# Patient Record
Sex: FEMALE | Race: WHITE | Employment: OTHER | URBAN - NONMETROPOLITAN AREA
[De-identification: names, ages, dates, MRNs, and addresses within clinical notes are randomized per-mention and may not be internally consistent; named-entity substitution may affect disease eponyms.]

---

## 2017-01-31 PROBLEM — M67.442 GANGLION CYST OF FINGER OF LEFT HAND: Status: ACTIVE | Noted: 2017-01-31

## 2017-07-17 ENCOUNTER — HOSPITAL ENCOUNTER (OUTPATIENT)
Dept: PHYSICAL THERAPY | Age: 77
Setting detail: THERAPIES SERIES
Discharge: HOME | End: 2017-07-17
Payer: MEDICARE

## 2017-07-17 PROCEDURE — 97110 THERAPEUTIC EXERCISES: CPT

## 2017-07-17 PROCEDURE — 97035 APP MDLTY 1+ULTRASOUND EA 15: CPT

## 2017-07-17 ASSESSMENT — PAIN SCALES - GENERAL: PAINLEVEL_OUTOF10: 5

## 2017-07-17 ASSESSMENT — PAIN DESCRIPTION - ORIENTATION: ORIENTATION: LOWER

## 2017-07-17 ASSESSMENT — PAIN DESCRIPTION - LOCATION: LOCATION: BACK

## 2017-07-19 ENCOUNTER — HOSPITAL ENCOUNTER (OUTPATIENT)
Dept: PHYSICAL THERAPY | Age: 77
Setting detail: THERAPIES SERIES
Discharge: HOME | End: 2017-07-19
Payer: MEDICARE

## 2017-07-19 PROCEDURE — 97110 THERAPEUTIC EXERCISES: CPT

## 2017-07-19 PROCEDURE — 97035 APP MDLTY 1+ULTRASOUND EA 15: CPT

## 2017-07-19 ASSESSMENT — PAIN DESCRIPTION - LOCATION: LOCATION: BACK

## 2017-07-19 ASSESSMENT — PAIN SCALES - GENERAL: PAINLEVEL_OUTOF10: 4

## 2017-07-19 ASSESSMENT — PAIN DESCRIPTION - PAIN TYPE: TYPE: CHRONIC PAIN

## 2017-07-19 ASSESSMENT — PAIN DESCRIPTION - ORIENTATION: ORIENTATION: RIGHT;LOWER

## 2017-07-24 ENCOUNTER — APPOINTMENT (OUTPATIENT)
Dept: PHYSICAL THERAPY | Age: 77
End: 2017-07-24
Payer: MEDICARE

## 2017-07-26 ENCOUNTER — APPOINTMENT (OUTPATIENT)
Dept: PHYSICAL THERAPY | Age: 77
End: 2017-07-26
Payer: MEDICARE

## 2017-07-27 ENCOUNTER — APPOINTMENT (OUTPATIENT)
Dept: PHYSICAL THERAPY | Age: 77
End: 2017-07-27
Payer: MEDICARE

## 2017-09-05 LAB
CHOLESTEROL, TOTAL: 168 MG/DL
CHOLESTEROL/HDL RATIO: 2.8
HDLC SERPL-MCNC: 60 MG/DL (ref 35–70)
LDL CHOLESTEROL CALCULATED: 70 MG/DL (ref 0–160)
TRIGL SERPL-MCNC: 148 MG/DL
VLDLC SERPL CALC-MCNC: 29 MG/DL

## 2017-09-22 ENCOUNTER — HOSPITAL ENCOUNTER (OUTPATIENT)
Dept: WOMENS IMAGING | Age: 77
Discharge: HOME | End: 2017-09-22
Payer: MEDICARE

## 2017-09-22 DIAGNOSIS — Z12.31 VISIT FOR SCREENING MAMMOGRAM: ICD-10-CM

## 2017-09-22 PROCEDURE — G0202 SCR MAMMO BI INCL CAD: HCPCS

## 2017-10-19 ENCOUNTER — HOSPITAL ENCOUNTER (OUTPATIENT)
Dept: INTERVENTIONAL RADIOLOGY/VASCULAR | Age: 77
Discharge: HOME | End: 2017-10-19
Payer: MEDICARE

## 2017-10-19 VITALS
TEMPERATURE: 97.7 F | BODY MASS INDEX: 27.81 KG/M2 | DIASTOLIC BLOOD PRESSURE: 76 MMHG | RESPIRATION RATE: 16 BRPM | WEIGHT: 157 LBS | OXYGEN SATURATION: 98 % | SYSTOLIC BLOOD PRESSURE: 136 MMHG | HEART RATE: 55 BPM

## 2017-10-19 PROCEDURE — 6360000002 HC RX W HCPCS

## 2017-10-19 PROCEDURE — 62323 NJX INTERLAMINAR LMBR/SAC: CPT | Performed by: RADIOLOGY

## 2017-10-19 PROCEDURE — 2500000003 HC RX 250 WO HCPCS

## 2017-10-19 PROCEDURE — 6360000004 HC RX CONTRAST MEDICATION: Performed by: RADIOLOGY

## 2017-10-19 RX ORDER — BUPIVACAINE HYDROCHLORIDE 2.5 MG/ML
5 INJECTION, SOLUTION EPIDURAL; INFILTRATION; INTRACAUDAL ONCE
Status: COMPLETED | OUTPATIENT
Start: 2017-10-19 | End: 2017-10-19

## 2017-10-19 RX ORDER — METHYLPREDNISOLONE ACETATE 80 MG/ML
80 INJECTION, SUSPENSION INTRA-ARTICULAR; INTRALESIONAL; INTRAMUSCULAR; SOFT TISSUE ONCE
Status: COMPLETED | OUTPATIENT
Start: 2017-10-19 | End: 2017-10-19

## 2017-10-19 RX ADMIN — METHYLPREDNISOLONE ACETATE 80 MG: 80 INJECTION, SUSPENSION INTRA-ARTICULAR; INTRALESIONAL; INTRAMUSCULAR; SOFT TISSUE at 09:20

## 2017-10-19 RX ADMIN — BUPIVACAINE HYDROCHLORIDE 2 ML: 2.5 INJECTION, SOLUTION EPIDURAL; INFILTRATION; INTRACAUDAL at 09:20

## 2017-10-19 RX ADMIN — IOHEXOL 1 ML: 180 INJECTION INTRAVENOUS at 09:20

## 2017-10-19 ASSESSMENT — PAIN - FUNCTIONAL ASSESSMENT: PAIN_FUNCTIONAL_ASSESSMENT: 0-10

## 2017-10-19 ASSESSMENT — PAIN SCALES - GENERAL
PAINLEVEL_OUTOF10: 0
PAINLEVEL_OUTOF10: 5
PAINLEVEL_OUTOF10: 5
PAINLEVEL_OUTOF10: 0

## 2017-10-19 ASSESSMENT — PAIN DESCRIPTION - PAIN TYPE: TYPE: CHRONIC PAIN

## 2017-10-19 ASSESSMENT — PAIN DESCRIPTION - ORIENTATION: ORIENTATION: RIGHT;LOWER

## 2017-10-19 ASSESSMENT — PAIN DESCRIPTION - DESCRIPTORS
DESCRIPTORS: ACHING
DESCRIPTORS: ACHING

## 2017-10-19 ASSESSMENT — PAIN DESCRIPTION - LOCATION: LOCATION: BACK

## 2017-10-19 NOTE — PROGRESS NOTES
Formulation and discussion of sedation / procedure plans, risks, benefits, side effects and alternatives with patient and/or responsible adult completed.     Electronically signed by Anamika Vallejo MD on 10/19/2017 at 9:22 AM

## 2017-10-19 NOTE — PROGRESS NOTES
8:55 AM pt arrives to OPN for nerve block.  All questions and concerns addressed  8:55 AM PATIENT RIGHTS AND RESPONSIBILITIES SHEET OFFERED TO PT TO READ.  9:03 AM to specials

## 2017-10-19 NOTE — PROGRESS NOTES
Patient in recovery, vitals are stable. She denies any new numbness/tingling in her extremities. Patients injection site band-aid is dry and intact.

## 2018-03-06 LAB
CHOLESTEROL, TOTAL: 207 MG/DL
CHOLESTEROL/HDL RATIO: 2.7
HDLC SERPL-MCNC: 74 MG/DL (ref 35–70)
LDL CHOLESTEROL CALCULATED: 82 MG/DL (ref 0–160)
TRIGL SERPL-MCNC: 149 MG/DL
VLDLC SERPL CALC-MCNC: 29 MG/DL

## 2018-04-03 ENCOUNTER — TELEPHONE (OUTPATIENT)
Dept: FAMILY MEDICINE CLINIC | Age: 78
End: 2018-04-03

## 2018-04-18 ENCOUNTER — OFFICE VISIT (OUTPATIENT)
Dept: FAMILY MEDICINE CLINIC | Age: 78
End: 2018-04-18
Payer: MEDICARE

## 2018-04-18 VITALS
SYSTOLIC BLOOD PRESSURE: 130 MMHG | HEART RATE: 76 BPM | BODY MASS INDEX: 27.21 KG/M2 | HEIGHT: 63 IN | RESPIRATION RATE: 16 BRPM | WEIGHT: 153.6 LBS | TEMPERATURE: 98.5 F | DIASTOLIC BLOOD PRESSURE: 80 MMHG

## 2018-04-18 DIAGNOSIS — G89.29 CHRONIC MIDLINE LOW BACK PAIN WITHOUT SCIATICA: Chronic | ICD-10-CM

## 2018-04-18 DIAGNOSIS — Z23 NEED FOR PNEUMOCOCCAL VACCINATION: ICD-10-CM

## 2018-04-18 DIAGNOSIS — M51.36 DDD (DEGENERATIVE DISC DISEASE), LUMBAR: ICD-10-CM

## 2018-04-18 DIAGNOSIS — I10 ESSENTIAL HYPERTENSION: Primary | Chronic | ICD-10-CM

## 2018-04-18 DIAGNOSIS — M15.9 PRIMARY OSTEOARTHRITIS INVOLVING MULTIPLE JOINTS: Chronic | ICD-10-CM

## 2018-04-18 DIAGNOSIS — E87.5 HYPERKALEMIA: ICD-10-CM

## 2018-04-18 DIAGNOSIS — R76.8 POSITIVE ANA (ANTINUCLEAR ANTIBODY): ICD-10-CM

## 2018-04-18 DIAGNOSIS — E78.5 DYSLIPIDEMIA: ICD-10-CM

## 2018-04-18 DIAGNOSIS — N18.30 CKD (CHRONIC KIDNEY DISEASE) STAGE 3, GFR 30-59 ML/MIN (HCC): Chronic | ICD-10-CM

## 2018-04-18 DIAGNOSIS — M54.50 CHRONIC MIDLINE LOW BACK PAIN WITHOUT SCIATICA: Chronic | ICD-10-CM

## 2018-04-18 PROCEDURE — 3017F COLORECTAL CA SCREEN DOC REV: CPT | Performed by: FAMILY MEDICINE

## 2018-04-18 PROCEDURE — 4040F PNEUMOC VAC/ADMIN/RCVD: CPT | Performed by: FAMILY MEDICINE

## 2018-04-18 PROCEDURE — 1036F TOBACCO NON-USER: CPT | Performed by: FAMILY MEDICINE

## 2018-04-18 PROCEDURE — 1123F ACP DISCUSS/DSCN MKR DOCD: CPT | Performed by: FAMILY MEDICINE

## 2018-04-18 PROCEDURE — 1090F PRES/ABSN URINE INCON ASSESS: CPT | Performed by: FAMILY MEDICINE

## 2018-04-18 PROCEDURE — 99204 OFFICE O/P NEW MOD 45 MIN: CPT | Performed by: FAMILY MEDICINE

## 2018-04-18 PROCEDURE — 90732 PPSV23 VACC 2 YRS+ SUBQ/IM: CPT | Performed by: FAMILY MEDICINE

## 2018-04-18 PROCEDURE — G8427 DOCREV CUR MEDS BY ELIG CLIN: HCPCS | Performed by: FAMILY MEDICINE

## 2018-04-18 PROCEDURE — G8400 PT W/DXA NO RESULTS DOC: HCPCS | Performed by: FAMILY MEDICINE

## 2018-04-18 PROCEDURE — G0009 ADMIN PNEUMOCOCCAL VACCINE: HCPCS | Performed by: FAMILY MEDICINE

## 2018-04-18 PROCEDURE — G8419 CALC BMI OUT NRM PARAM NOF/U: HCPCS | Performed by: FAMILY MEDICINE

## 2018-04-18 RX ORDER — LISINOPRIL AND HYDROCHLOROTHIAZIDE 12.5; 1 MG/1; MG/1
TABLET ORAL
Refills: 1 | COMMUNITY
Start: 2018-03-02 | End: 2018-09-18 | Stop reason: SDUPTHER

## 2018-04-18 RX ORDER — MELOXICAM 7.5 MG/1
7.5 TABLET ORAL DAILY PRN
Qty: 90 TABLET | Refills: 3 | Status: SHIPPED | OUTPATIENT
Start: 2018-04-18 | End: 2019-05-19 | Stop reason: SDUPTHER

## 2018-04-18 RX ORDER — SIMVASTATIN 20 MG
TABLET ORAL
Refills: 1 | COMMUNITY
Start: 2018-02-27 | End: 2018-09-18 | Stop reason: SDUPTHER

## 2018-04-18 RX ORDER — THIAMINE HCL 100 MG
TABLET ORAL 2 TIMES DAILY
COMMUNITY
End: 2019-04-10

## 2018-04-18 ASSESSMENT — PATIENT HEALTH QUESTIONNAIRE - PHQ9
1. LITTLE INTEREST OR PLEASURE IN DOING THINGS: 0
SUM OF ALL RESPONSES TO PHQ9 QUESTIONS 1 & 2: 0
SUM OF ALL RESPONSES TO PHQ QUESTIONS 1-9: 0
2. FEELING DOWN, DEPRESSED OR HOPELESS: 0

## 2018-05-21 ENCOUNTER — OFFICE VISIT (OUTPATIENT)
Dept: RHEUMATOLOGY | Age: 78
End: 2018-05-21
Payer: MEDICARE

## 2018-05-21 VITALS
HEART RATE: 64 BPM | HEIGHT: 63 IN | OXYGEN SATURATION: 98 % | BODY MASS INDEX: 27.11 KG/M2 | SYSTOLIC BLOOD PRESSURE: 132 MMHG | DIASTOLIC BLOOD PRESSURE: 71 MMHG | WEIGHT: 153 LBS

## 2018-05-21 DIAGNOSIS — M19.071 OSTEOARTHRITIS OF BOTH FEET, UNSPECIFIED OSTEOARTHRITIS TYPE: ICD-10-CM

## 2018-05-21 DIAGNOSIS — M19.072 OSTEOARTHRITIS OF BOTH FEET, UNSPECIFIED OSTEOARTHRITIS TYPE: ICD-10-CM

## 2018-05-21 DIAGNOSIS — R76.8 ANA POSITIVE: ICD-10-CM

## 2018-05-21 DIAGNOSIS — M15.4 EROSIVE OSTEOARTHRITIS OF HANDS, BILATERAL: ICD-10-CM

## 2018-05-21 DIAGNOSIS — M25.50 POLYARTHRALGIA: Primary | ICD-10-CM

## 2018-05-21 PROCEDURE — G8427 DOCREV CUR MEDS BY ELIG CLIN: HCPCS | Performed by: INTERNAL MEDICINE

## 2018-05-21 PROCEDURE — 3017F COLORECTAL CA SCREEN DOC REV: CPT | Performed by: INTERNAL MEDICINE

## 2018-05-21 PROCEDURE — 1090F PRES/ABSN URINE INCON ASSESS: CPT | Performed by: INTERNAL MEDICINE

## 2018-05-21 PROCEDURE — G8419 CALC BMI OUT NRM PARAM NOF/U: HCPCS | Performed by: INTERNAL MEDICINE

## 2018-05-21 PROCEDURE — 99204 OFFICE O/P NEW MOD 45 MIN: CPT | Performed by: INTERNAL MEDICINE

## 2018-05-21 ASSESSMENT — ENCOUNTER SYMPTOMS
SHORTNESS OF BREATH: 0
WHEEZING: 0
ABDOMINAL PAIN: 0
DIARRHEA: 1
EYES NEGATIVE: 1
RESPIRATORY NEGATIVE: 1
CONSTIPATION: 0
BLOOD IN STOOL: 0
COUGH: 0
SPUTUM PRODUCTION: 0

## 2018-06-08 LAB
C-REACTIVE PROTEIN: 0.5 MG/DL
PHOSPHORUS: 4.3 MG/DL (ref 2.4–4.7)
RHEUMATOID FACTOR: NEGATIVE
SEDIMENTATION RATE, ERYTHROCYTE: 12 MM/HR
URIC ACID: 6.8 MG/DL (ref 2.6–8)

## 2018-06-09 LAB
DOUBLE STRANDED DNA AB, IGG: <12.3 IU/ML
ENA TO SSA (RO) ANTIBODY: <0.2 U
ENA TO SSB (LA) ANTIBODY: <0.2 U
RNP AB, IGG: <0.2 U
SMITH ANTIBODY, IGG: <0.2 U

## 2018-06-29 ENCOUNTER — TELEPHONE (OUTPATIENT)
Dept: FAMILY MEDICINE CLINIC | Age: 78
End: 2018-06-29

## 2018-06-29 DIAGNOSIS — M15.9 PRIMARY OSTEOARTHRITIS INVOLVING MULTIPLE JOINTS: Primary | Chronic | ICD-10-CM

## 2018-07-29 ENCOUNTER — APPOINTMENT (OUTPATIENT)
Dept: GENERAL RADIOLOGY | Age: 78
End: 2018-07-29
Payer: MEDICARE

## 2018-07-29 ENCOUNTER — HOSPITAL ENCOUNTER (EMERGENCY)
Age: 78
Discharge: HOME | End: 2018-07-29
Attending: EMERGENCY MEDICINE
Payer: MEDICARE

## 2018-07-29 VITALS
DIASTOLIC BLOOD PRESSURE: 67 MMHG | RESPIRATION RATE: 17 BRPM | BODY MASS INDEX: 27.1 KG/M2 | OXYGEN SATURATION: 99 % | WEIGHT: 153 LBS | HEART RATE: 68 BPM | TEMPERATURE: 97.7 F | SYSTOLIC BLOOD PRESSURE: 152 MMHG

## 2018-07-29 DIAGNOSIS — S01.21XA LACERATION OF NOSE WITHOUT COMPLICATION, INITIAL ENCOUNTER: Primary | ICD-10-CM

## 2018-07-29 DIAGNOSIS — S00.31XA ABRASION OF NOSE, INITIAL ENCOUNTER: ICD-10-CM

## 2018-07-29 PROCEDURE — 6360000002 HC RX W HCPCS: Performed by: EMERGENCY MEDICINE

## 2018-07-29 PROCEDURE — 70160 X-RAY EXAM OF NASAL BONES: CPT

## 2018-07-29 PROCEDURE — 90715 TDAP VACCINE 7 YRS/> IM: CPT | Performed by: EMERGENCY MEDICINE

## 2018-07-29 PROCEDURE — 99282 EMERGENCY DEPT VISIT SF MDM: CPT

## 2018-07-29 PROCEDURE — 2709999900 HC NON-CHARGEABLE SUPPLY

## 2018-07-29 PROCEDURE — 90471 IMMUNIZATION ADMIN: CPT | Performed by: EMERGENCY MEDICINE

## 2018-07-29 RX ADMIN — TETANUS TOXOID, REDUCED DIPHTHERIA TOXOID AND ACELLULAR PERTUSSIS VACCINE, ADSORBED 0.5 ML: 5; 2.5; 8; 8; 2.5 SUSPENSION INTRAMUSCULAR at 16:17

## 2018-07-29 ASSESSMENT — ENCOUNTER SYMPTOMS
DIARRHEA: 0
WHEEZING: 0
BACK PAIN: 0
EYE DISCHARGE: 0
COUGH: 0
SORE THROAT: 0
NAUSEA: 0
EYE PAIN: 0
VOMITING: 0
RHINORRHEA: 0
ABDOMINAL PAIN: 0
SHORTNESS OF BREATH: 0

## 2018-07-29 ASSESSMENT — PAIN DESCRIPTION - PAIN TYPE: TYPE: ACUTE PAIN

## 2018-07-29 ASSESSMENT — PAIN DESCRIPTION - LOCATION: LOCATION: NOSE

## 2018-07-29 ASSESSMENT — PAIN DESCRIPTION - DESCRIPTORS: DESCRIPTORS: ACHING

## 2018-07-29 ASSESSMENT — PAIN DESCRIPTION - ORIENTATION: ORIENTATION: RIGHT;LEFT

## 2018-07-29 ASSESSMENT — PAIN SCALES - GENERAL: PAINLEVEL_OUTOF10: 8

## 2018-07-29 NOTE — ED PROVIDER NOTES
[address] [address]   eMERGENCY dEPARTMENT eNCOUnter        [facility]    Chief Complaint   Patient presents with    Laceration     left nose       Nurses Notes reviewed and I agree except as noted in the HPI. HPI    Michelle Rico is a 78 y.o. female who presents for evaluation of a laceration to the nose. The patient states she was watering some plants when she tripped and fell, hitting her nose. No LOC. No pain. No further complaints at the time of the initial encounter. REVIEW OF SYSTEMS    Review of Systems   Constitutional: Negative for appetite change, chills, fatigue and fever. HENT: Negative for congestion, ear pain, rhinorrhea and sore throat. Eyes: Negative for pain, discharge and visual disturbance. Respiratory: Negative for cough, shortness of breath and wheezing. Cardiovascular: Negative for chest pain, palpitations and leg swelling. Gastrointestinal: Negative for abdominal pain, diarrhea, nausea and vomiting. Genitourinary: Negative for difficulty urinating, dysuria and vaginal discharge. Musculoskeletal: Negative for arthralgias, back pain, joint swelling and neck pain. Skin: Positive for wound. Negative for pallor and rash. Neurological: Negative for dizziness, syncope, weakness, light-headedness and headaches. Hematological: Negative for adenopathy. Psychiatric/Behavioral: Negative for confusion, dysphoric mood and suicidal ideas. The patient is not nervous/anxious. PAST MEDICAL HISTORY     has a past medical history of Chronic low back pain; CKD (chronic kidney disease) stage 3, GFR 30-59 ml/min; DDD (degenerative disc disease), lumbar; Dyslipidemia; History of skin cancer; Hypertension; and Osteoarthritis. SURGICAL HISTORY     has a past surgical history that includes joint replacement (Bilateral); Foot surgery (Bilateral); Hysterectomy;  section; Dilation and curettage of uterus;  Carpal tunnel

## 2018-08-01 ENCOUNTER — OFFICE VISIT (OUTPATIENT)
Dept: FAMILY MEDICINE CLINIC | Age: 78
End: 2018-08-01
Payer: MEDICARE

## 2018-08-01 VITALS
SYSTOLIC BLOOD PRESSURE: 122 MMHG | DIASTOLIC BLOOD PRESSURE: 82 MMHG | WEIGHT: 155 LBS | BODY MASS INDEX: 27.46 KG/M2 | RESPIRATION RATE: 12 BRPM | TEMPERATURE: 98.3 F | HEIGHT: 63 IN | HEART RATE: 76 BPM

## 2018-08-01 DIAGNOSIS — S00.33XA CONTUSION OF NOSE, INITIAL ENCOUNTER: Primary | ICD-10-CM

## 2018-08-01 DIAGNOSIS — M51.36 DDD (DEGENERATIVE DISC DISEASE), LUMBAR: Chronic | ICD-10-CM

## 2018-08-01 DIAGNOSIS — M15.9 PRIMARY OSTEOARTHRITIS INVOLVING MULTIPLE JOINTS: Chronic | ICD-10-CM
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
## 2018-08-01 DIAGNOSIS — M54.50 CHRONIC MIDLINE LOW BACK PAIN WITHOUT SCIATICA: Chronic | ICD-10-CM
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
## 2018-08-01 DIAGNOSIS — G89.29 CHRONIC MIDLINE LOW BACK PAIN WITHOUT SCIATICA: Chronic | ICD-10-CM

## 2018-08-01 PROCEDURE — 3017F COLORECTAL CA SCREEN DOC REV: CPT | Performed by: FAMILY MEDICINE

## 2018-08-01 PROCEDURE — G8427 DOCREV CUR MEDS BY ELIG CLIN: HCPCS | Performed by: FAMILY MEDICINE

## 2018-08-01 PROCEDURE — 1123F ACP DISCUSS/DSCN MKR DOCD: CPT | Performed by: FAMILY MEDICINE

## 2018-08-01 PROCEDURE — 99213 OFFICE O/P EST LOW 20 MIN: CPT | Performed by: FAMILY MEDICINE

## 2018-08-01 PROCEDURE — 4040F PNEUMOC VAC/ADMIN/RCVD: CPT | Performed by: FAMILY MEDICINE

## 2018-08-01 PROCEDURE — G8400 PT W/DXA NO RESULTS DOC: HCPCS | Performed by: FAMILY MEDICINE

## 2018-08-01 PROCEDURE — 1036F TOBACCO NON-USER: CPT | Performed by: FAMILY MEDICINE

## 2018-08-01 PROCEDURE — 1101F PT FALLS ASSESS-DOCD LE1/YR: CPT | Performed by: FAMILY MEDICINE

## 2018-08-01 PROCEDURE — 1090F PRES/ABSN URINE INCON ASSESS: CPT | Performed by: FAMILY MEDICINE

## 2018-08-01 PROCEDURE — G8419 CALC BMI OUT NRM PARAM NOF/U: HCPCS | Performed by: FAMILY MEDICINE

## 2018-08-01 RX ORDER — TRAMADOL HYDROCHLORIDE 50 MG/1
50 TABLET ORAL EVERY 8 HOURS PRN
Qty: 90 TABLET | Refills: 2 | Status: SHIPPED | OUTPATIENT
Start: 2018-08-01 | End: 2018-12-17 | Stop reason: SDUPTHER

## 2018-08-01 NOTE — PROGRESS NOTES
Chief Complaint   Patient presents with   Prasanna Raymundo ED Follow-up     ED f/u 7/29/18 fall, pt fell and hit her face on Sunday after tripping, evaluated at ER, no new concerns     Follow-up     Chronic p[ain       History obtained from the patient. SUBJECTIVE:  Jania Loco is a 78 y.o. female that presents today for       Fall: had a fall Sunday, tripped over a brick. Hit her nose and face when fell. Tripped over a brick that wasn't supposed to be there. Seen in ER, neg xray. Sent home. Face is sore, but pain controlled. No LOC. No syncope. No pain elsewhere, otherwise doing fine. No HA or double vision. Chronic pain LAST VISIT: has chronic OA pain in back and R foot and ankle. Was on mobic, but stopped by old PCP. Has hx of mild CKD, no GI issues. Also gets tramadol, but having to take every 4 hours with tynol and not doing nearly as well. Asking what she can do. Starting to limit her activities. No falls. She understands risks of mobic, but really wants to resume and dec her tramadol dose. UPDATE TODAY: mobic working well. Due for refill of tramadol. Takes 1 to 3 per day, depending on the day. mobic helping as well. Keeps joint and back pain under good control. Age/Gender Health Maintenance    Lipid - ; LDL 82; HDL 74;  [facility] 2018)  DM Screen - [facility] 2018)  Colon Cancer Screening - Hyperplastic polyp SEPT 2011; repeat 7 years (moe)  Lung Cancer Screening (Age 54 to [de-identified] with 30 pack year hx, current smoker or quit within past 15 years) - never smoker    Tetanus - UTD July 2018  Influenza Vaccine - Candidate FALL 2018  Pneumonia Vaccine - UTD PCV 13 OCT 2015.  UTD PPV 23 APR 2018  Zostavax - Zostavax competed APR 2013     Breast Cancer Screening - NEG SEPT 2017  Cervical Cancer Screening - Aged out  Osteoporosis Screening - NEG OCT 2012    Falls screening - neg APR 2018      Current Outpatient Prescriptions   Medication Sig Dispense Refill    traMADol (ULTRAM) 50 MG tablet Take 1 tablet by mouth every 8 hours as needed for Pain for up to 90 days. . 90 tablet 2    Handicap Placard MISC by Does not apply route Expires 29 June 2023 1 each 0    Magnesium 500 MG TABS Take by mouth 2 times daily      simvastatin (ZOCOR) 20 MG tablet TK 1 T PO QPM  1    lisinopril-hydrochlorothiazide (PRINZIDE;ZESTORETIC) 10-12.5 MG per tablet TK 1 T PO QD  1    patiromer sorbitex calcium (VELTASSA) 8.4 g PACK Take by mouth      Multiple Vitamins-Minerals (PRESERVISION AREDS PO) Take by mouth      Probiotic Product (PROBIOTIC DAILY PO) Take by mouth      meloxicam (MOBIC) 7.5 MG tablet Take 1 tablet by mouth daily as needed for Pain (Patient taking differently: Take 7.5 mg by mouth daily as needed for Pain Taking every other day) 90 tablet 3    Calcium Carbonate-Vit D-Min (CALCIUM 1200 PO) Take by mouth daily      Cholecalciferol (VITAMIN D-3 PO) Take by mouth daily      Multiple Vitamins-Calcium (VIACTIV MULTI-VITAMIN) CHEW Take  by mouth daily. No current facility-administered medications for this visit. Orders Placed This Encounter   Medications    traMADol (ULTRAM) 50 MG tablet     Sig: Take 1 tablet by mouth every 8 hours as needed for Pain for up to 90 days. .     Dispense:  90 tablet     Refill:  2         All medications reviewed and reconciled, including OTC and herbal medications. Updated list given to patient.        Patient Active Problem List    Diagnosis Date Noted    Ganglion cyst of finger of left hand 01/31/2017     Priority: High     Class: Chronic    Positive WICHO (antinuclear antibody) 04/18/2018    Dyslipidemia     History of skin cancer      BCC AND SCC      Hypertension     Osteoarthritis     DDD (degenerative disc disease), lumbar     Chronic low back pain     Hyperkalemia     CKD (chronic kidney disease) stage 3, GFR 30-59 ml/min          Past Medical History:   Diagnosis Date    Chronic low back pain     CKD (chronic kidney disease) stage 3, GFR 30-59 Orthopnea, Paroxysmal Nocturnal Dyspnea  Respiratory:  Cough, Wheezing, Shortness of breath, Chest tightness, Apnea  Gastrointestinal:  Nausea, Vomiting, Diarrhea, Constipation, Heartburn, Blood in stool  Genitourinary:  Difficulty or painful urination, Flank pain, Change in frequency, Urgency  Skin:  Color change, Rash, Itching, Wound  Musculoskeletal:  Joint pain, Back pain, Gait problems, Joint swelling, Myalgias  Neurological:  Dizziness, Headaches, Presyncope, Numbness, Seizures, Tremors  Endocrine:  Heat Intolerance, Cold Intolerance, Polydipsia, Polyphagia, Polyuria      PHYSICAL EXAM:  Vitals:    08/01/18 1636   BP: 122/82   Pulse: 76   Resp: 12   Temp: 98.3 °F (36.8 °C)   Weight: 155 lb (70.3 kg)   Height: 5' 3.25\" (1.607 m)     Body mass index is 27.24 kg/m². Pain Score:   4 (face/joints)    VS Reviewed  General Appearance: A&O x 3, No acute distress,well developed and well- nourished  Eyes: pupils equal, round, and reactive to light, extraocular eye movements intact, conjunctivae and eye lids without erythema  ENT: external ear and ear canal clear bilaterally, TMs intact and regular, nose without deformity, nasal mucosa and turbinates normal without polyps, oropharynx normal, dentition is normal for age. Scattered bruising on face, laceration on bridge of nose, healing w/o drainage. EOMI. Neck: supple and non-tender without mass, no thyromegaly or thyroid nodules, no cervical lymphadenopathy  Pulmonary/Chest: clear to auscultation bilaterally- no wheezes, rales or rhonchi, normal air movement, no respiratory distress or retractions  Cardiovascular: S1 and S2 auscultated w/ RRR. No murmurs, rubs, clicks, or gallops, distal pulses intact. Abdomen: soft, non-tender, non-distended, bowel sounds physiologic,  no rebound or guarding, no masses or hernias noted. Liver and spleen without enlargement. Extremities: no cyanosis, clubbing or edema of the lower extremities. +2 PT/DP bilaterally.

## 2018-08-01 NOTE — PATIENT INSTRUCTIONS
Patient Education        Arthritis: Care Instructions  Your Care Instructions  Arthritis, also called osteoarthritis, is a breakdown of the cartilage that cushions your joints. When the cartilage wears down, your bones rub against each other. This causes pain and stiffness. Many people have some arthritis as they age. Arthritis most often affects the joints of the spine, hands, hips, knees, or feet. You can take simple measures to protect your joints, ease your pain, and help you stay active. Follow-up care is a key part of your treatment and safety. Be sure to make and go to all appointments, and call your doctor if you are having problems. It's also a good idea to know your test results and keep a list of the medicines you take. How can you care for yourself at home? · Stay at a healthy weight. Being overweight puts extra strain on your joints. · Talk to your doctor or physical therapist about exercises that will help ease joint pain. ¨ Stretch. You may enjoy gentle forms of yoga to help keep your joints and muscles flexible. ¨ Walk instead of jog. Other types of exercise that are less stressful on the joints include riding a bicycle, swimming, fabián chi, or water exercise. ¨ Lift weights. Strong muscles help reduce stress on your joints. Stronger thigh muscles, for example, take some of the stress off of the knees and hips. Learn the right way to lift weights so you do not make joint pain worse. · Take your medicines exactly as prescribed. Call your doctor if you think you are having a problem with your medicine. · Take pain medicines exactly as directed. ¨ If the doctor gave you a prescription medicine for pain, take it as prescribed. ¨ If you are not taking a prescription pain medicine, ask your doctor if you can take an over-the-counter medicine. · Use a cane, crutch, walker, or another device if you need help to get around. These can help rest your joints.  You also can use other things to make

## 2018-08-22 ENCOUNTER — OFFICE VISIT (OUTPATIENT)
Dept: RHEUMATOLOGY | Age: 78
End: 2018-08-22
Payer: MEDICARE

## 2018-08-22 VITALS
WEIGHT: 154 LBS | SYSTOLIC BLOOD PRESSURE: 132 MMHG | OXYGEN SATURATION: 100 % | DIASTOLIC BLOOD PRESSURE: 76 MMHG | BODY MASS INDEX: 27.06 KG/M2 | HEART RATE: 74 BPM

## 2018-08-22 DIAGNOSIS — Z78.0 POST-MENOPAUSAL: ICD-10-CM

## 2018-08-22 DIAGNOSIS — M19.072 OSTEOARTHRITIS OF BOTH FEET, UNSPECIFIED OSTEOARTHRITIS TYPE: ICD-10-CM

## 2018-08-22 DIAGNOSIS — M19.071 OSTEOARTHRITIS OF BOTH FEET, UNSPECIFIED OSTEOARTHRITIS TYPE: ICD-10-CM

## 2018-08-22 DIAGNOSIS — R76.8 ANA POSITIVE: Primary | ICD-10-CM

## 2018-08-22 DIAGNOSIS — M51.36 DDD (DEGENERATIVE DISC DISEASE), LUMBAR: ICD-10-CM

## 2018-08-22 DIAGNOSIS — M15.4 EROSIVE OSTEOARTHRITIS OF HANDS, BILATERAL: ICD-10-CM

## 2018-08-22 PROCEDURE — G8400 PT W/DXA NO RESULTS DOC: HCPCS | Performed by: INTERNAL MEDICINE

## 2018-08-22 PROCEDURE — G8419 CALC BMI OUT NRM PARAM NOF/U: HCPCS | Performed by: INTERNAL MEDICINE

## 2018-08-22 PROCEDURE — G8427 DOCREV CUR MEDS BY ELIG CLIN: HCPCS | Performed by: INTERNAL MEDICINE

## 2018-08-22 PROCEDURE — 1090F PRES/ABSN URINE INCON ASSESS: CPT | Performed by: INTERNAL MEDICINE

## 2018-08-22 PROCEDURE — 1101F PT FALLS ASSESS-DOCD LE1/YR: CPT | Performed by: INTERNAL MEDICINE

## 2018-08-22 PROCEDURE — 1036F TOBACCO NON-USER: CPT | Performed by: INTERNAL MEDICINE

## 2018-08-22 PROCEDURE — 3017F COLORECTAL CA SCREEN DOC REV: CPT | Performed by: INTERNAL MEDICINE

## 2018-08-22 PROCEDURE — 1123F ACP DISCUSS/DSCN MKR DOCD: CPT | Performed by: INTERNAL MEDICINE

## 2018-08-22 PROCEDURE — 99213 OFFICE O/P EST LOW 20 MIN: CPT | Performed by: INTERNAL MEDICINE

## 2018-08-22 PROCEDURE — 4040F PNEUMOC VAC/ADMIN/RCVD: CPT | Performed by: INTERNAL MEDICINE

## 2018-08-22 RX ORDER — OMEPRAZOLE 20 MG/1
20 CAPSULE, DELAYED RELEASE ORAL DAILY
Qty: 90 CAPSULE | Refills: 3 | Status: SHIPPED | OUTPATIENT
Start: 2018-08-22 | End: 2018-12-19 | Stop reason: SDUPTHER

## 2018-08-22 RX ORDER — OMEPRAZOLE 20 MG/1
20 CAPSULE, DELAYED RELEASE ORAL DAILY
Qty: 30 CAPSULE | Refills: 3 | Status: SHIPPED | OUTPATIENT
Start: 2018-08-22 | End: 2018-08-22 | Stop reason: SDUPTHER

## 2018-08-22 ASSESSMENT — ENCOUNTER SYMPTOMS
EYES NEGATIVE: 1
BLOOD IN STOOL: 0
WHEEZING: 0
COUGH: 0
SPUTUM PRODUCTION: 0
CONSTIPATION: 0
RESPIRATORY NEGATIVE: 1
DIARRHEA: 1
SHORTNESS OF BREATH: 0
ABDOMINAL PAIN: 1

## 2018-08-22 NOTE — PROGRESS NOTES
and weight loss. HENT: Negative. Eyes: Negative. Respiratory: Negative. Negative for cough, sputum production, shortness of breath and wheezing. Cardiovascular: Negative for chest pain, palpitations, claudication and leg swelling. Gastrointestinal: Positive for abdominal pain and diarrhea (since gallbladder removed. ). Negative for blood in stool, constipation and melena. Genitourinary: Negative. Musculoskeletal: Positive for myalgias (rarely in legs). Skin: Negative. Neurological: Negative for dizziness, tingling and headaches. Endo/Heme/Allergies: Negative. Psychiatric/Behavioral: The patient has insomnia. PAST MEDICAL HISTORY  Past Medical History:   Diagnosis Date    Chronic low back pain     CKD (chronic kidney disease) stage 3, GFR 30-59 ml/min     DDD (degenerative disc disease), lumbar     Dyslipidemia     History of skin cancer     BCC AND SCC    Hypertension     Osteoarthritis        SOCIAL HISTORY  Social History     Social History    Marital status:       Spouse name: N/A    Number of children: N/A    Years of education: N/A     Social History Main Topics    Smoking status: Never Smoker    Smokeless tobacco: Never Used    Alcohol use Yes      Comment: OCCASIONALLY    Drug use: No    Sexual activity: Not Currently     Other Topics Concern    None     Social History Narrative    None       FAMILY HISTORY  Family History   Problem Relation Age of Onset    Heart Disease Mother         CHF    Alzheimer's Disease Mother     Dementia Mother     Cancer Father         LUNG    Emphysema Father     Colon Cancer Neg Hx     Breast Cancer Neg Hx        SURGICAL HISTORY  Past Surgical History:   Procedure Laterality Date    BACK SURGERY      lower lumbar fusion    CARPAL TUNNEL RELEASE Bilateral      SECTION      CHOLECYSTECTOMY, LAPAROSCOPIC  10/03/2017    COLONOSCOPY      CYST REMOVAL Left 2017    left  5th digit- extremities:  Muscle strength 5/5, FROM,   Fingers: fullness right 2-3 MCPs, gretta nodes bilateral PIPs, Left > right CMC squaring, left CMC grind testing.    - mild gretta nodes. Wrist - elbows: non-tender, no synovits. Hips: left outer hip tenderness, increase w/ resisted abduction and internal rotation. Ankles: tenderness bilateral. No swelling or warmth  Feet: mid-foot tenderness bilateral bilateral.    - boutonier of the right 2nd toe. Spine: tender & hypertonic  upper traps bilat    Spine:   - hypertonicity and tenderness of the bilateral traps. Psych: Oriented to person, place, time. No anxiety or agitation. Skin: Warm and dry. No nodule on exposed extremities. No rash on exposed extremities. Lymph: No cervical LAD.  No supraclavicular LAD.      8/22/2018 --- RAPID 3: 0.3 + 3 + 0 = 3.3     RAPID3 Total Score: 4      Remission: <3  Low Disease Activity: <6  Moderate Disease Activity: >=6 and <=12  High Disease Activity: >12    LABS:  CBC  Lab Results   Component Value Date    WBC 5.3 05/03/2018    RBC 4.35 05/03/2018    HGB 12.8 05/03/2018    HCT 38.9 05/03/2018    MCV 89.5 05/03/2018    MCH 29.4 05/03/2018    MCHC 32.9 05/03/2018    RDW 13.9 05/03/2018     05/03/2018       CMP  Lab Results   Component Value Date    CALCIUM 9.4 05/03/2018     05/03/2018    K 4.8 05/03/2018    CO2 31 05/03/2018     05/03/2018    BUN 21 05/03/2018    CREATININE 1.03 05/03/2018       HgBA1c: No components found for: HGBA1C    No results found for: TSHFT4, TSH  No results found for: VITD25      No results found for: ANASCRN  No results found for: SSA  No results found for: SSB  No results found for: ANTI-SMITH  No results found for: DSDNAAB   No results found for: ANTIRNP  No results found for: C3, C4  No results found for: CCPAB  Lab Results   Component Value Date    RF Negative 06/08/2018       No components found for: CANCASCRN, APANCASCRN  Lab Results   Component Value Date    SEDRATE 12 continue mobic 7.5mg every other day prn pain from PCP   - would benefit from a PPI given persistent -- Rx for prilosec 20mg daily provided. Low back pain:   - intermittent, localized back pain, denies red flag sx's of leg paresthesias, saddle anesthesias, pseudoclaudications, wt loss, fevers, weakness of the legs. - continue as needed pain relief. - currently not concerning for inflammatory back pain or spinal stenosis. Post menopausal :   - DEXA ordered for re-evaluation. - continue calcium and vitamin D suppelentation. Prolonged NSAID use. we have discussed the use of nonsteroidal anti-inflammatory medications which include but not limited to Gastrointestinal toxicity (such as ulceration and bleeding), renal toxicity, liver toxicity, and potential cardiovascular toxicity. - Prilosec 20mg daily started for GI protection given her NSAID use          No Follow-up on file. Electronically signed by Zachary Guzman DO on 8/22/2018 at 10:03 AM      Thank you for allowing me to participate in the care of this patient. Please call if there are any questions.

## 2018-08-27 ENCOUNTER — HOSPITAL ENCOUNTER (OUTPATIENT)
Dept: GENERAL RADIOLOGY | Age: 78
Discharge: HOME | End: 2018-08-27
Payer: MEDICARE

## 2018-08-27 ENCOUNTER — HOSPITAL ENCOUNTER (OUTPATIENT)
Age: 78
Discharge: HOME | End: 2018-08-27
Payer: MEDICARE

## 2018-08-27 ENCOUNTER — HOSPITAL ENCOUNTER (OUTPATIENT)
Dept: WOMENS IMAGING | Age: 78
Discharge: HOME | End: 2018-08-27
Payer: MEDICARE

## 2018-08-27 DIAGNOSIS — Z78.0 POST-MENOPAUSAL: ICD-10-CM

## 2018-08-27 DIAGNOSIS — M15.4 EROSIVE OSTEOARTHRITIS OF HANDS, BILATERAL: ICD-10-CM

## 2018-08-27 PROCEDURE — 77080 DXA BONE DENSITY AXIAL: CPT

## 2018-08-27 PROCEDURE — 73130 X-RAY EXAM OF HAND: CPT

## 2018-08-28 ENCOUNTER — TELEPHONE (OUTPATIENT)
Dept: RHEUMATOLOGY | Age: 78
End: 2018-08-28

## 2018-09-07 ENCOUNTER — ANESTHESIA EVENT (OUTPATIENT)
Dept: ENDOSCOPY | Age: 78
End: 2018-09-07
Payer: MEDICARE

## 2018-09-07 ENCOUNTER — ANESTHESIA (OUTPATIENT)
Dept: ENDOSCOPY | Age: 78
End: 2018-09-07
Payer: MEDICARE

## 2018-09-07 ENCOUNTER — HOSPITAL ENCOUNTER (OUTPATIENT)
Age: 78
Setting detail: OUTPATIENT SURGERY
Discharge: HOME | End: 2018-09-07
Attending: INTERNAL MEDICINE | Admitting: INTERNAL MEDICINE
Payer: MEDICARE

## 2018-09-07 VITALS
OXYGEN SATURATION: 100 % | SYSTOLIC BLOOD PRESSURE: 120 MMHG | DIASTOLIC BLOOD PRESSURE: 60 MMHG | HEART RATE: 66 BPM | WEIGHT: 150.4 LBS | RESPIRATION RATE: 16 BRPM | HEIGHT: 63 IN | BODY MASS INDEX: 26.65 KG/M2 | TEMPERATURE: 98.3 F

## 2018-09-07 VITALS
RESPIRATION RATE: 15 BRPM | OXYGEN SATURATION: 99 % | DIASTOLIC BLOOD PRESSURE: 59 MMHG | SYSTOLIC BLOOD PRESSURE: 127 MMHG

## 2018-09-07 PROCEDURE — 6360000002 HC RX W HCPCS: Performed by: REGISTERED NURSE

## 2018-09-07 PROCEDURE — 3609027000 HC COLONOSCOPY: Performed by: INTERNAL MEDICINE

## 2018-09-07 PROCEDURE — 2709999900 HC NON-CHARGEABLE SUPPLY: Performed by: INTERNAL MEDICINE

## 2018-09-07 PROCEDURE — 7100000001 HC PACU RECOVERY - ADDTL 15 MIN: Performed by: INTERNAL MEDICINE

## 2018-09-07 PROCEDURE — 3700000000 HC ANESTHESIA ATTENDED CARE: Performed by: INTERNAL MEDICINE

## 2018-09-07 PROCEDURE — 3700000001 HC ADD 15 MINUTES (ANESTHESIA): Performed by: INTERNAL MEDICINE

## 2018-09-07 PROCEDURE — 2580000003 HC RX 258: Performed by: INTERNAL MEDICINE

## 2018-09-07 PROCEDURE — 7100000000 HC PACU RECOVERY - FIRST 15 MIN: Performed by: INTERNAL MEDICINE

## 2018-09-07 RX ORDER — PROPOFOL 10 MG/ML
INJECTION, EMULSION INTRAVENOUS PRN
Status: DISCONTINUED | OUTPATIENT
Start: 2018-09-07 | End: 2018-09-07 | Stop reason: SDUPTHER

## 2018-09-07 RX ORDER — SODIUM CHLORIDE 450 MG/100ML
INJECTION, SOLUTION INTRAVENOUS CONTINUOUS
Status: DISCONTINUED | OUTPATIENT
Start: 2018-09-07 | End: 2018-09-07 | Stop reason: HOSPADM

## 2018-09-07 RX ADMIN — PROPOFOL 200 MG: 10 INJECTION, EMULSION INTRAVENOUS at 12:18

## 2018-09-07 RX ADMIN — SODIUM CHLORIDE: 4.5 INJECTION, SOLUTION INTRAVENOUS at 11:18

## 2018-09-07 ASSESSMENT — PAIN SCALES - GENERAL
PAINLEVEL_OUTOF10: 0
PAINLEVEL_OUTOF10: 0

## 2018-09-07 ASSESSMENT — PAIN - FUNCTIONAL ASSESSMENT: PAIN_FUNCTIONAL_ASSESSMENT: 0-10

## 2018-09-07 NOTE — ANESTHESIA POSTPROCEDURE EVALUATION
Department of Anesthesiology  Postprocedure Note    Patient: Saintclair Awkward  MRN: [id number]  YOB: 1940  Date of evaluation: 9/7/2018  Time:  12:37 PM     Procedure Summary     Date:  09/07/18 Room / Location:  [address] [facility] ENDO 15 / [address] [facility]    Anesthesia Start:  7500 Anesthesia Stop:  7741    Procedure:  COLONOSCOPY (N/A Anus) Diagnosis:  (PERSONAL HX OF POLYPS)    Surgeon:  Reina Castaneda MD Responsible Provider:  Madonna Hemphill MD    Anesthesia Type:  MAC ASA Status:  2          Anesthesia Type: No value filed. Melissa Phase I: Melissa Score: 10    Melissa Phase II:      Last vitals: Reviewed and per EMR flowsheets.        Anesthesia Post Evaluation    Patient location during evaluation: PACU  Patient participation: complete - patient participated  Level of consciousness: awake and alert  Pain score: 0  Airway patency: patent  Nausea & Vomiting: no vomiting and no nausea  Complications: no  Cardiovascular status: blood pressure returned to baseline  Respiratory status: acceptable and spontaneous ventilation  Hydration status: euvolemic

## 2018-09-07 NOTE — ANESTHESIA PRE PROCEDURE
Last 3 Encounters:   09/07/18 (!) 144/71   08/22/18 132/76   08/01/18 122/82       NPO Status: Time of last liquid consumption: 0615                        Time of last solid consumption: 1800                        Date of last liquid consumption: 09/07/18                        Date of last solid food consumption: 09/05/18    BMI:   Wt Readings from Last 3 Encounters:   09/07/18 150 lb 6.4 oz (68.2 kg)   08/22/18 154 lb (69.9 kg)   08/01/18 155 lb (70.3 kg)     Body mass index is 26.64 kg/m². CBC:   Lab Results   Component Value Date    WBC 5.3 05/03/2018    RBC 4.35 05/03/2018    HGB 12.8 05/03/2018    HCT 38.9 05/03/2018    MCV 89.5 05/03/2018    RDW 13.9 05/03/2018     05/03/2018       CMP:   Lab Results   Component Value Date     05/03/2018    K 4.8 05/03/2018     05/03/2018    CO2 31 05/03/2018    BUN 21 05/03/2018    CREATININE 1.03 05/03/2018    LABGLOM 44 03/05/2017    GLUCOSE 99 05/03/2018    CALCIUM 9.4 05/03/2018       POC Tests: No results for input(s): POCGLU, POCNA, POCK, POCCL, POCBUN, POCHEMO, POCHCT in the last 72 hours.     Coags:   Lab Results   Component Value Date    INR 1.00 03/05/2017    APTT 32.7 03/05/2017       HCG (If Applicable): No results found for: PREGTESTUR, PREGSERUM, HCG, HCGQUANT     ABGs: No results found for: PHART, PO2ART, FSO2MQA, OUK9IRV, BEART, S1NTGGIZ     Type & Screen (If Applicable):  No results found for: LABABO, [address]    Anesthesia Evaluation  Patient summary reviewed and Nursing notes reviewed no history of anesthetic complications:   Airway: Mallampati: I  TM distance: >3 FB   Neck ROM: full  Mouth opening: > = 3 FB Dental: normal exam         Pulmonary:Negative Pulmonary ROS and normal exam                               Cardiovascular:  Exercise tolerance: good (>4 METS),   (+) hypertension:,       ECG reviewed                        Neuro/Psych:   Negative Neuro/Psych ROS              GI/Hepatic/Renal:   (+) renal disease: CRI, bowel prep,

## 2018-09-07 NOTE — H&P
Nikki Hernandez M.D.    D: 09/07/2018 12:14:09       T: 09/07/2018 12:15:14     [email or web address]  Job#: [id number]     Doc#: [id number]    CC:

## 2018-09-07 NOTE — BRIEF OP NOTE
Brief Postoperative Note  ______________________________________________________________    Patient: Nj Lopez  YOB: 1940  MRN: [id number]  Date of Procedure: 9/7/2018    Pre-Op Diagnosis: PERSONAL HX OF POLYPS    Post-Op Diagnosis: Same       Procedure(s):  COLONOSCOPY    Anesthesia: Anesthesia type not filed in the log.     Surgeon(s):  Raegan Nunez MD    Staff:  [employer]: Rogers Peña     Estimated Blood Loss: * No values recorded between 9/7/2018 12:15 PM and 9/7/2018 73:73 PM * mL    Complications: None    Specimens:   * No specimens in log *    Implants:  * No implants in log *      Drains:      Findings: nl colon    Raegan Nunez MD  Date: 9/7/2018  Time: 12:35 PM

## 2018-09-07 NOTE — OP NOTE
[facility] Endoscopy    CONSCIOUS SEDATION      Patient: Michael Henderson  : 1940  Acct#: [de-identified]    PLANNED PROCEDURE   []EGD  [x]Colonoscopy []Flex Sigmoid  []Other:  Indication: Pain control for GI procedure    Consent: I have discussed with the patient and/or the patient representative the indication, alternatives, and the possible risks and/or complications of the planned procedure and the anesthesia methods. The patient and/or patient representative appear to understand and agree to proceed. Pre-Sedation Documentation and Exam: I have personally completed a history, physical exam & review of systems for this patient (see notes). Airway Assessment: mac    Prior History of Anesthesia Complications: mac    ASA Classification: mac    Sedation/ Anesthesia Plan: mac      SEDATION  Planned agent:[x]Midazolam []Meperidine [x]Sublimaze []Morphine    Monitoring and Safety: The patient will be placed on a cardiac monitor and vital signs, pulse oximetry and level of consciousness will be continuously evaluated throughout the procedure. The patient will be closely monitored until recovery from the medications is complete and the patient has returned to baseline status. Respiratory therapy will be on standby during the procedure. I have reviewed with the patient and/or family the risks, benefits, and alternatives to the procedure.     Miriam Coon MD, [address]  2018, 12:13 PM    Post-Sedation Vital Signs: Vital signs were reviewed and were stable after the procedure (see flow sheet for vitals)            Post-Sedation Exam: Lungs: clear           Complications: none     Miriam Coon MD, CNSP  2018,

## 2018-09-08 NOTE — OP NOTE
[address] [place] [address]                                 OPERATIVE REPORT    PATIENT NAME: Mark Munoz                    :        1940  MED REC NO:   [id number]                           ROOM:  ACCOUNT NO:   [de-identified]                           ADMIT DATE: 2018  PROVIDER:     Tommy Sunshine M.D.    DATE OF PROCEDURE:  2018    PROCEDURE  Colonoscopy. INDICATION:  A 78 years pleasant female, personal history of colon polyps. She is undergoing followup examination. Denied any abdominal pain, nausea  or vomiting. Denied any dysphagia or painful swallowing. Please see my  brief history for details and for physical examination. ASA CLASSIFICATION:  As per Anesthesia. Please see Anesthesia note for  details. INSTRUMENT:  PCF-H190AL pediatric colonoscope. PHOTOGRAPHS:  Yes. BIOPSIES:  No.    DESCRIPTION OF PROCEDURE:  Procedure indications and complications  including but not limited to perforation, bleeding, infection, adverse  reaction to medicine, very slight chance of missing significant lesions  discussed with the patient and the patient expressed her understanding and  a written consent was obtained. The patient was placed in the left lateral decubitus position in the Endo  room #13. The patient was placed on appropriate monitoring of vitals  including blood pressure, heart rate and pulse ox. After the rectal  examination, after the adequate total intravenous anesthesia was given by  Anesthesia, the PCF-H190AL pediatric colonoscope was inserted into the  rectum and advanced up to the cecum without any difficulty and the terminal  ileum was intubated. On careful inspection, the preparation was good. On  withdrawal of the scope, the terminal ileum looks normal as shown in  picture #2. Appendiceal orifice and cecum look normal as shown in picture  #A1.   Ascending colon looks normal as

## 2018-09-14 ENCOUNTER — TELEPHONE (OUTPATIENT)
Dept: RHEUMATOLOGY | Age: 78
End: 2018-09-14

## 2018-09-14 NOTE — TELEPHONE ENCOUNTER
Patient called office stating Osmin Shannon had mentioned hip injections at her last office visit (08/22/18). Patient denied at office visit but has decided she would like to pursue the injections. Please call patient and advise. Thank you.

## 2018-09-18 ENCOUNTER — OFFICE VISIT (OUTPATIENT)
Dept: RHEUMATOLOGY | Age: 78
End: 2018-09-18
Payer: MEDICARE

## 2018-09-18 VITALS
SYSTOLIC BLOOD PRESSURE: 139 MMHG | WEIGHT: 153 LBS | HEART RATE: 62 BPM | BODY MASS INDEX: 27.1 KG/M2 | DIASTOLIC BLOOD PRESSURE: 72 MMHG | OXYGEN SATURATION: 100 %

## 2018-09-18 DIAGNOSIS — M70.62 TROCHANTERIC BURSITIS OF LEFT HIP: Primary | ICD-10-CM

## 2018-09-18 DIAGNOSIS — I10 ESSENTIAL HYPERTENSION: Chronic | ICD-10-CM

## 2018-09-18 DIAGNOSIS — E78.5 DYSLIPIDEMIA: Primary | Chronic | ICD-10-CM

## 2018-09-18 LAB
CHARACTER,SYN.FL: ABNORMAL
COLOR FLUID: ABNORMAL
CRYSTALS, FLUID: ABNORMAL
MONONUCLEAR CELLS SYNOVIAL FLUID: 79.2 % (ref 0–74)
PATHOLOGIST REVIEW: ABNORMAL
POLYMORPHONUCLEAR CELLS SYNOVIAL FLUID: 20.8 % (ref 0–24)
TOTAL NUCLEATED CELLS SYNOVIAL: 274 /CUMM (ref 0–150)
TOTAL VOLUME RECEIVED SYNOVIAL: 5 ML

## 2018-09-18 PROCEDURE — 20610 DRAIN/INJ JOINT/BURSA W/O US: CPT | Performed by: INTERNAL MEDICINE

## 2018-09-18 RX ORDER — LISINOPRIL AND HYDROCHLOROTHIAZIDE 12.5; 1 MG/1; MG/1
TABLET ORAL
Qty: 90 TABLET | Refills: 3 | Status: SHIPPED | OUTPATIENT
Start: 2018-09-18 | End: 2019-09-13 | Stop reason: SDUPTHER

## 2018-09-18 RX ORDER — SIMVASTATIN 20 MG
TABLET ORAL
Qty: 90 TABLET | Refills: 3 | Status: SHIPPED | OUTPATIENT
Start: 2018-09-18 | End: 2018-10-10

## 2018-09-18 RX ORDER — METHYLPREDNISOLONE ACETATE 80 MG/ML
80 INJECTION, SUSPENSION INTRA-ARTICULAR; INTRALESIONAL; INTRAMUSCULAR; SOFT TISSUE ONCE
Status: COMPLETED | OUTPATIENT
Start: 2018-09-18 | End: 2018-09-18

## 2018-09-18 RX ADMIN — METHYLPREDNISOLONE ACETATE 80 MG: 80 INJECTION, SUSPENSION INTRA-ARTICULAR; INTRALESIONAL; INTRAMUSCULAR; SOFT TISSUE at 12:56

## 2018-09-18 NOTE — PROGRESS NOTES
Bursa Injection Procedure Note    Date: 9/18/2018   Indications: Left trochaneric Bursitis  Anesthesia: Ethyl chloride  Procedure Details     After a discussion of the risks including infection ,bleeding and damage to tissues and benefits, mainly the relief of pain, written consent was obtained for the procedure. The site was prepped with Betadine and  Chlorhexadine. ~ 6 mLs of clear serosanguinous fluid removed. 1ml of Solumedrol 80 mg/ml mixed with 1 ml of  lidocaine was injected into the bursa. The injection site was cleansed with topical alcohol and a dressing was applied. The patient was asked to continue to rest the for a few more days before resuming regular activities. It may be more painful for the first 1-2 days. Watch for fever, or increased swelling or persistent pain in the joint. Call or return to clinic prn if such symptoms occur or there is failure to improve as anticipated. Complications:  None; patient tolerated the procedure well. Trochanteric bursitis:   - trochanteric bursal injection today. - synovial fluid sent crystal and cell count studies.

## 2018-09-18 NOTE — LETTER
The physician has answered all of my questions regarding this procedure. I have read or had explained to me this informed consent/authorization and I fully understand the contemplated procedures and their risks and certify that there are no blanks spaces in this form at the time of my signing. Patient Signature: ______________________________ Date/Time: ____________       Legal Guardian Signature: _______________________ Date/Time: ____________    Relationship to patient: ________________________________________________    Witness: _____________________________________ Date/Time: _____________  I certify that I have explained the nature, purpose, benefits, risks, complications and alternatives to the proposed procedure to the patient or the patients legal representative. I have answered all questions fully, and I believe that the patient / legal representative fully understands what I have explained.     Physicians Signature: ______________________________ Date/Time: __________  Medication:_______________  Lot #:__________  Expiration Date:__________  Medication:_______________  Lot #:__________  Expiration  Date:__________

## 2018-09-18 NOTE — TELEPHONE ENCOUNTER
Lisa Trent called requesting a refill on the following medications:  Requested Prescriptions     Pending Prescriptions Disp Refills    simvastatin (ZOCOR) 20 MG tablet 30 tablet 1    lisinopril-hydrochlorothiazide (PRINZIDE;ZESTORETIC) 10-12.5 MG per tablet 30 tablet 1     Pharmacy verified: [facility]      Date of last visit: 8/1/18  Date of next visit (if applicable): 76/82/1842

## 2018-09-24 ENCOUNTER — HOSPITAL ENCOUNTER (OUTPATIENT)
Dept: WOMENS IMAGING | Age: 78
Discharge: HOME | End: 2018-09-24
Payer: MEDICARE

## 2018-09-24 ENCOUNTER — TELEPHONE (OUTPATIENT)
Dept: FAMILY MEDICINE CLINIC | Age: 78
End: 2018-09-24

## 2018-09-24 DIAGNOSIS — Z12.31 VISIT FOR SCREENING MAMMOGRAM: ICD-10-CM

## 2018-09-24 PROCEDURE — 77067 SCR MAMMO BI INCL CAD: CPT

## 2018-10-09 NOTE — PROGRESS NOTES
normal without polyps, oropharynx normal, dentition is normal for age=  Neck: supple and non-tender without mass, no thyromegaly or thyroid nodules, no cervical lymphadenopathy  Pulmonary/Chest: clear to auscultation bilaterally- no wheezes, rales or rhonchi, normal air movement, no respiratory distress or retractions  Cardiovascular: S1 and S2 auscultated w/ RRR. No murmurs, rubs, clicks, or gallops, distal pulses intact. Abdomen: soft, non-tender, non-distended, bowel sounds physiologic,  no rebound or guarding, no masses or hernias noted. Liver and spleen without enlargement. Extremities: no cyanosis, clubbing or edema of the lower extremities. +2 PT/DP bilaterally. Musculoskeletal: No joint swelling or gross deformity   Skin: warm and dry, no rash or erythema      Lab Results   Component Value Date     05/03/2018    K 4.8 05/03/2018     05/03/2018    CO2 31 05/03/2018    BUN 21 05/03/2018    CREATININE 1.03 05/03/2018    GLUCOSE 99 05/03/2018    CALCIUM 9.4 05/03/2018        ASSESSMENT & PLAN  1. Essential hypertension    At goal  Cont' meds  Labs UTD    2. CKD (chronic kidney disease) stage 3, GFR 30-59 ml/min ([facility])    As per # 1    - Potassium; Future    3. Hyperkalemia    con't off veltessa  Check K+  If still ok, can con't off veltessa and doesn't need to f/u with nephro if doesn't want to    - Potassium; Future    4. Dyslipidemia    con't off zocor per pt preference  Check labs in 6 months prior to next apt and see where lipids are. Pt is in call-back cue    5. Primary osteoarthritis involving multiple joints    Stable  con't low dose mobic, she understands risks/benefits of this  con't tramdol, at q8h prn pain, has refills,c an call for refills  F/u + months  Agree with PPI from rheum for GI protection. Appreciate it    6. DDD (degenerative disc disease), lumbar      7. Chronic midline low back pain without sciatica      8.  Positive WICHO (antinuclear antibody)    Neg w/u  F/u rheum prn      DISPOSITION    Return in about 6 months (around 4/10/2019) for follow-up on chronic medical conditions, sooner as needed. Denver Carbo released without restrictions. Future Appointments  Date Time Provider Radha Paulson   10/10/2018 10:15 AM SCHEDULE, [facility] RM 1 [facility] [place]   2/25/2019 10:15 AM Riley Kumar DO [facility] [place]     PATIENT COUNSELING    Denver Carbo received counseling on the following healthy behaviors: nutrition, exercise and medication adherence    Patient given educational materials on: See Attached    I have instructed Julianna to complete a self tracking handout on Blood Pressures  and instructed them to bring it with them to her next appointment. Barriers to learning and self management: none    Discussed use, benefit, and side effects of prescribed medications. Barriers to medication compliance addressed. All patient questions answered. Pt voiced understanding.        Electronically signed by Cande Angel DO on 10/10/2018 at 10:07 AM

## 2018-10-10 ENCOUNTER — NURSE ONLY (OUTPATIENT)
Dept: LAB | Age: 78
End: 2018-10-10

## 2018-10-10 ENCOUNTER — TELEPHONE (OUTPATIENT)
Dept: FAMILY MEDICINE CLINIC | Age: 78
End: 2018-10-10

## 2018-10-10 ENCOUNTER — OFFICE VISIT (OUTPATIENT)
Dept: FAMILY MEDICINE CLINIC | Age: 78
End: 2018-10-10
Payer: MEDICARE

## 2018-10-10 VITALS
HEART RATE: 63 BPM | BODY MASS INDEX: 27.49 KG/M2 | SYSTOLIC BLOOD PRESSURE: 122 MMHG | TEMPERATURE: 98.1 F | RESPIRATION RATE: 14 BRPM | DIASTOLIC BLOOD PRESSURE: 68 MMHG | WEIGHT: 155.2 LBS

## 2018-10-10 DIAGNOSIS — R76.8 POSITIVE ANA (ANTINUCLEAR ANTIBODY): ICD-10-CM

## 2018-10-10 DIAGNOSIS — G89.29 CHRONIC MIDLINE LOW BACK PAIN WITHOUT SCIATICA: ICD-10-CM

## 2018-10-10 DIAGNOSIS — M51.36 DDD (DEGENERATIVE DISC DISEASE), LUMBAR: ICD-10-CM

## 2018-10-10 DIAGNOSIS — M54.50 CHRONIC MIDLINE LOW BACK PAIN WITHOUT SCIATICA: ICD-10-CM

## 2018-10-10 DIAGNOSIS — E78.5 DYSLIPIDEMIA: ICD-10-CM

## 2018-10-10 DIAGNOSIS — E87.5 HYPERKALEMIA: ICD-10-CM

## 2018-10-10 DIAGNOSIS — N18.30 CKD (CHRONIC KIDNEY DISEASE) STAGE 3, GFR 30-59 ML/MIN (HCC): ICD-10-CM

## 2018-10-10 DIAGNOSIS — M15.9 PRIMARY OSTEOARTHRITIS INVOLVING MULTIPLE JOINTS: ICD-10-CM

## 2018-10-10 DIAGNOSIS — I10 ESSENTIAL HYPERTENSION: Primary | ICD-10-CM

## 2018-10-10 LAB — POTASSIUM SERPL-SCNC: 4.1 MEQ/L (ref 3.5–5.2)

## 2018-10-10 PROCEDURE — 1101F PT FALLS ASSESS-DOCD LE1/YR: CPT | Performed by: FAMILY MEDICINE

## 2018-10-10 PROCEDURE — 1123F ACP DISCUSS/DSCN MKR DOCD: CPT | Performed by: FAMILY MEDICINE

## 2018-10-10 PROCEDURE — G8399 PT W/DXA RESULTS DOCUMENT: HCPCS | Performed by: FAMILY MEDICINE

## 2018-10-10 PROCEDURE — 4040F PNEUMOC VAC/ADMIN/RCVD: CPT | Performed by: FAMILY MEDICINE

## 2018-10-10 PROCEDURE — 1036F TOBACCO NON-USER: CPT | Performed by: FAMILY MEDICINE

## 2018-10-10 PROCEDURE — 3017F COLORECTAL CA SCREEN DOC REV: CPT | Performed by: FAMILY MEDICINE

## 2018-10-10 PROCEDURE — 99214 OFFICE O/P EST MOD 30 MIN: CPT | Performed by: FAMILY MEDICINE

## 2018-10-10 PROCEDURE — G8419 CALC BMI OUT NRM PARAM NOF/U: HCPCS | Performed by: FAMILY MEDICINE

## 2018-10-10 PROCEDURE — 1090F PRES/ABSN URINE INCON ASSESS: CPT | Performed by: FAMILY MEDICINE

## 2018-10-10 PROCEDURE — G8427 DOCREV CUR MEDS BY ELIG CLIN: HCPCS | Performed by: FAMILY MEDICINE

## 2018-10-10 PROCEDURE — G8484 FLU IMMUNIZE NO ADMIN: HCPCS | Performed by: FAMILY MEDICINE

## 2018-10-15 ENCOUNTER — TELEPHONE (OUTPATIENT)
Dept: RHEUMATOLOGY | Age: 78
End: 2018-10-15

## 2018-12-17 DIAGNOSIS — M51.36 DDD (DEGENERATIVE DISC DISEASE), LUMBAR: Chronic | ICD-10-CM

## 2018-12-17 DIAGNOSIS — M15.9 PRIMARY OSTEOARTHRITIS INVOLVING MULTIPLE JOINTS: Chronic | ICD-10-CM

## 2018-12-17 DIAGNOSIS — G89.29 CHRONIC MIDLINE LOW BACK PAIN WITHOUT SCIATICA: Chronic | ICD-10-CM

## 2018-12-17 DIAGNOSIS — M54.50 CHRONIC MIDLINE LOW BACK PAIN WITHOUT SCIATICA: Chronic | ICD-10-CM

## 2018-12-17 RX ORDER — TRAMADOL HYDROCHLORIDE 50 MG/1
50 TABLET ORAL EVERY 8 HOURS PRN
Qty: 90 TABLET | Refills: 2 | Status: SHIPPED | OUTPATIENT
Start: 2018-12-17 | End: 2019-03-17

## 2018-12-19 RX ORDER — OMEPRAZOLE 20 MG/1
20 CAPSULE, DELAYED RELEASE ORAL DAILY
Qty: 90 CAPSULE | Refills: 3 | Status: SHIPPED | OUTPATIENT
Start: 2018-12-19 | End: 2019-10-28 | Stop reason: SDUPTHER

## 2019-02-25 ENCOUNTER — OFFICE VISIT (OUTPATIENT)
Dept: RHEUMATOLOGY | Age: 79
End: 2019-02-25
Payer: MEDICARE

## 2019-02-25 VITALS
DIASTOLIC BLOOD PRESSURE: 68 MMHG | WEIGHT: 156.6 LBS | BODY MASS INDEX: 27.75 KG/M2 | HEART RATE: 72 BPM | SYSTOLIC BLOOD PRESSURE: 138 MMHG | OXYGEN SATURATION: 97 % | HEIGHT: 63 IN

## 2019-02-25 DIAGNOSIS — M19.072 OSTEOARTHRITIS OF BOTH FEET, UNSPECIFIED OSTEOARTHRITIS TYPE: ICD-10-CM

## 2019-02-25 DIAGNOSIS — M15.4 EROSIVE OSTEOARTHRITIS OF HANDS, BILATERAL: Primary | ICD-10-CM

## 2019-02-25 DIAGNOSIS — M19.071 OSTEOARTHRITIS OF BOTH FEET, UNSPECIFIED OSTEOARTHRITIS TYPE: ICD-10-CM

## 2019-02-25 DIAGNOSIS — M70.62 TROCHANTERIC BURSITIS OF LEFT HIP: ICD-10-CM

## 2019-02-25 DIAGNOSIS — R76.8 ANA POSITIVE: ICD-10-CM

## 2019-02-25 PROCEDURE — G8484 FLU IMMUNIZE NO ADMIN: HCPCS | Performed by: INTERNAL MEDICINE

## 2019-02-25 PROCEDURE — 99214 OFFICE O/P EST MOD 30 MIN: CPT | Performed by: INTERNAL MEDICINE

## 2019-02-25 PROCEDURE — 4040F PNEUMOC VAC/ADMIN/RCVD: CPT | Performed by: INTERNAL MEDICINE

## 2019-02-25 PROCEDURE — 1036F TOBACCO NON-USER: CPT | Performed by: INTERNAL MEDICINE

## 2019-02-25 PROCEDURE — 1101F PT FALLS ASSESS-DOCD LE1/YR: CPT | Performed by: INTERNAL MEDICINE

## 2019-02-25 PROCEDURE — G8399 PT W/DXA RESULTS DOCUMENT: HCPCS | Performed by: INTERNAL MEDICINE

## 2019-02-25 PROCEDURE — G8427 DOCREV CUR MEDS BY ELIG CLIN: HCPCS | Performed by: INTERNAL MEDICINE

## 2019-02-25 PROCEDURE — 1090F PRES/ABSN URINE INCON ASSESS: CPT | Performed by: INTERNAL MEDICINE

## 2019-02-25 PROCEDURE — 1123F ACP DISCUSS/DSCN MKR DOCD: CPT | Performed by: INTERNAL MEDICINE

## 2019-02-25 PROCEDURE — G8419 CALC BMI OUT NRM PARAM NOF/U: HCPCS | Performed by: INTERNAL MEDICINE

## 2019-02-25 RX ORDER — HYDROXYCHLOROQUINE SULFATE 200 MG/1
TABLET, FILM COATED ORAL
Qty: 60 TABLET | Refills: 2 | Status: SHIPPED | OUTPATIENT
Start: 2019-02-25 | End: 2019-07-23 | Stop reason: SDUPTHER

## 2019-02-25 ASSESSMENT — ENCOUNTER SYMPTOMS
EYE REDNESS: 0
VOMITING: 0
DIARRHEA: 1
EYES NEGATIVE: 1
NAUSEA: 0
CONSTIPATION: 0
RESPIRATORY NEGATIVE: 1
BLOOD IN STOOL: 0
EYE PAIN: 0

## 2019-03-12 ENCOUNTER — TELEPHONE (OUTPATIENT)
Dept: FAMILY MEDICINE CLINIC | Age: 79
End: 2019-03-12

## 2019-03-12 DIAGNOSIS — I10 ESSENTIAL HYPERTENSION: ICD-10-CM

## 2019-03-12 DIAGNOSIS — E78.5 DYSLIPIDEMIA: ICD-10-CM

## 2019-03-12 DIAGNOSIS — N18.30 CKD (CHRONIC KIDNEY DISEASE) STAGE 3, GFR 30-59 ML/MIN (HCC): Primary | ICD-10-CM

## 2019-04-01 ENCOUNTER — NURSE ONLY (OUTPATIENT)
Dept: LAB | Age: 79
End: 2019-04-01

## 2019-04-01 DIAGNOSIS — I10 ESSENTIAL HYPERTENSION: ICD-10-CM

## 2019-04-01 DIAGNOSIS — N18.30 CKD (CHRONIC KIDNEY DISEASE) STAGE 3, GFR 30-59 ML/MIN (HCC): ICD-10-CM

## 2019-04-01 DIAGNOSIS — E78.5 DYSLIPIDEMIA: ICD-10-CM

## 2019-04-01 LAB
ALBUMIN SERPL-MCNC: 4.3 G/DL (ref 3.5–5.1)
ALP BLD-CCNC: 116 U/L (ref 38–126)
ALT SERPL-CCNC: 16 U/L (ref 11–66)
ANION GAP SERPL CALCULATED.3IONS-SCNC: 11 MEQ/L (ref 8–16)
AST SERPL-CCNC: 24 U/L (ref 5–40)
BASOPHILS # BLD: 0.7 %
BASOPHILS ABSOLUTE: 0 THOU/MM3 (ref 0–0.1)
BILIRUB SERPL-MCNC: 0.5 MG/DL (ref 0.3–1.2)
BUN BLDV-MCNC: 21 MG/DL (ref 7–22)
CALCIUM SERPL-MCNC: 9.9 MG/DL (ref 8.5–10.5)
CHLORIDE BLD-SCNC: 102 MEQ/L (ref 98–111)
CHOLESTEROL, TOTAL: 234 MG/DL (ref 100–199)
CO2: 28 MEQ/L (ref 23–33)
CREAT SERPL-MCNC: 1.1 MG/DL (ref 0.4–1.2)
CREATININE, URINE: 110.2 MG/DL
EOSINOPHIL # BLD: 6.5 %
EOSINOPHILS ABSOLUTE: 0.3 THOU/MM3 (ref 0–0.4)
ERYTHROCYTE [DISTWIDTH] IN BLOOD BY AUTOMATED COUNT: 13.1 % (ref 11.5–14.5)
ERYTHROCYTE [DISTWIDTH] IN BLOOD BY AUTOMATED COUNT: 42.6 FL (ref 35–45)
GFR SERPL CREATININE-BSD FRML MDRD: 48 ML/MIN/1.73M2
GLUCOSE BLD-MCNC: 89 MG/DL (ref 70–108)
HCT VFR BLD CALC: 40.4 % (ref 37–47)
HDLC SERPL-MCNC: 57 MG/DL
HEMOGLOBIN: 13.3 GM/DL (ref 12–16)
IMMATURE GRANS (ABS): 0.01 THOU/MM3 (ref 0–0.07)
IMMATURE GRANULOCYTES: 0.2 %
LDL CHOLESTEROL CALCULATED: 144 MG/DL
LYMPHOCYTES # BLD: 32.2 %
LYMPHOCYTES ABSOLUTE: 1.5 THOU/MM3 (ref 1–4.8)
MCH RBC QN AUTO: 29.4 PG (ref 26–33)
MCHC RBC AUTO-ENTMCNC: 32.9 GM/DL (ref 32.2–35.5)
MCV RBC AUTO: 89.4 FL (ref 81–99)
MICROALBUMIN UR-MCNC: 1.95 MG/DL
MICROALBUMIN/CREAT UR-RTO: 18 MG/G (ref 0–30)
MONOCYTES # BLD: 11.8 %
MONOCYTES ABSOLUTE: 0.5 THOU/MM3 (ref 0.4–1.3)
NUCLEATED RED BLOOD CELLS: 0 /100 WBC
PLATELET # BLD: 226 THOU/MM3 (ref 130–400)
PMV BLD AUTO: 11.7 FL (ref 9.4–12.4)
POTASSIUM SERPL-SCNC: 4.7 MEQ/L (ref 3.5–5.2)
RBC # BLD: 4.52 MILL/MM3 (ref 4.2–5.4)
SEG NEUTROPHILS: 48.6 %
SEGMENTED NEUTROPHILS ABSOLUTE COUNT: 2.2 THOU/MM3 (ref 1.8–7.7)
SODIUM BLD-SCNC: 141 MEQ/L (ref 135–145)
TOTAL PROTEIN: 7.2 G/DL (ref 6.1–8)
TRIGL SERPL-MCNC: 167 MG/DL (ref 0–199)
TSH SERPL DL<=0.05 MIU/L-ACNC: 2.26 UIU/ML (ref 0.4–4.2)
WBC # BLD: 4.6 THOU/MM3 (ref 4.8–10.8)

## 2019-04-02 ENCOUNTER — TELEPHONE (OUTPATIENT)
Dept: FAMILY MEDICINE CLINIC | Age: 79
End: 2019-04-02

## 2019-04-02 NOTE — TELEPHONE ENCOUNTER
----- Message from Sammy Liz, DO sent at 4/1/2019  6:23 PM EDT -----  Please let pt know that labs overall stable and appropriate  Lipids a bit higher off zocor, but not catastrophically so  Will discuss at her f/u visit next wk. Let me know if questions, thanks!

## 2019-04-09 NOTE — PROGRESS NOTES
Chief Complaint   Patient presents with    6 Month Follow-Up     chronic medical conditions    Diarrhea     taking daily for over a year since gallbladder was removed       History obtained from the patient. SUBJECTIVE:  Coni Schofield is a 70 y.o. female that presents today for       -01. HTN/CKD3:     HPI:     Taking meds as prescribed ?: yes  Tolerating well ?: yes  Side Effects ?: denies  BP at home ?: <140/90  Working on TLCS ?: yes  Chest Pain/SOB/Palpitations? denies    BP Readings from Last 3 Encounters:   04/10/19 132/70   02/25/19 138/68   10/10/18 122/68       -02. Hyperkalemia PRIOr VISIT: dx with mild hyperkalemia by old PCP And sent to nephro at [facility]. Started on veltassa for it. However, very expensive and doesn't want to take it. No records. She has f/u soon. Asking what she should do. No prior issues until recent for elevated K+    UPDATE TODAY: pt quite Veltessa, doesn't want to see nephro any more. Would like me to montior. Labs stable over time.        -03. HLD:     HPI LAST VISIT: pt quit taking zocor 4 wks ago, doesn't want to take any more, no side effects. Watching diet. No hx of CAD or stroke. Wants to see where labas are in 6 months     UPDATE TODAY: off zocor, labs a bit worse, but not bad. Doesn't want to resume statin. No CAD.        -04. Chronic pain PRIOR VISIT: has chronic OA pain in back and R foot and ankle. Was on mobic, but stopped by old PCP. Has hx of mild CKD, no GI issues. Also gets tramadol, but having to take every 4 hours with tynol and not doing nearly as well. Asking what she can do. Starting to limit her activities. No falls. She understands risks of mobic, but really wants to resume and dec her tramadol dose. UPDATE PRIOr VISIT: mobic working well. Due for refill of tramadol. Takes 1 to 3 per day, depending on the day. mobic helping as well. Keeps joint and back pain under good control. UPDATE TODAY: pain stable, taking tramadol and mobic. Working well. Has 1 RF of tramadol yet. Keeping things under control. Placed on PPI by rheum, for GI protection. Overall doing well      -05. + WICHO PRIOR VISIT: has f.u for RA with Dr. Nancy Alfred. Labs neg other than + WICHO. Has apt with rheum. UPDATE TODAY: seen by rheum, neg w/u. Felt to be just OA and false + WICHO. Is on Plaquenil, does seems to help. Does have f/u with rheum in June.       -06. Diarrhea: started after gallbladder out. It's been 1.5 years ago. Every morning will have a loose stool and will have trouble holding it. Will be liquidy. Through the day, her bowels will be normal. No melena or hematochezia. No abd pain or cramping. wts stable. Age/Gender Health Maintenance    Lipid -   Lab Results   Component Value Date    CHOL 234 (H) 04/01/2019    CHOL 207 03/06/2018    CHOL 168 09/05/2017     Lab Results   Component Value Date    TRIG 167 04/01/2019    TRIG 149 03/06/2018    TRIG 148 09/05/2017     Lab Results   Component Value Date    HDL 57 04/01/2019    HDL 74 (A) 03/06/2018    HDL 60 09/05/2017     Lab Results   Component Value Date    LDLCALC 144 04/01/2019    LDLCALC 82 03/06/2018    LDLCALC 70 09/05/2017       ; LDL 82; HDL 74;  (MARCH 2018)    DM Screen -   Lab Results   Component Value Date    GLUCOSE 89 04/01/2019    GLUCOSE 99 05/03/2018       101 (MARCH 2018)    Colon Cancer Screening -NEG SEPT 2018, no repeat ([facility])  Lung Cancer Screening (Age 54 to [de-identified] with 30 pack year hx, current smoker or quit within past 15 years) - never smoker    Tetanus - UTD July 2018  Influenza Vaccine - UTD FALL 2018  Pneumonia Vaccine - UTD PCV 13 OCT 2015.  UTD PPV 23 APR 2018  Zostavax - Zostavax competed APR 2013     Breast Cancer Screening - NEG SEPT 2018  Cervical Cancer Screening - Aged out  Osteoporosis Screening - osteopenia AUG 2018, low FRAX SCORE      Current Outpatient Medications   Medication Sig Dispense Refill    traMADol (ULTRAM) 50 MG tablet Take 50 mg by mouth every 8 hours as needed lumbar     Dyslipidemia     History of skin cancer     BCC AND SCC    Hyperlipidemia     Hypertension     Osteoarthritis        Past Surgical History:   Procedure Laterality Date    BACK SURGERY      lower lumbar fusion    CARPAL TUNNEL RELEASE Bilateral      SECTION      CHOLECYSTECTOMY, LAPAROSCOPIC  10/03/2017    COLONOSCOPY      CYST REMOVAL Left 2017    left  5th digit-Dr Bowman    DILATION AND CURETTAGE OF UTERUS      FINGER SURGERY      SEVERAL    FOOT SURGERY Bilateral     HYSTERECTOMY      JOINT REPLACEMENT Bilateral     KNEES    AZ COLONOSCOPY FLX DX W/COLLJ SPEC WHEN PFRMD N/A 2018    COLONOSCOPY [facility]    SKIN BIOPSY      BCC AND SCC       Allergies   Allergen Reactions    Sulfa Antibiotics Swelling    Other Nausea And Vomiting     oysters       Social History     Tobacco Use    Smoking status: Never Smoker    Smokeless tobacco: Never Used   Substance Use Topics    Alcohol use: Yes     Comment: OCCASIONALLY       Family History   Problem Relation Age of Onset    Heart Disease Mother         CHF    Alzheimer's Disease Mother     Dementia Mother     Cancer Father         LUNG    Emphysema Father     Colon Cancer Neg Hx     Breast Cancer Neg Hx          I have reviewed the patient's past medical history, past surgical history, allergies, medications, social and family history and I have made updates where appropriate.       Review of Systems  Positive responses are highlighted in bold    Constitutional:  Fever, Chills, Night Sweats, Fatigue, Unexpected changes in weight  HENT:  Ear pain, Tinnitus, Nosebleeds, Trouble swallowing, Hearing loss, Sore throat  Cardiovascular:  Chest Pain, Palpitations, Orthopnea, Paroxysmal Nocturnal Dyspnea  Respiratory:  Cough, Wheezing, Shortness of breath, Chest tightness, Apnea  Gastrointestinal:  Nausea, Vomiting, Diarrhea, Constipation, Heartburn, Blood in 04/01/2019 13.3  12.0 - 16.0 gm/dl Final    Hematocrit 04/01/2019 40.4  37.0 - 47.0 % Final    MCV 04/01/2019 89.4  81.0 - 99.0 fL Final    MCH 04/01/2019 29.4  26.0 - 33.0 pg Final    MCHC 04/01/2019 32.9  32.2 - 35.5 gm/dl Final    RDW-CV 04/01/2019 13.1  11.5 - 14.5 % Final    RDW-SD 04/01/2019 42.6  35.0 - 45.0 fL Final    Platelets 01/52/4590 226  130 - 400 thou/mm3 Final    MPV 04/01/2019 11.7  9.4 - 12.4 fL Final    Seg Neutrophils 04/01/2019 48.6  % Final    Lymphocytes 04/01/2019 32.2  % Final    Monocytes 04/01/2019 11.8  % Final    Eosinophils 04/01/2019 6.5  % Final    Basophils 04/01/2019 0.7  % Final    Immature Granulocytes 04/01/2019 0.2  % Final    Segs Absolute 04/01/2019 2.2  1.8 - 7.7 thou/mm3 Final    Lymphocytes # 04/01/2019 1.5  1.0 - 4.8 thou/mm3 Final    Monocytes # 04/01/2019 0.5  0.4 - 1.3 thou/mm3 Final    Eosinophils # 04/01/2019 0.3  0.0 - 0.4 thou/mm3 Final    Basophils # 04/01/2019 0.0  0.0 - 0.1 thou/mm3 Final    Immature Grans (Abs) 04/01/2019 0.01  0.00 - 0.07 thou/mm3 Final    nRBC 04/01/2019 0  /100 wbc Final    TSH 04/01/2019 2.260  0.400 - 4.20 uIU/mL Final    Microalbumin, Random Urine 04/01/2019 1.95  mg/dL Final    Creatinine, Urine 04/01/2019 110.2  mg/dL Final    Microalb/Creat Ratio 04/01/2019 18  0 - 30 mg/g Final    Cholesterol, Total 04/01/2019 234* 100 - 199 mg/dL Final    Triglycerides 04/01/2019 167  0 - 199 mg/dL Final    HDL 04/01/2019 57  mg/dL Final    LDL Calculated 04/01/2019 144  mg/dL Final    Glucose 04/01/2019 89  70 - 108 mg/dL Final    CREATININE 04/01/2019 1.1  0.4 - 1.2 mg/dL Final    BUN 04/01/2019 21  7 - 22 mg/dL Final    Sodium 04/01/2019 141  135 - 145 meq/L Final    Potassium 04/01/2019 4.7  3.5 - 5.2 meq/L Final    Chloride 04/01/2019 102  98 - 111 meq/L Final    CO2 04/01/2019 28  23 - 33 meq/L Final    Calcium 04/01/2019 9.9  8.5 - 10.5 mg/dL Final    AST 04/01/2019 24  5 - 40 U/L Final    Alkaline Phosphatase 04/01/2019 116  38 - 126 U/L Final    Total Protein 04/01/2019 7.2  6.1 - 8.0 g/dL Final    Alb 04/01/2019 4.3  3.5 - 5.1 g/dL Final    Total Bilirubin 04/01/2019 0.5  0.3 - 1.2 mg/dL Final    ALT 04/01/2019 16  11 - 66 U/L Final    Anion Gap 04/01/2019 11.0  8.0 - 16.0 meq/L Final    Est, Glom Filt Rate 04/01/2019 48* ml/min/1.73m2 Final       ASSESSMENT & PLAN  1. Essential hypertension    At goal  Cont' meds  Labs UTD    2. CKD (chronic kidney disease) stage 3, GFR 30-59 ml/min ([facility])    Stable  con't monitor labs  con't lisinopril    3. Hyperkalemia    Labs stable off veltessa  Repeat BMP 6 months prior to next visit  In call back cue    4. Dyslipidemia    Labs ok for [facility] to con't off statin  Monitor yearly    5. Primary osteoarthritis involving multiple joints    Stable  con't low dose mobic, she understands risks/benefits of this  con't tramdol, at q8h prn pain, has refills, can call for refills  F/u rheum  Agree with PPI from rheum for GI protection. Appreciate it    6. DDD (degenerative disc disease), lumbar    As per # 5    7. Chronic midline low back pain without sciatica    As per # 5    8. Positive WICHO (antinuclear antibody)    As per #5  con't Plaqunil as well  F/u rheum    9. Erosive osteoarthritis of both hands    As above    10. Chronic diarrhea    Unclear etiology  Could still be d/t gallbladder    Plan:  Hold mg supplement  Start fiber  Check labs below  Further w/u based on response to above rec's and labs    - C Diff Toxin by RT PCR; Future  - Fecal leukocytes; Future  - IgA; Future  - O&P PANEL ([employer]) #1; Future  - Stool Culture; Future  - Tissue Transglutaminase, IgA; Future      DISPOSITION    Return in about 6 months (around 10/10/2019) for follow-up on chronic medical conditions, sooner as needed. Steven Garber released without restrictions.     Future Appointments   Date Time Provider Radha Paulson   6/24/2019 10:15 AM Mary York DO [facility] [place]     PATIENT COUNSELING    Julianna received counseling on the following healthy behaviors: nutrition, exercise and medication adherence    Patient given educational materials on: See Attached    I have instructed Julianna to complete a self tracking handout on Blood Pressures  and instructed them to bring it with them to her next appointment. Barriers to learning and self management: none    Discussed use, benefit, and side effects of prescribed medications. Barriers to medication compliance addressed. All patient questions answered. Pt voiced understanding.        Electronically signed by Leonela Nichole DO on 4/10/2019 at 10:07 AM

## 2019-04-10 ENCOUNTER — OFFICE VISIT (OUTPATIENT)
Dept: FAMILY MEDICINE CLINIC | Age: 79
End: 2019-04-10
Payer: MEDICARE

## 2019-04-10 VITALS
HEART RATE: 66 BPM | SYSTOLIC BLOOD PRESSURE: 132 MMHG | BODY MASS INDEX: 28.93 KG/M2 | HEIGHT: 62 IN | WEIGHT: 157.2 LBS | DIASTOLIC BLOOD PRESSURE: 70 MMHG | TEMPERATURE: 97.6 F | RESPIRATION RATE: 12 BRPM

## 2019-04-10 DIAGNOSIS — N18.30 CKD (CHRONIC KIDNEY DISEASE) STAGE 3, GFR 30-59 ML/MIN (HCC): ICD-10-CM

## 2019-04-10 DIAGNOSIS — M15.4 EROSIVE OSTEOARTHRITIS OF BOTH HANDS: ICD-10-CM

## 2019-04-10 DIAGNOSIS — I10 ESSENTIAL HYPERTENSION: Primary | ICD-10-CM

## 2019-04-10 DIAGNOSIS — E78.5 DYSLIPIDEMIA: ICD-10-CM

## 2019-04-10 DIAGNOSIS — M15.9 PRIMARY OSTEOARTHRITIS INVOLVING MULTIPLE JOINTS: ICD-10-CM

## 2019-04-10 DIAGNOSIS — G89.29 CHRONIC MIDLINE LOW BACK PAIN WITHOUT SCIATICA: ICD-10-CM

## 2019-04-10 DIAGNOSIS — E87.5 HYPERKALEMIA: ICD-10-CM

## 2019-04-10 DIAGNOSIS — K52.9 CHRONIC DIARRHEA: ICD-10-CM

## 2019-04-10 DIAGNOSIS — M51.36 DDD (DEGENERATIVE DISC DISEASE), LUMBAR: ICD-10-CM

## 2019-04-10 DIAGNOSIS — M54.50 CHRONIC MIDLINE LOW BACK PAIN WITHOUT SCIATICA: ICD-10-CM

## 2019-04-10 DIAGNOSIS — R76.8 POSITIVE ANA (ANTINUCLEAR ANTIBODY): ICD-10-CM

## 2019-04-10 PROCEDURE — 99214 OFFICE O/P EST MOD 30 MIN: CPT | Performed by: FAMILY MEDICINE

## 2019-04-10 PROCEDURE — G8427 DOCREV CUR MEDS BY ELIG CLIN: HCPCS | Performed by: FAMILY MEDICINE

## 2019-04-10 PROCEDURE — 1090F PRES/ABSN URINE INCON ASSESS: CPT | Performed by: FAMILY MEDICINE

## 2019-04-10 PROCEDURE — G8419 CALC BMI OUT NRM PARAM NOF/U: HCPCS | Performed by: FAMILY MEDICINE

## 2019-04-10 PROCEDURE — 4040F PNEUMOC VAC/ADMIN/RCVD: CPT | Performed by: FAMILY MEDICINE

## 2019-04-10 PROCEDURE — 1123F ACP DISCUSS/DSCN MKR DOCD: CPT | Performed by: FAMILY MEDICINE

## 2019-04-10 PROCEDURE — G8399 PT W/DXA RESULTS DOCUMENT: HCPCS | Performed by: FAMILY MEDICINE

## 2019-04-10 PROCEDURE — 1036F TOBACCO NON-USER: CPT | Performed by: FAMILY MEDICINE

## 2019-04-10 RX ORDER — TRAMADOL HYDROCHLORIDE 50 MG/1
50 TABLET ORAL EVERY 8 HOURS PRN
COMMUNITY
End: 2019-07-12

## 2019-04-10 ASSESSMENT — PATIENT HEALTH QUESTIONNAIRE - PHQ9
SUM OF ALL RESPONSES TO PHQ QUESTIONS 1-9: 0
2. FEELING DOWN, DEPRESSED OR HOPELESS: 0
1. LITTLE INTEREST OR PLEASURE IN DOING THINGS: 0
SUM OF ALL RESPONSES TO PHQ QUESTIONS 1-9: 0
SUM OF ALL RESPONSES TO PHQ9 QUESTIONS 1 & 2: 0

## 2019-04-10 NOTE — PROGRESS NOTES
Visit Information    Have you changed or started any medications since your last visit including any over-the-counter medicines, vitamins, or herbal medicines? no   Are you having any side effects from any of your medications? -  no  Have you stopped taking any of your medications? Is so, why? -  no    Have you seen any other physician or provider since your last visit? Yes - Records Obtained Dr Rissa Pearce  Have you had any other diagnostic tests since your last visit? No  Have you been seen in the emergency room and/or had an admission to a hospital since we last saw you? No  Have you had your routine dental cleaning in the past 6 months? yes     Have you activated your [employer] account? If not, what are your barriers?  Yes     Patient Care Team:  Cami John DO as PCP - General (Family Medicine)    Medical History Review  Deferred to PCP    Health Maintenance   Topic Date Due    Shingles Vaccine (2 of 3) 06/11/2013    Breast cancer screen  09/24/2019    Potassium monitoring  04/01/2020    Creatinine monitoring  04/01/2020    DEXA (modify frequency per FRAX score)  08/27/2020    DTaP/Tdap/Td vaccine (2 - Td) 07/29/2028    Flu vaccine  Completed    Pneumococcal 65+ years Vaccine  Completed

## 2019-04-10 NOTE — PATIENT INSTRUCTIONS
LAB INSTRUCTIONS:    Please complete labs within 4 week(s). Please fast for 8 hours prior to lab collection. The clinic will call you within 1 week of collection. If you have not heard from us within that amount of time, please call us at [phone]. Patient Education        High Blood Pressure: Care Instructions  Overview    It's normal for blood pressure to go up and down throughout the day. But if it stays up, you have high blood pressure. Another name for high blood pressure is hypertension. Despite what a lot of people think, high blood pressure usually doesn't cause headaches or make you feel dizzy or lightheaded. It usually has no symptoms. But it does increase your risk of stroke, heart attack, and other problems. You and your doctor will talk about your risks of these problems based on your blood pressure. Your doctor will give you a goal for your blood pressure. Your goal will be based on your health and your age. Lifestyle changes, such as eating healthy and being active, are always important to help lower blood pressure. You might also take medicine to reach your blood pressure goal.  Follow-up care is a key part of your treatment and safety. Be sure to make and go to all appointments, and call your doctor if you are having problems. It's also a good idea to know your test results and keep a list of the medicines you take. How can you care for yourself at home? Medical treatment  · If you stop taking your medicine, your blood pressure will go back up. You may take one or more types of medicine to lower your blood pressure. Be safe with medicines. Take your medicine exactly as prescribed. Call your doctor if you think you are having a problem with your medicine. · Talk to your doctor before you start taking aspirin every day. Aspirin can help certain people lower their risk of a heart attack or stroke.  But taking aspirin isn't right for everyone, because it can cause serious or a strange feeling in the chest.  ? Sweating. ? Shortness of breath. ? Nausea or vomiting. ? Pain, pressure, or a strange feeling in the back, neck, jaw, or upper belly or in one or both shoulders or arms. ? Lightheadedness or sudden weakness. ? A fast or irregular heartbeat.     · You have symptoms of a stroke. These may include:  ? Sudden numbness, tingling, weakness, or loss of movement in your face, arm, or leg, especially on only one side of your body. ? Sudden vision changes. ? Sudden trouble speaking. ? Sudden confusion or trouble understanding simple statements. ? Sudden problems with walking or balance. ? A sudden, severe headache that is different from past headaches.     · You have severe back or belly pain.    Do not wait until your blood pressure comes down on its own. Get help right away.   Call your doctor now or seek immediate care if:    · Your blood pressure is much higher than normal (such as 180/120 or higher), but you don't have symptoms.     · You think high blood pressure is causing symptoms, such as:  ? Severe headache.  ? Blurry vision.    Watch closely for changes in your health, and be sure to contact your doctor if:    · Your blood pressure measures higher than your doctor recommends at least 2 times. That means the top number is higher or the bottom number is higher, or both.     · You think you may be having side effects from your blood pressure medicine. Where can you learn more? Go to https://[employer].Direct[employer]. org and sign in to your [employer] account. Enter C449 in the [employer] box to learn more about \"High Blood Pressure: Care Instructions. \"     If you do not have an account, please click on the \"Sign Up Now\" link. Current as of: July 22, 2018  Content Version: 11.9  © 0174-5449 [employer]. Care instructions adapted under license by [facility] ([facility]).  If you have questions about a medical condition or this instruction, always ask your healthcare professional. [place] [address] any warranty or liability for your use of this information.

## 2019-04-30 ENCOUNTER — HOSPITAL ENCOUNTER (OUTPATIENT)
Age: 79
Discharge: HOME | End: 2019-04-30
Payer: MEDICARE

## 2019-04-30 DIAGNOSIS — K52.9 CHRONIC DIARRHEA: ICD-10-CM

## 2019-04-30 PROCEDURE — 36415 COLL VENOUS BLD VENIPUNCTURE: CPT

## 2019-04-30 PROCEDURE — 83516 IMMUNOASSAY NONANTIBODY: CPT

## 2019-04-30 PROCEDURE — 82784 ASSAY IGA/IGD/IGG/IGM EACH: CPT

## 2019-05-01 ENCOUNTER — HOSPITAL ENCOUNTER (OUTPATIENT)
Age: 79
Setting detail: SPECIMEN
Discharge: HOME | End: 2019-05-01
Payer: MEDICARE

## 2019-05-01 DIAGNOSIS — K52.9 CHRONIC DIARRHEA: ICD-10-CM

## 2019-05-01 LAB
CLOSTRIDIUM DIFFICILE, PCR: NORMAL
FECAL LEUKOCYTES: NORMAL
IGA: 146 MG/DL (ref 70–400)
REASON FOR REJECTION: NORMAL
REJECTED TEST: NORMAL

## 2019-05-01 PROCEDURE — 87427 SHIGA-LIKE TOXIN AG IA: CPT

## 2019-05-01 PROCEDURE — 89055 LEUKOCYTE ASSESSMENT FECAL: CPT

## 2019-05-01 PROCEDURE — 87045 FECES CULTURE AEROBIC BACT: CPT

## 2019-05-01 PROCEDURE — 87177 OVA AND PARASITES SMEARS: CPT

## 2019-05-02 LAB — OVA AND PARASITES: NORMAL

## 2019-05-03 ENCOUNTER — TELEPHONE (OUTPATIENT)
Dept: FAMILY MEDICINE CLINIC | Age: 79
End: 2019-05-03

## 2019-05-03 LAB
CULTURE, STOOL: NORMAL
TISSUE TRANSGLUTAMINASE IGA: 1 U/ML (ref 0–3)

## 2019-05-03 NOTE — TELEPHONE ENCOUNTER
Spoke to pt concerning normal labs. Pt states she stopped the mag and now is only going every other day now and now feeling a bit constipated. Pt is taking metamucil as suggested if she remembers.

## 2019-05-03 NOTE — TELEPHONE ENCOUNTER
----- Message from Bar Romero DO sent at 5/3/2019 12:24 PM EDT -----  Please let pt know that all blood work and stool studies are normal. All finally came back today. How is her diarrhea? Let me know, thanks!

## 2019-05-19 DIAGNOSIS — M15.9 PRIMARY OSTEOARTHRITIS INVOLVING MULTIPLE JOINTS: Chronic | ICD-10-CM

## 2019-05-20 RX ORDER — MELOXICAM 7.5 MG/1
7.5 TABLET ORAL DAILY PRN
Qty: 90 TABLET | Refills: 3 | Status: SHIPPED | OUTPATIENT
Start: 2019-05-20 | End: 2020-03-11

## 2019-06-24 ENCOUNTER — OFFICE VISIT (OUTPATIENT)
Dept: RHEUMATOLOGY | Age: 79
End: 2019-06-24
Payer: MEDICARE

## 2019-06-24 VITALS
DIASTOLIC BLOOD PRESSURE: 90 MMHG | HEART RATE: 62 BPM | SYSTOLIC BLOOD PRESSURE: 150 MMHG | WEIGHT: 157 LBS | BODY MASS INDEX: 28.89 KG/M2 | HEIGHT: 62 IN | OXYGEN SATURATION: 97 %

## 2019-06-24 DIAGNOSIS — R76.8 ANA POSITIVE: ICD-10-CM

## 2019-06-24 DIAGNOSIS — M15.4 EROSIVE OSTEOARTHRITIS OF HANDS, BILATERAL: Primary | ICD-10-CM

## 2019-06-24 DIAGNOSIS — M51.36 DDD (DEGENERATIVE DISC DISEASE), LUMBAR: ICD-10-CM

## 2019-06-24 DIAGNOSIS — M70.62 TROCHANTERIC BURSITIS OF LEFT HIP: ICD-10-CM

## 2019-06-24 DIAGNOSIS — M19.072 OSTEOARTHRITIS OF BOTH FEET, UNSPECIFIED OSTEOARTHRITIS TYPE: ICD-10-CM

## 2019-06-24 DIAGNOSIS — M19.071 OSTEOARTHRITIS OF BOTH FEET, UNSPECIFIED OSTEOARTHRITIS TYPE: ICD-10-CM

## 2019-06-24 PROCEDURE — G8427 DOCREV CUR MEDS BY ELIG CLIN: HCPCS | Performed by: INTERNAL MEDICINE

## 2019-06-24 PROCEDURE — 99214 OFFICE O/P EST MOD 30 MIN: CPT | Performed by: INTERNAL MEDICINE

## 2019-06-24 PROCEDURE — G8419 CALC BMI OUT NRM PARAM NOF/U: HCPCS | Performed by: INTERNAL MEDICINE

## 2019-06-24 PROCEDURE — 4040F PNEUMOC VAC/ADMIN/RCVD: CPT | Performed by: INTERNAL MEDICINE

## 2019-06-24 PROCEDURE — 1036F TOBACCO NON-USER: CPT | Performed by: INTERNAL MEDICINE

## 2019-06-24 PROCEDURE — G8399 PT W/DXA RESULTS DOCUMENT: HCPCS | Performed by: INTERNAL MEDICINE

## 2019-06-24 PROCEDURE — 1123F ACP DISCUSS/DSCN MKR DOCD: CPT | Performed by: INTERNAL MEDICINE

## 2019-06-24 PROCEDURE — 1090F PRES/ABSN URINE INCON ASSESS: CPT | Performed by: INTERNAL MEDICINE

## 2019-06-24 ASSESSMENT — ENCOUNTER SYMPTOMS
DIARRHEA: 1
EYE PAIN: 0
RESPIRATORY NEGATIVE: 1
EYES NEGATIVE: 1
BLOOD IN STOOL: 0
EYE REDNESS: 0
VOMITING: 0
NAUSEA: 0
CONSTIPATION: 0

## 2019-06-24 NOTE — PROGRESS NOTES
[facility] RHEUMATOLOGY FOLLOW UP NOTE       Date Of Service: 6/24/2019  Provider: Rosemary Randle DO    Name: Angela Fernández   MRN: [id number]    Chief Complaint(s)     Chief Complaint   Patient presents with    Follow-up     4 months        History of Present Illness (HPI)     Angela Fernández  is a(n)78 y.o. female with a hx of Chronic low back pain, and DDD lumbar spine, hypertension, osteoarthritis, Hyperlipidemia CKD stage 3  Here for the f/u evaluation of her osteoarthritis  both hand, both feet, low back pain.      Symptoms started:pt with several year of joint pain that started initially in the lower back withOUT trauma/injury. Development of radicular sx's and s/p lumbar spine surgery at [facility]. She has since has Ganglion cyst removal of the b/l 5th fingers and 3rd left PIP. Bilateral foot surgery for bunions. Hammer toe surgery on right. Bilateral knee TKR at [facility]. Nodules on fingers present for as long as she can remember. Right foot pain and swelling started around 2014. The pain has since been intermittent. Evaluated by Podiatry Kari Coronado) and diagnosed with osteoarthritis of the foot. Evaluated by Dr. Lizeth Ruiz 2017 who found her to have a (+) WICHO and elevated ESR. NEw PCP started mobic 7.5mg every other day has helped with the joint pains. Interval hx:   Osteoarthritis stable -- Pain in the hands -- intermittent, low back pain -- constant, localized. W/o radicular sx's or parethesia. Pain 5/10. Timing: n/a. Aggravating factors:  Back: mowing yard, walking dogs, prolonged standing.   hands -- prolonged use. Alleviating factors: mobic, heating pad (back) , back bracing. Denies AM stiffness, joint swelling, incontinence, saddle anesthesia, pseudoclaudciations           REVIEW OF SYSTEMS: (ROS)    Review of Systems   Constitutional: Negative for fever. HENT: Negative for congestion, hearing loss and nosebleeds. Eyes: Negative. Negative for pain and redness. Respiratory: Negative. Cardiovascular: Negative. Gastrointestinal: Positive for diarrhea (since gallbalder removal . ). Negative for blood in stool, constipation, nausea and vomiting. Genitourinary: Negative for hematuria. Musculoskeletal: Negative for myalgias. Skin: Negative for rash. Neurological: Negative for dizziness, weakness and headaches. Psychiatric/Behavioral: The patient is not nervous/anxious.           PAST MEDICAL HISTORY      Past Medical History:   Diagnosis Date    Chronic low back pain     CKD (chronic kidney disease) stage 3, GFR 30-59 ml/min ([facility])     DDD (degenerative disc disease), lumbar     Dyslipidemia     History of skin cancer     BCC AND SCC    Hyperlipidemia     Hypertension     Osteoarthritis        PAST SURGICAL HISTORY      Past Surgical History:   Procedure Laterality Date    BACK SURGERY      lower lumbar fusion    CARPAL TUNNEL RELEASE Bilateral      SECTION      CHOLECYSTECTOMY, LAPAROSCOPIC  10/03/2017    COLONOSCOPY      CYST REMOVAL Left 2017    left  5th digit-Dr Bowman    DILATION AND CURETTAGE OF UTERUS      FINGER SURGERY      SEVERAL    FOOT SURGERY Bilateral     HYSTERECTOMY      JOINT REPLACEMENT Bilateral     KNEES    PA COLONOSCOPY FLX DX W/COLLJ SPEC WHEN PFRMD N/A 2018    COLONOSCOPY performed [facility] Grace [facility]    SKIN BIOPSY      BCC AND SCC       FAMILY HISTORY      Family History   Problem Relation Age of Onset    Heart Disease Mother         CHF    Alzheimer's Disease Mother     Dementia Mother     Cancer Father         LUNG    Emphysema Father     Colon Cancer Neg Hx     Breast Cancer Neg Hx        SOCIAL HISTORY      Social History     Tobacco History     Smoking Status  Never Smoker    Smokeless Tobacco Use  Never Used          Alcohol History     Alcohol Use Status  Yes Comment  OCCASIONALLY          Drug Use     Drug Use Status  No          Sexual Activity     Sexually Active  Not normal. No respiratory distress. She has no wheezes. She has no rales. Lymphadenopathy:     She has no cervical adenopathy. Neurological: She is alert and oriented to person, place, and time. Skin: Skin is warm and dry. She is not diaphoretic. Psychiatric: She has a normal mood and affect. Her behavior is normal.      Fingers:   - fullness right 2-3 MCPs, Rt 3rd PIP , left 4th PIP. + gretta nodes , Left > right CMC squaring, left CMC grind testing. Wrist - elbows: non-tender, no synovits. Hips: left outer hip tenderness, increase w/ resisted abduction and internal rotation. Ankles:non-tender   Feet: mid-foot tenderness bilateral bilateral.               - boutonier of the right 2nd toe. Spine: tender & hypertonic  upper traps bilat   - right SC joint swelling.          RAPID3 Composite Score  6/24/2019 --- RAPID 3: 0 + 5 + 1 = 6     RAPID3 Total Score:       Remission: <3  Low Disease Activity: <6  Moderate Disease Activity: >=6 and <=12  High Disease Activity: >12     LABS      CBC  Lab Results   Component Value Date    WBC 4.6 04/01/2019    RBC 4.52 04/01/2019    HGB 13.3 04/01/2019    HCT 40.4 04/01/2019    MCV 89.4 04/01/2019    MCH 29.4 04/01/2019    MCHC 32.9 04/01/2019    RDW 13.9 05/03/2018     04/01/2019       CMP  Lab Results   Component Value Date    CALCIUM 9.9 04/01/2019    LABALBU 4.3 04/01/2019    PROT 7.2 04/01/2019     04/01/2019    K 4.7 04/01/2019    CO2 28 04/01/2019     04/01/2019    BUN 21 04/01/2019    CREATININE 1.1 04/01/2019    ALKPHOS 116 04/01/2019    ALT 16 04/01/2019    AST 24 04/01/2019       HgBA1c: No components found for: HGBA1C    Lab Results   Component Value Date    TSH 2.260 04/01/2019     No results found for: VITD25      No results found for: ANASCRN  No results found for: SSA  No results found for: SSB  No results found for: ANTI-SMITH  No results found for: DSDNAAB   No results found for: ANTIRNP  No results found for: C3, C4  No results found for: CCPAB  Lab Results   Component Value Date    RF Negative 06/08/2018       No components found for: CANCASCRN, APANCASCRN  Lab Results   Component Value Date    SEDRATE 12 06/08/2018     Lab Results   Component Value Date    CRP 0.5 06/08/2018       RADIOLOGY:         ASSESSMENT/PLAN    Assessment   Plan   - Symptoms started:pt with several year of joint pain that started initially in the lower back withOUT trauma/injury. Development of radicular sx's and s/p lumbar spine surgery at [address] [place] [id number]. She has since has Ganglion cyst removal of the b/l 5th fingers and 3rd left PIP. Bilateral foot surgery for bunions. Hammer toe surgery on right. Bilateral knee TKR at [address] [place] [address]. Nodules on fingers present for as long as she can remember. Right foot pain and swelling started around 2014. The pain has since been intermittent. Evaluated by Podiatry Tanner Alonso) and diagnosed with osteoarthritis of the foot. Evaluated by Dr. Barry Griffin 2017 who found her to have a (+) WICHO and elevated ESR. NEw PCP started mobic 7.5mg every other day has helped with the joint pains.     (+) WICHO : 1:160 homogenous   Polyarthralgia --- hands- intermittent, mild fullness/swelling of MCPS and Rt aC joint. - negative:  SSA, SSB, Espitia, RNP, DsDNA, RF, CCP              - discussed the a (+) WICHO can occur in the general population. 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 - plaquenil 300 mg/d-- Feb 2019.   - holding on additional medications at this time                 Bilateral hand osteoarthritis. - mother with similar hands. XR hands with some erosive changes. - prior tx: diclofenac gel (helped but didn't last)              - continue mobic 7.5mg every other day prn pain from PCP     Osteoarthritis bilateral feet:   - bilateral otes and midfoot DJD on x-ray evaluation. Prior Bilateral foot surgery for bunions. Hammer toe surgery on right.                - continue mobic 7.5mg every other day prn pain from PCP              - prilosec 20mg daily provided.      Low back pain:   - intermittent, localized back pain, denies red flag sx's of leg paresthesias, saddle anesthesias, pseudoclaudications, wt loss, fevers, weakness of the legs. - CONTINUE current tx.      Post menopausal :   - continue calcium and vitamin D suppelentation.      Prolonged NSAID use. - Prilosec 20mg daily started for GI protection given her NSAID use          No follow-ups on file. Electronically signed by Maurizio Mac DO on 6/24/2019 at 10:27 AM    New Prescriptions    No medications on file       Thank you for allowing me to participate in the care of this patient. Please call if there are any questions.

## 2019-07-12 ENCOUNTER — OFFICE VISIT (OUTPATIENT)
Dept: FAMILY MEDICINE CLINIC | Age: 79
End: 2019-07-12
Payer: MEDICARE

## 2019-07-12 VITALS
WEIGHT: 155 LBS | HEIGHT: 62 IN | DIASTOLIC BLOOD PRESSURE: 70 MMHG | BODY MASS INDEX: 28.52 KG/M2 | HEART RATE: 68 BPM | TEMPERATURE: 97.9 F | RESPIRATION RATE: 14 BRPM | SYSTOLIC BLOOD PRESSURE: 132 MMHG

## 2019-07-12 DIAGNOSIS — M15.9 PRIMARY OSTEOARTHRITIS INVOLVING MULTIPLE JOINTS: Chronic | ICD-10-CM

## 2019-07-12 DIAGNOSIS — M54.50 ACUTE LEFT-SIDED LOW BACK PAIN WITHOUT SCIATICA: Primary | ICD-10-CM

## 2019-07-12 DIAGNOSIS — N18.30 CKD (CHRONIC KIDNEY DISEASE) STAGE 3, GFR 30-59 ML/MIN (HCC): Chronic | ICD-10-CM

## 2019-07-12 DIAGNOSIS — M51.36 DDD (DEGENERATIVE DISC DISEASE), LUMBAR: Chronic | ICD-10-CM

## 2019-07-12 PROBLEM — K52.9 CHRONIC DIARRHEA: Status: RESOLVED | Noted: 2019-04-10 | Resolved: 2019-07-12

## 2019-07-12 PROCEDURE — G8399 PT W/DXA RESULTS DOCUMENT: HCPCS | Performed by: FAMILY MEDICINE

## 2019-07-12 PROCEDURE — 99214 OFFICE O/P EST MOD 30 MIN: CPT | Performed by: FAMILY MEDICINE

## 2019-07-12 PROCEDURE — G8427 DOCREV CUR MEDS BY ELIG CLIN: HCPCS | Performed by: FAMILY MEDICINE

## 2019-07-12 PROCEDURE — G8419 CALC BMI OUT NRM PARAM NOF/U: HCPCS | Performed by: FAMILY MEDICINE

## 2019-07-12 PROCEDURE — 4040F PNEUMOC VAC/ADMIN/RCVD: CPT | Performed by: FAMILY MEDICINE

## 2019-07-12 PROCEDURE — 1090F PRES/ABSN URINE INCON ASSESS: CPT | Performed by: FAMILY MEDICINE

## 2019-07-12 PROCEDURE — 1036F TOBACCO NON-USER: CPT | Performed by: FAMILY MEDICINE

## 2019-07-12 PROCEDURE — 1123F ACP DISCUSS/DSCN MKR DOCD: CPT | Performed by: FAMILY MEDICINE

## 2019-07-12 RX ORDER — PREDNISONE 20 MG/1
40 TABLET ORAL DAILY
Qty: 10 TABLET | Refills: 0 | Status: SHIPPED | OUTPATIENT
Start: 2019-07-12 | End: 2019-09-18 | Stop reason: SDUPTHER

## 2019-07-12 RX ORDER — TIZANIDINE 2 MG/1
2 TABLET ORAL EVERY 8 HOURS PRN
Qty: 30 TABLET | Refills: 0 | Status: SHIPPED | OUTPATIENT
Start: 2019-07-12 | End: 2019-08-08 | Stop reason: SDUPTHER

## 2019-07-23 RX ORDER — HYDROXYCHLOROQUINE SULFATE 200 MG/1
TABLET, FILM COATED ORAL
Qty: 60 TABLET | Refills: 0 | Status: SHIPPED | OUTPATIENT
Start: 2019-07-23 | End: 2019-08-12 | Stop reason: SDUPTHER

## 2019-08-08 ENCOUNTER — TELEPHONE (OUTPATIENT)
Dept: FAMILY MEDICINE CLINIC | Age: 79
End: 2019-08-08

## 2019-08-08 DIAGNOSIS — M54.50 ACUTE LEFT-SIDED LOW BACK PAIN WITHOUT SCIATICA: ICD-10-CM

## 2019-08-08 RX ORDER — TIZANIDINE 2 MG/1
2 TABLET ORAL EVERY 8 HOURS PRN
Qty: 30 TABLET | Refills: 0 | Status: SHIPPED | OUTPATIENT
Start: 2019-08-08 | End: 2019-09-18

## 2019-08-12 RX ORDER — HYDROXYCHLOROQUINE SULFATE 200 MG/1
TABLET, FILM COATED ORAL
Qty: 60 TABLET | Refills: 0 | Status: SHIPPED | OUTPATIENT
Start: 2019-08-12 | End: 2019-10-28 | Stop reason: SDUPTHER

## 2019-08-20 NOTE — PROGRESS NOTES
Chief Complaint   Patient presents with    Pain     located on the left side of bottom, pt feels she has pulled a muscle    Fall     pt had fallen about a month ago, pt's knukle on ther left hand, araceli finger is swollen and tender to touch. History obtained from the patient. SUBJECTIVE:  Sparkle Danielle is a 70 y.o. female that presents today for     -L buttocks pain:  Hurt over the weekend  Was trimming bushes and strained that area  No back pain  No radicular sxs  Does not radiate  No pain at rest  Pain with walking and going up stairs  Feeling a bit better  mobic helps, as does heat      -L hand pain:  Fall 4 wks ago  Landed on L hand  Still with L MCP swelling x 4 wks  Mild pain  Good ROM  No bruising. Age/Gender Health Maintenance    Lipid -   Lab Results   Component Value Date    CHOL 234 (H) 04/01/2019    CHOL 207 03/06/2018    CHOL 168 09/05/2017     Lab Results   Component Value Date    TRIG 167 04/01/2019    TRIG 149 03/06/2018    TRIG 148 09/05/2017     Lab Results   Component Value Date    HDL 57 04/01/2019    HDL 74 (A) 03/06/2018    HDL 60 09/05/2017     Lab Results   Component Value Date    LDLCALC 144 04/01/2019    LDLCALC 82 03/06/2018    LDLCALC 70 09/05/2017       ; LDL 82; HDL 74;  [facility] 2018)    DM Screen -   Lab Results   Component Value Date    GLUCOSE 89 04/01/2019    GLUCOSE 99 05/03/2018       Colon Cancer Screening -NEG SEPT 2018, no repeat (Hari Gill)  Lung Cancer Screening (Age 54 to [de-identified] with 30 pack year hx, current smoker or quit within past 15 years) - never smoker    Tetanus - UTD July 2018  Influenza Vaccine - UTD FALL 2018  Pneumonia Vaccine - UTD PCV 13 OCT 2015.  UTD PPV 23 APR 2018  Zostavax - Zostavax competed APR 2013   Shingrix - to get at pharmacy per medicare rules    Breast Cancer Screening - NEG SEPT 2018  Cervical Cancer Screening - Aged out  Osteoporosis Screening - osteopenia AUG 2018, low FRAX SCORE      Current Outpatient Medications BCC AND SCC    Hyperlipidemia     Hypertension     Osteoarthritis        Past Surgical History:   Procedure Laterality Date    BACK SURGERY      lower lumbar fusion    CARPAL TUNNEL RELEASE Bilateral      SECTION      CHOLECYSTECTOMY, LAPAROSCOPIC  10/03/2017    COLONOSCOPY      CYST REMOVAL Left 2017    left  5th digit-Dr Bowman    DILATION AND CURETTAGE OF UTERUS      FINGER SURGERY      SEVERAL    FOOT SURGERY Bilateral     HYSTERECTOMY      JOINT REPLACEMENT Bilateral     KNEES    DC COLONOSCOPY FLX DX W/COLLJ SPEC WHEN PFRMD N/A 2018    COL[facility] Pilar [facility]an [facility]    SKIN BIOPSY      BCC AND SCC       Allergies   Allergen Reactions    Sulfa Antibiotics Swelling    Other Nausea And Vomiting     oysters       Social History     Tobacco Use    Smoking status: Never Smoker    Smokeless tobacco: Never Used   Substance Use Topics    Alcohol use: Yes     Comment: OCCASIONALLY       Family History   Problem Relation Age of Onset    Heart Disease Mother         CHF    Alzheimer's Disease Mother     Dementia Mother     Cancer Father         LUNG    Emphysema Father     Colon Cancer Neg Hx     Breast Cancer Neg Hx          I have reviewed the patient's past medical history, past surgical history, allergies, medications, social and family history and I have made updates where appropriate.       Review of Systems  Positive responses are highlighted in bold    Constitutional:  Fever, Chills, Night Sweats, Fatigue, Unexpected changes in weight  HENT:  Ear pain, Tinnitus, Nosebleeds, Trouble swallowing, Hearing loss, Sore throat  Cardiovascular:  Chest Pain, Palpitations, Orthopnea, Paroxysmal Nocturnal Dyspnea  Respiratory:  Cough, Wheezing, Shortness of breath, Chest tightness, Apnea  Gastrointestinal:  Nausea, Vomiting, Diarrhea, Constipation, Heartburn, Blood in stool  Genitourinary:  Difficulty or painful urination, Flank pain, COUNSELING    Patient given educational materials on: See Attached    Barriers to learning and self management: none    Discussed use, benefit, and side effects of prescribed medications. Barriers to medication compliance addressed. All patient questions answered. Pt voiced understanding.        Electronically signed by Keiko Jones DO on 8/21/2019 at 4:53 PM

## 2019-08-21 ENCOUNTER — HOSPITAL ENCOUNTER (OUTPATIENT)
Dept: GENERAL RADIOLOGY | Age: 79
Discharge: HOME | End: 2019-08-21
Payer: MEDICARE

## 2019-08-21 ENCOUNTER — HOSPITAL ENCOUNTER (OUTPATIENT)
Age: 79
Discharge: HOME | End: 2019-08-21
Payer: MEDICARE

## 2019-08-21 ENCOUNTER — OFFICE VISIT (OUTPATIENT)
Dept: FAMILY MEDICINE CLINIC | Age: 79
End: 2019-08-21
Payer: MEDICARE

## 2019-08-21 VITALS
BODY MASS INDEX: 29.19 KG/M2 | RESPIRATION RATE: 16 BRPM | HEART RATE: 67 BPM | WEIGHT: 159.6 LBS | SYSTOLIC BLOOD PRESSURE: 115 MMHG | TEMPERATURE: 97.6 F | DIASTOLIC BLOOD PRESSURE: 55 MMHG

## 2019-08-21 DIAGNOSIS — M25.542 METACARPOPHALANGEAL JOINT PAIN OF LEFT HAND: ICD-10-CM

## 2019-08-21 DIAGNOSIS — S76.012A MUSCLE STRAIN OF LEFT GLUTEAL REGION, INITIAL ENCOUNTER: Primary | ICD-10-CM

## 2019-08-21 PROCEDURE — G8419 CALC BMI OUT NRM PARAM NOF/U: HCPCS | Performed by: FAMILY MEDICINE

## 2019-08-21 PROCEDURE — 99213 OFFICE O/P EST LOW 20 MIN: CPT | Performed by: FAMILY MEDICINE

## 2019-08-21 PROCEDURE — 4040F PNEUMOC VAC/ADMIN/RCVD: CPT | Performed by: FAMILY MEDICINE

## 2019-08-21 PROCEDURE — 1090F PRES/ABSN URINE INCON ASSESS: CPT | Performed by: FAMILY MEDICINE

## 2019-08-21 PROCEDURE — 1123F ACP DISCUSS/DSCN MKR DOCD: CPT | Performed by: FAMILY MEDICINE

## 2019-08-21 PROCEDURE — G8427 DOCREV CUR MEDS BY ELIG CLIN: HCPCS | Performed by: FAMILY MEDICINE

## 2019-08-21 PROCEDURE — 1036F TOBACCO NON-USER: CPT | Performed by: FAMILY MEDICINE

## 2019-08-21 PROCEDURE — 73130 X-RAY EXAM OF HAND: CPT

## 2019-08-21 PROCEDURE — G8399 PT W/DXA RESULTS DOCUMENT: HCPCS | Performed by: FAMILY MEDICINE

## 2019-08-21 NOTE — PROGRESS NOTES
Visit Information    Have you changed or started any medications since your last visit including any over-the-counter medicines, vitamins, or herbal medicines? no   Are you having any side effects from any of your medications? -  no  Have you stopped taking any of your medications? Is so, why? -  no    Have you seen any other physician or provider since your last visit? No  Have you had any other diagnostic tests since your last visit? No  Have you been seen in the emergency room and/or had an admission to a hospital since we last saw you? No  Have you had your routine dental cleaning in the past 6 months? no    Have you activated your [employer] account? If not, what are your barriers?  Yes     Patient Care Team:  [facility], DO as PCP - General (Family Medicine)  [facility], DO as PCP - [facility]        Health Maintenance   Topic Date Due    Annual Wellness Visit (AWV)  11/05/2005    Shingles Vaccine (2 of 3) 06/11/2013    Flu vaccine (1) 09/01/2019    Breast cancer screen  09/24/2019    Potassium monitoring  04/01/2020    Creatinine monitoring  04/01/2020    DEXA (modify frequency per FRAX score)  08/27/2020    DTaP/Tdap/Td vaccine (2 - Td) 07/29/2028    Pneumococcal 65+ years Vaccine  Completed

## 2019-08-22 ENCOUNTER — TELEPHONE (OUTPATIENT)
Dept: FAMILY MEDICINE CLINIC | Age: 79
End: 2019-08-22

## 2019-09-13 ENCOUNTER — TELEPHONE (OUTPATIENT)
Dept: FAMILY MEDICINE CLINIC | Age: 79
End: 2019-09-13

## 2019-09-13 DIAGNOSIS — E87.5 HYPERKALEMIA: Primary | ICD-10-CM

## 2019-09-13 DIAGNOSIS — I10 ESSENTIAL HYPERTENSION: Chronic | ICD-10-CM

## 2019-09-13 RX ORDER — LISINOPRIL AND HYDROCHLOROTHIAZIDE 12.5; 1 MG/1; MG/1
TABLET ORAL
Qty: 90 TABLET | Refills: 0 | Status: SHIPPED | OUTPATIENT
Start: 2019-09-13 | End: 2019-12-06 | Stop reason: SDUPTHER

## 2019-09-16 ENCOUNTER — PATIENT MESSAGE (OUTPATIENT)
Dept: FAMILY MEDICINE CLINIC | Age: 79
End: 2019-09-16

## 2019-09-16 NOTE — TELEPHONE ENCOUNTER
From: Van Mc  To: Eric Shi DO  Sent: 9/16/2019 9:47 AM EDT  Subject: Visit Follow-Up Question    I am still experiencing pain in my left side . not so much the spinal area as to the left side and radiating towards the front! I am wondering if I need to see the Dr. Yoselin Escoto or maybe a specialist? this mostly persists when I am active or doing physical activity ! thanks.  Julianna PORTER

## 2019-09-18 ENCOUNTER — OFFICE VISIT (OUTPATIENT)
Dept: FAMILY MEDICINE CLINIC | Age: 79
End: 2019-09-18
Payer: MEDICARE

## 2019-09-18 VITALS
HEART RATE: 66 BPM | HEIGHT: 62 IN | WEIGHT: 159.4 LBS | TEMPERATURE: 97.8 F | DIASTOLIC BLOOD PRESSURE: 80 MMHG | BODY MASS INDEX: 29.33 KG/M2 | RESPIRATION RATE: 12 BRPM | SYSTOLIC BLOOD PRESSURE: 138 MMHG

## 2019-09-18 DIAGNOSIS — M54.50 CHRONIC LEFT-SIDED LOW BACK PAIN WITHOUT SCIATICA: Primary | ICD-10-CM

## 2019-09-18 DIAGNOSIS — M53.3 CHRONIC LEFT SI JOINT PAIN: ICD-10-CM

## 2019-09-18 DIAGNOSIS — Z23 NEED FOR INFLUENZA VACCINATION: ICD-10-CM

## 2019-09-18 DIAGNOSIS — G89.29 CHRONIC LEFT-SIDED LOW BACK PAIN WITHOUT SCIATICA: Primary | ICD-10-CM

## 2019-09-18 DIAGNOSIS — M25.552 LEFT HIP PAIN: ICD-10-CM

## 2019-09-18 DIAGNOSIS — G89.29 CHRONIC LEFT SI JOINT PAIN: ICD-10-CM

## 2019-09-18 PROCEDURE — G8427 DOCREV CUR MEDS BY ELIG CLIN: HCPCS | Performed by: FAMILY MEDICINE

## 2019-09-18 PROCEDURE — 99214 OFFICE O/P EST MOD 30 MIN: CPT | Performed by: FAMILY MEDICINE

## 2019-09-18 PROCEDURE — 1036F TOBACCO NON-USER: CPT | Performed by: FAMILY MEDICINE

## 2019-09-18 PROCEDURE — 1090F PRES/ABSN URINE INCON ASSESS: CPT | Performed by: FAMILY MEDICINE

## 2019-09-18 PROCEDURE — 90686 IIV4 VACC NO PRSV 0.5 ML IM: CPT | Performed by: FAMILY MEDICINE

## 2019-09-18 PROCEDURE — G8419 CALC BMI OUT NRM PARAM NOF/U: HCPCS | Performed by: FAMILY MEDICINE

## 2019-09-18 PROCEDURE — 4040F PNEUMOC VAC/ADMIN/RCVD: CPT | Performed by: FAMILY MEDICINE

## 2019-09-18 PROCEDURE — G8399 PT W/DXA RESULTS DOCUMENT: HCPCS | Performed by: FAMILY MEDICINE

## 2019-09-18 PROCEDURE — G0008 ADMIN INFLUENZA VIRUS VAC: HCPCS | Performed by: FAMILY MEDICINE

## 2019-09-18 PROCEDURE — 1123F ACP DISCUSS/DSCN MKR DOCD: CPT | Performed by: FAMILY MEDICINE

## 2019-09-18 RX ORDER — PREDNISONE 20 MG/1
40 TABLET ORAL DAILY
Qty: 10 TABLET | Refills: 0 | Status: SHIPPED | OUTPATIENT
Start: 2019-09-18 | End: 2019-09-23

## 2019-09-18 RX ORDER — TRAMADOL HYDROCHLORIDE 50 MG/1
50 TABLET ORAL EVERY 8 HOURS PRN
Qty: 21 TABLET | Refills: 0 | Status: SHIPPED | OUTPATIENT
Start: 2019-09-18 | End: 2019-10-18 | Stop reason: SDUPTHER

## 2019-09-18 RX ORDER — TIZANIDINE 2 MG/1
2 TABLET ORAL EVERY 8 HOURS PRN
Qty: 30 TABLET | Refills: 0 | Status: ON HOLD | OUTPATIENT
Start: 2019-09-18 | End: 2019-12-04

## 2019-09-18 NOTE — PATIENT INSTRUCTIONS
or medium setting for 15 to 20 minutes every 2 or 3 hours. Try a warm shower in place of one session with the heating pad. · You can also try an ice pack for 10 to 15 minutes every 2 to 3 hours. Put a thin cloth between the ice pack and your skin. · Take pain medicines exactly as directed. ? If the doctor gave you a prescription medicine for pain, take it as prescribed. ? If you are not taking a prescription pain medicine, ask your doctor if you can take an over-the-counter medicine. · Take short walks several times a day. You can start with 5 to 10 minutes, 3 or 4 times a day, and work up to longer walks. Walk on level surfaces and avoid hills and stairs until your back is better. · Return to work and other activities as soon as you can. Continued rest without activity is usually not good for your back. · To prevent future back pain, do exercises to stretch and strengthen your back and stomach. Learn how to use good posture, safe lifting techniques, and proper body mechanics. When should you call for help? Call your doctor now or seek immediate medical care if:    · You have new or worsening numbness in your legs.     · You have new or worsening weakness in your legs. (This could make it hard to stand up.)     · You lose control of your bladder or bowels.    Watch closely for changes in your health, and be sure to contact your doctor if:    · You have a fever, lose weight, or don't feel well.     · You do not get better as expected. Where can you learn more? Go to [email or web address]://[email or web address].yoone. org and sign in to your [employer] account. Enter W593 in the Innohat box to learn more about \"Back Pain: Care Instructions. \"     If you do not have an account, please click on the \"Sign Up Now\" link. Current as of: September 20, 2018  Content Version: 12.1  © 1510-5060 [employer]. Care instructions adapted under license by [facility] ([facility]).  If you have questions about a medical

## 2019-09-18 NOTE — PROGRESS NOTES
Chief Complaint   Patient presents with    Back Pain       History obtained from the patient. SUBJECTIVE:  Vilma Ching is a 70 y.o. female that presents today for       -Low Back Pain LAST VISIT:     HPI:   Started 2 to 3 wks ago  Started after doing yard work  Persistent on L lower side, will radiate to the R side  Heat and ice help some  On tramadol chronically for back and joint pain, not helping.   mobic helps some  No radicular sxs  No bowel/bladder issues since back pain  Pain at it's worse a 6/10  Currently 3  Denies LUTS/dysuria     She describes the pain as dull, aching  in the lumbar region.     Inciting injury or history of trauma? No  Pain is relieved by - as above  Pain is aggravated by - as above  Radiation of the pain? No  Paresthesias of the extremities? No  Saddle anesthesia? No  Bowel or bladder incontinence? No  Treatments tried - as above. UPDATE TODAY:   Saw in July for this  Treated with tramadol, zanaflex and pred   Got mostly better, but once done with meds, pain came back  Has been dealing with this since then  Pain in L sided low back, will radiate down to L SI joint and front of L hip  Better with rest, worse when walking a distance, stairs and laying flat  Seems to be doing worse  Has been doing HEP, not helping  No fevers, chills, night sweats or wt loss  No groin pain/numbness or bowel/bladder dysfunction. No dysuria/luts  Asking what else to do. Age/Gender Health Maintenance    Lipid -   Lab Results   Component Value Date    CHOL 234 (H) 04/01/2019    CHOL 207 03/06/2018    CHOL 168 09/05/2017     Lab Results   Component Value Date    TRIG 167 04/01/2019    TRIG 149 03/06/2018    TRIG 148 09/05/2017     Lab Results   Component Value Date    HDL 57 04/01/2019    HDL 74 (A) 03/06/2018    HDL 60 09/05/2017     Lab Results   Component Value Date    LDLCALC 144 04/01/2019    LDLCALC 82 03/06/2018    LDLCALC 70 09/05/2017       ;  LDL 82; HDL 74;  (MARCH 2018)    DM Screen -   Lab Results   Component Value Date    GLUCOSE 89 04/01/2019    GLUCOSE 99 05/03/2018       Colon Cancer Screening -NEG SEPT 2018, no repeat (Mely Butter)  Lung Cancer Screening (Age 54 to [de-identified] with 30 pack year hx, current smoker or quit within past 15 years) - never smoker    Tetanus - UTD July 2018  Influenza Vaccine - UTD FALL 2019  Pneumonia Vaccine - UTD PCV 13 OCT 2015. UTD PPV 23 APR 2018  Zostavax - Zostavax competed APR 2013   Shingrix - to get at pharmacy per medicare rules    Breast Cancer Screening - NEG SEPT 2018  Cervical Cancer Screening - Aged out  Osteoporosis Screening - osteopenia AUG 2018, low FRAX SCORE      Current Outpatient Medications   Medication Sig Dispense Refill    predniSONE (DELTASONE) 20 MG tablet Take 2 tablets by mouth daily for 5 days 10 tablet 0    tiZANidine (ZANAFLEX) 2 MG tablet Take 1 tablet by mouth every 8 hours as needed (back pain/spasms) 30 tablet 0    traMADol (ULTRAM) 50 MG tablet Take 1 tablet by mouth every 8 hours as needed for Pain for up to 7 days. 21 tablet 0    lisinopril-hydrochlorothiazide (PRINZIDE;ZESTORETIC) 10-12.5 MG per tablet TAKE 1 TABLET BY MOUTH DAILY 90 tablet 0    hydroxychloroquine (PLAQUENIL) 200 MG tablet Take 1 tablet in the morning 60 tablet 0    meloxicam (MOBIC) 7.5 MG tablet TAKE 1 TABLET BY MOUTH DAILY AS NEEDED FOR PAIN 90 tablet 3    omeprazole (PRILOSEC) 20 MG delayed release capsule Take 1 capsule by mouth daily 90 capsule 3    Multiple Vitamins-Minerals (MULTIVITAMIN PO) Take by mouth daily      Handicap Placard MISC by Does not apply route Expires 29 June 2023 1 each 0    Multiple Vitamins-Minerals (PRESERVISION AREDS PO) Take by mouth      Probiotic Product (PROBIOTIC DAILY PO) Take by mouth daily       Calcium Carbonate-Vit D-Min (CALCIUM 1200 PO) Take by mouth daily      Cholecalciferol (VITAMIN D-3 PO) Take by mouth daily       No current facility-administered medications for this visit. Reviewed  General Appearance: A&O x 3, No acute distress,well developed and well- nourished  Eyes: pupils equal, round, and reactive to light, extraocular eye movements intact, conjunctivae and eye lids without erythema  ENT: external ear and ear canal clear bilaterally, TMs intact and regular, nose without deformity, nasal mucosa and turbinates normal without polyps, oropharynx normal, dentition is normal for age=  Neck: supple and non-tender without mass, no thyromegaly or thyroid nodules, no cervical lymphadenopathy  Pulmonary/Chest: clear to auscultation bilaterally- no wheezes, rales or rhonchi, normal air movement, no respiratory distress or retractions  Cardiovascular: S1 and S2 auscultated w/ RRR. No murmurs, rubs, clicks, or gallops, distal pulses intact. Abdomen: soft, non-tender, non-distended, bowel sounds physiologic,  no rebound or guarding, no masses or hernias noted. Liver and spleen without enlargement. Extremities: no cyanosis, clubbing or edema of the lower extremities. +2 PT/DP bilaterally. Musculoskeletal: No joint swelling or gross deformity   Skin: warm and dry, no rash or erythema  LUMBAR SPINE EXAM:  Examination of the Lumbar spine shows:  Deformity Absent . Soft Tissue Swelling Absent . Soft Tissue Tenderness abset. Midline Bone Tenderness Absent . Paraspinal Muscular Spasm present L lateral back. Previous Incisions Absent . Erythema Absent . Lumbar Flexion does not produce pain. Lumbar Extension does not produce pain. NEUROLOGICAL EXAM:  SLR     Left: Negative. Right Negative. DTR 2+ bilaterally  Ravi's Sign Absent   Gait normal Heel/ Toe.   Sensation to Touch normal    LE strength    Right Left   Hip Flexors 5/5 5/5   Hip Extensors 5/5 5/5   Knee Flexors 5/5 5/5   Knee Extensors 5/5 5/5   Ankle Flexors 5/5 5/5   Ankle Extensors 5/5 5/5   Extensor Hallicus Longus 5/5 5/5   Dorsiflexors 5/5 5/5     Sensation:  T12 100% 100%   L1 100% 100%   L2 100% 100%   L3 100% 100% L4 100% 100%   L5 100% 100%   S1 100% 100%   S2 100% 100%   S3-S5 100% 100%       ASSESSMENT & PLAN  1. Chronic left-sided low back pain without sciatica    Failed conservative measures  No alarm features    Plan:  Refer to PT  Short course of tramadol, prednisone and zanaflex  Xrays of back, hip and SI joint d/t persistent pain  F/u 7 wks  Further w/u based on response to therapy    OAARS reviewed and appropriate. Controlled Substances Monitoring:     Periodic Controlled Substance Monitoring: Possible medication side effects, risk of tolerance/dependence & alternative treatments discussed., No signs of potential drug abuse or diversion identified. Jeet Galvan DO)    - predniSONE (DELTASONE) 20 MG tablet; Take 2 tablets by mouth daily for 5 days  Dispense: 10 tablet; Refill: 0  - tiZANidine (ZANAFLEX) 2 MG tablet; Take 1 tablet by mouth every 8 hours as needed (back pain/spasms)  Dispense: 30 tablet; Refill: 0  - traMADol (ULTRAM) 50 MG tablet; Take 1 tablet by mouth every 8 hours as needed for Pain for up to 7 days. Dispense: 21 tablet; Refill: 0  - XR LUMBAR SPINE (2-3 VIEWS); Future  - XR HIP LEFT (2-3 VIEWS); Future  - XR SACROILIAC JOINTS (MIN 3 VIEWS); Future  - Amb External Referral To Physical Therapy    2. Left hip pain    - predniSONE (DELTASONE) 20 MG tablet; Take 2 tablets by mouth daily for 5 days  Dispense: 10 tablet; Refill: 0  - tiZANidine (ZANAFLEX) 2 MG tablet; Take 1 tablet by mouth every 8 hours as needed (back pain/spasms)  Dispense: 30 tablet; Refill: 0  - traMADol (ULTRAM) 50 MG tablet; Take 1 tablet by mouth every 8 hours as needed for Pain for up to 7 days. Dispense: 21 tablet; Refill: 0  - XR LUMBAR SPINE (2-3 VIEWS); Future  - XR HIP LEFT (2-3 VIEWS); Future  - XR SACROILIAC JOINTS (MIN 3 VIEWS); Future  - Amb External Referral To Physical Therapy    3. Chronic left SI joint pain    - predniSONE (DELTASONE) 20 MG tablet;  Take 2 tablets by mouth daily for 5 days  Dispense: 10 tablet; Refill: 0  - tiZANidine (ZANAFLEX) 2 MG tablet; Take 1 tablet by mouth every 8 hours as needed (back pain/spasms)  Dispense: 30 tablet; Refill: 0  - traMADol (ULTRAM) 50 MG tablet; Take 1 tablet by mouth every 8 hours as needed for Pain for up to 7 days. Dispense: 21 tablet; Refill: 0  - XR LUMBAR SPINE (2-3 VIEWS); Future  - XR HIP LEFT (2-3 VIEWS); Future  - XR SACROILIAC JOINTS (MIN 3 VIEWS); Future  - Amb External Referral To Physical Therapy    4. Need for influenza vaccination    - INFLUENZA, QUADV, 3 YRS AND OLDER, IM PF, PREFILL SYR OR SDV, 0.5ML (AFLURIA QUADV, PF)      DISPOSITION    Return in about 7 weeks (around 11/6/2019) for f/u back pain/chronic medical conditons, sooner as needed. Rachel Manriquez released without restrictions. Future Appointments   Date Time Provider Radha Paulson   9/25/2019  9:20 AM STR MAMMOGRAPHY RM2  [facility] Radiolog   11/6/2019  1:20 PM [facility] [place]   12/23/2019 10:30 AM Macrina Copeland DO [facility]    Patient given educational materials on: See Attached    Barriers to learning and self management: none    Discussed use, benefit, and side effects of prescribed medications. Barriers to medication compliance addressed. All patient questions answered. Pt voiced understanding.        Electronically signed by Frank Peng DO on 9/18/2019 at 5:24 PM

## 2019-09-19 ENCOUNTER — HOSPITAL ENCOUNTER (OUTPATIENT)
Dept: GENERAL RADIOLOGY | Age: 79
Discharge: HOME | End: 2019-09-19
Payer: MEDICARE

## 2019-09-19 ENCOUNTER — HOSPITAL ENCOUNTER (OUTPATIENT)
Age: 79
Discharge: HOME | End: 2019-09-19
Payer: MEDICARE

## 2019-09-19 DIAGNOSIS — M54.50 CHRONIC LEFT-SIDED LOW BACK PAIN WITHOUT SCIATICA: ICD-10-CM

## 2019-09-19 DIAGNOSIS — G89.29 CHRONIC LEFT-SIDED LOW BACK PAIN WITHOUT SCIATICA: ICD-10-CM

## 2019-09-19 DIAGNOSIS — M53.3 CHRONIC LEFT SI JOINT PAIN: ICD-10-CM

## 2019-09-19 DIAGNOSIS — M25.552 LEFT HIP PAIN: ICD-10-CM

## 2019-09-19 DIAGNOSIS — G89.29 CHRONIC LEFT SI JOINT PAIN: ICD-10-CM

## 2019-09-19 PROCEDURE — 73502 X-RAY EXAM HIP UNI 2-3 VIEWS: CPT

## 2019-09-19 PROCEDURE — 72100 X-RAY EXAM L-S SPINE 2/3 VWS: CPT

## 2019-09-19 PROCEDURE — 72202 X-RAY EXAM SI JOINTS 3/> VWS: CPT

## 2019-09-20 ENCOUNTER — TELEPHONE (OUTPATIENT)
Dept: FAMILY MEDICINE CLINIC | Age: 79
End: 2019-09-20

## 2019-09-25 ENCOUNTER — HOSPITAL ENCOUNTER (OUTPATIENT)
Dept: WOMENS IMAGING | Age: 79
Discharge: HOME | End: 2019-09-25
Payer: MEDICARE

## 2019-09-25 DIAGNOSIS — Z12.39 BREAST SCREENING: ICD-10-CM

## 2019-09-25 PROCEDURE — 77063 BREAST TOMOSYNTHESIS BI: CPT

## 2019-09-26 ENCOUNTER — TELEPHONE (OUTPATIENT)
Dept: FAMILY MEDICINE CLINIC | Age: 79
End: 2019-09-26

## 2019-10-15 ENCOUNTER — TELEPHONE (OUTPATIENT)
Dept: FAMILY MEDICINE CLINIC | Age: 79
End: 2019-10-15

## 2019-10-18 ENCOUNTER — PATIENT MESSAGE (OUTPATIENT)
Dept: FAMILY MEDICINE CLINIC | Age: 79
End: 2019-10-18

## 2019-10-18 DIAGNOSIS — M53.3 CHRONIC LEFT SI JOINT PAIN: ICD-10-CM

## 2019-10-18 DIAGNOSIS — G89.29 CHRONIC LEFT SI JOINT PAIN: ICD-10-CM

## 2019-10-18 DIAGNOSIS — G89.29 CHRONIC LEFT-SIDED LOW BACK PAIN WITHOUT SCIATICA: ICD-10-CM

## 2019-10-18 DIAGNOSIS — M54.50 CHRONIC LEFT-SIDED LOW BACK PAIN WITHOUT SCIATICA: ICD-10-CM

## 2019-10-18 DIAGNOSIS — M25.552 LEFT HIP PAIN: ICD-10-CM

## 2019-10-18 RX ORDER — PREDNISONE 10 MG/1
TABLET ORAL
Qty: 30 TABLET | Refills: 0 | Status: SHIPPED | OUTPATIENT
Start: 2019-10-18 | End: 2019-11-03

## 2019-10-18 RX ORDER — TRAMADOL HYDROCHLORIDE 50 MG/1
50 TABLET ORAL EVERY 8 HOURS PRN
Qty: 21 TABLET | Refills: 0 | Status: SHIPPED | OUTPATIENT
Start: 2019-10-18 | End: 2019-10-25

## 2019-10-21 ENCOUNTER — PATIENT MESSAGE (OUTPATIENT)
Dept: FAMILY MEDICINE CLINIC | Age: 79
End: 2019-10-21

## 2019-10-28 ENCOUNTER — OFFICE VISIT (OUTPATIENT)
Dept: RHEUMATOLOGY | Age: 79
End: 2019-10-28
Payer: MEDICARE

## 2019-10-28 VITALS
DIASTOLIC BLOOD PRESSURE: 86 MMHG | BODY MASS INDEX: 29.08 KG/M2 | OXYGEN SATURATION: 98 % | SYSTOLIC BLOOD PRESSURE: 138 MMHG | WEIGHT: 158 LBS | HEIGHT: 62 IN | HEART RATE: 73 BPM

## 2019-10-28 DIAGNOSIS — M51.36 DDD (DEGENERATIVE DISC DISEASE), LUMBAR: ICD-10-CM

## 2019-10-28 DIAGNOSIS — M70.62 TROCHANTERIC BURSITIS OF LEFT HIP: ICD-10-CM

## 2019-10-28 DIAGNOSIS — M19.071 OSTEOARTHRITIS OF BOTH FEET, UNSPECIFIED OSTEOARTHRITIS TYPE: Primary | ICD-10-CM

## 2019-10-28 DIAGNOSIS — Z51.81 MEDICATION MONITORING ENCOUNTER: ICD-10-CM

## 2019-10-28 DIAGNOSIS — R76.8 ANA POSITIVE: ICD-10-CM

## 2019-10-28 DIAGNOSIS — M19.072 OSTEOARTHRITIS OF BOTH FEET, UNSPECIFIED OSTEOARTHRITIS TYPE: Primary | ICD-10-CM

## 2019-10-28 PROCEDURE — G8427 DOCREV CUR MEDS BY ELIG CLIN: HCPCS | Performed by: INTERNAL MEDICINE

## 2019-10-28 PROCEDURE — 99214 OFFICE O/P EST MOD 30 MIN: CPT | Performed by: INTERNAL MEDICINE

## 2019-10-28 PROCEDURE — 4040F PNEUMOC VAC/ADMIN/RCVD: CPT | Performed by: INTERNAL MEDICINE

## 2019-10-28 PROCEDURE — G8482 FLU IMMUNIZE ORDER/ADMIN: HCPCS | Performed by: INTERNAL MEDICINE

## 2019-10-28 PROCEDURE — 20610 DRAIN/INJ JOINT/BURSA W/O US: CPT | Performed by: INTERNAL MEDICINE

## 2019-10-28 PROCEDURE — 1090F PRES/ABSN URINE INCON ASSESS: CPT | Performed by: INTERNAL MEDICINE

## 2019-10-28 PROCEDURE — G8417 CALC BMI ABV UP PARAM F/U: HCPCS | Performed by: INTERNAL MEDICINE

## 2019-10-28 PROCEDURE — G8399 PT W/DXA RESULTS DOCUMENT: HCPCS | Performed by: INTERNAL MEDICINE

## 2019-10-28 PROCEDURE — 1123F ACP DISCUSS/DSCN MKR DOCD: CPT | Performed by: INTERNAL MEDICINE

## 2019-10-28 PROCEDURE — 1036F TOBACCO NON-USER: CPT | Performed by: INTERNAL MEDICINE

## 2019-10-28 RX ORDER — OMEPRAZOLE 20 MG/1
20 CAPSULE, DELAYED RELEASE ORAL DAILY
Qty: 90 CAPSULE | Refills: 3 | Status: SHIPPED | OUTPATIENT
Start: 2019-10-28 | End: 2019-12-26

## 2019-10-28 RX ORDER — METHYLPREDNISOLONE ACETATE 80 MG/ML
80 INJECTION, SUSPENSION INTRA-ARTICULAR; INTRALESIONAL; INTRAMUSCULAR; SOFT TISSUE ONCE
Status: COMPLETED | OUTPATIENT
Start: 2019-10-28 | End: 2019-10-28

## 2019-10-28 RX ORDER — HYDROXYCHLOROQUINE SULFATE 200 MG/1
TABLET, FILM COATED ORAL
Qty: 60 TABLET | Refills: 0 | Status: SHIPPED | OUTPATIENT
Start: 2019-10-28 | End: 2020-02-06

## 2019-10-28 RX ADMIN — METHYLPREDNISOLONE ACETATE 80 MG: 80 INJECTION, SUSPENSION INTRA-ARTICULAR; INTRALESIONAL; INTRAMUSCULAR; SOFT TISSUE at 08:40

## 2019-10-28 ASSESSMENT — ENCOUNTER SYMPTOMS
CONSTIPATION: 0
EYES NEGATIVE: 1
BLOOD IN STOOL: 0
EYE PAIN: 0
DIARRHEA: 0
NAUSEA: 0
EYE REDNESS: 0
RESPIRATORY NEGATIVE: 1
VOMITING: 0

## 2019-10-30 ENCOUNTER — NURSE ONLY (OUTPATIENT)
Dept: LAB | Age: 79
End: 2019-10-30

## 2019-10-30 DIAGNOSIS — E87.5 HYPERKALEMIA: ICD-10-CM

## 2019-10-30 LAB
ANION GAP SERPL CALCULATED.3IONS-SCNC: 13 MEQ/L (ref 8–16)
BUN BLDV-MCNC: 21 MG/DL (ref 7–22)
CALCIUM SERPL-MCNC: 10 MG/DL (ref 8.5–10.5)
CHLORIDE BLD-SCNC: 97 MEQ/L (ref 98–111)
CO2: 26 MEQ/L (ref 23–33)
CREAT SERPL-MCNC: 1 MG/DL (ref 0.4–1.2)
GFR SERPL CREATININE-BSD FRML MDRD: 54 ML/MIN/1.73M2
GLUCOSE BLD-MCNC: 91 MG/DL (ref 70–108)
POTASSIUM SERPL-SCNC: 4.6 MEQ/L (ref 3.5–5.2)
SODIUM BLD-SCNC: 136 MEQ/L (ref 135–145)

## 2019-10-31 ENCOUNTER — TELEPHONE (OUTPATIENT)
Dept: FAMILY MEDICINE CLINIC | Age: 79
End: 2019-10-31

## 2019-11-06 ENCOUNTER — OFFICE VISIT (OUTPATIENT)
Dept: FAMILY MEDICINE CLINIC | Age: 79
End: 2019-11-06
Payer: MEDICARE

## 2019-11-06 VITALS
SYSTOLIC BLOOD PRESSURE: 128 MMHG | TEMPERATURE: 97.5 F | RESPIRATION RATE: 14 BRPM | HEART RATE: 76 BPM | DIASTOLIC BLOOD PRESSURE: 82 MMHG

## 2019-11-06 DIAGNOSIS — M54.50 CHRONIC LEFT-SIDED LOW BACK PAIN WITHOUT SCIATICA: Primary | ICD-10-CM

## 2019-11-06 DIAGNOSIS — M25.552 LEFT HIP PAIN: ICD-10-CM

## 2019-11-06 DIAGNOSIS — E87.5 HYPERKALEMIA: ICD-10-CM

## 2019-11-06 DIAGNOSIS — M53.3 CHRONIC LEFT SI JOINT PAIN: ICD-10-CM

## 2019-11-06 DIAGNOSIS — I10 ESSENTIAL HYPERTENSION: ICD-10-CM

## 2019-11-06 DIAGNOSIS — G89.29 CHRONIC LEFT SI JOINT PAIN: ICD-10-CM

## 2019-11-06 DIAGNOSIS — G89.29 CHRONIC LEFT-SIDED LOW BACK PAIN WITHOUT SCIATICA: Primary | ICD-10-CM

## 2019-11-06 DIAGNOSIS — N18.30 CKD (CHRONIC KIDNEY DISEASE) STAGE 3, GFR 30-59 ML/MIN (HCC): ICD-10-CM

## 2019-11-06 DIAGNOSIS — R76.8 POSITIVE ANA (ANTINUCLEAR ANTIBODY): ICD-10-CM

## 2019-11-06 DIAGNOSIS — M15.4 EROSIVE OSTEOARTHRITIS OF BOTH HANDS: ICD-10-CM

## 2019-11-06 DIAGNOSIS — M15.9 PRIMARY OSTEOARTHRITIS INVOLVING MULTIPLE JOINTS: ICD-10-CM

## 2019-11-06 DIAGNOSIS — E78.5 DYSLIPIDEMIA: ICD-10-CM

## 2019-11-06 PROCEDURE — G8482 FLU IMMUNIZE ORDER/ADMIN: HCPCS | Performed by: FAMILY MEDICINE

## 2019-11-06 PROCEDURE — 1036F TOBACCO NON-USER: CPT | Performed by: FAMILY MEDICINE

## 2019-11-06 PROCEDURE — G8417 CALC BMI ABV UP PARAM F/U: HCPCS | Performed by: FAMILY MEDICINE

## 2019-11-06 PROCEDURE — G8427 DOCREV CUR MEDS BY ELIG CLIN: HCPCS | Performed by: FAMILY MEDICINE

## 2019-11-06 PROCEDURE — 1090F PRES/ABSN URINE INCON ASSESS: CPT | Performed by: FAMILY MEDICINE

## 2019-11-06 PROCEDURE — 4040F PNEUMOC VAC/ADMIN/RCVD: CPT | Performed by: FAMILY MEDICINE

## 2019-11-06 PROCEDURE — 99214 OFFICE O/P EST MOD 30 MIN: CPT | Performed by: FAMILY MEDICINE

## 2019-11-06 PROCEDURE — G8399 PT W/DXA RESULTS DOCUMENT: HCPCS | Performed by: FAMILY MEDICINE

## 2019-11-06 PROCEDURE — 1123F ACP DISCUSS/DSCN MKR DOCD: CPT | Performed by: FAMILY MEDICINE

## 2019-11-06 RX ORDER — HYDROCODONE BITARTRATE AND ACETAMINOPHEN 5; 325 MG/1; MG/1
1 TABLET ORAL EVERY 6 HOURS PRN
Qty: 28 TABLET | Refills: 0 | Status: SHIPPED | OUTPATIENT
Start: 2019-11-06 | End: 2019-11-13

## 2019-11-13 ENCOUNTER — OFFICE VISIT (OUTPATIENT)
Dept: PHYSICAL MEDICINE AND REHAB | Age: 79
End: 2019-11-13
Payer: MEDICARE

## 2019-11-13 VITALS
WEIGHT: 158.07 LBS | HEART RATE: 80 BPM | DIASTOLIC BLOOD PRESSURE: 68 MMHG | BODY MASS INDEX: 29.09 KG/M2 | HEIGHT: 62 IN | SYSTOLIC BLOOD PRESSURE: 128 MMHG

## 2019-11-13 DIAGNOSIS — M47.816 LUMBAR SPONDYLOSIS: Primary | ICD-10-CM

## 2019-11-13 PROCEDURE — 1123F ACP DISCUSS/DSCN MKR DOCD: CPT | Performed by: PHYSICAL MEDICINE & REHABILITATION

## 2019-11-13 PROCEDURE — G8399 PT W/DXA RESULTS DOCUMENT: HCPCS | Performed by: PHYSICAL MEDICINE & REHABILITATION

## 2019-11-13 PROCEDURE — 4040F PNEUMOC VAC/ADMIN/RCVD: CPT | Performed by: PHYSICAL MEDICINE & REHABILITATION

## 2019-11-13 PROCEDURE — G8417 CALC BMI ABV UP PARAM F/U: HCPCS | Performed by: PHYSICAL MEDICINE & REHABILITATION

## 2019-11-13 PROCEDURE — G8427 DOCREV CUR MEDS BY ELIG CLIN: HCPCS | Performed by: PHYSICAL MEDICINE & REHABILITATION

## 2019-11-13 PROCEDURE — 1036F TOBACCO NON-USER: CPT | Performed by: PHYSICAL MEDICINE & REHABILITATION

## 2019-11-13 PROCEDURE — 1090F PRES/ABSN URINE INCON ASSESS: CPT | Performed by: PHYSICAL MEDICINE & REHABILITATION

## 2019-11-13 PROCEDURE — 99204 OFFICE O/P NEW MOD 45 MIN: CPT | Performed by: PHYSICAL MEDICINE & REHABILITATION

## 2019-11-13 PROCEDURE — G8482 FLU IMMUNIZE ORDER/ADMIN: HCPCS | Performed by: PHYSICAL MEDICINE & REHABILITATION

## 2019-12-04 ENCOUNTER — APPOINTMENT (OUTPATIENT)
Dept: GENERAL RADIOLOGY | Age: 79
End: 2019-12-04
Attending: PHYSICAL MEDICINE & REHABILITATION
Payer: MEDICARE

## 2019-12-04 ENCOUNTER — HOSPITAL ENCOUNTER (OUTPATIENT)
Age: 79
Setting detail: OUTPATIENT SURGERY
Discharge: HOME | End: 2019-12-04
Attending: PHYSICAL MEDICINE & REHABILITATION | Admitting: PHYSICAL MEDICINE & REHABILITATION
Payer: MEDICARE

## 2019-12-04 VITALS
RESPIRATION RATE: 20 BRPM | TEMPERATURE: 97 F | WEIGHT: 154 LBS | BODY MASS INDEX: 27.29 KG/M2 | SYSTOLIC BLOOD PRESSURE: 126 MMHG | DIASTOLIC BLOOD PRESSURE: 57 MMHG | HEIGHT: 63 IN | OXYGEN SATURATION: 97 % | HEART RATE: 71 BPM

## 2019-12-04 PROCEDURE — 64494 INJ PARAVERT F JNT L/S 2 LEV: CPT | Performed by: PHYSICAL MEDICINE & REHABILITATION

## 2019-12-04 PROCEDURE — 7100000011 HC PHASE II RECOVERY - ADDTL 15 MIN: Performed by: PHYSICAL MEDICINE & REHABILITATION

## 2019-12-04 PROCEDURE — 2709999900 HC NON-CHARGEABLE SUPPLY: Performed by: PHYSICAL MEDICINE & REHABILITATION

## 2019-12-04 PROCEDURE — 2500000003 HC RX 250 WO HCPCS: Performed by: PHYSICAL MEDICINE & REHABILITATION

## 2019-12-04 PROCEDURE — 6360000002 HC RX W HCPCS: Performed by: PHYSICAL MEDICINE & REHABILITATION

## 2019-12-04 PROCEDURE — 3209999900 FLUORO FOR SURGICAL PROCEDURES

## 2019-12-04 PROCEDURE — 3600000054 HC PAIN LEVEL 3 BASE: Performed by: PHYSICAL MEDICINE & REHABILITATION

## 2019-12-04 PROCEDURE — 64493 INJ PARAVERT F JNT L/S 1 LEV: CPT | Performed by: PHYSICAL MEDICINE & REHABILITATION

## 2019-12-04 PROCEDURE — 99152 MOD SED SAME PHYS/QHP 5/>YRS: CPT | Performed by: PHYSICAL MEDICINE & REHABILITATION

## 2019-12-04 PROCEDURE — 7100000010 HC PHASE II RECOVERY - FIRST 15 MIN: Performed by: PHYSICAL MEDICINE & REHABILITATION

## 2019-12-04 RX ORDER — TRIAMCINOLONE ACETONIDE 40 MG/ML
INJECTION, SUSPENSION INTRA-ARTICULAR; INTRAMUSCULAR PRN
Status: DISCONTINUED | OUTPATIENT
Start: 2019-12-04 | End: 2019-12-04 | Stop reason: ALTCHOICE

## 2019-12-04 RX ORDER — LIDOCAINE HYDROCHLORIDE 10 MG/ML
INJECTION, SOLUTION EPIDURAL; INFILTRATION; INTRACAUDAL; PERINEURAL PRN
Status: DISCONTINUED | OUTPATIENT
Start: 2019-12-04 | End: 2019-12-04 | Stop reason: ALTCHOICE

## 2019-12-04 RX ORDER — FENTANYL CITRATE 50 UG/ML
INJECTION, SOLUTION INTRAMUSCULAR; INTRAVENOUS PRN
Status: DISCONTINUED | OUTPATIENT
Start: 2019-12-04 | End: 2019-12-04 | Stop reason: ALTCHOICE

## 2019-12-04 RX ORDER — MIDAZOLAM HYDROCHLORIDE 1 MG/ML
INJECTION INTRAMUSCULAR; INTRAVENOUS PRN
Status: DISCONTINUED | OUTPATIENT
Start: 2019-12-04 | End: 2019-12-04 | Stop reason: ALTCHOICE

## 2019-12-04 ASSESSMENT — PAIN - FUNCTIONAL ASSESSMENT: PAIN_FUNCTIONAL_ASSESSMENT: 0-10

## 2019-12-04 ASSESSMENT — PAIN SCALES - GENERAL: PAINLEVEL_OUTOF10: 0

## 2019-12-04 ASSESSMENT — PAIN DESCRIPTION - DESCRIPTORS: DESCRIPTORS: ACHING

## 2019-12-06 DIAGNOSIS — I10 ESSENTIAL HYPERTENSION: Chronic | ICD-10-CM

## 2019-12-06 RX ORDER — LISINOPRIL AND HYDROCHLOROTHIAZIDE 12.5; 1 MG/1; MG/1
TABLET ORAL
Qty: 90 TABLET | Refills: 3 | Status: SHIPPED | OUTPATIENT
Start: 2019-12-06 | End: 2020-12-13 | Stop reason: SDUPTHER

## 2019-12-17 ENCOUNTER — OFFICE VISIT (OUTPATIENT)
Dept: PHYSICAL MEDICINE AND REHAB | Age: 79
End: 2019-12-17
Payer: MEDICARE

## 2019-12-17 VITALS
BODY MASS INDEX: 27.3 KG/M2 | DIASTOLIC BLOOD PRESSURE: 88 MMHG | SYSTOLIC BLOOD PRESSURE: 136 MMHG | WEIGHT: 154.1 LBS | HEART RATE: 72 BPM | HEIGHT: 63 IN

## 2019-12-17 DIAGNOSIS — M47.816 LUMBAR SPONDYLOSIS: Primary | ICD-10-CM

## 2019-12-17 PROCEDURE — G8482 FLU IMMUNIZE ORDER/ADMIN: HCPCS | Performed by: PHYSICAL MEDICINE & REHABILITATION

## 2019-12-17 PROCEDURE — 99214 OFFICE O/P EST MOD 30 MIN: CPT | Performed by: PHYSICAL MEDICINE & REHABILITATION

## 2019-12-17 PROCEDURE — G8417 CALC BMI ABV UP PARAM F/U: HCPCS | Performed by: PHYSICAL MEDICINE & REHABILITATION

## 2019-12-17 PROCEDURE — 1123F ACP DISCUSS/DSCN MKR DOCD: CPT | Performed by: PHYSICAL MEDICINE & REHABILITATION

## 2019-12-17 PROCEDURE — 1090F PRES/ABSN URINE INCON ASSESS: CPT | Performed by: PHYSICAL MEDICINE & REHABILITATION

## 2019-12-17 PROCEDURE — 4040F PNEUMOC VAC/ADMIN/RCVD: CPT | Performed by: PHYSICAL MEDICINE & REHABILITATION

## 2019-12-17 PROCEDURE — G8399 PT W/DXA RESULTS DOCUMENT: HCPCS | Performed by: PHYSICAL MEDICINE & REHABILITATION

## 2019-12-17 PROCEDURE — 1036F TOBACCO NON-USER: CPT | Performed by: PHYSICAL MEDICINE & REHABILITATION

## 2019-12-17 PROCEDURE — G8427 DOCREV CUR MEDS BY ELIG CLIN: HCPCS | Performed by: PHYSICAL MEDICINE & REHABILITATION

## 2019-12-17 RX ORDER — TRAMADOL HYDROCHLORIDE 50 MG/1
50 TABLET ORAL EVERY 8 HOURS PRN
Qty: 21 TABLET | Refills: 0 | Status: SHIPPED | OUTPATIENT
Start: 2019-12-17 | End: 2019-12-24

## 2019-12-24 DIAGNOSIS — M19.072 OSTEOARTHRITIS OF BOTH FEET, UNSPECIFIED OSTEOARTHRITIS TYPE: ICD-10-CM

## 2019-12-24 DIAGNOSIS — M19.071 OSTEOARTHRITIS OF BOTH FEET, UNSPECIFIED OSTEOARTHRITIS TYPE: ICD-10-CM

## 2019-12-26 RX ORDER — OMEPRAZOLE 20 MG/1
20 CAPSULE, DELAYED RELEASE ORAL DAILY
Qty: 90 CAPSULE | Refills: 3 | Status: SHIPPED | OUTPATIENT
Start: 2019-12-26 | End: 2020-11-18 | Stop reason: SDUPTHER

## 2020-01-03 ENCOUNTER — HOSPITAL ENCOUNTER (OUTPATIENT)
Dept: MRI IMAGING | Age: 80
Discharge: HOME | End: 2020-01-03
Payer: MEDICARE

## 2020-01-03 PROCEDURE — 72148 MRI LUMBAR SPINE W/O DYE: CPT

## 2020-01-14 ENCOUNTER — OFFICE VISIT (OUTPATIENT)
Dept: PHYSICAL MEDICINE AND REHAB | Age: 80
End: 2020-01-14
Payer: MEDICARE

## 2020-01-14 VITALS
HEART RATE: 72 BPM | BODY MASS INDEX: 26.95 KG/M2 | SYSTOLIC BLOOD PRESSURE: 136 MMHG | WEIGHT: 152.12 LBS | HEIGHT: 63 IN | DIASTOLIC BLOOD PRESSURE: 76 MMHG

## 2020-01-14 PROCEDURE — 99213 OFFICE O/P EST LOW 20 MIN: CPT | Performed by: PHYSICAL MEDICINE & REHABILITATION

## 2020-01-14 PROCEDURE — G8427 DOCREV CUR MEDS BY ELIG CLIN: HCPCS | Performed by: PHYSICAL MEDICINE & REHABILITATION

## 2020-01-14 PROCEDURE — G8399 PT W/DXA RESULTS DOCUMENT: HCPCS | Performed by: PHYSICAL MEDICINE & REHABILITATION

## 2020-01-14 PROCEDURE — G8417 CALC BMI ABV UP PARAM F/U: HCPCS | Performed by: PHYSICAL MEDICINE & REHABILITATION

## 2020-01-14 PROCEDURE — 1090F PRES/ABSN URINE INCON ASSESS: CPT | Performed by: PHYSICAL MEDICINE & REHABILITATION

## 2020-01-14 PROCEDURE — G8482 FLU IMMUNIZE ORDER/ADMIN: HCPCS | Performed by: PHYSICAL MEDICINE & REHABILITATION

## 2020-01-14 PROCEDURE — 4040F PNEUMOC VAC/ADMIN/RCVD: CPT | Performed by: PHYSICAL MEDICINE & REHABILITATION

## 2020-01-14 PROCEDURE — 1123F ACP DISCUSS/DSCN MKR DOCD: CPT | Performed by: PHYSICAL MEDICINE & REHABILITATION

## 2020-01-14 PROCEDURE — 1036F TOBACCO NON-USER: CPT | Performed by: PHYSICAL MEDICINE & REHABILITATION

## 2020-01-14 NOTE — PROGRESS NOTES
FOLLOW UP APPOINTMENT:         Basilia Rodriguez MD    1/14/2020     Sherice Workman is a 70 y.o. female, who came back due to   post injection follow up    Cause of the symptom(s): degenerative (arthritis)    Last visit was on 12/17/2019     Relief was noted from prior lumbar medial branch block 12/4/2019. Quality of Symptoms: aching and throbbing    Aggravating factors: activity    Relieving factors: rest    Treatment done: physical therapy, injection, anti-inflammatory medication and muscle relaxant      Allergies   Allergen Reactions    Sulfa Antibiotics Swelling    Other Nausea And Vomiting     oysters       Social History     Socioeconomic History    Marital status:       Spouse name: Not on file    Number of children: Not on file    Years of education: Not on file    Highest education level: Not on file   Occupational History    Not on file   Social Needs    Financial resource strain: Not on file    Food insecurity:     Worry: Not on file     Inability: Not on file    Transportation needs:     Medical: Not on file     Non-medical: Not on file   Tobacco Use    Smoking status: Never Smoker    Smokeless tobacco: Never Used   Substance and Sexual Activity    Alcohol use: Yes     Comment: OCCASIONALLY    Drug use: No    Sexual activity: Not Currently   Lifestyle    Physical activity:     Days per week: Not on file     Minutes per session: Not on file    Stress: Not on file   Relationships    Social connections:     Talks on phone: Not on file     Gets together: Not on file     Attends [employer] service: Not on file     Active member of club or organization: Not on file     Attends meetings of clubs or organizations: Not on file     Relationship status: Not on file    Intimate partner violence:     Fear of current or ex partner: Not on file     Emotionally abused: Not on file     Physically abused: Not on file     Forced sexual activity: Not on file   Other Topics Concern    Not on file   Social History Narrative    Not on file       Past Medical History:   Diagnosis Date    Chronic low back pain     CKD (chronic kidney disease) stage 3, GFR 30-59 ml/min (HCC)     DDD (degenerative disc disease), lumbar     Dyslipidemia     History of skin cancer     BCC AND SCC    Hyperlipidemia     Hypertension     Osteoarthritis     PONV (postoperative nausea and vomiting)        Past Surgical History:   Procedure Laterality Date    BACK SURGERY      lower lumbar fusion    CARPAL TUNNEL RELEASE Bilateral      SECTION      CHOLECYSTECTOMY, LAPAROSCOPIC  10/03/2017    COLONOSCOPY      CYST REMOVAL Left 2017    left  5th digit-Dr Bowman    DILATION AND CURETTAGE OF UTERUS      FINGER SURGERY      SEVERAL    FOOT SURGERY Bilateral     HYSTERECTOMY      JOINT REPLACEMENT Bilateral     KNEES    NERVE SURGERY N/A 2019    left L3, L4 medial branch and L5 dorsal rami injection performed by Yoshi Taylor MD at [address] [facility] [place] DX W/[address] [facility] [address] WHEN PFRMD N/A 2018    COLONOSCOPY performed by Mateusz Coyle MD at [facility]    SKIN BIOPSY      BCC AND SCC       Family History   Problem Relation Age of Onset    Heart Disease Mother         CHF    Alzheimer's Disease Mother     Dementia Mother     Cancer Father         LUNG    Emphysema Father     Colon Cancer Neg Hx     Breast Cancer Neg Hx         Prior to Visit Medications    Medication Sig Taking? Authorizing Provider   omeprazole (PRILOSEC) 20 MG delayed release capsule TAKE 1 CAPSULE BY MOUTH DAILY.  Yes JOSE Duarte - CNP   lisinopril-hydrochlorothiazide (PRINZIDE;ZESTORETIC) 10-12.5 MG per tablet TAKE 1 TABLET BY MOUTH DAILY Yes Lisa Galvan DO   hydroxychloroquine (PLAQUENIL) 200 MG tablet Take 1 tablet in the morning Yes Jyoti Cortez DO   meloxicam (MOBIC) 7.5 MG tablet TAKE 1 TABLET BY MOUTH DAILY AS NEEDED FOR PAIN Yes Kenn ENGLISH Onesim[place]ll Medico, DO   Multiple Vitamins-Minerals (MULTIVITAMIN PO) Take by mouth daily Yes Historical Provider, MD   Handicap Placard MISC by Does not apply route Expires 29 June 2023 Yes Gloria [place], DO   Multiple Vitamins-Minerals (PRESERVISION AREDS PO) Take by mouth Yes Historical Provider, MD   Probiotic Product (PROBIOTIC DAILY PO) Take by mouth daily  Yes Historical Provider, MD   Calcium Carbonate-Vit D-Min (CALCIUM 1200 PO) Take by mouth daily Yes Historical Provider, MD   Cholecalciferol (VITAMIN D-3 PO) Take by mouth daily Yes Historical Provider, MD         Review of Systems L   All systems reviewed, all unremarkable other than HPI. No change since last visit. Denies complications from the injection, including fever, chills, infection or non healing wound. /76 (Site: Right Upper Arm, Position: Sitting, Cuff Size: Large Adult)   Pulse 72   Ht 5' 3\" (1.6 m)   Wt 152 lb 1.9 oz (69 kg)   BMI 26.95 kg/m²       Physical Exam  Constitutional:       Appearance: Normal appearance. HENT:      Head: Normocephalic. Eyes:      General: No scleral icterus. Conjunctiva/sclera: Conjunctivae normal.   Cardiovascular:      Rate and Rhythm: Normal rate. Pulses: Normal pulses. Pulmonary:      Effort: Pulmonary effort is normal. No respiratory distress. Musculoskeletal:         General: Tenderness present. Comments: Positive lumbar facet loading   Skin:     General: Skin is dry. Neurological:      Mental Status: She is alert and oriented to person, place, and time. Motor: No weakness. Assessment and Plan:      Diagnosis Orders   1. Lumbar spondylosis  DE INJ DX/THER AGNT PARAVERT FACET JOINT, LUMBAR/SAC, 1ST LEVEL    DE INJ DX/THER AGNT PARAVERT FACET JOINT, LUMBAR/SAC, 2ND LEVEL       Second lumbar medial branch block at L3-4-5 and schedule for RFA if pain is better. Responded well from prior injection, although short duration of pain relief.      All questions were answered and imaging studies reviewed with the patient. I have reviewed the chief complaint and HPI including the [facility] and Vital documentation by my staff and I agree with their documentation and have added where applicable. Time spent with patient was 25 minutes. More than 50% was spent counseling/coordinating the patient's care. Nicholas Ritter MD   Spine Medicine/PM&R         Functionality Assessment/Goals Worksheet     On a scale of 0 (Does not Interfere) to 10 (Completely Interferes)     1. Which number describes how during the past week pain has interfered with       the following:  A. General Activity:  5  B. Mood: 4  C. Walking Ability:  5  D. Normal Work (Includes both work outside the home and housework):  2  E. Relations with Other People:   0  F. Sleep:   8  G. Enjoyment of Life:   3    2. Patient Prefers to Take their Pain Medications:     []  On a regular basis   [x]  Only when necessary    []  Does not take pain medications    3. What are the Patient's Goals/Expectations for Visiting Pain Management?      [x]  Learn about my pain    []  Receive Medication   []  Physical Therapy     []  Treat Depression   []  Receive Injections    []  Treat Sleep   []  Deal with Anxiety and Stress   []  Treat Opoid Dependence/Addiction   []  Other:

## 2020-01-22 ENCOUNTER — TELEPHONE (OUTPATIENT)
Dept: PHYSICAL MEDICINE AND REHAB | Age: 80
End: 2020-01-22

## 2020-01-29 ENCOUNTER — APPOINTMENT (OUTPATIENT)
Dept: GENERAL RADIOLOGY | Age: 80
End: 2020-01-29
Attending: PHYSICAL MEDICINE & REHABILITATION
Payer: MEDICARE

## 2020-01-29 ENCOUNTER — HOSPITAL ENCOUNTER (OUTPATIENT)
Age: 80
Setting detail: OUTPATIENT SURGERY
Discharge: HOME | End: 2020-01-29
Attending: PHYSICAL MEDICINE & REHABILITATION | Admitting: PHYSICAL MEDICINE & REHABILITATION
Payer: MEDICARE

## 2020-01-29 VITALS
RESPIRATION RATE: 15 BRPM | WEIGHT: 155 LBS | TEMPERATURE: 97.2 F | HEIGHT: 63 IN | BODY MASS INDEX: 27.46 KG/M2 | HEART RATE: 73 BPM | DIASTOLIC BLOOD PRESSURE: 70 MMHG | SYSTOLIC BLOOD PRESSURE: 157 MMHG | OXYGEN SATURATION: 93 %

## 2020-01-29 PROCEDURE — 3600000054 HC PAIN LEVEL 3 BASE: Performed by: PHYSICAL MEDICINE & REHABILITATION

## 2020-01-29 PROCEDURE — 2500000003 HC RX 250 WO HCPCS: Performed by: PHYSICAL MEDICINE & REHABILITATION

## 2020-01-29 PROCEDURE — 64493 INJ PARAVERT F JNT L/S 1 LEV: CPT | Performed by: PHYSICAL MEDICINE & REHABILITATION

## 2020-01-29 PROCEDURE — 6360000002 HC RX W HCPCS: Performed by: PHYSICAL MEDICINE & REHABILITATION

## 2020-01-29 PROCEDURE — 7100000010 HC PHASE II RECOVERY - FIRST 15 MIN: Performed by: PHYSICAL MEDICINE & REHABILITATION

## 2020-01-29 PROCEDURE — 3209999900 FLUORO FOR SURGICAL PROCEDURES

## 2020-01-29 PROCEDURE — 99152 MOD SED SAME PHYS/QHP 5/>YRS: CPT | Performed by: PHYSICAL MEDICINE & REHABILITATION

## 2020-01-29 PROCEDURE — 64494 INJ PARAVERT F JNT L/S 2 LEV: CPT | Performed by: PHYSICAL MEDICINE & REHABILITATION

## 2020-01-29 PROCEDURE — 2709999900 HC NON-CHARGEABLE SUPPLY: Performed by: PHYSICAL MEDICINE & REHABILITATION

## 2020-01-29 PROCEDURE — 7100000011 HC PHASE II RECOVERY - ADDTL 15 MIN: Performed by: PHYSICAL MEDICINE & REHABILITATION

## 2020-01-29 RX ORDER — TRIAMCINOLONE ACETONIDE 40 MG/ML
INJECTION, SUSPENSION INTRA-ARTICULAR; INTRAMUSCULAR PRN
Status: DISCONTINUED | OUTPATIENT
Start: 2020-01-29 | End: 2020-01-29 | Stop reason: ALTCHOICE

## 2020-01-29 RX ORDER — FENTANYL CITRATE 50 UG/ML
INJECTION, SOLUTION INTRAMUSCULAR; INTRAVENOUS PRN
Status: DISCONTINUED | OUTPATIENT
Start: 2020-01-29 | End: 2020-01-29 | Stop reason: ALTCHOICE

## 2020-01-29 RX ORDER — LIDOCAINE HYDROCHLORIDE 10 MG/ML
INJECTION, SOLUTION EPIDURAL; INFILTRATION; INTRACAUDAL; PERINEURAL PRN
Status: DISCONTINUED | OUTPATIENT
Start: 2020-01-29 | End: 2020-01-29 | Stop reason: ALTCHOICE

## 2020-01-29 RX ORDER — MIDAZOLAM HYDROCHLORIDE 1 MG/ML
INJECTION INTRAMUSCULAR; INTRAVENOUS PRN
Status: DISCONTINUED | OUTPATIENT
Start: 2020-01-29 | End: 2020-01-29 | Stop reason: ALTCHOICE

## 2020-01-29 ASSESSMENT — PAIN - FUNCTIONAL ASSESSMENT: PAIN_FUNCTIONAL_ASSESSMENT: 0-10

## 2020-01-29 ASSESSMENT — PAIN SCALES - GENERAL: PAINLEVEL_OUTOF10: 0

## 2020-01-29 ASSESSMENT — PAIN DESCRIPTION - DESCRIPTORS: DESCRIPTORS: ACHING;CONSTANT

## 2020-02-06 RX ORDER — HYDROXYCHLOROQUINE SULFATE 200 MG/1
TABLET, FILM COATED ORAL
Qty: 60 TABLET | Refills: 2 | Status: SHIPPED | OUTPATIENT
Start: 2020-02-06 | End: 2020-02-07

## 2020-02-07 RX ORDER — HYDROXYCHLOROQUINE SULFATE 200 MG/1
TABLET, FILM COATED ORAL
Qty: 90 TABLET | Refills: 2 | Status: SHIPPED | OUTPATIENT
Start: 2020-02-07 | End: 2020-11-18 | Stop reason: SDUPTHER

## 2020-02-20 ENCOUNTER — OFFICE VISIT (OUTPATIENT)
Dept: PHYSICAL MEDICINE AND REHAB | Age: 80
End: 2020-02-20
Payer: MEDICARE

## 2020-02-20 VITALS
WEIGHT: 154.98 LBS | DIASTOLIC BLOOD PRESSURE: 70 MMHG | SYSTOLIC BLOOD PRESSURE: 128 MMHG | BODY MASS INDEX: 27.46 KG/M2 | HEIGHT: 63 IN

## 2020-02-20 PROCEDURE — G8482 FLU IMMUNIZE ORDER/ADMIN: HCPCS | Performed by: PHYSICAL MEDICINE & REHABILITATION

## 2020-02-20 PROCEDURE — G8399 PT W/DXA RESULTS DOCUMENT: HCPCS | Performed by: PHYSICAL MEDICINE & REHABILITATION

## 2020-02-20 PROCEDURE — 99213 OFFICE O/P EST LOW 20 MIN: CPT | Performed by: PHYSICAL MEDICINE & REHABILITATION

## 2020-02-20 PROCEDURE — 4040F PNEUMOC VAC/ADMIN/RCVD: CPT | Performed by: PHYSICAL MEDICINE & REHABILITATION

## 2020-02-20 PROCEDURE — G8417 CALC BMI ABV UP PARAM F/U: HCPCS | Performed by: PHYSICAL MEDICINE & REHABILITATION

## 2020-02-20 PROCEDURE — 1036F TOBACCO NON-USER: CPT | Performed by: PHYSICAL MEDICINE & REHABILITATION

## 2020-02-20 PROCEDURE — G8427 DOCREV CUR MEDS BY ELIG CLIN: HCPCS | Performed by: PHYSICAL MEDICINE & REHABILITATION

## 2020-02-20 PROCEDURE — 1090F PRES/ABSN URINE INCON ASSESS: CPT | Performed by: PHYSICAL MEDICINE & REHABILITATION

## 2020-02-20 PROCEDURE — 1123F ACP DISCUSS/DSCN MKR DOCD: CPT | Performed by: PHYSICAL MEDICINE & REHABILITATION

## 2020-02-20 NOTE — PROGRESS NOTES
FOLLOW UP APPOINTMENT:         Lizet Madsen MD    2/20/2020       Kenneth Nascimento is a 70 y.o. female, who came back due to   post injection follow up      Post Left L3-4 medial branch and L5 dorsal rami injection - increase activity for today. Overall, better by at least 70%. Pain noted post injection but has dissipated. Allergies   Allergen Reactions    Sulfa Antibiotics Swelling    Other Nausea And Vomiting     oysters       Social History     Socioeconomic History    Marital status:       Spouse name: Not on file    Number of children: Not on file    Years of education: Not on file    Highest education level: Not on file   Occupational History    Not on file   Social Needs    Financial resource strain: Not on file    Food insecurity:     Worry: Not on file     Inability: Not on file    Transportation needs:     Medical: Not on file     Non-medical: Not on file   Tobacco Use    Smoking status: Never Smoker    Smokeless tobacco: Never Used   Substance and Sexual Activity    Alcohol use: Yes     Comment: OCCASIONALLY    Drug use: No    Sexual activity: Not Currently   Lifestyle    Physical activity:     Days per week: Not on file     Minutes per session: Not on file    Stress: Not on file   Relationships    Social connections:     Talks on phone: Not on file     Gets together: Not on file     Attends Shinto service: Not on file     Active member of club or organization: Not on file     Attends meetings of clubs or organizations: Not on file     Relationship status: Not on file    Intimate partner violence:     Fear of current or ex partner: Not on file     Emotionally abused: Not on file     Physically abused: Not on file     Forced sexual activity: Not on file   Other Topics Concern    Not on file   Social History Narrative    Not on file       Past Medical History:   Diagnosis Date    Chronic low back pain     CKD (chronic kidney disease) stage 3, GFR

## 2020-03-11 RX ORDER — MELOXICAM 7.5 MG/1
7.5 TABLET ORAL DAILY PRN
Qty: 90 TABLET | Refills: 3 | Status: SHIPPED | OUTPATIENT
Start: 2020-03-11 | End: 2020-04-24 | Stop reason: SDUPTHER

## 2020-04-24 ENCOUNTER — VIRTUAL VISIT (OUTPATIENT)
Dept: FAMILY MEDICINE CLINIC | Age: 80
End: 2020-04-24
Payer: MEDICARE

## 2020-04-24 VITALS
BODY MASS INDEX: 26.58 KG/M2 | RESPIRATION RATE: 14 BRPM | HEIGHT: 63 IN | DIASTOLIC BLOOD PRESSURE: 72 MMHG | WEIGHT: 150 LBS | SYSTOLIC BLOOD PRESSURE: 127 MMHG

## 2020-04-24 PROCEDURE — 1090F PRES/ABSN URINE INCON ASSESS: CPT | Performed by: FAMILY MEDICINE

## 2020-04-24 PROCEDURE — G8399 PT W/DXA RESULTS DOCUMENT: HCPCS | Performed by: FAMILY MEDICINE

## 2020-04-24 PROCEDURE — 99214 OFFICE O/P EST MOD 30 MIN: CPT | Performed by: FAMILY MEDICINE

## 2020-04-24 PROCEDURE — G8427 DOCREV CUR MEDS BY ELIG CLIN: HCPCS | Performed by: FAMILY MEDICINE

## 2020-04-24 PROCEDURE — 1123F ACP DISCUSS/DSCN MKR DOCD: CPT | Performed by: FAMILY MEDICINE

## 2020-04-24 PROCEDURE — 4040F PNEUMOC VAC/ADMIN/RCVD: CPT | Performed by: FAMILY MEDICINE

## 2020-04-24 RX ORDER — MELOXICAM 7.5 MG/1
7.5 TABLET ORAL DAILY PRN
Qty: 90 TABLET | Refills: 3 | Status: SHIPPED | OUTPATIENT
Start: 2020-04-24 | End: 2021-05-17 | Stop reason: SDUPTHER

## 2020-04-24 NOTE — PROGRESS NOTES
Chief Complaint   Patient presents with    Follow-up     Chronic issues as noted below       History obtained from the patient. SUBJECTIVE:  Edison Enriquez is a 70 y.o. female that presents today for       -Low Back Pain PRIOR VISIT:     HPI:   Started 2 to 3 wks ago  Started after doing yard work  Persistent on L lower side, will radiate to the R side  Heat and ice help some  On tramadol chronically for back and joint pain, not helping.   mobic helps some  No radicular sxs  No bowel/bladder issues since back pain  Pain at it's worse a 6/10  Currently 3  Denies LUTS/dysuria     She describes the pain as dull, aching  in the lumbar region.     Inciting injury or history of trauma? No  Pain is relieved by - as above  Pain is aggravated by - as above  Radiation of the pain? No  Paresthesias of the extremities? No  Saddle anesthesia? No  Bowel or bladder incontinence? No  Treatments tried - as above. UPDATE PRIOR VISIT:   Saw in July for this  Treated with tramadol, zanaflex and pred   Got mostly better, but once done with meds, pain came back  Has been dealing with this since then  Pain in L sided low back, will radiate down to L SI joint and front of L hip  Better with rest, worse when walking a distance, stairs and laying flat  Seems to be doing worse  Has been doing HEP, not helping  No fevers, chills, night sweats or wt loss  No groin pain/numbness or bowel/bladder dysfunction. No dysuria/luts  Asking what else to do. UPDATE LAST VISIT:   Completed 3 wks of PT, but wasn't getting better, thought it was getting a bit worse  So PT stopped  Referred to [facility] d/t worsening sxs  They recommended surgery, she wants to hold off  Referred to pain management at [facility]  As for hip pain, that is better, got hip injection at [facility], and those sxs are better.    Back pain limiting her on certain days  Tramadol used in the past, not helping  Asking what she can do while waiting for pain management apt  Has used Chronic low back pain     CKD (chronic kidney disease) stage 3, GFR 30-59 ml/min (HCC)     DDD (degenerative disc disease), lumbar     Dyslipidemia     History of skin cancer     BCC AND SCC    Hyperlipidemia     Hypertension     Osteoarthritis     PONV (postoperative nausea and vomiting)        Past Surgical History:   Procedure Laterality Date    BACK SURGERY      lower lumbar fusion    CARPAL TUNNEL RELEASE Bilateral      SECTION      CHOLECYSTECTOMY, LAPAROSCOPIC  10/03/2017    COLONOSCOPY      CYST REMOVAL Left 2017    left  5th digit-Dr Bowman    DILATION AND CURETTAGE OF UTERUS      FINGER SURGERY      SEVERAL    FOOT SURGERY Bilateral     HYSTERECTOMY      JOINT REPLACEMENT Bilateral     KNEES    NERVE SURGERY N/A 2019    left L3, L4 medial branch and L5 dorsal rami injection performed by Gale Adkins MD at [facility] [address] Left 2020    left L3, L4 medial branch and L5 dorsal rami injection performed by Gale Adkins MD at [address] [place] [address] [place] DX W/MARTHA Garcia  PFRMD N/A 2018    COLONOSCOPY performed by Brynn Mcmillan MD at [facility]    SKIN BIOPSY      BCC AND SCC       Allergies   Allergen Reactions    Sulfa Antibiotics Swelling    Other Nausea And Vomiting     oysters       Social History     Tobacco Use    Smoking status: Never Smoker    Smokeless tobacco: Never Used   Substance Use Topics    Alcohol use: Yes     Comment: OCCASIONALLY       Family History   Problem Relation Age of Onset    Heart Disease Mother         CHF    Alzheimer's Disease Mother     Dementia Mother     Cancer Father         LUNG    Emphysema Father     Colon Cancer Neg Hx     Breast Cancer Neg Hx          I have reviewed the patient's past medical history, past surgical history, allergies, medications, social and family history and I have made updates where [facility] [place]   5/21/2020  1:15 PM Roberto Valencia MD SRPX Pain [facility] [place]   4/21/2021 10:00 AM Fani Huynh DO [place] [id number] [facility]     PATIENT COUNSELING    Barriers to learning and self management: none    Discussed use, benefit, and side effects of prescribed medications. Barriers to medication compliance addressed. All patient questions answered. Pt voiced understanding.        Electronically signed by Fani Huynh DO on 4/24/2020 at 11:24 AM

## 2020-04-24 NOTE — PATIENT INSTRUCTIONS
pressure, or a strange feeling in the chest.  ? Sweating. ? Shortness of breath. ? Nausea or vomiting. ? Pain, pressure, or a strange feeling in the back, neck, jaw, or upper belly or in one or both shoulders or arms. ? Lightheadedness or sudden weakness. ? A fast or irregular heartbeat.     · You have symptoms of a stroke. These may include:  ? Sudden numbness, tingling, weakness, or loss of movement in your face, arm, or leg, especially on only one side of your body. ? Sudden vision changes. ? Sudden trouble speaking. ? Sudden confusion or trouble understanding simple statements. ? Sudden problems with walking or balance. ? A sudden, severe headache that is different from past headaches.     · You have severe back or belly pain.    Do not wait until your blood pressure comes down on its own. Get help right away.   Call your doctor now or seek immediate care if:    · Your blood pressure is much higher than normal (such as 180/120 or higher), but you don't have symptoms.     · You think high blood pressure is causing symptoms, such as:  ? Severe headache.  ? Blurry vision.    Watch closely for changes in your health, and be sure to contact your doctor if:    · Your blood pressure measures higher than your doctor recommends at least 2 times. That means the top number is higher or the bottom number is higher, or both.     · You think you may be having side effects from your blood pressure medicine. Where can you learn more? Go to [email or web address] and sign in to your [employer] account. Enter S774 in the [facility] box to learn more about \"High Blood Pressure: Care Instructions. \"     If you do not have an account, please click on the \"Sign Up Now\" link. Current as of: December 15, 2019Content Version: 12.4  © 2228-1778 Healthwise, Incorporated. Care instructions adapted under license by [facility] ([facility]).  If you have questions about a medical condition or this instruction,

## 2020-04-29 ENCOUNTER — TELEPHONE (OUTPATIENT)
Dept: RHEUMATOLOGY | Age: 80
End: 2020-04-29

## 2020-04-29 ENCOUNTER — VIRTUAL VISIT (OUTPATIENT)
Dept: RHEUMATOLOGY | Age: 80
End: 2020-04-29
Payer: MEDICARE

## 2020-04-29 PROCEDURE — 1090F PRES/ABSN URINE INCON ASSESS: CPT | Performed by: INTERNAL MEDICINE

## 2020-04-29 PROCEDURE — 4040F PNEUMOC VAC/ADMIN/RCVD: CPT | Performed by: INTERNAL MEDICINE

## 2020-04-29 PROCEDURE — 99213 OFFICE O/P EST LOW 20 MIN: CPT | Performed by: INTERNAL MEDICINE

## 2020-04-29 PROCEDURE — 1123F ACP DISCUSS/DSCN MKR DOCD: CPT | Performed by: INTERNAL MEDICINE

## 2020-04-29 PROCEDURE — G8399 PT W/DXA RESULTS DOCUMENT: HCPCS | Performed by: INTERNAL MEDICINE

## 2020-04-29 PROCEDURE — G8428 CUR MEDS NOT DOCUMENT: HCPCS | Performed by: INTERNAL MEDICINE

## 2020-04-29 ASSESSMENT — ENCOUNTER SYMPTOMS
EYE PAIN: 0
BLOOD IN STOOL: 0
RESPIRATORY NEGATIVE: 1
NAUSEA: 0
EYE REDNESS: 0
EYES NEGATIVE: 1
DIARRHEA: 0
CONSTIPATION: 0
VOMITING: 0

## 2020-04-29 NOTE — PROGRESS NOTES
intermittent. Evaluated by Podiatry Devon Fajardo) and diagnosed with osteoarthritis of the foot. Evaluated by Dr. Sis Lau 2017 who found her to have a (+) WICHO and elevated ESR. NEw PCP started mobic 7.5mg every other day has helped with the joint pains. Interval hx:    - [facility] evaluation - recommended back surgery - declined surgery at this time. - back injection Feb 20, 2020 - from Dr. Cece Benítez with improvment of pain     Joint pains currently in the left hips, lower back. Pain up to 8-9/10, intermittent in the lower back  Alleviating factors: mobic, heating pad (back) , back bracing, improved posture, slower walking. Radicular pain down left legs  , + myalgin in the left outer hip and lower back. Left outer worsened since the last evaluation- worse with laying on the hip. descirbed as a fullness sensation    Inflammatory osteoarthritis -- no sitingicant pain,plaquenil complaince reporting. Denies swelling, redness,warmth       REVIEW OF SYSTEMS: (ROS)    Review of Systems   Constitutional: Negative for fever. HENT: Negative for congestion, hearing loss and nosebleeds. Eyes: Negative. Negative for pain and redness. Respiratory: Negative. Cardiovascular: Negative. Gastrointestinal: Negative for blood in stool, constipation, diarrhea (since gallbalder removal 2018), nausea and vomiting. Genitourinary: Negative for hematuria. Musculoskeletal: Positive for myalgias. Skin: Negative for rash. Neurological: Positive for weakness. Negative for dizziness and headaches. Psychiatric/Behavioral: The patient is not nervous/anxious.           PAST MEDICAL HISTORY      Past Medical History:   Diagnosis Date    Chronic low back pain     CKD (chronic kidney disease) stage 3, GFR 30-59 ml/min ([facility])     DDD (degenerative disc disease), lumbar     Dyslipidemia     History of skin cancer     BCC AND SCC    Hyperlipidemia     Hypertension     Osteoarthritis     PONV (postoperative nausea and THE MORNING 90 tablet 2    omeprazole (PRILOSEC) 20 MG delayed release capsule TAKE 1 CAPSULE BY MOUTH DAILY. 90 capsule 3    lisinopril-hydrochlorothiazide (PRINZIDE;ZESTORETIC) 10-12.5 MG per tablet TAKE 1 TABLET BY MOUTH DAILY 90 tablet 3    Multiple Vitamins-Minerals (MULTIVITAMIN PO) Take by mouth daily      Handicap Placard MISC by Does not apply route Expires 29 June 2023 1 each 0    Multiple Vitamins-Minerals (PRESERVISION AREDS PO) Take by mouth      Probiotic Product (PROBIOTIC DAILY PO) Take by mouth daily       Calcium Carbonate-Vit D-Min (CALCIUM 1200 PO) Take by mouth daily      Cholecalciferol (VITAMIN D-3 PO) Take by mouth daily       No current facility-administered medications for this visit. PHYSICAL EXAMINATION / OBJECTIVE     Objective: There were no vitals taken for this visit. Physical Exam  Constitutional:       General: She is not in acute distress. Appearance: She is well-developed. She is not diaphoretic. HENT:      Head: Normocephalic and atraumatic. Eyes:      Conjunctiva/sclera: Conjunctivae normal.   Neck:      Musculoskeletal: Normal range of motion and neck supple. Lymphadenopathy:      Cervical: No cervical adenopathy. Skin:     General: Skin is warm and dry. Neurological:      Mental Status: She is alert and oriented to person, place, and time. Psychiatric:         Behavior: Behavior normal.        Fingers: . + gretta nodes , Left > right CMC squaring, left CMC grind testing. Wrist - elbows: non-tender, no synovits.                 LABS      CBC  Lab Results   Component Value Date    WBC 4.6 04/01/2019    RBC 4.52 04/01/2019    HGB 13.3 04/01/2019    HCT 40.4 04/01/2019    MCV 89.4 04/01/2019    MCH 29.4 04/01/2019    MCHC 32.9 04/01/2019    RDW 13.9 05/03/2018     04/01/2019       CMP  Lab Results   Component Value Date    CALCIUM 10.0 10/30/2019    LABALBU 4.3 04/01/2019    PROT 7.2 04/01/2019     10/30/2019    K 4.6 10/30/2019 April 2019-- rescheduled for July 2020 given the coronavirus epidemic      No follow-ups on file. Electronically signed by Cintia Saini DO on 4/29/2020 at 8:09 AM    New Prescriptions    No medications on file      Bursa Injection Procedure Note    Date: 4/29/2020   Indications: left Bursitis  Anesthesia: Ethyl chloride  Procedure Details     After a discussion of the risks including infection ,bleeding and damage to tissues and benefits, mainly the relief of pain, written consent was obtained for the procedure. The site was prepped with Betadine and  Chlorhexadine. 1ml of Solumedrol 80 mg/ml mixed with 1 ml of bupivacaine was injected into the bursa. The injection site was cleansed with topical alcohol and a dressing was applied. The patient was asked to continue to rest the for a few more days before resuming regular activities. It may be more painful for the first 1-2 days. Watch for fever, or increased swelling or persistent pain in the joint. Call or return to clinic prn if such symptoms occur or there is failure to improve as anticipated. Complications:  None; patient tolerated the procedure well.

## 2020-04-29 NOTE — TELEPHONE ENCOUNTER
Patient stated that she received a call to confirm that an appointment has been set up for 10/28/20 and that's fine. She also stated that she was suppose to call in regarding a dexa scan and whether it has been two years since her last one and she thinks it has.

## 2020-05-01 ENCOUNTER — OFFICE VISIT (OUTPATIENT)
Dept: RHEUMATOLOGY | Age: 80
End: 2020-05-01
Payer: MEDICARE

## 2020-05-01 VITALS
WEIGHT: 149.91 LBS | SYSTOLIC BLOOD PRESSURE: 150 MMHG | HEART RATE: 65 BPM | OXYGEN SATURATION: 97 % | DIASTOLIC BLOOD PRESSURE: 82 MMHG | TEMPERATURE: 98.1 F | HEIGHT: 63 IN | BODY MASS INDEX: 26.56 KG/M2

## 2020-05-01 PROCEDURE — 99999 PR OFFICE/OUTPT VISIT,PROCEDURE ONLY: CPT | Performed by: INTERNAL MEDICINE

## 2020-05-01 PROCEDURE — 20610 DRAIN/INJ JOINT/BURSA W/O US: CPT | Performed by: INTERNAL MEDICINE

## 2020-05-01 RX ORDER — METHYLPREDNISOLONE ACETATE 80 MG/ML
80 INJECTION, SUSPENSION INTRA-ARTICULAR; INTRALESIONAL; INTRAMUSCULAR; SOFT TISSUE ONCE
Status: COMPLETED | OUTPATIENT
Start: 2020-05-01 | End: 2020-05-01

## 2020-05-01 RX ADMIN — METHYLPREDNISOLONE ACETATE 80 MG: 80 INJECTION, SUSPENSION INTRA-ARTICULAR; INTRALESIONAL; INTRAMUSCULAR; SOFT TISSUE at 12:41

## 2020-05-20 ENCOUNTER — VIRTUAL VISIT (OUTPATIENT)
Dept: PHYSICAL MEDICINE AND REHAB | Age: 80
End: 2020-05-20
Payer: MEDICARE

## 2020-05-20 PROCEDURE — G8428 CUR MEDS NOT DOCUMENT: HCPCS | Performed by: PHYSICAL MEDICINE & REHABILITATION

## 2020-05-20 PROCEDURE — 99213 OFFICE O/P EST LOW 20 MIN: CPT | Performed by: PHYSICAL MEDICINE & REHABILITATION

## 2020-05-20 PROCEDURE — 1123F ACP DISCUSS/DSCN MKR DOCD: CPT | Performed by: PHYSICAL MEDICINE & REHABILITATION

## 2020-05-20 PROCEDURE — 4040F PNEUMOC VAC/ADMIN/RCVD: CPT | Performed by: PHYSICAL MEDICINE & REHABILITATION

## 2020-05-20 PROCEDURE — G8399 PT W/DXA RESULTS DOCUMENT: HCPCS | Performed by: PHYSICAL MEDICINE & REHABILITATION

## 2020-05-20 PROCEDURE — 1090F PRES/ABSN URINE INCON ASSESS: CPT | Performed by: PHYSICAL MEDICINE & REHABILITATION

## 2020-05-20 RX ORDER — TRAMADOL HYDROCHLORIDE 50 MG/1
50 TABLET ORAL EVERY 8 HOURS PRN
Qty: 21 TABLET | Refills: 0 | Status: SHIPPED | OUTPATIENT
Start: 2020-05-20 | End: 2020-05-27

## 2020-05-23 ENCOUNTER — HOSPITAL ENCOUNTER (OUTPATIENT)
Age: 80
Discharge: HOME | End: 2020-05-23
Payer: MEDICARE

## 2020-05-23 PROCEDURE — U0002 COVID-19 LAB TEST NON-CDC: HCPCS

## 2020-05-24 LAB
PERFORMING LAB: NORMAL
REPORT: NORMAL
SARS-COV-2: NOT DETECTED

## 2020-05-26 ENCOUNTER — TELEPHONE (OUTPATIENT)
Dept: PHYSICAL MEDICINE AND REHAB | Age: 80
End: 2020-05-26

## 2020-05-27 ENCOUNTER — HOSPITAL ENCOUNTER (OUTPATIENT)
Age: 80
Setting detail: OUTPATIENT SURGERY
Discharge: HOME | End: 2020-05-27
Attending: PHYSICAL MEDICINE & REHABILITATION | Admitting: PHYSICAL MEDICINE & REHABILITATION
Payer: MEDICARE

## 2020-05-27 ENCOUNTER — APPOINTMENT (OUTPATIENT)
Dept: GENERAL RADIOLOGY | Age: 80
End: 2020-05-27
Attending: PHYSICAL MEDICINE & REHABILITATION
Payer: MEDICARE

## 2020-05-27 VITALS
SYSTOLIC BLOOD PRESSURE: 113 MMHG | OXYGEN SATURATION: 96 % | HEART RATE: 89 BPM | BODY MASS INDEX: 29.44 KG/M2 | TEMPERATURE: 98.1 F | RESPIRATION RATE: 15 BRPM | WEIGHT: 160 LBS | HEIGHT: 62 IN | DIASTOLIC BLOOD PRESSURE: 57 MMHG

## 2020-05-27 PROCEDURE — 7100000010 HC PHASE II RECOVERY - FIRST 15 MIN: Performed by: PHYSICAL MEDICINE & REHABILITATION

## 2020-05-27 PROCEDURE — 64493 INJ PARAVERT F JNT L/S 1 LEV: CPT | Performed by: PHYSICAL MEDICINE & REHABILITATION

## 2020-05-27 PROCEDURE — 2500000003 HC RX 250 WO HCPCS: Performed by: PHYSICAL MEDICINE & REHABILITATION

## 2020-05-27 PROCEDURE — 2709999900 HC NON-CHARGEABLE SUPPLY: Performed by: PHYSICAL MEDICINE & REHABILITATION

## 2020-05-27 PROCEDURE — 7100000011 HC PHASE II RECOVERY - ADDTL 15 MIN: Performed by: PHYSICAL MEDICINE & REHABILITATION

## 2020-05-27 PROCEDURE — 99152 MOD SED SAME PHYS/QHP 5/>YRS: CPT | Performed by: PHYSICAL MEDICINE & REHABILITATION

## 2020-05-27 PROCEDURE — 3209999900 FLUORO FOR SURGICAL PROCEDURES

## 2020-05-27 PROCEDURE — 64494 INJ PARAVERT F JNT L/S 2 LEV: CPT | Performed by: PHYSICAL MEDICINE & REHABILITATION

## 2020-05-27 PROCEDURE — 3600000056 HC PAIN LEVEL 4 BASE: Performed by: PHYSICAL MEDICINE & REHABILITATION

## 2020-05-27 PROCEDURE — 6360000002 HC RX W HCPCS: Performed by: PHYSICAL MEDICINE & REHABILITATION

## 2020-05-27 RX ORDER — TRIAMCINOLONE ACETONIDE 40 MG/ML
INJECTION, SUSPENSION INTRA-ARTICULAR; INTRAMUSCULAR PRN
Status: DISCONTINUED | OUTPATIENT
Start: 2020-05-27 | End: 2020-05-27 | Stop reason: ALTCHOICE

## 2020-05-27 RX ORDER — FENTANYL CITRATE 50 UG/ML
INJECTION, SOLUTION INTRAMUSCULAR; INTRAVENOUS PRN
Status: DISCONTINUED | OUTPATIENT
Start: 2020-05-27 | End: 2020-05-27 | Stop reason: ALTCHOICE

## 2020-05-27 RX ORDER — LIDOCAINE HYDROCHLORIDE 10 MG/ML
INJECTION, SOLUTION INFILTRATION; PERINEURAL PRN
Status: DISCONTINUED | OUTPATIENT
Start: 2020-05-27 | End: 2020-05-27 | Stop reason: ALTCHOICE

## 2020-05-27 RX ORDER — MIDAZOLAM HYDROCHLORIDE 1 MG/ML
INJECTION INTRAMUSCULAR; INTRAVENOUS PRN
Status: DISCONTINUED | OUTPATIENT
Start: 2020-05-27 | End: 2020-05-27 | Stop reason: ALTCHOICE

## 2020-05-27 ASSESSMENT — PAIN DESCRIPTION - DESCRIPTORS: DESCRIPTORS: ACHING;BURNING;CONSTANT

## 2020-05-27 ASSESSMENT — PAIN - FUNCTIONAL ASSESSMENT: PAIN_FUNCTIONAL_ASSESSMENT: 0-10

## 2020-05-27 ASSESSMENT — PAIN SCALES - GENERAL: PAINLEVEL_OUTOF10: 5

## 2020-05-27 NOTE — H&P
2023  Prateek Granados, DO   Multiple Vitamins-Minerals (PRESERVISION AREDS PO) Take by mouth  Historical Provider, MD   Probiotic Product (PROBIOTIC DAILY PO) Take by mouth daily   Historical Provider, MD   Calcium Carbonate-Vit D-Min (CALCIUM 1200 PO) Take by mouth daily  Historical Provider, MD   Cholecalciferol (VITAMIN D-3 PO) Take by mouth daily  Historical Provider, MD         Review of Systems : All systems reviewed, all unremarkable other than HPI. No change since last visit. Physical Exam  Constitutional:       Appearance: Normal appearance. HENT:      Head: Atraumatic. Cardiovascular:      Rate and Rhythm: Normal rate. Pulmonary:      Effort: Pulmonary effort is normal. No respiratory distress. Neurological:      Mental Status: She is alert and oriented to person, place, and time. Psychiatric:         Mood and Affect: Mood normal.       Cervical Spine Exam: Full ROM, without limitation     Access: Adequate mouth opening         Assessment:   Lumbar spondylosis    PLAN:     As advertised. Planned duration of treatment: 4 weeks. Further assessment and followup in  4 weeks for follow up post injection.        Electronically signed by Peter Moyer MD on 5/27/2020 at 10:50 AM

## 2020-06-18 ENCOUNTER — OFFICE VISIT (OUTPATIENT)
Dept: PHYSICAL MEDICINE AND REHAB | Age: 80
End: 2020-06-18
Payer: MEDICARE

## 2020-06-18 VITALS
TEMPERATURE: 97.8 F | HEIGHT: 62 IN | SYSTOLIC BLOOD PRESSURE: 126 MMHG | WEIGHT: 160 LBS | BODY MASS INDEX: 29.44 KG/M2 | DIASTOLIC BLOOD PRESSURE: 64 MMHG

## 2020-06-18 PROCEDURE — 4040F PNEUMOC VAC/ADMIN/RCVD: CPT | Performed by: PHYSICAL MEDICINE & REHABILITATION

## 2020-06-18 PROCEDURE — G8427 DOCREV CUR MEDS BY ELIG CLIN: HCPCS | Performed by: PHYSICAL MEDICINE & REHABILITATION

## 2020-06-18 PROCEDURE — 1090F PRES/ABSN URINE INCON ASSESS: CPT | Performed by: PHYSICAL MEDICINE & REHABILITATION

## 2020-06-18 PROCEDURE — 1036F TOBACCO NON-USER: CPT | Performed by: PHYSICAL MEDICINE & REHABILITATION

## 2020-06-18 PROCEDURE — 1123F ACP DISCUSS/DSCN MKR DOCD: CPT | Performed by: PHYSICAL MEDICINE & REHABILITATION

## 2020-06-18 PROCEDURE — G8417 CALC BMI ABV UP PARAM F/U: HCPCS | Performed by: PHYSICAL MEDICINE & REHABILITATION

## 2020-06-18 PROCEDURE — 99214 OFFICE O/P EST MOD 30 MIN: CPT | Performed by: PHYSICAL MEDICINE & REHABILITATION

## 2020-06-18 PROCEDURE — G8399 PT W/DXA RESULTS DOCUMENT: HCPCS | Performed by: PHYSICAL MEDICINE & REHABILITATION

## 2020-06-18 RX ORDER — TRAMADOL HYDROCHLORIDE 50 MG/1
50 TABLET ORAL EVERY 6 HOURS PRN
Qty: 21 TABLET | Refills: 0 | Status: SHIPPED | OUTPATIENT
Start: 2020-06-18 | End: 2020-06-25

## 2020-06-18 NOTE — PROGRESS NOTES
FOLLOW UP APPOINTMENT:         Dayanna Jean MD    6/18/2020       Edu Gutierrez is a 70 y.o. female, who came for a   post injection follow up      Cause of the symptom(s): degenerative (arthritis)      Last visit was on 2/20/2020  due to : back pain       May 27- lumbar medial branch block L3-4 - 5       Pain relief was noted at 80% for at least 2 weeks. Quality of Symptoms: aching and throbbing    Aggravating factors: activity    Relieving factors: rest, lying down and use of medication      Treatment done: physical therapy, injection, anti-inflammatory medication, muscle relaxant, steroid and opioid      Allergies   Allergen Reactions    Sulfa Antibiotics Swelling    Other Nausea And Vomiting     oysters       Social History     Socioeconomic History    Marital status:       Spouse name: Not on file    Number of children: Not on file    Years of education: Not on file    Highest education level: Not on file   Occupational History    Not on file   Social Needs    Financial resource strain: Not on file    Food insecurity     Worry: Not on file     Inability: Not on file    Transportation needs     Medical: Not on file     Non-medical: Not on file   Tobacco Use    Smoking status: Never Smoker    Smokeless tobacco: Never Used   Substance and Sexual Activity    Alcohol use: Yes     Comment: OCCASIONALLY    Drug use: No    Sexual activity: Not Currently   Lifestyle    Physical activity     Days per week: Not on file     Minutes per session: Not on file    Stress: Not on file   Relationships    Social connections     Talks on phone: Not on file     Gets together: Not on file     Attends Uatsdin service: Not on file     Active member of club or organization: Not on file     Attends meetings of clubs or organizations: Not on file     Relationship status: Not on file    Intimate partner violence     Fear of current or ex partner: Not on file     Emotionally abused: Not on file tablet Take 1 tablet by mouth daily as needed for Pain Yes Tiffanie Santana, DO   hydroxychloroquine (PLAQUENIL) 200 MG tablet TAKE 1 TABLET BY MOUTH IN THE MORNING Yes JOSE Burton CNP   omeprazole (PRILOSEC) 20 MG delayed release capsule TAKE 1 CAPSULE BY MOUTH DAILY. Yes JOSE Burton CNP   lisinopril-hydrochlorothiazide (PRINZIDE;ZESTORETIC) 10-12.5 MG per tablet TAKE 1 TABLET BY MOUTH DAILY Yes Tiffanie Santana, DO   Multiple Vitamins-Minerals (MULTIVITAMIN PO) Take by mouth daily Yes Historical Provider, MD   Handicap Placard MISC by Does not apply route Expires 29 June 2023 Yes Tiffanie Santana, DO   Multiple Vitamins-Minerals (PRESERVISION AREDS PO) Take by mouth Yes Historical Provider, MD   Probiotic Product (PROBIOTIC DAILY PO) Take by mouth daily  Yes Historical Provider, MD   Calcium Carbonate-Vit D-Min (CALCIUM 1200 PO) Take by mouth daily Yes Historical Provider, MD   Cholecalciferol (VITAMIN D-3 PO) Take by mouth daily Yes Historical Provider, MD         Review of Systems : All systems reviewed, all unremarkable other than HPI/subjective. No change since last visit. Denies  fever, chills, infection or non healing wound. /64 (Site: Right Upper Arm, Position: Sitting, Cuff Size: Medium Adult)   Temp 97.8 °F (36.6 °C)   Ht 5' 2\" (1.575 m)   Wt 160 lb (72.6 kg)   BMI 29.26 kg/m²       Physical Exam  Constitutional:       General: She is not in acute distress. Appearance: Normal appearance. HENT:      Head: Atraumatic. Musculoskeletal:         General: Tenderness present. Comments: Lumbar spine    Neurological:      Mental Status: She is alert and oriented to person, place, and time. Assessment and Plan:      Diagnosis Orders   1. Status post lumbar laminectomy  Urine Drug Screen    Brace       Trial of tramadol for severe pain. Trial of back brace. Urine drug screen to check. FF up in 3 months.      All questions were answered and imaging studies reviewed with the patient. I have reviewed the chief complaint and HPI including the [facility] and Vital documentation by my staff and I agree with their documentation and have added where applicable. Time spent with patient was 25  minutes. More than 50% was spent counseling/coordinating the patient's care. Edgar Barr MD   Spine Medicine/PM&R         \    [address] [address] [place]  Dept: [phone]  Dept Fax: [phone]: [phone]    Visit Date: 6/18/2020    Functionality Assessment/Goals Worksheet     On a scale of 0 (Does not Interfere) to 10 (Completely Interferes)     1. Which number describes how during the past week pain has interfered with       the following:  A. General Activity:  5  B. Mood: 4  C. Walking Ability:  2  D. Normal Work (Includes both work outside the home and housework):  3  E. Relations with Other People:   0  F. Sleep:   4  G. Enjoyment of Life:   2    2. Patient Prefers to Take their Pain Medications:     [x]  On a regular basis   []  Only when necessary    []  Does not take pain medications    3. What are the Patient's Goals/Expectations for Visiting Pain Management?      []  Learn about my pain    []  Receive Medication   []  Physical Therapy     []  Treat Depression   []  Receive Injections    []  Treat Sleep   []  Deal with Anxiety and Stress   []  Treat Opoid Dependence/Addiction   [x]  Other: just want to not hurt as much   Ablations

## 2020-06-24 ENCOUNTER — TELEPHONE (OUTPATIENT)
Dept: FAMILY MEDICINE CLINIC | Age: 80
End: 2020-06-24

## 2020-06-24 ENCOUNTER — NURSE ONLY (OUTPATIENT)
Dept: LAB | Age: 80
End: 2020-06-24

## 2020-06-24 LAB
ALBUMIN SERPL-MCNC: 4.3 G/DL (ref 3.5–5.1)
ALP BLD-CCNC: 128 U/L (ref 38–126)
ALT SERPL-CCNC: 17 U/L (ref 11–66)
ANION GAP SERPL CALCULATED.3IONS-SCNC: 9 MEQ/L (ref 8–16)
AST SERPL-CCNC: 28 U/L (ref 5–40)
BASOPHILS # BLD: 0.6 %
BASOPHILS ABSOLUTE: 0 THOU/MM3 (ref 0–0.1)
BILIRUB SERPL-MCNC: 0.4 MG/DL (ref 0.3–1.2)
BUN BLDV-MCNC: 19 MG/DL (ref 7–22)
CALCIUM SERPL-MCNC: 9.9 MG/DL (ref 8.5–10.5)
CHLORIDE BLD-SCNC: 97 MEQ/L (ref 98–111)
CHOLESTEROL, TOTAL: 198 MG/DL (ref 100–199)
CO2: 27 MEQ/L (ref 23–33)
CREAT SERPL-MCNC: 1 MG/DL (ref 0.4–1.2)
CREATININE, URINE: 80.5 MG/DL
EOSINOPHIL # BLD: 3.6 %
EOSINOPHILS ABSOLUTE: 0.2 THOU/MM3 (ref 0–0.4)
ERYTHROCYTE [DISTWIDTH] IN BLOOD BY AUTOMATED COUNT: 12.1 % (ref 11.5–14.5)
ERYTHROCYTE [DISTWIDTH] IN BLOOD BY AUTOMATED COUNT: 41.8 FL (ref 35–45)
GFR SERPL CREATININE-BSD FRML MDRD: 54 ML/MIN/1.73M2
GLUCOSE BLD-MCNC: 91 MG/DL (ref 70–108)
HCT VFR BLD CALC: 39.6 % (ref 37–47)
HDLC SERPL-MCNC: 76 MG/DL
HEMOGLOBIN: 13 GM/DL (ref 12–16)
IMMATURE GRANS (ABS): 0.01 THOU/MM3 (ref 0–0.07)
IMMATURE GRANULOCYTES: 0.2 %
LDL CHOLESTEROL CALCULATED: 98 MG/DL
LYMPHOCYTES # BLD: 35.9 %
LYMPHOCYTES ABSOLUTE: 1.7 THOU/MM3 (ref 1–4.8)
MCH RBC QN AUTO: 30.7 PG (ref 26–33)
MCHC RBC AUTO-ENTMCNC: 32.8 GM/DL (ref 32.2–35.5)
MCV RBC AUTO: 93.6 FL (ref 81–99)
MICROALBUMIN UR-MCNC: 1.66 MG/DL
MICROALBUMIN/CREAT UR-RTO: 21 MG/G (ref 0–30)
MONOCYTES # BLD: 13.3 %
MONOCYTES ABSOLUTE: 0.6 THOU/MM3 (ref 0.4–1.3)
NUCLEATED RED BLOOD CELLS: 0 /100 WBC
PLATELET # BLD: 223 THOU/MM3 (ref 130–400)
PMV BLD AUTO: 10.3 FL (ref 9.4–12.4)
POTASSIUM SERPL-SCNC: 4.2 MEQ/L (ref 3.5–5.2)
RBC # BLD: 4.23 MILL/MM3 (ref 4.2–5.4)
SEG NEUTROPHILS: 46.4 %
SEGMENTED NEUTROPHILS ABSOLUTE COUNT: 2.2 THOU/MM3 (ref 1.8–7.7)
SODIUM BLD-SCNC: 133 MEQ/L (ref 135–145)
TOTAL PROTEIN: 6.9 G/DL (ref 6.1–8)
TRIGL SERPL-MCNC: 122 MG/DL (ref 0–199)
TSH SERPL DL<=0.05 MIU/L-ACNC: 1.77 UIU/ML (ref 0.4–4.2)
WBC # BLD: 4.7 THOU/MM3 (ref 4.8–10.8)

## 2020-06-29 ENCOUNTER — NURSE ONLY (OUTPATIENT)
Dept: LAB | Age: 80
End: 2020-06-29

## 2020-06-29 LAB
ANION GAP SERPL CALCULATED.3IONS-SCNC: 11 MEQ/L (ref 8–16)
BUN BLDV-MCNC: 25 MG/DL (ref 7–22)
CALCIUM SERPL-MCNC: 9.3 MG/DL (ref 8.5–10.5)
CHLORIDE BLD-SCNC: 100 MEQ/L (ref 98–111)
CO2: 26 MEQ/L (ref 23–33)
CREAT SERPL-MCNC: 1.4 MG/DL (ref 0.4–1.2)
GFR SERPL CREATININE-BSD FRML MDRD: 36 ML/MIN/1.73M2
GLUCOSE BLD-MCNC: 85 MG/DL (ref 70–108)
POTASSIUM SERPL-SCNC: 4.7 MEQ/L (ref 3.5–5.2)
SODIUM BLD-SCNC: 137 MEQ/L (ref 135–145)

## 2020-06-30 ENCOUNTER — TELEPHONE (OUTPATIENT)
Dept: FAMILY MEDICINE CLINIC | Age: 80
End: 2020-06-30

## 2020-06-30 NOTE — TELEPHONE ENCOUNTER
Yes  Have her inc fluid intake  BMP 1 wk, non-fasting is fine     Diagnosis Orders   1.  ROWAN (acute kidney injury) [facility])  Basic Metabolic Panel

## 2020-07-06 ENCOUNTER — NURSE ONLY (OUTPATIENT)
Dept: LAB | Age: 80
End: 2020-07-06

## 2020-07-06 LAB
ANION GAP SERPL CALCULATED.3IONS-SCNC: 11 MEQ/L (ref 8–16)
BUN BLDV-MCNC: 18 MG/DL (ref 7–22)
CALCIUM SERPL-MCNC: 9.7 MG/DL (ref 8.5–10.5)
CHLORIDE BLD-SCNC: 98 MEQ/L (ref 98–111)
CO2: 26 MEQ/L (ref 23–33)
CREAT SERPL-MCNC: 1.2 MG/DL (ref 0.4–1.2)
GFR SERPL CREATININE-BSD FRML MDRD: 43 ML/MIN/1.73M2
GLUCOSE BLD-MCNC: 87 MG/DL (ref 70–108)
POTASSIUM SERPL-SCNC: 4.4 MEQ/L (ref 3.5–5.2)
SODIUM BLD-SCNC: 135 MEQ/L (ref 135–145)

## 2020-07-07 ENCOUNTER — TELEPHONE (OUTPATIENT)
Dept: FAMILY MEDICINE CLINIC | Age: 80
End: 2020-07-07

## 2020-07-07 NOTE — TELEPHONE ENCOUNTER
----- Message from Jordyn Brothers, DO sent at 7/6/2020  6:27 PM EDT -----  Please let pt know that f/u bmp looks much better  con't with good hydration  Let me know if questions, thanks!

## 2020-07-21 ENCOUNTER — VIRTUAL VISIT (OUTPATIENT)
Dept: PHYSICAL MEDICINE AND REHAB | Age: 80
End: 2020-07-21
Payer: MEDICARE

## 2020-07-21 PROCEDURE — 1123F ACP DISCUSS/DSCN MKR DOCD: CPT | Performed by: PHYSICAL MEDICINE & REHABILITATION

## 2020-07-21 PROCEDURE — G8428 CUR MEDS NOT DOCUMENT: HCPCS | Performed by: PHYSICAL MEDICINE & REHABILITATION

## 2020-07-21 PROCEDURE — G8399 PT W/DXA RESULTS DOCUMENT: HCPCS | Performed by: PHYSICAL MEDICINE & REHABILITATION

## 2020-07-21 PROCEDURE — 1090F PRES/ABSN URINE INCON ASSESS: CPT | Performed by: PHYSICAL MEDICINE & REHABILITATION

## 2020-07-21 PROCEDURE — 99213 OFFICE O/P EST LOW 20 MIN: CPT | Performed by: PHYSICAL MEDICINE & REHABILITATION

## 2020-07-21 PROCEDURE — 4040F PNEUMOC VAC/ADMIN/RCVD: CPT | Performed by: PHYSICAL MEDICINE & REHABILITATION

## 2020-07-21 RX ORDER — TRAMADOL HYDROCHLORIDE 50 MG/1
50 TABLET ORAL 2 TIMES DAILY
Qty: 30 TABLET | Refills: 0 | Status: SHIPPED | OUTPATIENT
Start: 2020-07-21 | End: 2020-08-20

## 2020-07-21 NOTE — PROGRESS NOTES
VIRTUAL VISIT:         Devin Franco MD    7/21/2020       Artur Salazar is a 70 y.o. female, who came for a  follow up to discuss medication management       Cause of the symptom(s): degenerative (arthritis)      Currently taking tramadol for 2 per day. Had prior lumbar medial branch block  - 6/2020 - good relief. Current pain level: 8 on the scale of 0-10 ( 10 being worst)     Quality of Symptoms: aching and throbbing    Aggravating factors: activity, lifting and twisting    Relieving factors: rest, lying down and use of medication    Treatment done: physical therapy, injection and anti-inflammatory medication      Allergies   Allergen Reactions    Sulfa Antibiotics Swelling    Other Nausea And Vomiting     oysters       Social History     Socioeconomic History    Marital status:       Spouse name: Not on file    Number of children: Not on file    Years of education: Not on file    Highest education level: Not on file   Occupational History    Not on file   Social Needs    Financial resource strain: Not on file    Food insecurity     Worry: Not on file     Inability: Not on file    Transportation needs     Medical: Not on file     Non-medical: Not on file   Tobacco Use    Smoking status: Never Smoker    Smokeless tobacco: Never Used   Substance and Sexual Activity    Alcohol use: Yes     Comment: OCCASIONALLY    Drug use: No    Sexual activity: Not Currently   Lifestyle    Physical activity     Days per week: Not on file     Minutes per session: Not on file    Stress: Not on file   Relationships    Social connections     Talks on phone: Not on file     Gets together: Not on file     Attends Mu-ism service: Not on file     Active member of club or organization: Not on file     Attends meetings of clubs or organizations: Not on file     Relationship status: Not on file    Intimate partner violence     Fear of current or ex partner: Not on file     Emotionally abused: Not on file     Physically abused: Not on file     Forced sexual activity: Not on file   Other Topics Concern    Not on file   Social History Narrative    Not on file       Past Medical History:   Diagnosis Date    Chronic low back pain     CKD (chronic kidney disease) stage 3, GFR 30-59 ml/min ([facility])     DDD (degenerative disc disease), lumbar     Dyslipidemia     History of skin cancer     BCC AND SCC    Hyperlipidemia     Hypertension     Osteoarthritis     PONV (postoperative nausea and vomiting)        Past Surgical History:   Procedure Laterality Date    BACK SURGERY      lower lumbar fusion    CARPAL TUNNEL RELEASE Bilateral      SECTION      CHOLECYSTECTOMY, LAPAROSCOPIC  10/03/2017    COLONOSCOPY      CYST REMOVAL Left 2017    left  5th digit-Dr Bowman    DILATION AND CURETTAGE OF UTERUS      FACET JOINT INJECTION Bilateral 2020    B/L L3-4 medial branch and L5 dorsal rami injection performed by Shantanu Baum MD at [address] [place] [address] Bilateral     HYSTERECTOMY      JOINT REPLACEMENT Bilateral     KNEES    NERVE SURGERY N/A 2019    left L3, L4 medial branch and L5 dorsal rami injection performed by Shantanu Baum MD at [address] [address] Left 2020    left L3, L4 medial branch and L5 dorsal rami injection performed by Shantanu Baum MD at [address] [place] [address] FLX DX W/COLLJ Radha  PFRMD N/A 2018    COLONOSCOPY performed by Sony Luna MD at [address] [facility]    SKIN BIOPSY      BCC AND SCC       Family History   Problem Relation Age of Onset    Heart Disease Mother         CHF    Alzheimer's Disease Mother     Dementia Mother     Cancer Father         LUNG    Emphysema Father     Colon Cancer Neg Hx     Breast Cancer Neg Hx         Prior to Visit Medications    Medication Sig Taking?  Authorizing Provider   traMADol (ULTRAM) 50 MG tablet Take 1 tablet by mouth 2 times daily for 30 days. Intended supply: 3 days. Take lowest dose possible to manage pain Yes Shantanu Baum MD   meloxicam (MOBIC) 7.5 MG tablet Take 1 tablet by mouth daily as needed for Pain  Elyse Suazo, DO   hydroxychloroquine (PLAQUENIL) 200 MG tablet TAKE 1 TABLET BY MOUTH IN THE MORNING  [place] APRN - CNP   omeprazole (PRILOSEC) 20 MG delayed release capsule TAKE 1 CAPSULE BY MOUTH DAILY. Luz, APRN - ENLY   lisinopril-hydrochlorothiazide (PRINZIDE;ZESTORETIC) 10-12.5 MG per tablet TAKE 1 TABLET BY MOUTH DAILY  Elyse Suazo, DO   Multiple Vitamins-Minerals (MULTIVITAMIN PO) Take by mouth daily  Historical Provider, MD   Handicap Placard MISC by Does not apply route Expires 29 June 2023  Elyse Suazo, DO   Multiple Vitamins-Minerals (PRESERVISION AREDS PO) Take by mouth  Historical Provider, MD   Probiotic Product (PROBIOTIC DAILY PO) Take by mouth daily   Historical Provider, MD   Calcium Carbonate-Vit D-Min (CALCIUM 1200 PO) Take by mouth daily  Historical Provider, MD   Cholecalciferol (VITAMIN D-3 PO) Take by mouth daily  Historical Provider, MD         Review of Systems : All systems reviewed, all unremarkable other than HPI/subjective. No change since last visit. Denies  fever, chills, infection or non healing wound. Physical Exam  Constitutional:       General: She is not in acute distress. Appearance: Normal appearance. HENT:      Head: Atraumatic. Pulmonary:      Effort: Pulmonary effort is normal.   Neurological:      General: No focal deficit present. Mental Status: She is alert. Psychiatric:         Behavior: Behavior normal.           Assessment and Plan:      Diagnosis Orders   1. Status post lumbar laminectomy  traMADol (ULTRAM) 50 MG tablet   2.  Lumbar spondylosis  traMADol (ULTRAM) 50 MG tablet         Had bilateral L3-4 medial branch and L5 dorsal rami injection -

## 2020-08-28 ENCOUNTER — HOSPITAL ENCOUNTER (OUTPATIENT)
Dept: WOMENS IMAGING | Age: 80
Discharge: HOME | End: 2020-08-28
Payer: MEDICARE

## 2020-08-28 PROCEDURE — 77080 DXA BONE DENSITY AXIAL: CPT

## 2020-08-31 NOTE — RESULT ENCOUNTER NOTE
The bone density study has revealed some progression/worsening of your bone strength since the last evaluation. You are close to the point of requiring treatment and I would recommend having your bone density study repeated in 1 to 2 years. Please make sure you are taking a calcium and vitamin D supplement daily and performing routine daily exercise.

## 2020-09-02 ENCOUNTER — OFFICE VISIT (OUTPATIENT)
Dept: PHYSICAL MEDICINE AND REHAB | Age: 80
End: 2020-09-02
Payer: MEDICARE

## 2020-09-02 ENCOUNTER — HOSPITAL ENCOUNTER (OUTPATIENT)
Dept: GENERAL RADIOLOGY | Age: 80
Discharge: HOME | End: 2020-09-02
Payer: MEDICARE

## 2020-09-02 ENCOUNTER — HOSPITAL ENCOUNTER (OUTPATIENT)
Age: 80
Discharge: HOME | End: 2020-09-02
Payer: MEDICARE

## 2020-09-02 VITALS
BODY MASS INDEX: 29.44 KG/M2 | SYSTOLIC BLOOD PRESSURE: 122 MMHG | DIASTOLIC BLOOD PRESSURE: 84 MMHG | HEIGHT: 62 IN | WEIGHT: 160 LBS | TEMPERATURE: 98 F

## 2020-09-02 PROCEDURE — 1123F ACP DISCUSS/DSCN MKR DOCD: CPT | Performed by: PHYSICAL MEDICINE & REHABILITATION

## 2020-09-02 PROCEDURE — G8399 PT W/DXA RESULTS DOCUMENT: HCPCS | Performed by: PHYSICAL MEDICINE & REHABILITATION

## 2020-09-02 PROCEDURE — 4040F PNEUMOC VAC/ADMIN/RCVD: CPT | Performed by: PHYSICAL MEDICINE & REHABILITATION

## 2020-09-02 PROCEDURE — 72100 X-RAY EXAM L-S SPINE 2/3 VWS: CPT

## 2020-09-02 PROCEDURE — 99214 OFFICE O/P EST MOD 30 MIN: CPT | Performed by: PHYSICAL MEDICINE & REHABILITATION

## 2020-09-02 PROCEDURE — 1036F TOBACCO NON-USER: CPT | Performed by: PHYSICAL MEDICINE & REHABILITATION

## 2020-09-02 PROCEDURE — G8417 CALC BMI ABV UP PARAM F/U: HCPCS | Performed by: PHYSICAL MEDICINE & REHABILITATION

## 2020-09-02 PROCEDURE — G8427 DOCREV CUR MEDS BY ELIG CLIN: HCPCS | Performed by: PHYSICAL MEDICINE & REHABILITATION

## 2020-09-02 PROCEDURE — 1090F PRES/ABSN URINE INCON ASSESS: CPT | Performed by: PHYSICAL MEDICINE & REHABILITATION

## 2020-09-02 RX ORDER — TRAMADOL HYDROCHLORIDE 50 MG/1
50 TABLET ORAL EVERY 8 HOURS PRN
Qty: 21 TABLET | Refills: 0 | Status: SHIPPED | OUTPATIENT
Start: 2020-09-02 | End: 2020-09-09
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
## 2020-09-08 ENCOUNTER — VIRTUAL VISIT (OUTPATIENT)
Dept: PHYSICAL MEDICINE AND REHAB | Age: 80
End: 2020-09-08
Payer: MEDICARE

## 2020-09-08 PROCEDURE — G8428 CUR MEDS NOT DOCUMENT: HCPCS | Performed by: PHYSICAL MEDICINE & REHABILITATION

## 2020-09-08 PROCEDURE — G8399 PT W/DXA RESULTS DOCUMENT: HCPCS | Performed by: PHYSICAL MEDICINE & REHABILITATION

## 2020-09-08 PROCEDURE — 99213 OFFICE O/P EST LOW 20 MIN: CPT | Performed by: PHYSICAL MEDICINE & REHABILITATION

## 2020-09-08 PROCEDURE — 4040F PNEUMOC VAC/ADMIN/RCVD: CPT | Performed by: PHYSICAL MEDICINE & REHABILITATION

## 2020-09-08 PROCEDURE — 1123F ACP DISCUSS/DSCN MKR DOCD: CPT | Performed by: PHYSICAL MEDICINE & REHABILITATION

## 2020-09-08 PROCEDURE — 1090F PRES/ABSN URINE INCON ASSESS: CPT | Performed by: PHYSICAL MEDICINE & REHABILITATION

## 2020-09-08 RX ORDER — TRAMADOL HYDROCHLORIDE 50 MG/1
50 TABLET ORAL EVERY 8 HOURS PRN
Qty: 42 TABLET | Refills: 0 | Status: SHIPPED | OUTPATIENT
Start: 2020-09-08 | End: 2020-09-22

## 2020-09-08 NOTE — PROGRESS NOTES
refill should be for 30 days    Will Spencer is a 70 y.o. female being evaluated by a Virtual Visit (video visit) encounter to address concerns as mentioned above. A caregiver was present when appropriate. Due to this being a TeleHealth encounter (During DFL-71 public health emergency), evaluation of the following organ systems was limited: Vitals/Constitutional/EENT/Resp/CV/GI//MS/Neuro/Skin/Heme-Lymph-Imm. Pursuant to the emergency declaration under the [address] [place] [address], [address] [place] [address] authority and the [employer] Act, this Virtual Visit was conducted with patient's (and/or legal guardian's) consent, to reduce the patient's risk of exposure to COVID-19 and provide necessary medical care. The patient (and/or legal guardian) has also been advised to contact this office for worsening conditions or problems, and seek emergency medical treatment and/or call 911 if deemed necessary. Patient identification was verified at the start of the visit: Yes    Total time spent for this encounter: Not billed by time    Services were provided through a video synchronous discussion virtually to substitute for in-person clinic visit. Patient and provider were located at their individual homes. --Darrion Arias MD on 9/8/2020 at 2:41 PM    An electronic signature was used to authenticate this note.

## 2020-09-24 ENCOUNTER — PATIENT MESSAGE (OUTPATIENT)
Dept: PHYSICAL MEDICINE AND REHAB | Age: 80
End: 2020-09-24

## 2020-09-25 RX ORDER — TRAMADOL HYDROCHLORIDE 50 MG/1
50 TABLET ORAL EVERY 8 HOURS PRN
Qty: 42 TABLET | Refills: 0 | Status: SHIPPED | OUTPATIENT
Start: 2020-09-25 | End: 2020-10-09 | Stop reason: SDUPTHER

## 2020-09-25 NOTE — TELEPHONE ENCOUNTER
From: Cj Borges  To: Severo Campion, MD  Sent: 9/24/2020 9:30 PM EDT  Subject: Prescription Question    I would like to get a refill for the tramadol that I got last time I saw the Dr. My prescription is running out. ...have 5 pills remaining! If possible, could you please notify [address]    [facility] Thanks, N.B.

## 2020-09-30 ENCOUNTER — TELEPHONE (OUTPATIENT)
Dept: FAMILY MEDICINE CLINIC | Age: 80
End: 2020-09-30

## 2020-09-30 ENCOUNTER — HOSPITAL ENCOUNTER (OUTPATIENT)
Dept: WOMENS IMAGING | Age: 80
Discharge: HOME | End: 2020-09-30
Payer: MEDICARE

## 2020-09-30 PROCEDURE — 77063 BREAST TOMOSYNTHESIS BI: CPT

## 2020-10-01 ENCOUNTER — HOSPITAL ENCOUNTER (OUTPATIENT)
Age: 80
Discharge: HOME | End: 2020-10-01
Payer: MEDICARE

## 2020-10-01 ENCOUNTER — TELEPHONE (OUTPATIENT)
Dept: FAMILY MEDICINE CLINIC | Age: 80
End: 2020-10-01

## 2020-10-01 ENCOUNTER — OFFICE VISIT (OUTPATIENT)
Dept: FAMILY MEDICINE CLINIC | Age: 80
End: 2020-10-01
Payer: MEDICARE

## 2020-10-01 ENCOUNTER — HOSPITAL ENCOUNTER (OUTPATIENT)
Dept: GENERAL RADIOLOGY | Age: 80
Discharge: HOME | End: 2020-10-01
Payer: MEDICARE

## 2020-10-01 VITALS
OXYGEN SATURATION: 97 % | HEIGHT: 63 IN | RESPIRATION RATE: 14 BRPM | HEART RATE: 73 BPM | WEIGHT: 163.6 LBS | TEMPERATURE: 97.5 F | BODY MASS INDEX: 28.99 KG/M2 | SYSTOLIC BLOOD PRESSURE: 132 MMHG | DIASTOLIC BLOOD PRESSURE: 63 MMHG

## 2020-10-01 PROCEDURE — 1090F PRES/ABSN URINE INCON ASSESS: CPT | Performed by: FAMILY MEDICINE

## 2020-10-01 PROCEDURE — G8417 CALC BMI ABV UP PARAM F/U: HCPCS | Performed by: FAMILY MEDICINE

## 2020-10-01 PROCEDURE — G8399 PT W/DXA RESULTS DOCUMENT: HCPCS | Performed by: FAMILY MEDICINE

## 2020-10-01 PROCEDURE — G8427 DOCREV CUR MEDS BY ELIG CLIN: HCPCS | Performed by: FAMILY MEDICINE

## 2020-10-01 PROCEDURE — 4040F PNEUMOC VAC/ADMIN/RCVD: CPT | Performed by: FAMILY MEDICINE

## 2020-10-01 PROCEDURE — 1036F TOBACCO NON-USER: CPT | Performed by: FAMILY MEDICINE

## 2020-10-01 PROCEDURE — 73030 X-RAY EXAM OF SHOULDER: CPT

## 2020-10-01 PROCEDURE — 99213 OFFICE O/P EST LOW 20 MIN: CPT | Performed by: FAMILY MEDICINE

## 2020-10-01 PROCEDURE — G8484 FLU IMMUNIZE NO ADMIN: HCPCS | Performed by: FAMILY MEDICINE

## 2020-10-01 PROCEDURE — 1123F ACP DISCUSS/DSCN MKR DOCD: CPT | Performed by: FAMILY MEDICINE

## 2020-10-01 RX ORDER — ASCORBIC ACID 500 MG
500 TABLET ORAL DAILY
COMMUNITY

## 2020-10-01 ASSESSMENT — PATIENT HEALTH QUESTIONNAIRE - PHQ9
2. FEELING DOWN, DEPRESSED OR HOPELESS: 1
SUM OF ALL RESPONSES TO PHQ QUESTIONS 1-9: 1
SUM OF ALL RESPONSES TO PHQ9 QUESTIONS 1 & 2: 1
1. LITTLE INTEREST OR PLEASURE IN DOING THINGS: 0
SUM OF ALL RESPONSES TO PHQ QUESTIONS 1-9: 1

## 2020-10-01 NOTE — TELEPHONE ENCOUNTER
Pt stated she has fallen twice in the last 2 1/2 weeks falling backwards. She's somewhat fine, no broken bones. However, right upper arm/shoulder area, has some limited movement. Can't hook bra, can put straight up and out. Also feels weak.   I asked if she wanted an OV but she wanted your opinion first.

## 2020-10-01 NOTE — PROGRESS NOTES
Chief Complaint   Patient presents with    Shoulder Pain     R shoulder, from fall       History obtained from the patient. SUBJECTIVE:  Mars Leo is a 70 y.o. female that presents today for       -R Shoulder Pain    HPI:    2 falls the last 1.5 wk  Both were flukes  1st, she was standing on a chair and when getting down, foot caught the edge and fell  Landed on back, may have hit her head  May have also landed on R shoulder  No LOC, no HA, just some mild R shoulder pain    Sunday, was trimming her tree, and was holding onto to a branch while cutting another  The branch she was holding unexpectedly gave way and she fell backwards  Landed on back and possibly her R shoulder again    Here today d/t mild pain in R shoulder and dec ROM to int rotation  No other pain  Is R hand dominant    Inciting injury or history of trauma? Yes  Pain is relieved by - rest  Pain is aggravated by - reaching behind her back  Radiation of the pain? No  Paresthesias of the extremities? No  Decreased ROM? Yes  Treatments tried - otc meds      Age/Gender Health Maintenance    Lipid -   Lab Results   Component Value Date    CHOL 198 06/24/2020    CHOL 234 (H) 04/01/2019    CHOL 207 03/06/2018     Lab Results   Component Value Date    TRIG 122 06/24/2020    TRIG 167 04/01/2019    TRIG 149 03/06/2018     Lab Results   Component Value Date    HDL 76 06/24/2020    HDL 57 04/01/2019    HDL 74 (A) 03/06/2018     Lab Results   Component Value Date    LDLCALC 98 06/24/2020    LDLCALC 144 04/01/2019    LDLCALC 82 03/06/2018       ;  LDL 82; HDL 74;  [facility] 2018)    DM Screen -   Lab Results   Component Value Date    GLUCOSE 87 07/06/2020    GLUCOSE 99 05/03/2018       Colon Cancer Screening -NEG SEPT 2018, no repeat (Marlene Cox)  Lung Cancer Screening (Age 54 to [de-identified] with 30 pack year hx, current smoker or quit within past 15 years) - never smoker    Tetanus - UTD July 2018  Influenza Vaccine - UTD FALL 2019  Pneumonia Vaccine - UTD PCV 13 OCT 2015. UTD PPV 23 APR 2018  Zostavax - Zostavax competed APR 2013   Shingrix - UTD x 2    Breast Cancer Screening - NEG SEPT 2020  Cervical Cancer Screening - Aged out  Osteoporosis Screening - osteopenia AUG 2018 and AUG 2020      Current Outpatient Medications   Medication Sig Dispense Refill    vitamin C (ASCORBIC ACID) 500 MG tablet Take 500 mg by mouth daily      traMADol (ULTRAM) 50 MG tablet Take 1 tablet by mouth every 8 hours as needed for Pain for up to 14 days. 42 tablet 0    meloxicam (MOBIC) 7.5 MG tablet Take 1 tablet by mouth daily as needed for Pain 90 tablet 3    hydroxychloroquine (PLAQUENIL) 200 MG tablet TAKE 1 TABLET BY MOUTH IN THE MORNING 90 tablet 2    omeprazole (PRILOSEC) 20 MG delayed release capsule TAKE 1 CAPSULE BY MOUTH DAILY. 90 capsule 3    lisinopril-hydrochlorothiazide (PRINZIDE;ZESTORETIC) 10-12.5 MG per tablet TAKE 1 TABLET BY MOUTH DAILY 90 tablet 3    Handicap Placard MISC by Does not apply route Expires 29 June 2023 1 each 0    Multiple Vitamins-Minerals (PRESERVISION AREDS PO) Take by mouth      Calcium Carbonate-Vit D-Min (CALCIUM 1200 PO) Take by mouth daily      Cholecalciferol (VITAMIN D-3 PO) Take by mouth daily       No current facility-administered medications for this visit. No orders of the defined types were placed in this encounter. All medications reviewed and reconciled, including OTC and herbal medications. Updated list given to patient.        Patient Active Problem List    Diagnosis Date Noted    Ganglion cyst of finger of left hand 01/31/2017     Priority: High     Class: Chronic    Lumbar spondylosis     Erosive osteoarthritis of both hands     Positive WICHO (antinuclear antibody) 04/18/2018    Dyslipidemia     History of skin cancer      BCC AND SCC      Hypertension     Osteoarthritis     DDD (degenerative disc disease), lumbar     Chronic low back pain     Hyperkalemia     CKD (chronic kidney disease) stage 3, GFR 30-59 ml/min        Past Medical History:   Diagnosis Date    Chronic low back pain     CKD (chronic kidney disease) stage 3, GFR 30-59 ml/min     DDD (degenerative disc disease), lumbar     Dyslipidemia     History of skin cancer     BCC AND SCC    Hyperlipidemia     Hypertension     Osteoarthritis     PONV (postoperative nausea and vomiting)        Past Surgical History:   Procedure Laterality Date    BACK SURGERY      lower lumbar fusion    CARPAL TUNNEL RELEASE Bilateral      SECTION      CHOLECYSTECTOMY, LAPAROSCOPIC  10/03/2017    COLONOSCOPY      CYST REMOVAL Left 2017    left  5th digit-Dr Bowman    DILATION AND CURETTAGE OF UTERUS      FACET JOINT INJECTION Bilateral 2020    B/L L3-4 medial branch and L5 dorsal rami injection performed by Yennifer Patterson MD at [address] [place] [address] Bilateral     HYSTERECTOMY      JOINT REPLACEMENT Bilateral     KNEES    NERVE SURGERY N/A 2019    left L3, L4 medial branch and L5 dorsal rami injection performed by Yennifer Patterson MD at [place] [address] Left 2020    left L3, L4 medial branch and L5 dorsal rami injection performed by Yennifer Patterson MD at [address] [place] [address] FL DX W/COLLJ Radha 1978 PFRMD N/A 2018    COLONOSCOPY performed by Dorene Yao MD at [address] [facility]    SKIN BIOPSY      BCC AND SCC       Allergies   Allergen Reactions    Sulfa Antibiotics Swelling    Other Nausea And Vomiting     oysters       Social History     Tobacco Use    Smoking status: Never Smoker    Smokeless tobacco: Never Used   Substance Use Topics    Alcohol use: Yes     Comment: OCCASIONALLY       Family History   Problem Relation Age of Onset    Heart Disease Mother         CHF    Alzheimer's Disease Mother     Dementia Mother     Cancer Father         LUNG    Emphysema Father     Colon Cancer Neg Hx     Breast Cancer Neg Hx          I have reviewed the patient's past medical history, past surgical history, allergies, medications, social and family history and I have made updates where appropriate. Review of Systems  Positive responses are highlighted in bold    Constitutional:  Fever, Chills, Night Sweats, Fatigue, Unexpected changes in weight  HENT:  Ear pain, Tinnitus, Nosebleeds, Trouble swallowing, Hearing loss, Sore throat  Cardiovascular:  Chest Pain, Palpitations, Orthopnea, Paroxysmal Nocturnal Dyspnea  Respiratory:  Cough, Wheezing, Shortness of breath, Chest tightness, Apnea  Gastrointestinal:  Nausea, Vomiting, Diarrhea, Constipation, Heartburn, Blood in stool  Genitourinary:  Difficulty or painful urination, Flank pain, Change in frequency, Urgency  Skin:  Color change, Rash, Itching, Wound  Musculoskeletal:  Joint pain, Back pain, Gait problems, Joint swelling, Myalgias  Neurological:  Dizziness, Headaches, Presyncope, Numbness, Seizures, Tremors  Endocrine:  Heat Intolerance, Cold Intolerance, Polydipsia, Polyphagia, Polyuria      PHYSICAL EXAM:  Vitals:    10/01/20 1127   BP: 132/63   Pulse: 73   Resp: 14   Temp: 97.5 °F (36.4 °C)   TempSrc: Temporal   SpO2: 97%   Weight: 163 lb 9.6 oz (74.2 kg)   Height: 5' 2.5\" (1.588 m)     Body mass index is 29.45 kg/m².   Pain Score:   3(R shoulder)    VS Reviewed  General Appearance: A&O x 3, No acute distress,well developed and well- nourished  Eyes: pupils equal, round, and reactive to light, extraocular eye movements intact, conjunctivae and eye lids without erythema  ENT: external ear and ear canal clear bilaterally, TMs intact and regular, nose without deformity, nasal mucosa and turbinates normal without polyps, oropharynx normal, dentition is normal for age=  Neck: supple and non-tender without mass, no thyromegaly or thyroid nodules, no cervical lymphadenopathy  Pulmonary/Chest: clear to auscultation bilaterally- no wheezes, rales or rhonchi, normal air movement, no respiratory distress or retractions  Cardiovascular: S1 and S2 auscultated w/ RRR. No murmurs, rubs, clicks, or gallops, distal pulses intact. Abdomen: soft, non-tender, non-distended, bowel sounds physiologic,  no rebound or guarding, no masses or hernias noted. Liver and spleen without enlargement. Extremities: no cyanosis, clubbing or edema of the lower extremities. +2 PT/DP bilaterally. Musculoskeletal: No joint swelling or gross deformity   Skin: warm and dry, no rash or erythema  R Shoulder:  SWELLING: none  DEFORMITY: none  TENDERNESS: mild  ROM: dec to int rotation, otherwise intact  STRENGTH: external rotation grade 5 of 5, internal rotation grade 5 of 5, supraspinatus grade 5 of 5, infraspinatus grade 5 of 5, belly press grade 5 of 5 and deltoid grade 5 of 5  STABILITY: normal  SPECIAL TESTS: Carmell Records' test: negative, Cross-chest abduction: negative, Yergason's test: negative, Speed's test: negative and Warren's test: negative  Neurologic Exam: normal sensation and reflexes  Vascular Exam: radial pulse present and good color & capillary refill  Neck: supple, tender no and ROM: normal      ASSESSMENT & PLAN  1. Acute pain of right shoulder    Likely mild impingement  Reassuring exam overall  Will xray d/t fall  Given HEP  If not improving much in 2 wks, she will call and will get her setup with formal PT    - XR SHOULDER RIGHT (MIN 2 VIEWS); Future    2. Fall in home, initial encounter    Fluky falls  Precautions reviewed    - XR SHOULDER RIGHT (MIN 2 VIEWS); Future      DISPOSITION    Return if symptoms worsen or fail to improve. Amol Ho released without restrictions.     Future Appointments   Date Time Provider Radha Paulson   10/28/2020 10:40 AM DO MAURA Rangel Rheum P - [place]   11/3/2020 11:00 AM MD [facility] [place]   4/21/2021 10:00 AM Karl Lombard, DO [facility] [place]     PATIENT COUNSELING    Patient given educational materials on: See Attached    Barriers to learning and self management: none    Discussed use, benefit, and side effects of prescribed medications. Barriers to medication compliance addressed. All patient questions answered. Pt voiced understanding.        Electronically signed by Devin Shay DO on 10/1/2020 at 12:36 PM

## 2020-10-01 NOTE — PATIENT INSTRUCTIONS
Patient Education        Shoulder Pain: Care Instructions  Your Care Instructions     You can hurt your shoulder by using it too much during an activity, such as fishing or baseball. It can also happen as part of the everyday wear and tear of getting older. Shoulder injuries can be slow to heal, but your shoulder should get better with time. Your doctor may recommend a sling to rest your shoulder. If you have injured your shoulder, you may need testing and treatment. Follow-up care is a key part of your treatment and safety. Be sure to make and go to all appointments, and call your doctor if you are having problems. It's also a good idea to know your test results and keep a list of the medicines you take. How can you care for yourself at home? · Take pain medicines exactly as directed. ? If the doctor gave you a prescription medicine for pain, take it as prescribed. ? If you are not taking a prescription pain medicine, ask your doctor if you can take an over-the-counter medicine. ? Do not take two or more pain medicines at the same time unless the doctor told you to. Many pain medicines contain acetaminophen, which is Tylenol. Too much acetaminophen (Tylenol) can be harmful. · If your doctor recommends that you wear a sling, use it as directed. Do not take it off before your doctor tells you to. · Put ice or a cold pack on the sore area for 10 to 20 minutes at a time. Put a thin cloth between the ice and your skin. · If there is no swelling, you can put moist heat, a heating pad, or a warm cloth on your shoulder. Some doctors suggest alternating between hot and cold. · Rest your shoulder for a few days. If your doctor recommends it, you can then begin gentle exercise of the shoulder, but do not lift anything heavy. When should you call for help? [id number] anytime you think you may need emergency care. For example, call if:  · You have chest pain or pressure.  This may occur with:  ? Sweating. ? Shortness of breath. ? Nausea or vomiting. ? Pain that spreads from the chest to the neck, jaw, or one or both shoulders or arms. ? Dizziness or lightheadedness. ? A fast or uneven pulse. After calling 911, chew 1 adult-strength aspirin. Wait for an ambulance. Do not try to drive yourself. · Your arm or hand is cool or pale or changes color. Call your doctor now or seek immediate medical care if:  · You have signs of infection, such as:  ? Increased pain, swelling, warmth, or redness in your shoulder. ? Red streaks leading from a place on your shoulder. ? Pus draining from an area of your shoulder. ? Swollen lymph nodes in your neck, armpits, or groin. ? A fever. Watch closely for changes in your health, and be sure to contact your doctor if:  · You cannot use your shoulder. · Your shoulder does not get better as expected. Where can you learn more? Go to https://[employer]. org and sign in to your [employer] account. Enter D098 in the [employer] box to learn more about \"Shoulder Pain: Care Instructions. \"     If you do not have an account, please click on the \"Sign Up Now\" link. Current as of: March 2, 2020               Content Version: 12.5  © 2031-0103 [employer]wise, Inc[employer]. Care instructions adapted under license by [facility] [employer] [facility] ([facility]). If you have questions about a medical condition or this instruction, always ask your healthcare professional. [place] [address] any warranty or liability for your use of this information.

## 2020-10-01 NOTE — TELEPHONE ENCOUNTER
----- Message from Declan Cruz DO sent at 10/1/2020  2:14 PM EDT -----  Please let pt know that xray neg for fracture  Just mild arthritis. con't with plan as discussed in clinic. Let me know if questions, thanks!

## 2020-10-01 NOTE — TELEPHONE ENCOUNTER
appt scheduled  Future Appointments   Date Time Provider Radha Paulson   10/1/2020 11:20 AM Aliyah Cedillo DO [facility] [place]   10/28/2020 10:40 AM DO MAURA Cisneros Rheum [place]   11/3/2020 11:00 AM MD MAURA Ding Pain [facility] [place]   4/21/2021 10:00 AM Aliyah Cedillo DO [address] [place] [address]

## 2020-10-08 ENCOUNTER — PATIENT MESSAGE (OUTPATIENT)
Dept: PHYSICAL MEDICINE AND REHAB | Age: 80
End: 2020-10-08

## 2020-10-09 RX ORDER — TRAMADOL HYDROCHLORIDE 50 MG/1
50 TABLET ORAL EVERY 8 HOURS PRN
Qty: 42 TABLET | Refills: 0 | Status: SHIPPED | OUTPATIENT
Start: 2020-10-09 | End: 2020-10-23

## 2020-10-09 NOTE — TELEPHONE ENCOUNTER
OARRS reviewed. UDS: + for  Alcohol inconsistent (06/18/2020). Last seen: 9/8/2020. Follow-up: 11/03/2020    Patient leaving on vacation needs signed right away.

## 2020-10-20 ENCOUNTER — PATIENT MESSAGE (OUTPATIENT)
Dept: PHYSICAL MEDICINE AND REHAB | Age: 80
End: 2020-10-20

## 2020-10-23 ENCOUNTER — TELEPHONE (OUTPATIENT)
Dept: FAMILY MEDICINE CLINIC | Age: 80
End: 2020-10-23

## 2020-10-23 NOTE — TELEPHONE ENCOUNTER
Please let pt know that she is due for labs to monitor her renal fxn since on mobic. Fasting    Next few wks is fine    Let me know if questions, thanks! Diagnosis Orders   1.  Stage 3 chronic kidney disease, unspecified whether stage 3a or 3b CKD  Basic Metabolic Panel

## 2020-10-28 ENCOUNTER — PATIENT MESSAGE (OUTPATIENT)
Dept: PHYSICAL MEDICINE AND REHAB | Age: 80
End: 2020-10-28

## 2020-10-28 ENCOUNTER — OFFICE VISIT (OUTPATIENT)
Dept: RHEUMATOLOGY | Age: 80
End: 2020-10-28
Payer: MEDICARE

## 2020-10-28 VITALS
WEIGHT: 164.8 LBS | SYSTOLIC BLOOD PRESSURE: 140 MMHG | HEIGHT: 63 IN | OXYGEN SATURATION: 99 % | TEMPERATURE: 97.5 F | HEART RATE: 68 BPM | BODY MASS INDEX: 29.2 KG/M2 | DIASTOLIC BLOOD PRESSURE: 78 MMHG

## 2020-10-28 PROCEDURE — 4040F PNEUMOC VAC/ADMIN/RCVD: CPT | Performed by: INTERNAL MEDICINE

## 2020-10-28 PROCEDURE — 99214 OFFICE O/P EST MOD 30 MIN: CPT | Performed by: INTERNAL MEDICINE

## 2020-10-28 PROCEDURE — 1036F TOBACCO NON-USER: CPT | Performed by: INTERNAL MEDICINE

## 2020-10-28 PROCEDURE — G8399 PT W/DXA RESULTS DOCUMENT: HCPCS | Performed by: INTERNAL MEDICINE

## 2020-10-28 PROCEDURE — 1090F PRES/ABSN URINE INCON ASSESS: CPT | Performed by: INTERNAL MEDICINE

## 2020-10-28 PROCEDURE — G8427 DOCREV CUR MEDS BY ELIG CLIN: HCPCS | Performed by: INTERNAL MEDICINE

## 2020-10-28 PROCEDURE — G8417 CALC BMI ABV UP PARAM F/U: HCPCS | Performed by: INTERNAL MEDICINE

## 2020-10-28 PROCEDURE — 1123F ACP DISCUSS/DSCN MKR DOCD: CPT | Performed by: INTERNAL MEDICINE

## 2020-10-28 PROCEDURE — G8484 FLU IMMUNIZE NO ADMIN: HCPCS | Performed by: INTERNAL MEDICINE

## 2020-10-28 RX ORDER — TRAMADOL HYDROCHLORIDE 50 MG/1
50 TABLET ORAL EVERY 6 HOURS PRN
COMMUNITY
End: 2021-04-21

## 2020-10-28 ASSESSMENT — ENCOUNTER SYMPTOMS
SHORTNESS OF BREATH: 0
VOMITING: 0
COUGH: 0
EYES NEGATIVE: 1
DIARRHEA: 0
NAUSEA: 0
CONSTIPATION: 0
WHEEZING: 0
EYE REDNESS: 0
EYE PAIN: 0

## 2020-10-28 NOTE — TELEPHONE ENCOUNTER
OARRS reviewed. UDS: + for alcohol inconsistent (06/18/20). Last seen: 9/8/2020. Follow-up: 11/03/2020    Please advise on refill. You have filled in the past with this on the refill requests.

## 2020-10-28 NOTE — PROGRESS NOTES
[facility] RHEUMATOLOGY FOLLOW UP NOTE       Date Of Service: 10/28/2020  Provider: Aicha Santoyo DO    Name: Mars Smoker   MRN: [id number]      Chief Complaint(s)     Chief Complaint   Patient presents with    6 Month Follow-Up     ostearthritis        History of Present Illness (HPI)     Mars Smoker  is a(n)79 y.o. female with a hx of Chronic low back pain, and DDD lumbar spine, hypertension, osteoarthritis, Hyperlipidemia CKD stage 3  Here for her undiff inflammatory arthritis,  osteoarthritis  bilateral  hands,  Feet, and  low back pain.      Symptoms started:pt with several year of joint pain that started initially in the lower back withOUT trauma/injury. Development of radicular sx's and s/p lumbar spine surgery at [facility]. She has since has Ganglion cyst removal of the b/l 5th fingers and 3rd left PIP. Bilateral foot surgery for bunions.  Hammer toe surgery on right. Bilateral knee TKR at [facility].  Nodules on fingers present for as long as she can remember.  Right foot pain and swelling started around 2014. The pain has since been intermittent. Evaluated by Podiatry Molly Briceno) and diagnosed with osteoarthritis of the foot.  Evaluated by Dr. Carri Galeas 2017 who found her to have a (+) WICHO and elevated ESR. NEw PCP started mobic 7.5mg every other day has helped with the joint pains.        Interval hx:     2 falls since the last evaluation - denies fractures. Evaluation by PCP with x-ray with mild arthritis. Pain int he right shoulder, left hip (buttock)  and lower back. Pain up to 8/10 in the lower back. Alleviating factors: mobic, heating pad (back) , back bracing, improved posture, slower walking. Radicular pain down left legs  , + myalgin in the left outer hip and lower back. Mild numbness in the left gluteal region      Inflammatory osteoarthritis -- no sitingicant pain,plaquenil complaince reporting.  Denies swelling, redness,warmth     REVIEW OF SYSTEMS: (ROS)    Review of Systems   Constitutional: Negative for diaphoresis, fatigue and fever. HENT: Negative for congestion, hearing loss and nosebleeds. Eyes: Negative. Negative for pain and redness. Respiratory: Negative for cough, shortness of breath and wheezing. Cardiovascular: Negative. Negative for chest pain. Gastrointestinal: Negative for constipation, diarrhea, nausea and vomiting. Genitourinary: Negative for difficulty urinating, frequency and hematuria. Musculoskeletal: Negative for myalgias. Skin: Negative for rash. Neurological: Negative for dizziness, weakness and headaches. Hematological: Does not bruise/bleed easily. Psychiatric/Behavioral: Negative for sleep disturbance. The patient is not nervous/anxious. PAST MEDICAL HISTORY     has a past medical history of Chronic low back pain, CKD (chronic kidney disease) stage 3, GFR 30-59 ml/min, DDD (degenerative disc disease), lumbar, Dyslipidemia, History of skin cancer, Hyperlipidemia, Hypertension, Osteoarthritis, and PONV (postoperative nausea and vomiting). PAST SURGICAL HISTORY     has a past surgical history that includes joint replacement (Bilateral); Foot surgery (Bilateral); Hysterectomy;  section; Dilation and curettage of uterus; Carpal tunnel release (Bilateral); Finger surgery; skin biopsy; back surgery (); Colonoscopy; cyst removal (Left, 2017); Cholecystectomy, laparoscopic (10/03/2017); pr colonoscopy flx dx w/collj spec when pfrmd (N/A, 2018); Nerve Surgery (N/A, 2019); Pain management procedure (Left, 2020); and Facet joint injection (Bilateral, 2020). Gerald Jagjit FAMILY HISTORY      Family History   Problem Relation Age of Onset    Heart Disease Mother         CHF    Alzheimer's Disease Mother     Dementia Mother     Cancer Father         LUNG    Emphysema Father     Colon Cancer Neg Hx     Breast Cancer Neg Hx        SOCIAL HISTORY     reports that she has never smoked.  She has never used smokeless tobacco. She reports current alcohol use. She reports that she does not use drugs. ALLERGIES     Allergies   Allergen Reactions    Sulfa Antibiotics Swelling    Other Nausea And Vomiting     oysters       CURRENT MEDICATIONS      Current Outpatient Medications:     traMADol (ULTRAM) 50 MG tablet, Take 50 mg by mouth every 6 hours as needed for Pain., Disp: , Rfl:     vitamin C (ASCORBIC ACID) 500 MG tablet, Take 500 mg by mouth daily, Disp: , Rfl:     meloxicam (MOBIC) 7.5 MG tablet, Take 1 tablet by mouth daily as needed for Pain, Disp: 90 tablet, Rfl: 3    hydroxychloroquine (PLAQUENIL) 200 MG tablet, TAKE 1 TABLET BY MOUTH IN THE MORNING, Disp: 90 tablet, Rfl: 2    omeprazole (PRILOSEC) 20 MG delayed release capsule, TAKE 1 CAPSULE BY MOUTH DAILY. , Disp: 90 capsule, Rfl: 3    lisinopril-hydrochlorothiazide (PRINZIDE;ZESTORETIC) 10-12.5 MG per tablet, TAKE 1 TABLET BY MOUTH DAILY, Disp: 90 tablet, Rfl: 3    Handicap Placard MISC, by Does not apply route Expires 29 June 2023, Disp: 1 each, Rfl: 0    Multiple Vitamins-Minerals (PRESERVISION AREDS PO), Take by mouth, Disp: , Rfl:     Calcium Carbonate-Vit D-Min (CALCIUM 1200 PO), Take by mouth daily, Disp: , Rfl:     Cholecalciferol (VITAMIN D-3 PO), Take by mouth daily, Disp: , Rfl:     PHYSICAL EXAMINATION / OBJECTIVE     Objective:  BP (!) 140/78 (Site: Right Upper Arm, Position: Sitting, Cuff Size: Medium Adult)   Pulse 68   Temp 97.5 °F (36.4 °C)   Ht 5' 2.52\" (1.588 m)   Wt 164 lb 12.8 oz (74.8 kg)   SpO2 99%   BMI 29.64 kg/m²     General Appearance: General appearance:  AAO x 3 ,  well-developed and well nourished  Head: NCAT  Eyes: No abnormalities. ,  Sclera non-icteric,   Ears / nose:  normal  appearance  ears and nose. No active drainage from nose. mouth:  MMM, ears w/o deformities  Neck: No jugular venous distention, appears symmetric, good ROM  Lymph: no cervical adenopathy   Pulmonary/Chest: CTA bilateral ,  symmetric chest expansion. Cardiovascular: Normal S1 and S2, NO murmur, rub, gallop  : Deferred   Abd/GI: Deferred   Neurologic: Speech normal, no facial droop,  Skin:   Erythematous patch affecting the left medial eyebrow  Extremities:    Musculoskeletal:  Upper extremities:  Tender right lateral shoulder. + DIP and PIP nodules. Non-tender,     Lower extremities:  HIPS - tender latearl hips. Knees/ankles/feeto non-tender. Spine: tender sacrel region, with rigth lower than left sacral flexion        Exam KEY:   Tender :  T    Swelling: S,   Deformities: D,   Non-tender : NT  ,  No swelling: NS       RAPID 3:   10/28/2020 --- RAPID 3:  +  +  =        Remission: <3  Low Disease Activity: <6  Moderate Disease Activity: >=6 and <=12  High Disease Activity: >12     LABS      CBC  Lab Results   Component Value Date    WBC 4.7 06/24/2020    WBC 4.6 04/01/2019    WBC 5.3 05/03/2018    RBC 4.23 06/24/2020    HGB 13.0 06/24/2020    HGB 13.3 04/01/2019    HGB 12.8 05/03/2018    HCT 39.6 06/24/2020    MCV 93.6 06/24/2020    RDW 13.9 05/03/2018     06/24/2020     04/01/2019     05/03/2018       CMP  Lab Results   Component Value Date    CALCIUM 9.7 07/06/2020    LABALBU 4.3 06/24/2020    PROT 6.9 06/24/2020     07/06/2020    K 4.4 07/06/2020    CO2 26 07/06/2020    CL 98 07/06/2020    BUN 18 07/06/2020    CREATININE 1.2 07/06/2020    CREATININE 1.4 06/29/2020    CREATININE 1.0 06/24/2020    ALKPHOS 128 06/24/2020    ALT 17 06/24/2020    ALT 16 04/01/2019    AST 28 06/24/2020    AST 24 04/01/2019       HgBA1c: No components found for: HGBA1C    No results found for: VITD25    No results found for: ANASCRN  No results found for: SSA  No results found for: SSB  No results found for: ANTI-SMITH  No results found for: DSDNAAB   No results found for: ANTIRNP  No results found for: C3, C4  No results found for: CCPAB  Lab Results   Component Value Date    RF Negative 06/08/2018       No components found for: CANCASCRN, Thank you for allowing me to participate in the care of this patient. Please call if there are any questions.

## 2020-10-28 NOTE — TELEPHONE ENCOUNTER
From: Natasha Hurley  To: Dell Allen MD  Sent: 10/28/2020 12:53 PM EDT  Subject: Non-Urgent Medical Question    I have enough Tramadol for 2 more days and then I will need a refill! It seems to be helping my pain level! . Thanks Dortha Buerger.

## 2020-10-29 ENCOUNTER — CLINICAL DOCUMENTATION (OUTPATIENT)
Dept: PHYSICAL MEDICINE AND REHAB | Age: 80
End: 2020-10-29

## 2020-10-29 ENCOUNTER — TELEPHONE (OUTPATIENT)
Dept: PHYSICAL MEDICINE AND REHAB | Age: 80
End: 2020-10-29

## 2020-10-29 NOTE — TELEPHONE ENCOUNTER
Patient called upset about the denial in the tramadol refill. Patient states that she has received previous refills since UDS is done does not understand why it is an issue just now. Patient states she has a glass of wine or a beer occasionally.

## 2020-10-29 NOTE — PROGRESS NOTES
Will refill rx but needs a ff up appt for either virtual or face to face. UDS in June showed positive for ETOH,  I need another UDS ASAP for me to prescribe it. Please call patient today.

## 2020-10-30 NOTE — TELEPHONE ENCOUNTER
VM left to call office. Contacted patient via [employer] as well. If patient is going to continue pain medications will need to sign a contract.

## 2020-11-02 NOTE — TELEPHONE ENCOUNTER
Called patient and explained when to come in. Patient states only has a glass of wine or a beer off and on not all the time and does not understand how it even showed in the urine drug screen. Patient denies any use of cough medications near the time of the screen.

## 2020-11-03 ENCOUNTER — VIRTUAL VISIT (OUTPATIENT)
Dept: PHYSICAL MEDICINE AND REHAB | Age: 80
End: 2020-11-03
Payer: MEDICARE

## 2020-11-03 PROCEDURE — 1036F TOBACCO NON-USER: CPT | Performed by: PHYSICAL MEDICINE & REHABILITATION

## 2020-11-03 PROCEDURE — 1090F PRES/ABSN URINE INCON ASSESS: CPT | Performed by: PHYSICAL MEDICINE & REHABILITATION

## 2020-11-03 PROCEDURE — G8482 FLU IMMUNIZE ORDER/ADMIN: HCPCS | Performed by: PHYSICAL MEDICINE & REHABILITATION

## 2020-11-03 PROCEDURE — G8417 CALC BMI ABV UP PARAM F/U: HCPCS | Performed by: PHYSICAL MEDICINE & REHABILITATION

## 2020-11-03 PROCEDURE — G8399 PT W/DXA RESULTS DOCUMENT: HCPCS | Performed by: PHYSICAL MEDICINE & REHABILITATION

## 2020-11-03 PROCEDURE — 1123F ACP DISCUSS/DSCN MKR DOCD: CPT | Performed by: PHYSICAL MEDICINE & REHABILITATION

## 2020-11-03 PROCEDURE — 4040F PNEUMOC VAC/ADMIN/RCVD: CPT | Performed by: PHYSICAL MEDICINE & REHABILITATION

## 2020-11-03 PROCEDURE — G8428 CUR MEDS NOT DOCUMENT: HCPCS | Performed by: PHYSICAL MEDICINE & REHABILITATION

## 2020-11-03 PROCEDURE — 99213 OFFICE O/P EST LOW 20 MIN: CPT | Performed by: PHYSICAL MEDICINE & REHABILITATION

## 2020-11-03 RX ORDER — TRAMADOL HYDROCHLORIDE 50 MG/1
50 TABLET ORAL EVERY 8 HOURS PRN
Qty: 90 TABLET | Refills: 0 | Status: SHIPPED | OUTPATIENT
Start: 2020-11-03 | End: 2020-11-06

## 2020-11-03 NOTE — PROGRESS NOTES
FOLLOW UP APPOINTMENT: VIRTUAL VISIT         Tracy Leonardo MD    11/3/2020       Ingrid Larson is a 70 y.o. female, who came for a  follow up to discuss treatment options    Cause of the symptom(s): degenerative (arthritis)    Onset: Chronic     Current pain level: 6 on the scale of 0-10 ( 10 being worst)     Came today to discuss refill of tramadol. Allergies   Allergen Reactions    Sulfa Antibiotics Swelling    Other Nausea And Vomiting     oysters       Social History     Socioeconomic History    Marital status:       Spouse name: Not on file    Number of children: Not on file    Years of education: Not on file    Highest education level: Not on file   Occupational History    Not on file   Social Needs    Financial resource strain: Not on file    Food insecurity     Worry: Not on file     Inability: Not on file    Transportation needs     Medical: Not on file     Non-medical: Not on file   Tobacco Use    Smoking status: Never Smoker    Smokeless tobacco: Never Used   Substance and Sexual Activity    Alcohol use: Yes     Comment: OCCASIONALLY    Drug use: No    Sexual activity: Not Currently   Lifestyle    Physical activity     Days per week: Not on file     Minutes per session: Not on file    Stress: Not on file   Relationships    Social connections     Talks on phone: Not on file     Gets together: Not on file     Attends Zoroastrian service: Not on file     Active member of club or organization: Not on file     Attends meetings of clubs or organizations: Not on file     Relationship status: Not on file    Intimate partner violence     Fear of current or ex partner: Not on file     Emotionally abused: Not on file     Physically abused: Not on file     Forced sexual activity: Not on file   Other Topics Concern    Not on file   Social History Narrative    Not on file       Past Medical History:   Diagnosis Date    Chronic low back pain     CKD (chronic kidney disease) Historical Provider, MD   vitamin C (ASCORBIC ACID) 500 MG tablet Take 500 mg by mouth daily  Historical Provider, MD   meloxicam (MOBIC) 7.5 MG tablet Take 1 tablet by mouth daily as needed for Pain  Raul Velazco DO   hydroxychloroquine (PLAQUENIL) 200 MG tablet TAKE 1 TABLET BY MOUTH IN THE MORNING  Luz APRN - CNP   omeprazole (PRILOSEC) 20 MG delayed release capsule TAKE 1 CAPSULE BY MOUTH DAILY. BRANDON EscobarN - NELY   lisinopril-hydrochlorothiazide (PRINZIDE;ZESTORETIC) 10-12.5 MG per tablet TAKE 1 TABLET BY MOUTH DAILY  Raul Velazco DO   Handicap Placard [address] [facility] by Does not apply route Expires 29 June 2023  Raul Velazco DO   Multiple Vitamins-Minerals (PRESERVISION AREDS PO) Take by mouth  Historical Provider, MD   Calcium Carbonate-Vit D-Min (CALCIUM 1200 PO) Take by mouth daily  Historical Provider, MD   Cholecalciferol (VITAMIN D-3 PO) Take by mouth daily  Historical Provider, MD           Assessment and Plan:      Diagnosis Orders   1. Status post lumbar laminectomy  traMADol (ULTRAM) 50 MG tablet       Refill tramadol. FF up in 8 weeks via telehealth. UDS pending. Samuel Batista is a 70 y.o. female being evaluated by a Virtual Visit (video visit) encounter to address concerns as mentioned above. A caregiver was present when appropriate. Due to this being a TeleHealth encounter ([facility] emergency), evaluation of the following organ systems was limited: Vitals/Constitutional/EENT/Resp/CV/GI//MS/Neuro/Skin/Heme-Lymph-Imm. Pursuant to the emergency declaration under the [address] [place] [address], [address] [place] [address] [facility] authority and the [employer] Act, this Virtual Visit was conducted with patient's (and/or legal guardian's) consent, to reduce the patient's risk of exposure to COVID-19 and provide necessary medical care.   The patient (and/or legal guardian) has also been advised to contact this office for worsening conditions or problems, and seek emergency medical treatment and/or call 911 if deemed necessary. Patient identification was verified at the start of the visit: Yes    Total time spent for this encounter: Not billed by time    Services were provided through a video synchronous discussion virtually to substitute for in-person clinic visit. Patient and provider were located at their individual homes. --Nathanael Patrick MD on 11/3/2020 at 12:53 PM    An electronic signature was used to authenticate this note.

## 2020-11-16 ENCOUNTER — TELEPHONE (OUTPATIENT)
Dept: FAMILY MEDICINE CLINIC | Age: 80
End: 2020-11-16

## 2020-11-16 ENCOUNTER — NURSE ONLY (OUTPATIENT)
Dept: LAB | Age: 80
End: 2020-11-16

## 2020-11-16 ENCOUNTER — PATIENT MESSAGE (OUTPATIENT)
Dept: FAMILY MEDICINE CLINIC | Age: 80
End: 2020-11-16

## 2020-11-16 LAB
ANION GAP SERPL CALCULATED.3IONS-SCNC: 9 MEQ/L (ref 8–16)
BUN BLDV-MCNC: 20 MG/DL (ref 7–22)
CALCIUM SERPL-MCNC: 10.3 MG/DL (ref 8.5–10.5)
CHLORIDE BLD-SCNC: 101 MEQ/L (ref 98–111)
CO2: 29 MEQ/L (ref 23–33)
CREAT SERPL-MCNC: 1 MG/DL (ref 0.4–1.2)
GFR SERPL CREATININE-BSD FRML MDRD: 54 ML/MIN/1.73M2
GLUCOSE BLD-MCNC: 115 MG/DL (ref 70–108)
POTASSIUM SERPL-SCNC: 4.8 MEQ/L (ref 3.5–5.2)
SODIUM BLD-SCNC: 139 MEQ/L (ref 135–145)

## 2020-11-18 RX ORDER — HYDROXYCHLOROQUINE SULFATE 200 MG/1
300 TABLET, FILM COATED ORAL DAILY
Qty: 135 TABLET | Refills: 1 | Status: SHIPPED | OUTPATIENT
Start: 2020-11-18 | End: 2021-05-13

## 2020-11-18 RX ORDER — OMEPRAZOLE 20 MG/1
20 CAPSULE, DELAYED RELEASE ORAL DAILY
Qty: 90 CAPSULE | Refills: 3 | Status: SHIPPED | OUTPATIENT
Start: 2020-11-18 | End: 2021-11-16

## 2020-11-18 NOTE — TELEPHONE ENCOUNTER
From: KAUSHAL Teran  To: Herson Sethi  Sent: 11/16/2020 12:29 PM EST  Subject: labs 11/16/2020    ----- Message from Laura Breen DO sent at 11/16/2020 12:24 PM EST -----  Please let pt know that renal function is stable  Let me know if questions, thanks!

## 2020-12-14 RX ORDER — LISINOPRIL AND HYDROCHLOROTHIAZIDE 12.5; 1 MG/1; MG/1
TABLET ORAL
Qty: 90 TABLET | Refills: 3 | Status: SHIPPED | OUTPATIENT
Start: 2020-12-14 | End: 2021-12-06

## 2021-01-13 ENCOUNTER — OFFICE VISIT (OUTPATIENT)
Dept: PHYSICAL MEDICINE AND REHAB | Age: 81
End: 2021-01-13
Payer: MEDICARE

## 2021-01-13 VITALS
SYSTOLIC BLOOD PRESSURE: 138 MMHG | HEIGHT: 63 IN | DIASTOLIC BLOOD PRESSURE: 80 MMHG | BODY MASS INDEX: 29.06 KG/M2 | WEIGHT: 164 LBS

## 2021-01-13 DIAGNOSIS — M21.70 LEG LENGTH DISCREPANCY: ICD-10-CM

## 2021-01-13 DIAGNOSIS — M41.9 SCOLIOSIS OF THORACOLUMBAR SPINE, UNSPECIFIED SCOLIOSIS TYPE: ICD-10-CM

## 2021-01-13 DIAGNOSIS — M47.816 LUMBAR SPONDYLOSIS: Primary | ICD-10-CM

## 2021-01-13 PROCEDURE — 1090F PRES/ABSN URINE INCON ASSESS: CPT | Performed by: PHYSICAL MEDICINE & REHABILITATION

## 2021-01-13 PROCEDURE — G8482 FLU IMMUNIZE ORDER/ADMIN: HCPCS | Performed by: PHYSICAL MEDICINE & REHABILITATION

## 2021-01-13 PROCEDURE — G8399 PT W/DXA RESULTS DOCUMENT: HCPCS | Performed by: PHYSICAL MEDICINE & REHABILITATION

## 2021-01-13 PROCEDURE — 99214 OFFICE O/P EST MOD 30 MIN: CPT | Performed by: PHYSICAL MEDICINE & REHABILITATION

## 2021-01-13 PROCEDURE — 1036F TOBACCO NON-USER: CPT | Performed by: PHYSICAL MEDICINE & REHABILITATION

## 2021-01-13 PROCEDURE — 4040F PNEUMOC VAC/ADMIN/RCVD: CPT | Performed by: PHYSICAL MEDICINE & REHABILITATION

## 2021-01-13 PROCEDURE — 1123F ACP DISCUSS/DSCN MKR DOCD: CPT | Performed by: PHYSICAL MEDICINE & REHABILITATION

## 2021-01-13 PROCEDURE — G8417 CALC BMI ABV UP PARAM F/U: HCPCS | Performed by: PHYSICAL MEDICINE & REHABILITATION

## 2021-01-13 PROCEDURE — G8427 DOCREV CUR MEDS BY ELIG CLIN: HCPCS | Performed by: PHYSICAL MEDICINE & REHABILITATION

## 2021-01-13 NOTE — PROGRESS NOTES
FOLLOW UP APPOINTMENT:         Mary Ennis MD    1/13/2021       Roldan Stokes is a 68 y.o. female, who came for a  follow up to discuss treatment options    Cause of the symptom(s): degenerative (arthritis)    Onset: chronic     Current pain level: 6 on the scale of 0-10 ( 10 being worst)     Quality of Symptoms: aching and throbbing    Aggravating factors: activity and lifting    Relieving factors: rest and lying down        Allergies   Allergen Reactions    Sulfa Antibiotics Swelling    Other Nausea And Vomiting     oysters       Social History     Socioeconomic History    Marital status:       Spouse name: Not on file    Number of children: Not on file    Years of education: Not on file    Highest education level: Not on file   Occupational History    Not on file   Social Needs    Financial resource strain: Not on file    Food insecurity     Worry: Not on file     Inability: Not on file    Transportation needs     Medical: Not on file     Non-medical: Not on file   Tobacco Use    Smoking status: Never Smoker    Smokeless tobacco: Never Used   Substance and Sexual Activity    Alcohol use: Yes     Comment: OCCASIONALLY    Drug use: No    Sexual activity: Not Currently   Lifestyle    Physical activity     Days per week: Not on file     Minutes per session: Not on file    Stress: Not on file   Relationships    Social connections     Talks on phone: Not on file     Gets together: Not on file     Attends Mormonism service: Not on file     Active member of club or organization: Not on file     Attends meetings of clubs or organizations: Not on file     Relationship status: Not on file    Intimate partner violence     Fear of current or ex partner: Not on file     Emotionally abused: Not on file     Physically abused: Not on file     Forced sexual activity: Not on file   Other Topics Concern    Not on file   Social History Narrative    Not on file       Past Medical History: Diagnosis Date    Chronic low back pain     CKD (chronic kidney disease) stage 3, GFR 30-59 ml/min     DDD (degenerative disc disease), lumbar     Dyslipidemia     History of skin cancer     BCC AND SCC    Hyperlipidemia     Hypertension     Osteoarthritis     PONV (postoperative nausea and vomiting)        Past Surgical History:   Procedure Laterality Date    BACK SURGERY      lower lumbar fusion    CARPAL TUNNEL RELEASE Bilateral      SECTION      CHOLECYSTECTOMY, LAPAROSCOPIC  10/03/2017    COLONOSCOPY      CYST REMOVAL Left 2017    left  5th digit-Dr Bowman    DILATION AND CURETTAGE OF UTERUS      FACET JOINT INJECTION Bilateral 2020    B/L L3-4 medial branch and L5 dorsal rami injection performed by Arleene Fleischer, MD at [address] [place] [address] Bilateral     HYSTERECTOMY      JOINT REPLACEMENT Bilateral     KNEES    NERVE SURGERY N/A 2019    left L3, L4 medial branch and L5 dorsal rami injection performed by Arleene Fleischer, MD at [place] [address] Left 2020    left L3, L4 medial branch and L5 dorsal rami injection performed by Arleene Fleischer, MD at [address] [place] [address] DX W/COLLJ Radha  PFRMD N/A 2018    COLONOS[facility] performed [facility] Booker Cole MD at [facility]    SKIN BIOPSY      BCC AND SCC       Family History   Problem Relation Age of Onset    Heart Disease Mother         CHF    Alzheimer's Disease Mother     Dementia Mother     Cancer Father         LUNG    Emphysema Father     Colon Cancer Neg Hx     Breast Cancer Neg Hx         Prior to Visit Medications    Medication Sig Taking?  Authorizing Provider   lisinopril-hydroCHLOROthiazide (PRINZIDE;ZESTORETIC) 10-12.5 MG per tablet TAKE 1 TABLET BY MOUTH DAILY Yes Racquel Ackerman DO omeprazole (PRILOSEC) 20 MG delayed release capsule Take 1 capsule by mouth daily Yes Whit Bustamante APRN - NELY   hydroxychloroquine (PLAQUENIL) 200 MG tablet Take 1.5 tablets by mouth daily Yes JOSE Comer CNP   traMADol (ULTRAM) 50 MG tablet Take 50 mg by mouth every 6 hours as needed for Pain. Yes Historical Provider, MD   vitamin C (ASCORBIC ACID) 500 MG tablet Take 500 mg by mouth daily Yes Historical Provider, MD   meloxicam (MOBIC) 7.5 MG tablet Take 1 tablet by mouth daily as needed for Pain Yes Brianna Tyler, DO   Handicap Placpeg [address] [facility] by Does not apply route Expires 29 June 2023 Yes Brianna Tyler, DO   Multiple Vitamins-Minerals (PRESERVISION AREDS PO) Take by mouth Yes Historical Provider, MD   Calcium Carbonate-Vit D-Min (CALCIUM 1200 PO) Take by mouth daily Yes Historical Provider, MD   Cholecalciferol (VITAMIN D-3 PO) Take by mouth daily Yes Historical Provider, MD       Review of Systems  All systems reviewed, all unremarkable other than HPI/subjective. No change since last visit. Denies  fever, chills, infection or non healing wound. /80   Ht 5' 2.52\" (1.588 m)   Wt 164 lb (74.4 kg)   BMI 29.50 kg/m²       Physical Exam  Constitutional:       General: She is not in acute distress. HENT:      Head: Atraumatic. Neck:      Musculoskeletal: Neck supple. Pulmonary:      Effort: Pulmonary effort is normal. No respiratory distress. Musculoskeletal:         General: Tenderness present. Comments: Lumbar spine, tender facet    Skin:     General: Skin is warm. Neurological:      Mental Status: She is alert and oriented to person, place, and time. Psychiatric:         Behavior: Behavior normal.          Assessment and Plan:      Diagnosis Orders   1. Lumbar spondylosis     2. Scoliosis of thoracolumbar spine, unspecified scoliosis type         For now, continue ES tylenol. Ff up as needed. If she decides - consider U17-T4-8 medial branch block, right worse than the left ( consider bilateral injection)     4-6 months ff up. Orthorics ff up if needed     All questions were answered and imaging studies reviewed with the patient. I have reviewed the chief complaint and HPI including the [facility] and Vital documentation by my staff and I agree with their documentation and have added where applicable. Time spent with patient was 25 minutes. More than 50% was spent counseling/coordinating the patient's care.      Sherrie Mitchell MD   Spine Medicine/PM&R

## 2021-01-19 ENCOUNTER — TELEPHONE (OUTPATIENT)
Dept: PHYSICAL MEDICINE AND REHAB | Age: 81
End: 2021-01-19

## 2021-01-20 ENCOUNTER — APPOINTMENT (OUTPATIENT)
Dept: GENERAL RADIOLOGY | Age: 81
End: 2021-01-20
Attending: PHYSICAL MEDICINE & REHABILITATION
Payer: MEDICARE

## 2021-01-20 ENCOUNTER — HOSPITAL ENCOUNTER (OUTPATIENT)
Age: 81
Setting detail: OUTPATIENT SURGERY
Discharge: HOME | End: 2021-01-20
Attending: PHYSICAL MEDICINE & REHABILITATION | Admitting: PHYSICAL MEDICINE & REHABILITATION
Payer: MEDICARE

## 2021-01-20 VITALS
BODY MASS INDEX: 29.26 KG/M2 | HEART RATE: 76 BPM | TEMPERATURE: 97.1 F | HEIGHT: 62 IN | RESPIRATION RATE: 13 BRPM | OXYGEN SATURATION: 93 % | WEIGHT: 159 LBS | DIASTOLIC BLOOD PRESSURE: 59 MMHG | SYSTOLIC BLOOD PRESSURE: 130 MMHG

## 2021-01-20 PROCEDURE — 3209999900 FLUORO FOR SURGICAL PROCEDURES

## 2021-01-20 PROCEDURE — 64495 INJ PARAVERT F JNT L/S 3 LEV: CPT | Performed by: PHYSICAL MEDICINE & REHABILITATION

## 2021-01-20 PROCEDURE — 99153 MOD SED SAME PHYS/QHP EA: CPT | Performed by: PHYSICAL MEDICINE & REHABILITATION

## 2021-01-20 PROCEDURE — 7100000010 HC PHASE II RECOVERY - FIRST 15 MIN: Performed by: PHYSICAL MEDICINE & REHABILITATION

## 2021-01-20 PROCEDURE — 99152 MOD SED SAME PHYS/QHP 5/>YRS: CPT | Performed by: PHYSICAL MEDICINE & REHABILITATION

## 2021-01-20 PROCEDURE — 64494 INJ PARAVERT F JNT L/S 2 LEV: CPT | Performed by: PHYSICAL MEDICINE & REHABILITATION

## 2021-01-20 PROCEDURE — 7100000011 HC PHASE II RECOVERY - ADDTL 15 MIN: Performed by: PHYSICAL MEDICINE & REHABILITATION

## 2021-01-20 PROCEDURE — 3600000059 HC PAIN LEVEL 5 ADDL 15 MIN: Performed by: PHYSICAL MEDICINE & REHABILITATION

## 2021-01-20 PROCEDURE — 2709999900 HC NON-CHARGEABLE SUPPLY: Performed by: PHYSICAL MEDICINE & REHABILITATION

## 2021-01-20 PROCEDURE — 64490 INJ PARAVERT F JNT C/T 1 LEV: CPT | Performed by: PHYSICAL MEDICINE & REHABILITATION

## 2021-01-20 PROCEDURE — 2500000003 HC RX 250 WO HCPCS: Performed by: PHYSICAL MEDICINE & REHABILITATION

## 2021-01-20 PROCEDURE — 3600000058 HC PAIN LEVEL 5 BASE: Performed by: PHYSICAL MEDICINE & REHABILITATION

## 2021-01-20 PROCEDURE — 6360000002 HC RX W HCPCS: Performed by: PHYSICAL MEDICINE & REHABILITATION

## 2021-01-20 PROCEDURE — 64493 INJ PARAVERT F JNT L/S 1 LEV: CPT | Performed by: PHYSICAL MEDICINE & REHABILITATION

## 2021-01-20 RX ORDER — TRIAMCINOLONE ACETONIDE 40 MG/ML
INJECTION, SUSPENSION INTRA-ARTICULAR; INTRAMUSCULAR PRN
Status: DISCONTINUED | OUTPATIENT
Start: 2021-01-20 | End: 2021-01-20 | Stop reason: ALTCHOICE

## 2021-01-20 RX ORDER — MIDAZOLAM HYDROCHLORIDE 1 MG/ML
INJECTION INTRAMUSCULAR; INTRAVENOUS PRN
Status: DISCONTINUED | OUTPATIENT
Start: 2021-01-20 | End: 2021-01-20 | Stop reason: ALTCHOICE

## 2021-01-20 RX ORDER — LIDOCAINE HYDROCHLORIDE 10 MG/ML
INJECTION, SOLUTION EPIDURAL; INFILTRATION; INTRACAUDAL; PERINEURAL PRN
Status: DISCONTINUED | OUTPATIENT
Start: 2021-01-20 | End: 2021-01-20 | Stop reason: ALTCHOICE

## 2021-01-20 RX ORDER — FENTANYL CITRATE 50 UG/ML
INJECTION, SOLUTION INTRAMUSCULAR; INTRAVENOUS PRN
Status: DISCONTINUED | OUTPATIENT
Start: 2021-01-20 | End: 2021-01-20 | Stop reason: ALTCHOICE

## 2021-01-20 ASSESSMENT — PAIN - FUNCTIONAL ASSESSMENT: PAIN_FUNCTIONAL_ASSESSMENT: 0-10

## 2021-01-20 NOTE — H&P
Sara Ward MD      Cyndy Marquez is a 68 y.o. female, who came in for a procedure,  Bilateral T12-L1,L2,L3,L4 L5 medial branch block. No change since last visit . Denies diabetes. No blood thinners       Treatment done: physical therapy, anti-inflammatory medication and muscle relaxant    Allergies   Allergen Reactions    Sulfa Antibiotics Swelling    Other Nausea And Vomiting     oysters       Social History     Socioeconomic History    Marital status:       Spouse name: Not on file    Number of children: Not on file    Years of education: Not on file    Highest education level: Not on file   Occupational History    Not on file   Social Needs    Financial resource strain: Not on file    Food insecurity     Worry: Not on file     Inability: Not on file    Transportation needs     Medical: Not on file     Non-medical: Not on file   Tobacco Use    Smoking status: Never Smoker    Smokeless tobacco: Never Used   Substance and Sexual Activity    Alcohol use: Yes     Comment: OCCASIONALLY    Drug use: No    Sexual activity: Not Currently   Lifestyle    Physical activity     Days per week: Not on file     Minutes per session: Not on file    Stress: Not on file   Relationships    Social connections     Talks on phone: Not on file     Gets together: Not on file     Attends Anabaptism service: Not on file     Active member of club or organization: Not on file     Attends meetings of clubs or organizations: Not on file     Relationship status: Not on file    Intimate partner violence     Fear of current or ex partner: Not on file     Emotionally abused: Not on file     Physically abused: Not on file     Forced sexual activity: Not on file   Other Topics Concern    Not on file   Social History Narrative    Not on file       Past Medical History:   Diagnosis Date    Chronic low back pain     CKD (chronic kidney disease) stage 3, GFR 30-59 ml/min  DDD (degenerative disc disease), lumbar     Dyslipidemia     History of skin cancer     BCC AND SCC    Hyperlipidemia     Hypertension     Osteoarthritis     PONV (postoperative nausea and vomiting)        Past Surgical History:   Procedure Laterality Date    BACK SURGERY      lower lumbar fusion    CARPAL TUNNEL RELEASE Bilateral      SECTION      CHOLECYSTECTOMY, LAPAROSCOPIC  10/03/2017    COLONOSCOPY      CYST REMOVAL Left 2017    left  5th digit-Dr Bowman    DILATION AND CURETTAGE OF UTERUS      FACET JOINT INJECTION Bilateral 2020    B/L L3-4 medial branch and L5 dorsal rami injection performed by Wilene Apgar, MD at [address] [place] [address] Bilateral     HYSTERECTOMY      JOINT REPLACEMENT Bilateral     KNEES    NERVE SURGERY N/A 2019    left L3, L4 medial branch and L5 dorsal rami injection performed by Wilene Apgar, MD at [place] [address] Left 2020    left L3, L4 medial branch and L5 dorsal rami injection performed by Wilene Apgar, MD at [address] [place] [address] FLX DX W/COLLJ Darbytammy  PFRMD N/A 2018    COLONOSCOPY performed by Melisa Mahmood MD at [address] [place] [address]       Prior to Visit Medications    Medication Sig Taking? Authorizing Provider   lisinopril-hydroCHLOROthiazide (PRINZIDE;ZESTORETIC) 10-12.5 MG per tablet TAKE 1 TABLET BY MOUTH DAILY Yes Kenn Galvan, DO   traMADol (ULTRAM) 50 MG tablet Take 50 mg by mouth every 6 hours as needed for Pain.  Yes Historical Provider, MD   Handicap Placard MISC by Does not apply route Expires 2023 Yes Donnell Balderrama, DO   omeprazole (PRILOSEC) 20 MG delayed release capsule Take 1 capsule by mouth daily  Ulysses Maidens, APRN - CNP

## 2021-01-20 NOTE — PROGRESS NOTES
1230-  Patient arrived to phase II via cart. Spontaneous respirations even and unlabored. Placed on monitor--VSS. Report received from Surgical RN.   2843-  Assessment completed. Patient is drowsy, but responds to name and follows commands. IV capped off-- no complications. Injection sites clean and dry. Patient denies pain--will monitor. 1235-  Snack and drink given to patient. Family in room. [id number]  All belongings in room. Discharge instructions given in pre-op, no further questions. 1303-  IV removed-- no complications. Bandage applied. 1310-  Patient discharged in stable condition with all belongings. This RN walked patient to car.

## 2021-02-19 ENCOUNTER — HOSPITAL ENCOUNTER (EMERGENCY)
Age: 81
Discharge: HOME | End: 2021-02-19
Payer: MEDICARE

## 2021-02-19 VITALS
TEMPERATURE: 96.2 F | DIASTOLIC BLOOD PRESSURE: 82 MMHG | HEART RATE: 80 BPM | RESPIRATION RATE: 16 BRPM | WEIGHT: 160 LBS | HEIGHT: 62 IN | OXYGEN SATURATION: 96 % | SYSTOLIC BLOOD PRESSURE: 185 MMHG | BODY MASS INDEX: 29.44 KG/M2

## 2021-02-19 DIAGNOSIS — S40.021A CONTUSION OF MULTIPLE SITES OF RIGHT SHOULDER AND UPPER ARM, INITIAL ENCOUNTER: Primary | ICD-10-CM

## 2021-02-19 DIAGNOSIS — S40.011A CONTUSION OF MULTIPLE SITES OF RIGHT SHOULDER AND UPPER ARM, INITIAL ENCOUNTER: Primary | ICD-10-CM

## 2021-02-19 LAB
HCT VFR BLD CALC: 36.7 % (ref 37–47)
HEMOGLOBIN: 12.5 GM/DL (ref 12–16)
MCH RBC QN AUTO: 30.2 PG (ref 27–31)
MCHC RBC AUTO-ENTMCNC: 34.1 GM/DL (ref 33–37)
MCV RBC AUTO: 89 FL (ref 81–99)
PDW BLD-RTO: 13.3 % (ref 11.5–14.5)
PLATELET # BLD: 202 THOU/MM3 (ref 130–400)
PMV BLD AUTO: 9.8 FL (ref 7.4–10.4)
RBC # BLD: 4.14 MILL/MM3 (ref 4.2–5.4)
WBC # BLD: 4.1 THOU/MM3 (ref 4.8–10.8)

## 2021-02-19 PROCEDURE — 99213 OFFICE O/P EST LOW 20 MIN: CPT

## 2021-02-19 PROCEDURE — 36415 COLL VENOUS BLD VENIPUNCTURE: CPT

## 2021-02-19 PROCEDURE — 85027 COMPLETE CBC AUTOMATED: CPT

## 2021-02-19 PROCEDURE — 99214 OFFICE O/P EST MOD 30 MIN: CPT | Performed by: NURSE PRACTITIONER

## 2021-02-19 ASSESSMENT — ENCOUNTER SYMPTOMS
WHEEZING: 0
SHORTNESS OF BREATH: 0
NAUSEA: 0
DIARRHEA: 0
VOMITING: 0

## 2021-02-19 ASSESSMENT — PAIN SCALES - GENERAL: PAINLEVEL_OUTOF10: 1

## 2021-02-19 NOTE — ED NOTES
To [facility] with complaints of right upper arm pain and bruising. No injury.  Bruising is in elbow area pain in upper inner arm     Ivy Limb, RN  02/19/21 2719       Ivy Limb, RN  02/19/21 6984

## 2021-02-19 NOTE — ED NOTES
Pt discharged. Pt verbalized understanding of discharge instructions. Pt walked out with cane. Pt in stable condition.      Olga Shafer LPN  56/17/40 [id number]

## 2021-02-19 NOTE — ED PROVIDER NOTES
SURGICALHISTORY     Patient  has a past surgical history that includes joint replacement (Bilateral); Foot surgery (Bilateral); Hysterectomy;  section; Dilation and curettage of uterus; Carpal tunnel release (Bilateral); Finger surgery; skin biopsy; back surgery (); Colonoscopy; cyst removal (Left, 2017); Cholecystectomy, laparoscopic (10/03/2017); pr colonoscopy flx dx w/collj spec when pfrmd (N/A, 2018); Nerve Surgery (N/A, 2019); Pain management procedure (Left, 2020); Facet joint injection (Bilateral, 2020); and Pain management procedure (Bilateral, 2021). CURRENT MEDICATIONS       Discharge Medication List as of 2021  9:45 AM      CONTINUE these medications which have NOT CHANGED    Details   lisinopril-hydroCHLOROthiazide (PRINZIDE;ZESTORETIC) 10-12.5 MG per tablet TAKE 1 TABLET BY MOUTH DAILY, Disp-90 tablet, R-3Normal      omeprazole (PRILOSEC) 20 MG delayed release capsule Take 1 capsule by mouth daily, Disp-90 capsule, R-3Normal      hydroxychloroquine (PLAQUENIL) 200 MG tablet Take 1.5 tablets by mouth daily, Disp-135 tablet, R-1**Patient requests 90 days supply**Normal      traMADol (ULTRAM) 50 MG tablet Take 50 mg by mouth every 6 hours as needed for Pain. Historical Med      vitamin C (ASCORBIC ACID) 500 MG tablet Take 500 mg by mouth dailyHistorical Med      meloxicam (MOBIC) 7.5 MG tablet Take 1 tablet by mouth daily as needed for Pain, Disp-90 tablet, R-3Normal      Handicap [facility]acard [facility] Starting 2018, Disp-1 each, R-0, PrintExpires 2023      Multiple Vitamins-Minerals (PRESERVISION AREDS PO) Take by mouthHistorical Med      Calcium Carbonate-Vit D-Min (CALCIUM 1200 PO) Take by mouth daily      Cholecalciferol (VITAMIN D-3 PO) Take by mouth daily             ALLERGIES     Patient is is allergic to sulfa antibiotics and other.     Patients   Immunization History   Administered Date(s) Administered  Influenza Virus Vaccine 10/01/2014, 09/20/2018    Influenza, High Dose (Fluzone 65 yrs and older) 09/24/2016, 10/28/2020    Influenza, Quadv, IM, PF (6 mo and older Fluzone, Flulaval, Fluarix, and 3 yrs and older Afluria) 09/18/2019    Pneumococcal Conjugate 13-valent ([id number]) 10/23/2015    Pneumococcal Polysaccharide ([id number]) 04/18/2018    Tdap (Boostrix, Adacel) 07/29/2018    Zoster Live (Zostavax) 04/16/2013    Zoster Recombinant (Shingrix) 11/13/2019, 01/13/2020       FAMILY HISTORY     Patient's family history includes Alzheimer's Disease in her mother; Cancer in her father; Dementia in her mother; Emphysema in her father; Heart Disease in her mother. SOCIAL HISTORY     Patient  reports that she has never smoked. She has never used smokeless tobacco. She reports current alcohol use. She reports that she does not use drugs. PHYSICAL EXAM     ED TRIAGE VITALS  BP: (!) 185/82, Temp: 96.2 °F (35.7 °C), Pulse: 80, Resp: 16, SpO2: 96 %,Estimated body mass index is 29.26 kg/m² as calculated from the following:    Height as of this encounter: 5' 2\" (1.575 m). Weight as of this encounter: 160 lb (72.6 kg). ,No LMP recorded. Patient has had a hysterectomy. Physical Exam  Constitutional:       General: She is not in acute distress. Appearance: Normal appearance. She is not ill-appearing, toxic-appearing or diaphoretic. Cardiovascular:      Pulses: Normal pulses. Pulmonary:      Effort: Pulmonary effort is normal. No respiratory distress. Musculoskeletal: Normal range of motion. General: Tenderness (right upper arm, 1/10 currently, no known injury) present. No swelling or signs of injury. Skin:     General: Skin is warm. Findings: Bruising (improving appearence, and reported by patient) present. Neurological:      General: No focal deficit present. Mental Status: She is alert and oriented to person, place, and time. Motor: No weakness. Coordination: Coordination normal.   Psychiatric:         Mood and Affect: Mood normal.         Behavior: Behavior normal.         Thought Content: Thought content normal.         Judgment: Judgment normal.         DIAGNOSTIC RESULTS     Labs:  Results for orders placed or performed during the hospital encounter of 02/19/21   CBC   Result Value Ref Range    WBC 4.1 (L) 4.8 - 10.8 thou/mm3    RBC 4.14 (L) 4.20 - 5.40 mill/mm3    Hemoglobin 12.5 12.0 - 16.0 gm/dl    Hematocrit 36.7 (L) 37.0 - 47.0 %    MCV 89 81 - 99 fL    MCH 30.2 27.0 - 31.0 pg    MCHC 34.1 33.0 - 37.0 gm/dl    RDW 13.3 11.5 - 14.5 %    Platelets 091 472 - 371 thou/mm3    MPV 9.8 7.4 - 10.4 fL       IMAGING:    No orders to display     URGENT CARE COURSE:     Vitals:    02/19/21 0906 02/19/21 0907   BP:  (!) 185/82   Pulse: 80    Resp: 16    Temp: 96.2 °F (35.7 °C)    TempSrc: Temporal    SpO2: 96%    Weight: 160 lb (72.6 kg)    Height: 5' 2\" (1.575 m)        Medications - No data to display         PROCEDURES:  None    FINAL IMPRESSION      1. Contusion of multiple sites of right shoulder and upper arm, initial encounter          DISPOSITION/ PLAN   Patient is discharged home with instructions to continue over-the-counter arthritic Tylenol, as well as intermittent icing of joint. Discussed with patient that if tenderness has not improved within the next 4 to 5 days recommend follow-up with orthopedics. Discussed with patient that if there is any numbness or tingling, or heaviness sensation to right arm go directly to the ER. Discussed with patient that given there is been no recent injury to right arm, there is low suspicion to injury to bone. Also discussed with patient that given there is low risk for clot development given patient's history, there is low suspicion for clot to extremity.         PATIENT REFERRED TO:  Camelia Astorga [place] [address] [facility]: Discharge Medication List as of 2/19/2021  9:45 AM          Discharge Medication List as of 2/19/2021  9:45 AM          Discharge Medication List as of 2/19/2021  9:45 AM          JOSE Nair NP    (Please note that portions of this note were completed with a voice recognition program. Efforts were made to edit the dictations but occasionally words are mis-transcribed.)         JOSE Foley NP  02/19/21 [id number]

## 2021-03-26 ENCOUNTER — TELEPHONE (OUTPATIENT)
Dept: FAMILY MEDICINE CLINIC | Age: 81
End: 2021-03-26

## 2021-03-26 DIAGNOSIS — N18.30 STAGE 3 CHRONIC KIDNEY DISEASE, UNSPECIFIED WHETHER STAGE 3A OR 3B CKD (HCC): Primary | Chronic | ICD-10-CM

## 2021-03-26 DIAGNOSIS — I10 ESSENTIAL HYPERTENSION: Chronic | ICD-10-CM

## 2021-03-26 NOTE — TELEPHONE ENCOUNTER
Per HIPAA, left detailed message informing pt to have labs completed prior to appt on 4/24/21. New lab orders mailed to pt.

## 2021-03-26 NOTE — TELEPHONE ENCOUNTER
Pt due for fasting labs prior to next apt on 4/21/2021. Please call to have pt complete this. Thanks! ASSESSMENT & PLAN   Diagnosis Orders   1. Stage 3 chronic kidney disease, unspecified whether stage 3a or 3b CKD  CBC Auto Differential    Comprehensive Metabolic Panel    Lipid Panel    Microalbumin / Creatinine Urine Ratio    TSH with Reflex   2.  Essential hypertension  CBC Auto Differential    Comprehensive Metabolic Panel    Lipid Panel    Microalbumin / Creatinine Urine Ratio    TSH with Reflex     Future Appointments   Date Time Provider [facility]   4/21/2021 10:00 AM [id number]   4/28/2021 10:40 AM DO FELICIANO Duff SRPX Rheum P - LINDA SWANN II.KADY

## 2021-04-12 ENCOUNTER — NURSE ONLY (OUTPATIENT)
Dept: LAB | Age: 81
End: 2021-04-12

## 2021-04-12 DIAGNOSIS — I10 ESSENTIAL HYPERTENSION: Chronic | ICD-10-CM

## 2021-04-12 DIAGNOSIS — N18.30 STAGE 3 CHRONIC KIDNEY DISEASE, UNSPECIFIED WHETHER STAGE 3A OR 3B CKD (HCC): Chronic | ICD-10-CM

## 2021-04-12 DIAGNOSIS — N18.30 STAGE 3 CHRONIC KIDNEY DISEASE, UNSPECIFIED WHETHER STAGE 3A OR 3B CKD (HCC): Primary | ICD-10-CM

## 2021-04-12 LAB
ALBUMIN SERPL-MCNC: 4.6 G/DL (ref 3.5–5.1)
ALP BLD-CCNC: 114 U/L (ref 38–126)
ALT SERPL-CCNC: 18 U/L (ref 11–66)
ANION GAP SERPL CALCULATED.3IONS-SCNC: 12 MEQ/L (ref 8–16)
AST SERPL-CCNC: 25 U/L (ref 5–40)
BASOPHILS # BLD: 0.8 %
BASOPHILS ABSOLUTE: 0 THOU/MM3 (ref 0–0.1)
BILIRUB SERPL-MCNC: 0.4 MG/DL (ref 0.3–1.2)
BUN BLDV-MCNC: 26 MG/DL (ref 7–22)
CALCIUM SERPL-MCNC: 10.1 MG/DL (ref 8.5–10.5)
CHLORIDE BLD-SCNC: 105 MEQ/L (ref 98–111)
CHOLESTEROL, TOTAL: 266 MG/DL (ref 100–199)
CO2: 25 MEQ/L (ref 23–33)
CREAT SERPL-MCNC: 1.3 MG/DL (ref 0.4–1.2)
CREATININE, URINE: 125.8 MG/DL
EOSINOPHIL # BLD: 5.7 %
EOSINOPHILS ABSOLUTE: 0.3 THOU/MM3 (ref 0–0.4)
ERYTHROCYTE [DISTWIDTH] IN BLOOD BY AUTOMATED COUNT: 13 % (ref 11.5–14.5)
ERYTHROCYTE [DISTWIDTH] IN BLOOD BY AUTOMATED COUNT: 44.1 FL (ref 35–45)
GFR SERPL CREATININE-BSD FRML MDRD: 40 ML/MIN/1.73M2
GLUCOSE BLD-MCNC: 100 MG/DL (ref 70–108)
HCT VFR BLD CALC: 39.9 % (ref 37–47)
HDLC SERPL-MCNC: 60 MG/DL
HEMOGLOBIN: 12.9 GM/DL (ref 12–16)
IMMATURE GRANS (ABS): 0.01 THOU/MM3 (ref 0–0.07)
IMMATURE GRANULOCYTES: 0.2 %
LDL CHOLESTEROL CALCULATED: 171 MG/DL
LYMPHOCYTES # BLD: 27.9 %
LYMPHOCYTES ABSOLUTE: 1.4 THOU/MM3 (ref 1–4.8)
MCH RBC QN AUTO: 30.1 PG (ref 26–33)
MCHC RBC AUTO-ENTMCNC: 32.3 GM/DL (ref 32.2–35.5)
MCV RBC AUTO: 93 FL (ref 81–99)
MICROALBUMIN UR-MCNC: 3.35 MG/DL
MICROALBUMIN/CREAT UR-RTO: 27 MG/G (ref 0–30)
MONOCYTES # BLD: 14.3 %
MONOCYTES ABSOLUTE: 0.7 THOU/MM3 (ref 0.4–1.3)
NUCLEATED RED BLOOD CELLS: 0 /100 WBC
PLATELET # BLD: 238 THOU/MM3 (ref 130–400)
PMV BLD AUTO: 10.5 FL (ref 9.4–12.4)
POTASSIUM SERPL-SCNC: 4.6 MEQ/L (ref 3.5–5.2)
RBC # BLD: 4.29 MILL/MM3 (ref 4.2–5.4)
SEG NEUTROPHILS: 51.1 %
SEGMENTED NEUTROPHILS ABSOLUTE COUNT: 2.5 THOU/MM3 (ref 1.8–7.7)
SODIUM BLD-SCNC: 142 MEQ/L (ref 135–145)
TOTAL PROTEIN: 7.2 G/DL (ref 6.1–8)
TRIGL SERPL-MCNC: 174 MG/DL (ref 0–199)
TSH SERPL DL<=0.05 MIU/L-ACNC: 2.22 UIU/ML (ref 0.4–4.2)
WBC # BLD: 4.9 THOU/MM3 (ref 4.8–10.8)

## 2021-04-13 ENCOUNTER — TELEPHONE (OUTPATIENT)
Dept: FAMILY MEDICINE CLINIC | Age: 81
End: 2021-04-13

## 2021-04-19 ENCOUNTER — TELEPHONE (OUTPATIENT)
Dept: FAMILY MEDICINE CLINIC | Age: 81
End: 2021-04-19

## 2021-04-19 ENCOUNTER — NURSE ONLY (OUTPATIENT)
Dept: LAB | Age: 81
End: 2021-04-19

## 2021-04-19 DIAGNOSIS — N18.30 STAGE 3 CHRONIC KIDNEY DISEASE, UNSPECIFIED WHETHER STAGE 3A OR 3B CKD (HCC): ICD-10-CM

## 2021-04-19 LAB
ANION GAP SERPL CALCULATED.3IONS-SCNC: 10 MEQ/L (ref 8–16)
BUN BLDV-MCNC: 20 MG/DL (ref 7–22)
CALCIUM SERPL-MCNC: 10 MG/DL (ref 8.5–10.5)
CHLORIDE BLD-SCNC: 103 MEQ/L (ref 98–111)
CO2: 27 MEQ/L (ref 23–33)
CREAT SERPL-MCNC: 0.9 MG/DL (ref 0.4–1.2)
GFR SERPL CREATININE-BSD FRML MDRD: 60 ML/MIN/1.73M2
GLUCOSE BLD-MCNC: 95 MG/DL (ref 70–108)
POTASSIUM SERPL-SCNC: 4.3 MEQ/L (ref 3.5–5.2)
SODIUM BLD-SCNC: 140 MEQ/L (ref 135–145)

## 2021-04-19 NOTE — TELEPHONE ENCOUNTER
----- Message from Lala Mohamud DO sent at 4/19/2021 11:39 AM EDT -----  Please let pt know that renal fxn is stable and appropriate. Let me know if questions, thanks!

## 2021-04-20 NOTE — PROGRESS NOTES
Chief Complaint   Patient presents with    Medicare AWV    Follow-up     Chronic issues as noted below    Diarrhea       History obtained from the patient. SUBJECTIVE:  Artur Salazar is a 68 y.o. female that presents today for       -Low Back Pain PRIOR VISIT:     HPI:   Started 2 to 3 wks ago  Started after doing yard work  Persistent on L lower side, will radiate to the R side  Heat and ice help some  On tramadol chronically for back and joint pain, not helping.   mobic helps some  No radicular sxs  No bowel/bladder issues since back pain  Pain at it's worse a 6/10  Currently 3  Denies LUTS/dysuria     She describes the pain as dull, aching  in the lumbar region.     Inciting injury or history of trauma? No  Pain is relieved by - as above  Pain is aggravated by - as above  Radiation of the pain? No  Paresthesias of the extremities? No  Saddle anesthesia? No  Bowel or bladder incontinence? No  Treatments tried - as above. UPDATE PRIOR VISIT:   Saw in July for this  Treated with tramadol, zanaflex and pred   Got mostly better, but once done with meds, pain came back  Has been dealing with this since then  Pain in L sided low back, will radiate down to L SI joint and front of L hip  Better with rest, worse when walking a distance, stairs and laying flat  Seems to be doing worse  Has been doing HEP, not helping  No fevers, chills, night sweats or wt loss  No groin pain/numbness or bowel/bladder dysfunction. No dysuria/luts  Asking what else to do. UPDATE PRIOR VISIT:   Completed 3 wks of PT, but wasn't getting better, thought it was getting a bit worse  So PT stopped  Referred to [facility] d/t worsening sxs  They recommended surgery, she wants to hold off  Referred to pain management at [facility]  As for hip pain, that is better, got hip injection at [facility], and those sxs are better.    Back pain limiting her on certain days  Tramadol used in the past, not helping  Asking what she can do while waiting for smoker    Tetanus - UTD July 2018  Influenza Vaccine - UTD FALL 2020  Pneumonia Vaccine - UTD PCV 13 OCT 2015. UTD PPV 23 APR 2018  Zostavax - Zostavax competed APR 2013   Shingrix - UTD x 2    Breast Cancer Screening - NEG SEPT 2020  Cervical Cancer Screening - Aged out  Osteoporosis Screening - osteopenia AUG 2018 and AUG 2020      Current Outpatient Medications   Medication Sig Dispense Refill    lisinopril-hydroCHLOROthiazide (PRINZIDE;ZESTORETIC) 10-12.5 MG per tablet TAKE 1 TABLET BY MOUTH DAILY 90 tablet 3    omeprazole (PRILOSEC) 20 MG delayed release capsule Take 1 capsule by mouth daily 90 capsule 3    hydroxychloroquine (PLAQUENIL) 200 MG tablet Take 1.5 tablets by mouth daily 135 tablet 1    vitamin C (ASCORBIC ACID) 500 MG tablet Take 500 mg by mouth daily      meloxicam (MOBIC) 7.5 MG tablet Take 1 tablet by mouth daily as needed for Pain 90 tablet 3    Handicap Placard MISC by Does not apply route Expires 29 June 2023 1 each 0    Multiple Vitamins-Minerals (PRESERVISION AREDS PO) Take by mouth      Calcium Carbonate-Vit D-Min (CALCIUM 1200 PO) Take by mouth daily      Cholecalciferol (VITAMIN D-3 PO) Take by mouth daily       No current facility-administered medications for this visit. No orders of the defined types were placed in this encounter. All medications reviewed and reconciled, including OTC and herbal medications. Updated list given to patient.        Patient Active Problem List    Diagnosis Date Noted    Ganglion cyst of finger of left hand 01/31/2017     Priority: High     Class: Chronic    Lumbar spondylosis     Erosive osteoarthritis of both hands     Positive WICHO (antinuclear antibody) 04/18/2018    Dyslipidemia     History of skin cancer      BCC AND SCC      Hypertension     Osteoarthritis     DDD (degenerative disc disease), lumbar     Chronic low back pain     Hyperkalemia     CKD (chronic kidney disease) stage 3, GFR 30-59 ml/min        Past Medical History:   Diagnosis Date    Chronic low back pain     CKD (chronic kidney disease) stage 3, GFR 30-59 ml/min     DDD (degenerative disc disease), lumbar     Dyslipidemia     History of skin cancer     BCC AND SCC    Hyperlipidemia     Hypertension     Osteoarthritis     PONV (postoperative nausea and vomiting)        Past Surgical History:   Procedure Laterality Date    BACK SURGERY      lower lumbar fusion    CARPAL TUNNEL RELEASE Bilateral      SECTION      CHOLECYSTECTOMY, LAPAROSCOPIC  10/03/2017    COLONOSCOPY      CYST REMOVAL Left 2017    left  5th digit-Dr Bowman    DILATION AND CURETTAGE OF UTERUS      FACET JOINT INJECTION Bilateral 2020    B/L L3-4 medial branch and L5 dorsal rami injection performed by Estela Segura MD at [address] [place] [address] Bilateral     HYSTERECTOMY      JOINT REPLACEMENT Bilateral     KNEES    NERVE SURGERY N/A 2019    left L3, L4 medial branch and L5 dorsal rami injection performed by Estela Segura MD at [place] [address] Left 2020    left L3, L4 medial branch and L5 dorsal rami injection performed by Estela Segura MD at [place] [address] Bilateral 2021    bilateral T12, L1,L2,L3,L4,L5 medial branch block performed by Estela Segura MD at [address] [place] [address] DX W/COLLJ [address] [facility] [address] WHEN PFRMD N/A 2018    COLONOSCOPY performed by Telly Guzman MD [address] End[facility]    SKIN BIOPSY      BCC AND SCC       Allergies   Allergen Reactions    Sulfa Antibiotics Swelling    Other Nausea And Vomiting     oysters       Social History     Tobacco Use    Smoking status: Never Smoker    Smokeless tobacco: Never Used   Substance Use Topics    Alcohol use: Yes     Comment: OCCASIONALLY       Family History   Problem Relation Age of Onset    Heart Disease Mother or thyroid nodules, no cervical lymphadenopathy  Pulmonary/Chest: clear to auscultation bilaterally- no wheezes, rales or rhonchi, normal air movement, no respiratory distress or retractions  Cardiovascular: S1 and S2 auscultated w/ RRR. No murmurs, rubs, clicks, or gallops, distal pulses intact. Abdomen: soft, non-tender, non-distended, bowel sounds physiologic,  no rebound or guarding, no masses or hernias noted. Liver and spleen without enlargement. Extremities: no cyanosis, clubbing or edema of the lower extremities. Skin: warm and dry, no rash or erythema      Lab Results   Component Value Date     04/19/2021    K 4.3 04/19/2021     04/19/2021    CO2 27 04/19/2021    BUN 20 04/19/2021    CREATININE 0.9 04/19/2021    GLUCOSE 95 04/19/2021    GLUCOSE 99 05/03/2018    CALCIUM 10.0 04/19/2021      No results found for: GFRESTIMATE  Lab Results   Component Value Date    LABGLOM 60 04/19/2021       ASSESSMENT & PLAN  1. Chronic left-sided low back pain without sciatica    Stable   con't prn mobic  con't HEP  F/u pain managment    2. DDD (degenerative disc disease), lumbar    As above    3. Left hip pain    Improved   con't prn mobic   F/u pain management. 4. Chronic left SI joint pain      5. Essential hypertension    At goal  con't meds  Labs UTD    6. Stage 3a chronic kidney disease    Stable  con't BP control  con't lisinorpril  Discussed risks of NSAIDs, she understands but doesn't feel she can go w/o    7. Hyperkalemia    Labs stable off veltessa  Repeat BMP 6 months  In call back cue    8. Dyslipidemia    con't diet control  Work on lifestyle changes  Labs in 6 months  In call back cue    9. Primary osteoarthritis involving multiple joints    Stable  con't low dose mobic, she understands risks/benefits of this  F/u rheum  con't PPI    10. Positive WICHO (antinuclear antibody)    As above    11. Erosive osteoarthritis of both hands    As above    12.  Diarrhea, unspecified type    Suspect mild IBS-D  No alarm features  Add daily fiber  Call or f/u in 4 wks if no better      DISPOSITION    Return in about 1 year (around 2022) for AWV, follow-up on chronic medical conditions, sooner as needed. [address] released without restrictions. Future Appointments   Date Time Provider Radha Paulson   2021 10:40 AM Belem Luis DO N SRPX Rheum [facility]   2022  9:40 AM Cliff Hammond DO [facility]     PATIENT COUNSELING    Julianna received counseling on the following healthy behaviors: nutrition, exercise and medication adherence    Patient given educational materials on: See Attached    I have instructed Julianna to complete a self tracking handout on Blood Pressures  and instructed them to bring it with them to her next appointment. Barriers to learning and self management: none    Discussed use, benefit, and side effects of prescribed medications. Barriers to medication compliance addressed. All patient questions answered. Pt voiced understanding. Electronically signed by Cliff Hammond DO on 2021 at 11:38 AM        Medicare Annual Wellness Visit  Name: Hayley Lu Date: 2021   MRN: [id number] Sex: Female   Age: 68 y.o. Ethnicity: Non-/Non    : 1940 Race: Dorita Joanna is here for Medicare AWV, Follow-up (Chronic issues as noted below), and Diarrhea    Screenings for behavioral, psychosocial and functional/safety risks, and cognitive dysfunction are all negative except as indicated below. These results, as well as other patient data from the [address] form, are documented in Flowsheets linked to this Encounter. Allergies   Allergen Reactions    Sulfa Antibiotics Swelling    Other Nausea And Vomiting     oysters         Prior to Visit Medications    Medication Sig Taking?  Authorizing Provider   lisinopril-hydroCHLOROthiazide (PRINZIDE;ZESTORETIC) 10-12.5 MG per tablet TAKE 1 TABLET BY MOUTH DAILY Yes Antonio Challenger, DO   omeprazole (PRILOSEC) 20 MG delayed release capsule Take 1 capsule by mouth daily Yes JOSE Escobar - NELY   hydroxychloroquine (PLAQUENIL) 200 MG tablet Take 1.5 tablets by mouth daily Yes JOSE Escobar - CNP   vitamin C (ASCORBIC ACID) 500 MG tablet Take 500 mg by mouth daily Yes Historical Provider, MD   meloxicam (MOBIC) 7.5 MG tablet Take 1 tablet by mouth daily as needed for Pain Yes Antonio Challenger, DO   Handicap Placard [facility] [facility] by Does not apply route Expires 2023 Yes Antonio Challenger, DO   Multiple Vitamins-Minerals (PRESERVISION AREDS PO) Take by mouth Yes Historical Provider, MD   Calcium Carbonate-Vit D-Min (CALCIUM 1200 PO) Take by mouth daily Yes Historical Provider, MD   Cholecalciferol (VITAMIN D-3 PO) Take by mouth daily Yes Historical Provider, MD         Past Medical History:   Diagnosis Date    Chronic low back pain     CKD (chronic kidney disease) stage 3, GFR 30-59 ml/min     DDD (degenerative disc disease), lumbar     Dyslipidemia     History of skin cancer     BCC AND SCC    Hyperlipidemia     Hypertension     Osteoarthritis     PONV (postoperative nausea and vomiting)        Past Surgical History:   Procedure Laterality Date    BACK SURGERY      lower lumbar fusion    CARPAL TUNNEL RELEASE Bilateral      SECTION      CHOLECYSTECTOMY, LAPAROSCOPIC  10/03/2017    COLONOSCOPY      CYST REMOVAL Left 2017    left  5th digit-Dr Bowman    DILATION AND CURETTAGE OF UTERUS      FACET JOINT INJECTION Bilateral 2020    B/L L3-4 medial branch and L5 dorsal rami injection performed by Thomas Lorenz MD at [address] Bilateral     HYSTERECTOMY      JOINT REPLACEMENT Bilateral     KNEES    NERVE SURGERY N/A 2019    left L3, L4 medial branch and L5 dorsal rami injection performed by Thomas Lorenz MD at [address] [place] [address] CENTER OR    PAIN MANAGEMENT PROCEDURE Left 1/29/2020    left L3, L4 medial branch and L5 dorsal rami injection performed by Cristina Littlejohn MD at [address] Bilateral 1/20/2021    bilateral T12, L1,L2,L3,L4,L5 medial branch block performed by Cristina Littlejohn MD at [address] [place] [address] DX W/COLLJ [address] [place] [facility] [address] WHEN PFRMD N/A 9/7/2018    COLONOSCOPY performed by Mark Lloyd MD at [facility]    SKIN BIOPSY      BCC AND SCC         Family History   Problem Relation Age of Onset    Heart Disease Mother         CHF    Alzheimer's Disease Mother     Dementia Mother     Cancer Father         LUNG    Emphysema Father     Colon Cancer Neg Hx     Breast Cancer Neg Hx        CareTeam (Including outside providers/suppliers regularly involved in providing care):   Patient Care Team:  Nicol Ramirez DO as PCP - General (Family Medicine)  Nicol Ramirez DO as PCP - [facility] Empaneled Provider    Wt Readings from Last 3 Encounters:   04/21/21 165 lb (74.8 kg)   02/19/21 160 lb (72.6 kg)   01/20/21 159 lb (72.1 kg)     Vitals:    04/21/21 1002   BP: (!) 142/70   Pulse: 68   Resp: 14   Temp: 98.4 °F (36.9 °C)   TempSrc: Oral   SpO2: 98%   Weight: 165 lb (74.8 kg)   Height: 5' 2.5\" (1.588 m)     Body mass index is 29.7 kg/m². Based upon direct observation of the patient, evaluation of cognition reveals recent and remote memory intact. Patient's complete Health Risk Assessment and screening values have been reviewed and are found in Flowsheets. The following problems were reviewed today and where indicated follow up appointments were made and/or referrals ordered. Positive Risk Factor Screenings with Interventions:     Fall Risk:  Timed Up and Go Test > 12 seconds?  (Complete if either Fall Risk answers are Yes): (!) yes  2 or more falls in past year?: no  Fall with injury in past year?: no  Fall Risk Interventions:    · Home safety tips provided          General Health and ACP:  General  In general, how would you say your health is?: Good  In the past 7 days, have you experienced any of the following?  New or Increased Pain, New or Increased Fatigue, Loneliness, Social Isolation, Stress or Anger?: (!) Loneliness, Stress, Anger  Do you get the social and emotional support that you need?: Yes  Do you have a Living Will?: Yes  Advance Directives     Power of  Living Will ACP-Advance Directive ACP-Power of     Not on File Not on File Not on File [address] [facility] Risk Interventions:  · Loneliness: patient declines any further intervention for this issue  · Stress: relaxation techniques discussed  · Anger: relaxation techniques discussed        Personalized Preventive Plan   Current Health Maintenance Status  Immunization History   Administered Date(s) Administered    COVID-19, Moderna, PF, 100mcg/0.5mL 02/11/2021, 03/11/2021    Influenza Virus Vaccine 10/01/2014, 09/20/2018    Influenza, High Dose (Fluzone 65 yrs and older) 09/24/2016, 10/28/2020    Influenza, Quadv, IM, PF (6 mo and older Fluzone, Flulaval, Fluarix, and 3 yrs and older Afluria) 09/18/2019    Pneumococcal Conjugate 13-valent (Svwhenu44) 10/23/2015    Pneumococcal Polysaccharide ([id number]) 04/18/2018    Tdap (Boostrix, Adacel) 07/29/2018    Zoster Live (Zostavax) 04/16/2013    Zoster Recombinant (Shingrix) 11/13/2019, 01/13/2020        Health Maintenance   Topic Date Due    Annual Wellness Visit (AWV)  Never done    Breast cancer screen  09/30/2021    Potassium monitoring  04/19/2022    Creatinine monitoring  04/19/2022    DEXA (modify frequency per FRAX score)  08/28/2022    DTaP/Tdap/Td vaccine (2 - Td) 07/29/2028    Flu vaccine  Completed    Shingles Vaccine  Completed    Pneumococcal 65+ years Vaccine  Completed    COVID-19 Vaccine  Completed    Hepatitis A vaccine  Aged Out    Hepatitis B vaccine  Aged Out    Hib vaccine  Aged

## 2021-04-21 ENCOUNTER — OFFICE VISIT (OUTPATIENT)
Dept: FAMILY MEDICINE CLINIC | Age: 81
End: 2021-04-21
Payer: MEDICARE

## 2021-04-21 VITALS
HEART RATE: 68 BPM | OXYGEN SATURATION: 98 % | HEIGHT: 63 IN | RESPIRATION RATE: 14 BRPM | TEMPERATURE: 98.4 F | SYSTOLIC BLOOD PRESSURE: 142 MMHG | BODY MASS INDEX: 29.23 KG/M2 | WEIGHT: 165 LBS | DIASTOLIC BLOOD PRESSURE: 70 MMHG

## 2021-04-21 DIAGNOSIS — M51.36 DDD (DEGENERATIVE DISC DISEASE), LUMBAR: ICD-10-CM

## 2021-04-21 DIAGNOSIS — M53.3 CHRONIC LEFT SI JOINT PAIN: ICD-10-CM

## 2021-04-21 DIAGNOSIS — R19.7 DIARRHEA, UNSPECIFIED TYPE: ICD-10-CM

## 2021-04-21 DIAGNOSIS — N18.31 STAGE 3A CHRONIC KIDNEY DISEASE (HCC): ICD-10-CM

## 2021-04-21 DIAGNOSIS — E87.5 HYPERKALEMIA: ICD-10-CM

## 2021-04-21 DIAGNOSIS — G89.29 CHRONIC LEFT SI JOINT PAIN: ICD-10-CM

## 2021-04-21 DIAGNOSIS — M54.50 CHRONIC LEFT-SIDED LOW BACK PAIN WITHOUT SCIATICA: ICD-10-CM

## 2021-04-21 DIAGNOSIS — R76.8 POSITIVE ANA (ANTINUCLEAR ANTIBODY): ICD-10-CM

## 2021-04-21 DIAGNOSIS — M15.4 EROSIVE OSTEOARTHRITIS OF BOTH HANDS: ICD-10-CM

## 2021-04-21 DIAGNOSIS — G89.29 CHRONIC LEFT-SIDED LOW BACK PAIN WITHOUT SCIATICA: ICD-10-CM

## 2021-04-21 DIAGNOSIS — Z00.00 ROUTINE GENERAL MEDICAL EXAMINATION AT A HEALTH CARE FACILITY: Primary | ICD-10-CM

## 2021-04-21 DIAGNOSIS — M15.9 PRIMARY OSTEOARTHRITIS INVOLVING MULTIPLE JOINTS: ICD-10-CM

## 2021-04-21 DIAGNOSIS — M25.552 LEFT HIP PAIN: ICD-10-CM

## 2021-04-21 DIAGNOSIS — I10 ESSENTIAL HYPERTENSION: ICD-10-CM

## 2021-04-21 DIAGNOSIS — E78.5 DYSLIPIDEMIA: ICD-10-CM

## 2021-04-21 PROCEDURE — G8399 PT W/DXA RESULTS DOCUMENT: HCPCS | Performed by: FAMILY MEDICINE

## 2021-04-21 PROCEDURE — G8427 DOCREV CUR MEDS BY ELIG CLIN: HCPCS | Performed by: FAMILY MEDICINE

## 2021-04-21 PROCEDURE — 1036F TOBACCO NON-USER: CPT | Performed by: FAMILY MEDICINE

## 2021-04-21 PROCEDURE — G8417 CALC BMI ABV UP PARAM F/U: HCPCS | Performed by: FAMILY MEDICINE

## 2021-04-21 PROCEDURE — 4040F PNEUMOC VAC/ADMIN/RCVD: CPT | Performed by: FAMILY MEDICINE

## 2021-04-21 PROCEDURE — G0438 PPPS, INITIAL VISIT: HCPCS | Performed by: FAMILY MEDICINE

## 2021-04-21 PROCEDURE — 1123F ACP DISCUSS/DSCN MKR DOCD: CPT | Performed by: FAMILY MEDICINE

## 2021-04-21 PROCEDURE — 99213 OFFICE O/P EST LOW 20 MIN: CPT | Performed by: FAMILY MEDICINE

## 2021-04-21 PROCEDURE — 1090F PRES/ABSN URINE INCON ASSESS: CPT | Performed by: FAMILY MEDICINE

## 2021-04-21 ASSESSMENT — PATIENT HEALTH QUESTIONNAIRE - PHQ9
2. FEELING DOWN, DEPRESSED OR HOPELESS: 1
1. LITTLE INTEREST OR PLEASURE IN DOING THINGS: 0
SUM OF ALL RESPONSES TO PHQ QUESTIONS 1-9: 1
SUM OF ALL RESPONSES TO PHQ9 QUESTIONS 1 & 2: 1

## 2021-04-21 ASSESSMENT — LIFESTYLE VARIABLES
AUDIT-C TOTAL SCORE: 1
HOW OFTEN DO YOU HAVE A DRINK CONTAINING ALCOHOL: 1

## 2021-04-21 NOTE — PATIENT INSTRUCTIONS
Personalized Preventive Plan for Gabo Venegas - 4/21/2021  Medicare offers a range of preventive health benefits. Some of the tests and screenings are paid in full while other may be subject to a deductible, co-insurance, and/or copay. Some of these benefits include a comprehensive review of your medical history including lifestyle, illnesses that may run in your family, and various assessments and screenings as appropriate. After reviewing your medical record and screening and assessments performed today your provider may have ordered immunizations, labs, imaging, and/or referrals for you. A list of these orders (if applicable) as well as your Preventive Care list are included within your After Visit Summary for your review. Other Preventive Recommendations:    · A preventive eye exam performed by an eye specialist is recommended every 1-2 years to screen for glaucoma; cataracts, macular degeneration, and other eye disorders. · A preventive dental visit is recommended every 6 months. · Try to get at least 150 minutes of exercise per week or 10,000 steps per day on a pedometer . · Order or download the FREE \"Exercise & Physical Activity: Your Everyday Guide\" from [employer]. Call 8-524.872.5144 or search [employer] online. · You need 6384-0958 mg of calcium and 5682-9276 IU of vitamin D per day. It is possible to meet your calcium requirement with diet alone, but a vitamin D supplement is usually necessary to meet this goal.  · When exposed to the sun, use a sunscreen that protects against both UVA and UVB radiation with an SPF of 30 or greater. Reapply every 2 to 3 hours or after sweating, drying off with a towel, or swimming. · Always wear a seat belt when traveling in a car. Always wear a helmet when riding a bicycle or motorcycle.

## 2021-04-28 ENCOUNTER — OFFICE VISIT (OUTPATIENT)
Dept: RHEUMATOLOGY | Age: 81
End: 2021-04-28
Payer: MEDICARE

## 2021-04-28 VITALS
SYSTOLIC BLOOD PRESSURE: 152 MMHG | HEIGHT: 63 IN | BODY MASS INDEX: 29.59 KG/M2 | DIASTOLIC BLOOD PRESSURE: 68 MMHG | HEART RATE: 78 BPM | WEIGHT: 167 LBS | OXYGEN SATURATION: 91 %

## 2021-04-28 DIAGNOSIS — M70.62 TROCHANTERIC BURSITIS OF LEFT HIP: ICD-10-CM

## 2021-04-28 DIAGNOSIS — M19.072 OSTEOARTHRITIS OF BOTH FEET, UNSPECIFIED OSTEOARTHRITIS TYPE: Primary | ICD-10-CM

## 2021-04-28 DIAGNOSIS — M19.071 OSTEOARTHRITIS OF BOTH FEET, UNSPECIFIED OSTEOARTHRITIS TYPE: Primary | ICD-10-CM

## 2021-04-28 DIAGNOSIS — M15.4 EROSIVE OSTEOARTHRITIS OF HANDS, BILATERAL: ICD-10-CM

## 2021-04-28 DIAGNOSIS — M51.36 DDD (DEGENERATIVE DISC DISEASE), LUMBAR: ICD-10-CM

## 2021-04-28 DIAGNOSIS — Z51.81 MEDICATION MONITORING ENCOUNTER: ICD-10-CM

## 2021-04-28 PROCEDURE — 4040F PNEUMOC VAC/ADMIN/RCVD: CPT | Performed by: INTERNAL MEDICINE

## 2021-04-28 PROCEDURE — 1123F ACP DISCUSS/DSCN MKR DOCD: CPT | Performed by: INTERNAL MEDICINE

## 2021-04-28 PROCEDURE — G8399 PT W/DXA RESULTS DOCUMENT: HCPCS | Performed by: INTERNAL MEDICINE

## 2021-04-28 PROCEDURE — 1036F TOBACCO NON-USER: CPT | Performed by: INTERNAL MEDICINE

## 2021-04-28 PROCEDURE — G8417 CALC BMI ABV UP PARAM F/U: HCPCS | Performed by: INTERNAL MEDICINE

## 2021-04-28 PROCEDURE — 1090F PRES/ABSN URINE INCON ASSESS: CPT | Performed by: INTERNAL MEDICINE

## 2021-04-28 PROCEDURE — 99214 OFFICE O/P EST MOD 30 MIN: CPT | Performed by: INTERNAL MEDICINE

## 2021-04-28 PROCEDURE — G8427 DOCREV CUR MEDS BY ELIG CLIN: HCPCS | Performed by: INTERNAL MEDICINE

## 2021-04-28 ASSESSMENT — ENCOUNTER SYMPTOMS
NAUSEA: 0
VOMITING: 0
EYE PAIN: 0
CONSTIPATION: 0
COUGH: 0
SHORTNESS OF BREATH: 0
DIARRHEA: 0
EYES NEGATIVE: 1
WHEEZING: 0
EYE REDNESS: 0

## 2021-04-28 NOTE — PATIENT INSTRUCTIONS
Patient Education        Hip Bursitis: Exercises  Introduction  Here are some examples of exercises for you to try. The exercises may be suggested for a condition or for rehabilitation. Start each exercise slowly. Ease off the exercises if you start to have pain. You will be told when to start these exercises and which ones will work best for you. How to do the exercises  Hip rotator stretch   1. Lie on your back with both knees bent and your feet flat on the floor. 2. Put the ankle of your affected leg on your opposite thigh near your knee. 3. Use your hand to gently push your knee away from your body until you feel a gentle stretch around your hip. 4. Hold the stretch for 15 to 30 seconds. 5. Repeat 2 to 4 times. 6. Repeat steps 1 through 5, but this time use your hand to gently pull your knee toward your opposite shoulder. Iliotibial band stretch   1. Lean sideways against a wall. If you are not steady on your feet, hold on to a chair or counter. 2. Stand on the leg with the affected hip, with that leg close to the wall. Then cross your other leg in front of it. 3. Let your affected hip drop out to the side of your body and against wall. Then lean away from your affected hip until you feel a stretch. 4. Hold the stretch for 15 to 30 seconds. 5. Repeat 2 to 4 times. Straight-leg raises to the outside   1. Lie on your side, with your affected hip on top. 2. Tighten the front thigh muscles of your top leg to keep your knee straight. 3. Keep your hip and your leg straight in line with the rest of your body, and keep your knee pointing forward. Do not drop your hip back. 4. Lift your top leg straight up toward the ceiling, about 12 inches off the floor. Hold for about 6 seconds, then slowly lower your leg. 5. Repeat 8 to 12 times. Clamshell   1. Lie on your side, with your affected hip on top and your head propped on a pillow. Keep your feet and knees together and your knees bent.   2. Raise your top knee, but keep your feet together. Do not let your hips roll back. Your legs should open up like a clamshell. 3. Hold for 6 seconds. 4. Slowly lower your knee back down. Rest for 10 seconds. 5. Repeat 8 to 12 times. Follow-up care is a key part of your treatment and safety. Be sure to make and go to all appointments, and call your doctor if you are having problems. It's also a good idea to know your test results and keep a list of the medicines you take. Where can you learn more? Go to https://[employer].Adapx. org and sign in to your [employer] account. Enter C430 in the [employer] box to learn more about \"Hip Bursitis: Exercises. \"     If you do not have an account, please click on the \"Sign Up Now\" link. Current as of: November 16, 2020               Content Version: 12.8  © 2006-2021 [employer]. Care instructions adapted under license by [employer]. If you have questions about a medical condition or this instruction, always ask your healthcare professional. [place] [address] any warranty or liability for your use of this information. Patient Education        Rotator Cuff: Exercises  Introduction  Here are some examples of exercises for you to try. The exercises may be suggested for a condition or for rehabilitation. Start each exercise slowly. Ease off the exercises if you start to have pain. You will be told when to start these exercises and which ones will work best for you. How to do the exercises  Pendulum swing   If you have pain in your back, do not do this exercise. 7. Hold on to a table or the back of a chair with your good arm. Then bend forward a little and let your sore arm hang straight down. This exercise does not use the arm muscles. Rather, use your legs and your hips to create movement that makes your arm swing freely.   8. Use the movement from your hips and legs to guide the slightly swinging arm back and forth like a pendulum (or elephant trunk). Then guide it in circles that start small (about the size of a dinner plate). Make the circles a bit larger each day, as your pain allows. 9. Do this exercise for 5 minutes, 5 to 7 times each day. 10. As you have less pain, try bending over a little farther to do this exercise. This will increase the amount of movement at your shoulder. Posterior stretching exercise   6. Hold the elbow of your injured arm with your other hand. 7. Use your hand to pull your injured arm gently up and across your body. You will feel a gentle stretch across the back of your injured shoulder. 8. Hold for at least 15 to 30 seconds. Then slowly lower your arm. 9. Repeat 2 to 4 times. Up-the-back stretch   Your doctor or physical therapist may want you to wait to do this stretch until you have regained most of your range of motion and strength. You can do this stretch in different ways. Hold any of these stretches for at least 15 to 30 seconds. Repeat them 2 to 4 times. 6. Light stretch: Put your hand in your back pocket. Let it rest there to stretch your shoulder. 7. Moderate stretch: With your other hand, hold your injured arm (palm outward) behind your back by the wrist. Pull your arm up gently to stretch your shoulder. 8. Advanced stretch: Put a towel over your other shoulder. Put the hand of your injured arm behind your back. Now hold the back end of the towel. With the other hand, hold the front end of the towel in front of your body. Pull gently on the front end of the towel. This will bring your hand farther up your back to stretch your shoulder. Overhead stretch   6. Standing about an arm's length away, grasp onto a solid surface. You could use a countertop, a doorknob, or the back of a sturdy chair. 7. With your knees slightly bent, bend forward with your arms straight. Lower your upper body, and let your shoulders stretch.   8. As your shoulders are able to stretch farther, you may need to take a step or two backward. 9. Hold for at least 15 to 30 seconds. Then stand up and relax. If you had stepped back during your stretch, step forward so you can keep your hands on the solid surface. 10. Repeat 2 to 4 times. Shoulder flexion (lying down)   To make a wand for this exercise, use a piece of PVC pipe or a broom handle with the broom removed. Make the wand about a foot wider than your shoulders. 1. Lie on your back, holding a wand with both hands. Your palms should face down as you hold the wand. 2. Keeping your elbows straight, slowly raise your arms over your head. Raise them until you feel a stretch in your shoulders, upper back, and chest.  3. Hold for 15 to 30 seconds. 4. Repeat 2 to 4 times. Shoulder rotation (lying down)   To make a wand for this exercise, use a piece of PVC pipe or a broom handle with the broom removed. Make the wand about a foot wider than your shoulders. 1. Lie on your back. Hold a wand with both hands with your elbows bent and palms up. 2. Keep your elbows close to your body, and move the wand across your body toward the sore arm. 3. Hold for 8 to 12 seconds. 4. Repeat 2 to 4 times. Wall climbing (to the side)   Avoid any movement that is straight to your side, and be careful not to arch your back. Your arm should stay about 30 degrees to the front of your side. 1. Stand with your side to a wall so that your fingers can just touch it at an angle about 30 degrees toward the front of your body. 2. Walk the fingers of your injured arm up the wall as high as pain permits. Try not to shrug your shoulder up toward your ear as you move your arm up. 3. Hold that position for a count of at least 15 to 20.  4. Walk your fingers back down to the starting position. 5. Repeat at least 2 to 4 times. Try to reach higher each time. Wall climbing (to the front)   During this stretching exercise, be careful not to arch your back.   1. Face a wall, and stand so your fingers can just touch it. 2. Keeping your shoulder down, walk the fingers of your injured arm up the wall as high as pain permits. (Don't shrug your shoulder up toward your ear.)  3. Hold your arm in that position for at least 15 to 30 seconds. 4. Slowly walk your fingers back down to where you started. 5. Repeat at least 2 to 4 times. Try to reach higher each time. Shoulder blade squeeze   1. Stand with your arms at your sides, and squeeze your shoulder blades together. Do not raise your shoulders up as you squeeze. 2. Hold 6 seconds. 3. Repeat 8 to 12 times. Scapular exercise: Arm reach   1. Lie flat on your back. This exercise is a very slight motion that starts with your arms raised (elbows straight, arms straight). 2. From this position, reach higher toward the deisy or ceiling. Keep your elbows straight. All motion should be from your shoulder blade only. 3. Relax your arms back to where you started. 4. Repeat 8 to 12 times. Arm raise to the side   During this strengthening exercise, your arm should stay about 30 degrees to the front of your side. 1. Slowly raise your injured arm to the side, with your thumb facing up. Raise your arm 60 degrees at the most (shoulder level is 90 degrees). 2. Hold the position for 3 to 5 seconds. Then lower your arm back to your side. If you need to, bring your \"good\" arm across your body and place it under the elbow as you lower your injured arm. Use your good arm to keep your injured arm from dropping down too fast.  3. Repeat 8 to 12 times. 4. When you first start out, don't hold any extra weight in your hand. As you get stronger, you may use a 1-pound to 2-pound dumbbell or a small can of food. Shoulder flexor and extensor exercise   These are isometric exercises. That means you contract your muscles without actually moving. 1. Push forward (flex): Stand facing a wall or doorjamb, about 6 inches or less back.  Hold your injured arm against shoulder blades toward each other. Then move your arms back where you started. Internal rotator strengthening exercise   1. Start by tying a piece of elastic exercise material to a doorknob. You can use surgical tubing or Thera-Band. 2. Stand or sit with your shoulder relaxed and your elbow bent 90 degrees. Your upper arm should rest comfortably against your side. Squeeze a rolled towel between your elbow and your body for comfort. This will help keep your arm at your side. 3. Hold one end of the elastic band in the hand of the painful arm. 4. Slowly rotate your forearm toward your body until it touches your belly. Slowly move it back to where you started. 5. Keep your elbow and upper arm firmly tucked against the towel roll or at your side. 6. Repeat 8 to 12 times. External rotator strengthening exercise   1. Start by tying a piece of elastic exercise material to a doorknob. You can use surgical tubing or Thera-Band. (You may also hold one end of the band in each hand.)  2. Stand or sit with your shoulder relaxed and your elbow bent 90 degrees. Your upper arm should rest comfortably against your side. Squeeze a rolled towel between your elbow and your body for comfort. This will help keep your arm at your side. 3. Hold one end of the elastic band with the hand of the painful arm. 4. Start with your forearm across your belly. Slowly rotate the forearm out away from your body. Keep your elbow and upper arm tucked against the towel roll or the side of your body until you begin to feel tightness in your shoulder. Slowly move your arm back to where you started. 5. Repeat 8 to 12 times. Follow-up care is a key part of your treatment and safety. Be sure to make and go to all appointments, and call your doctor if you are having problems. It's also a good idea to know your test results and keep a list of the medicines you take. Where can you learn more? Go to https://jessica.health-[employer]. org and sign in to your [employer] account. Enter Bello Miles in the [employer] box to learn more about \"Rotator Cuff: Exercises. \"     If you do not have an account, please click on the \"Sign Up Now\" link. Current as of: November 16, 2020               Content Version: 12.8  © 5896-0375 Healthwise, Incorporated. Care instructions adapted under license by [facility] ([facility]). If you have questions about a medical condition or this instruction, always ask your healthcare professional. [address] any warranty or liability for your use of this information.

## 2021-04-28 NOTE — PROGRESS NOTES
[facility] RHEUMATOLOGY FOLLOW UP NOTE       Date Of Service: 4/28/2021  Provider: Maurice Arrdeondo DO    Name: Minerva Sorto   MRN: [id number]      Chief Complaint(s)     Chief Complaint   Patient presents with    6 Month Follow-Up        History of Present Illness (HPI)     Minerva Sorto  is a(n)80 y.o. female with a hx of Chronic low back pain, and DDD lumbar spine, hypertension, osteoarthritis, Hyperlipidemia CKD stage 3  Here for her undiff inflammatory arthritis,  osteoarthritis  bilateral  hands,  Feet, and  low back pain.      Symptoms started:pt with several year of joint pain that started initially in the lower back withOUT trauma/injury. Development of radicular sx's and s/p lumbar spine surgery at [facility]. She has since has Ganglion cyst removal of the b/l 5th fingers and 3rd left PIP. Bilateral foot surgery for bunions.  Hammer toe surgery on right. Bilateral knee TKR at [facility].  Nodules on fingers present for as long as she can remember.  Right foot pain and swelling started around 2014. The pain has since been intermittent. Evaluated by Podiatry Haylie Mancilla) and diagnosed with osteoarthritis of the foot.  Evaluated by Dr. Vera Powell 2017 who found her to have a (+) WICHO and elevated ESR. NEw PCP started mobic 7.5mg every other day has helped with the joint pains.        Inflammatory osteoarthritis - no siginificant pain in the hand at this itme. Mild intermittent pain along the left thumb joints. Taking plaquenil     - right > left shoulder pain  Aggravated with walking dog and laying on the shoulders. Alleviating: decreased use. - left hip  - intermittent, localized worse with laying on the hips. Pain up to 6/10 over the last week. Pain int he right shoulder, left hip (buttock)  and lower back. Pain up to 8/10 in the lower back. Alleviating factors: mobic, heating pad (back) , back bracing, improved posture, slower walking. Radicular pain down left legs  , + myalgin in the left outer hip and lower back. Mild numbness in the left gluteal region    Chronic low back pain, active, up to 6/10 over the past week, denies weakness, radicular , numbness. REVIEW OF SYSTEMS: (ROS)    Review of Systems   Constitutional: Negative for diaphoresis, fatigue and fever. HENT: Negative for congestion, hearing loss and nosebleeds. Eyes: Negative. Negative for pain and redness. Respiratory: Negative for cough, shortness of breath and wheezing. Cardiovascular: Negative. Negative for chest pain. Gastrointestinal: Negative for constipation, diarrhea, nausea and vomiting. Genitourinary: Negative for difficulty urinating, frequency and hematuria. Musculoskeletal: Negative for myalgias. Skin: Negative for rash. Neurological: Negative for dizziness, weakness and headaches. Hematological: Does not bruise/bleed easily. Psychiatric/Behavioral: Negative for sleep disturbance. The patient is not nervous/anxious. PAST MEDICAL HISTORY     has a past medical history of Chronic low back pain, CKD (chronic kidney disease) stage 3, GFR 30-59 ml/min, DDD (degenerative disc disease), lumbar, Dyslipidemia, History of skin cancer, Hyperlipidemia, Hypertension, Osteoarthritis, and PONV (postoperative nausea and vomiting). PAST SURGICAL HISTORY     has a past surgical history that includes joint replacement (Bilateral); Foot surgery (Bilateral); Hysterectomy;  section; Dilation and curettage of uterus; Carpal tunnel release (Bilateral); Finger surgery; skin biopsy; back surgery (); Colonoscopy; cyst removal (Left, 2017); Cholecystectomy, laparoscopic (10/03/2017); pr colonoscopy flx dx w/collj spec when pfrmd (N/A, 2018); Nerve Surgery (N/A, 2019); Pain management procedure (Left, 2020); Facet joint injection (Bilateral, 2020); and Pain management procedure (Bilateral, 2021). Franco Haider     FAMILY HISTORY      Family History   Problem Relation Age of Onset    Heart Disease Mother CHF    Alzheimer's Disease Mother     Dementia Mother     Cancer Father         LUNG    Emphysema Father     Colon Cancer Neg Hx     Breast Cancer Neg Hx        SOCIAL HISTORY     reports that she has never smoked. She has never used smokeless tobacco. She reports current alcohol use. She reports that she does not use drugs. ALLERGIES     Allergies   Allergen Reactions    Sulfa Antibiotics Swelling    Other Nausea And Vomiting     oysters       CURRENT MEDICATIONS      Current Outpatient Medications:     lisinopril-hydroCHLOROthiazide (PRINZIDE;ZESTORETIC) 10-12.5 MG per tablet, TAKE 1 TABLET BY MOUTH DAILY, Disp: 90 tablet, Rfl: 3    omeprazole (PRILOSEC) 20 MG delayed release capsule, Take 1 capsule by mouth daily, Disp: 90 capsule, Rfl: 3    hydroxychloroquine (PLAQUENIL) 200 MG tablet, Take 1.5 tablets by mouth daily, Disp: 135 tablet, Rfl: 1    vitamin C (ASCORBIC ACID) 500 MG tablet, Take 500 mg by mouth daily, Disp: , Rfl:     meloxicam (MOBIC) 7.5 MG tablet, Take 1 tablet by mouth daily as needed for Pain, Disp: 90 tablet, Rfl: 3    Multiple Vitamins-Minerals (PRESERVISION AREDS PO), Take by mouth, Disp: , Rfl:     Calcium Carbonate-Vit D-Min (CALCIUM 1200 PO), Take by mouth daily, Disp: , Rfl:     Cholecalciferol (VITAMIN D-3 PO), Take by mouth daily, Disp: , Rfl:     Handicap Placard MISC, by Does not apply route Expires 29 June 2023, Disp: 1 each, Rfl: 0    PHYSICAL EXAMINATION / OBJECTIVE     Objective:  BP (!) 152/68 (Site: Left Upper Arm, Position: Sitting, Cuff Size: Medium Adult)   Pulse 78   Ht 5' 2.52\" (1.588 m)   Wt 167 lb (75.8 kg)   SpO2 91%   BMI 30.04 kg/m²     General Appearance: General appearance:  AAO x 3 ,  well-developed and well nourished  Head: NCAT  Eyes: No abnormalities. ,  Sclera non-icteric,   Ears / nose:  normal  appearance  ears and nose. No active drainage from nose.    mouth:  MMM, ears w/o deformities  Neck: No jugular venous distention, appears symmetric, good ROM  Lymph: no cervical adenopathy   Pulmonary/Chest: CTA bilateral ,  symmetric chest expansion. Cardiovascular: Normal S1 and S2, NO murmur, rub, gallop  : Deferred   Abd/GI: Deferred   Neurologic: Speech normal, no facial droop,  Skin:   Erythematous patch affecting the left medial eyebrow  Extremities:    Musculoskeletal:  Upper extremities:  Tender left shoulder + infraspinatous. , + speed, negative lucía. + mild weakness in the shoulder 4/5    Lower extremities:  HIPS - tender latearl hips. Knees/ankles/feeto non-tender. Spine: tender sacrel region, with rigth lower than left sacral flexion        Exam KEY:   Tender :  T    Swelling: S,   Deformities: D,   Non-tender : NT  ,  No swelling: NS       RAPID 3:   4/28/2021 --- RAPID 3:  +  +  =        Remission: <3  Low Disease Activity: <6  Moderate Disease Activity: >=6 and <=12  High Disease Activity: >12     LABS      CBC  Lab Results   Component Value Date    WBC 4.9 04/12/2021    WBC 4.1 02/19/2021    WBC 4.7 06/24/2020    RBC 4.29 04/12/2021    HGB 12.9 04/12/2021    HGB 12.5 02/19/2021    HGB 13.0 06/24/2020    HCT 39.9 04/12/2021    MCV 93.0 04/12/2021    RDW 13.3 02/19/2021     04/12/2021     02/19/2021     06/24/2020       CMP  Lab Results   Component Value Date    CALCIUM 10.0 04/19/2021    LABALBU 4.6 04/12/2021    PROT 7.2 04/12/2021     04/19/2021    K 4.3 04/19/2021    CO2 27 04/19/2021     04/19/2021    BUN 20 04/19/2021    CREATININE 0.9 04/19/2021    CREATININE 1.3 04/12/2021    CREATININE 1.0 11/16/2020    ALKPHOS 114 04/12/2021    ALT 18 04/12/2021    ALT 17 06/24/2020    ALT 16 04/01/2019    AST 25 04/12/2021    AST 28 06/24/2020    AST 24 04/01/2019       HgBA1c: No components found for: HGBA1C    No results found for: VITD25    No results found for: ANASCRN  No results found for: SSA  No results found for: SSB  No results found for: ANTI-SMITH  No results found for: DSDNAAB   No results found for: ANTIRNP  No results found for: C3, C4  No results found for: CCPAB  Lab Results   Component Value Date    RF Negative 06/08/2018       No components found for: Redia Scales Results   Component Value Date    SEDRATE 12 06/08/2018     Lab Results   Component Value Date    CRP 0.5 06/08/2018         RADIOLOGY / PROCEDURES:         ASSESSMENT/PLAN    Assessment   Plan     No diagnosis found.       Erosive osteoarthritis bilateral hands  - Chondrocalcinosis noted in left hand, mild fullness/swelling of MCPs previously appreciated. Positive WICHO but negative suck serologies. ?  CPPD arthropathy versus inflammatory/erosive osteoarthritis   - Continue Plaquenil 300 mg daily (February 2019)     Trochanteric bursitis- bilat  -  Stretches provided     bilat  shoulder - continue home exercises. XR with mild Degnerative    - discussed phys therapy if continued at the next evaluation. - provided home exercises. -     Lumbar spinal stenosis  & foraminal stenosis --    \- pseudo-claudication symptoms improved with sitting worse with standing and walking.  ' Prior tx: Failed physical therapy 2019. Lumbar epidural providing moderate relief (2019))              -orthopedics evaluation  October 2019 and has declined surgery at this time.                   Osteoarthritis bilateral feet: - mother with similar hands. XR hands with some erosive changes.             - prior tx: diclofenac gel (helped but didn't last)                         -  mobic 7.5mg every other day prn pain from PCP or tylenol OTC                - prilosec 20mg daily provided. No follow-ups on file. Electronically signed by García Calle DO on 4/28/2021 at 11:21 AM    New Prescriptions    No medications on file       Thank you for allowing me to participate in the care of this patient. Please call if there are any questions.

## 2021-05-12 DIAGNOSIS — M19.071 OSTEOARTHRITIS OF BOTH FEET, UNSPECIFIED OSTEOARTHRITIS TYPE: ICD-10-CM

## 2021-05-12 DIAGNOSIS — M19.072 OSTEOARTHRITIS OF BOTH FEET, UNSPECIFIED OSTEOARTHRITIS TYPE: ICD-10-CM

## 2021-05-13 RX ORDER — HYDROXYCHLOROQUINE SULFATE 200 MG/1
TABLET, FILM COATED ORAL
Qty: 135 TABLET | Refills: 1 | Status: SHIPPED | OUTPATIENT
Start: 2021-05-13 | End: 2021-11-16

## 2021-05-16 ENCOUNTER — PATIENT MESSAGE (OUTPATIENT)
Dept: FAMILY MEDICINE CLINIC | Age: 81
End: 2021-05-16

## 2021-05-17 DIAGNOSIS — M15.9 PRIMARY OSTEOARTHRITIS INVOLVING MULTIPLE JOINTS: Chronic | ICD-10-CM

## 2021-05-17 RX ORDER — MELOXICAM 7.5 MG/1
7.5 TABLET ORAL DAILY PRN
Qty: 90 TABLET | Refills: 3 | Status: SHIPPED | OUTPATIENT
Start: 2021-05-17 | End: 2022-02-08 | Stop reason: ALTCHOICE

## 2021-05-17 NOTE — TELEPHONE ENCOUNTER
From: Jodi Gasca  To: Ang Parks, DO  Sent: 5/16/2021 7:05 PM EDT  Subject: Prescription Question    I would like to get a refill on my script for Melodicam!   I use [facility] on [address]. Thank you    I will need to get it before Friday, 5-2021!

## 2021-09-24 ENCOUNTER — PATIENT MESSAGE (OUTPATIENT)
Dept: FAMILY MEDICINE CLINIC | Age: 81
End: 2021-09-24

## 2021-09-27 NOTE — TELEPHONE ENCOUNTER
From: Pilar Reis  To: Buffy Aparicio DO  Sent: 9/24/2021 12:57 PM EDT  Subject: Visit Follow-Up Question    I am having pain again in my right knee again! I did see a [facility]anch[facility] at [facility] in June(?) and x-rays showed no problem! Knee has some shooting pains off and on and sometimes feel \"mushy\". I have been using ice and heat off and on! Any suggestions?     Thanks [employer]

## 2021-09-28 ENCOUNTER — OFFICE VISIT (OUTPATIENT)
Dept: FAMILY MEDICINE CLINIC | Age: 81
End: 2021-09-28
Payer: MEDICARE

## 2021-09-28 ENCOUNTER — TELEPHONE (OUTPATIENT)
Dept: FAMILY MEDICINE CLINIC | Age: 81
End: 2021-09-28

## 2021-09-28 VITALS
BODY MASS INDEX: 28.7 KG/M2 | HEIGHT: 63 IN | DIASTOLIC BLOOD PRESSURE: 71 MMHG | WEIGHT: 162 LBS | SYSTOLIC BLOOD PRESSURE: 132 MMHG | OXYGEN SATURATION: 98 % | HEART RATE: 68 BPM | TEMPERATURE: 98.1 F | RESPIRATION RATE: 14 BRPM

## 2021-09-28 DIAGNOSIS — Z23 NEEDS FLU SHOT: ICD-10-CM

## 2021-09-28 DIAGNOSIS — M62.838 MUSCLE SPASM OF RIGHT LOWER EXTREMITY: ICD-10-CM

## 2021-09-28 DIAGNOSIS — M25.511 CHRONIC RIGHT SHOULDER PAIN: ICD-10-CM

## 2021-09-28 DIAGNOSIS — G89.29 CHRONIC RIGHT SHOULDER PAIN: ICD-10-CM

## 2021-09-28 DIAGNOSIS — M25.561 ACUTE PAIN OF RIGHT KNEE: Primary | ICD-10-CM

## 2021-09-28 PROCEDURE — 90694 VACC AIIV4 NO PRSRV 0.5ML IM: CPT | Performed by: FAMILY MEDICINE

## 2021-09-28 PROCEDURE — 1123F ACP DISCUSS/DSCN MKR DOCD: CPT | Performed by: FAMILY MEDICINE

## 2021-09-28 PROCEDURE — 1036F TOBACCO NON-USER: CPT | Performed by: FAMILY MEDICINE

## 2021-09-28 PROCEDURE — 1090F PRES/ABSN URINE INCON ASSESS: CPT | Performed by: FAMILY MEDICINE

## 2021-09-28 PROCEDURE — G8427 DOCREV CUR MEDS BY ELIG CLIN: HCPCS | Performed by: FAMILY MEDICINE

## 2021-09-28 PROCEDURE — 99213 OFFICE O/P EST LOW 20 MIN: CPT | Performed by: FAMILY MEDICINE

## 2021-09-28 PROCEDURE — G0008 ADMIN INFLUENZA VIRUS VAC: HCPCS | Performed by: FAMILY MEDICINE

## 2021-09-28 PROCEDURE — G8399 PT W/DXA RESULTS DOCUMENT: HCPCS | Performed by: FAMILY MEDICINE

## 2021-09-28 PROCEDURE — 4040F PNEUMOC VAC/ADMIN/RCVD: CPT | Performed by: FAMILY MEDICINE

## 2021-09-28 PROCEDURE — G8417 CALC BMI ABV UP PARAM F/U: HCPCS | Performed by: FAMILY MEDICINE

## 2021-09-28 RX ORDER — TIZANIDINE 4 MG/1
4 TABLET ORAL 3 TIMES DAILY PRN
Qty: 45 TABLET | Refills: 0 | Status: SHIPPED | OUTPATIENT
Start: 2021-09-28 | End: 2022-07-24 | Stop reason: ALTCHOICE

## 2021-09-28 RX ORDER — PREDNISONE 20 MG/1
40 TABLET ORAL DAILY
Qty: 10 TABLET | Refills: 0 | Status: SHIPPED | OUTPATIENT
Start: 2021-09-28 | End: 2021-10-03

## 2021-09-28 NOTE — PROGRESS NOTES
Immunization(s) given during visit:    Immunizations Administered     Name Date Dose Route    Influenza, Quadv, adjuvanted, 65 yrs +, IM, PF (Fluad) 9/28/2021 0.5 mL Intramuscular    Site: Deltoid- Left    Lot: [id number]    NDC: 03884-516-61          Most recent Vaccine Information Sheet dated 8/6/21 given to pt      Vaccine Information Sheet, \"Influenza - Inactivated\"  given to Sammi Esposito, or parent/legal guardian of  Sammi Esposito and verbalized understanding. Patient responses:    Have you ever had a reaction to a flu vaccine? No  Do you have an allergy to eggs, neomycin or polymixin? No  Do you have an allergy to Thimerosal, contact lens solution, or Merthiolate? No  Have you ever had Guillian [place] Syndrome? No  Do you have any current illness? No  Do you have a temperature above 100 degrees? No  Are you pregnant? No  If pregnant, permission obtained from physician? No  Do you have an active neurological disorder? No      Flu vaccine given per order. Please see immunization tab.

## 2021-09-29 ENCOUNTER — HOSPITAL ENCOUNTER (OUTPATIENT)
Dept: GENERAL RADIOLOGY | Age: 81
Discharge: HOME | End: 2021-09-29
Payer: MEDICARE

## 2021-09-29 ENCOUNTER — HOSPITAL ENCOUNTER (OUTPATIENT)
Age: 81
Discharge: HOME | End: 2021-09-29
Payer: MEDICARE

## 2021-09-29 DIAGNOSIS — G89.29 CHRONIC RIGHT SHOULDER PAIN: ICD-10-CM

## 2021-09-29 DIAGNOSIS — M25.511 CHRONIC RIGHT SHOULDER PAIN: ICD-10-CM

## 2021-09-29 DIAGNOSIS — M25.561 ACUTE PAIN OF RIGHT KNEE: ICD-10-CM

## 2021-09-29 DIAGNOSIS — M62.838 MUSCLE SPASM OF RIGHT LOWER EXTREMITY: ICD-10-CM

## 2021-09-29 PROCEDURE — 73564 X-RAY EXAM KNEE 4 OR MORE: CPT

## 2021-09-29 PROCEDURE — 73030 X-RAY EXAM OF SHOULDER: CPT

## 2021-09-30 ENCOUNTER — TELEPHONE (OUTPATIENT)
Dept: FAMILY MEDICINE CLINIC | Age: 81
End: 2021-09-30

## 2021-09-30 NOTE — TELEPHONE ENCOUNTER
----- Message from Sonal Willard DO sent at 9/29/2021  7:13 PM EDT -----  Please let know that knee xray looked good, hardware in place. Shoulder xray showed some mild arthritis but otherwise ok  Con't with plan from clinic visit. Let me know if questions, thanks!

## 2021-10-08 ENCOUNTER — TELEPHONE (OUTPATIENT)
Dept: FAMILY MEDICINE CLINIC | Age: 81
End: 2021-10-08

## 2021-10-08 DIAGNOSIS — N18.31 STAGE 3A CHRONIC KIDNEY DISEASE (HCC): Primary | Chronic | ICD-10-CM

## 2021-10-08 DIAGNOSIS — E78.5 DYSLIPIDEMIA: Chronic | ICD-10-CM

## 2021-10-08 NOTE — TELEPHONE ENCOUNTER
Please let pt know that she is due for 6 month f/u labs on cholesterol and BMP    Should be fasting    Next few wks is fine    Let me know if questions, thanks! Diagnosis Orders   1. Stage 3a chronic kidney disease ([address]  Lipid Panel    Basic Metabolic Panel   2.  Dyslipidemia  Lipid Panel    Basic Metabolic Panel     Future Appointments   Date Time Provider Radha Paulson   10/15/2021 12:20 PM STR MAMMOGRAPHY RM3 DIG STRZ WOMEN STR Radiolog   11/3/2021 11:00 AM Darshan Leal DO N [facility]PX Rheum [facility] [address]   4/27/2022  9:40 AM Carmen Garcia, DO Fam Med [address]

## 2021-10-15 ENCOUNTER — HOSPITAL ENCOUNTER (OUTPATIENT)
Dept: WOMENS IMAGING | Age: 81
Discharge: HOME | End: 2021-10-15
Payer: MEDICARE

## 2021-10-15 DIAGNOSIS — Z12.31 VISIT FOR SCREENING MAMMOGRAM: ICD-10-CM

## 2021-10-15 PROCEDURE — 77063 BREAST TOMOSYNTHESIS BI: CPT

## 2021-10-18 ENCOUNTER — TELEPHONE (OUTPATIENT)
Dept: FAMILY MEDICINE CLINIC | Age: 81
End: 2021-10-18

## 2021-10-18 NOTE — TELEPHONE ENCOUNTER
----- Message from Dusty Pierre, DO sent at 10/17/2021  7:31 AM EDT -----  Please let pt know that mammogram is normal. Let me know if questions, thanks!

## 2021-10-28 ENCOUNTER — NURSE ONLY (OUTPATIENT)
Dept: LAB | Age: 81
End: 2021-10-28

## 2021-10-28 DIAGNOSIS — E78.5 DYSLIPIDEMIA: Chronic | ICD-10-CM

## 2021-10-28 DIAGNOSIS — N18.31 STAGE 3A CHRONIC KIDNEY DISEASE (HCC): Chronic | ICD-10-CM

## 2021-10-28 LAB
ANION GAP SERPL CALCULATED.3IONS-SCNC: 12 MEQ/L (ref 8–16)
BUN BLDV-MCNC: 17 MG/DL (ref 7–22)
CALCIUM SERPL-MCNC: 10.1 MG/DL (ref 8.5–10.5)
CHLORIDE BLD-SCNC: 100 MEQ/L (ref 98–111)
CHOLESTEROL, TOTAL: 263 MG/DL (ref 100–199)
CO2: 26 MEQ/L (ref 23–33)
CREAT SERPL-MCNC: 1.1 MG/DL (ref 0.4–1.2)
GFR SERPL CREATININE-BSD FRML MDRD: 48 ML/MIN/1.73M2
GLUCOSE BLD-MCNC: 107 MG/DL (ref 70–108)
HDLC SERPL-MCNC: 60 MG/DL
LDL CHOLESTEROL CALCULATED: 165 MG/DL
POTASSIUM SERPL-SCNC: 4.7 MEQ/L (ref 3.5–5.2)
SODIUM BLD-SCNC: 138 MEQ/L (ref 135–145)
TRIGL SERPL-MCNC: 191 MG/DL (ref 0–199)

## 2021-10-29 ENCOUNTER — TELEPHONE (OUTPATIENT)
Dept: FAMILY MEDICINE CLINIC | Age: 81
End: 2021-10-29

## 2021-10-29 NOTE — TELEPHONE ENCOUNTER
----- Message from Gricelda Reid DO sent at 10/29/2021  5:48 AM EDT -----  Please let pt know that labs overall stable and appropriate. Lipids a bit better than prior. But not high enough to warrant resuming zocor  Let me know if questions, thanks!

## 2021-11-03 ENCOUNTER — OFFICE VISIT (OUTPATIENT)
Dept: RHEUMATOLOGY | Age: 81
End: 2021-11-03
Payer: MEDICARE

## 2021-11-03 VITALS
SYSTOLIC BLOOD PRESSURE: 130 MMHG | BODY MASS INDEX: 28.88 KG/M2 | HEART RATE: 73 BPM | OXYGEN SATURATION: 93 % | HEIGHT: 63 IN | DIASTOLIC BLOOD PRESSURE: 84 MMHG | WEIGHT: 163 LBS

## 2021-11-03 DIAGNOSIS — M15.4 EROSIVE OSTEOARTHRITIS OF HANDS, BILATERAL: Primary | ICD-10-CM

## 2021-11-03 DIAGNOSIS — M71.349 SYNOVIAL CYST OF HAND: ICD-10-CM

## 2021-11-03 DIAGNOSIS — Z51.81 MEDICATION MONITORING ENCOUNTER: ICD-10-CM

## 2021-11-03 DIAGNOSIS — M19.072 OSTEOARTHRITIS OF BOTH FEET, UNSPECIFIED OSTEOARTHRITIS TYPE: ICD-10-CM

## 2021-11-03 DIAGNOSIS — M51.36 DDD (DEGENERATIVE DISC DISEASE), LUMBAR: ICD-10-CM

## 2021-11-03 DIAGNOSIS — M19.071 OSTEOARTHRITIS OF BOTH FEET, UNSPECIFIED OSTEOARTHRITIS TYPE: ICD-10-CM

## 2021-11-03 PROCEDURE — 1090F PRES/ABSN URINE INCON ASSESS: CPT | Performed by: INTERNAL MEDICINE

## 2021-11-03 PROCEDURE — G8399 PT W/DXA RESULTS DOCUMENT: HCPCS | Performed by: INTERNAL MEDICINE

## 2021-11-03 PROCEDURE — 99214 OFFICE O/P EST MOD 30 MIN: CPT | Performed by: INTERNAL MEDICINE

## 2021-11-03 PROCEDURE — 96372 THER/PROPH/DIAG INJ SC/IM: CPT | Performed by: INTERNAL MEDICINE

## 2021-11-03 PROCEDURE — G8427 DOCREV CUR MEDS BY ELIG CLIN: HCPCS | Performed by: INTERNAL MEDICINE

## 2021-11-03 PROCEDURE — G8417 CALC BMI ABV UP PARAM F/U: HCPCS | Performed by: INTERNAL MEDICINE

## 2021-11-03 PROCEDURE — 1036F TOBACCO NON-USER: CPT | Performed by: INTERNAL MEDICINE

## 2021-11-03 PROCEDURE — 4040F PNEUMOC VAC/ADMIN/RCVD: CPT | Performed by: INTERNAL MEDICINE

## 2021-11-03 PROCEDURE — 1123F ACP DISCUSS/DSCN MKR DOCD: CPT | Performed by: INTERNAL MEDICINE

## 2021-11-03 PROCEDURE — G8484 FLU IMMUNIZE NO ADMIN: HCPCS | Performed by: INTERNAL MEDICINE

## 2021-11-03 RX ORDER — METHYLPREDNISOLONE ACETATE 40 MG/ML
5 INJECTION, SUSPENSION INTRA-ARTICULAR; INTRALESIONAL; INTRAMUSCULAR; SOFT TISSUE ONCE
Status: COMPLETED | OUTPATIENT
Start: 2021-11-03 | End: 2021-11-03

## 2021-11-03 RX ORDER — METHYLPREDNISOLONE ACETATE 80 MG/ML
10 INJECTION, SUSPENSION INTRA-ARTICULAR; INTRALESIONAL; INTRAMUSCULAR; SOFT TISSUE ONCE
Status: DISCONTINUED | OUTPATIENT
Start: 2021-11-03 | End: 2021-11-03

## 2021-11-03 RX ADMIN — METHYLPREDNISOLONE ACETATE 5.2 MG: 40 INJECTION, SUSPENSION INTRA-ARTICULAR; INTRALESIONAL; INTRAMUSCULAR; SOFT TISSUE at 11:57

## 2021-11-03 ASSESSMENT — ENCOUNTER SYMPTOMS
EYES NEGATIVE: 1
COUGH: 0
WHEEZING: 0
CONSTIPATION: 0
DIARRHEA: 0
EYE REDNESS: 0
NAUSEA: 0
EYE PAIN: 0
VOMITING: 0
SHORTNESS OF BREATH: 0

## 2021-11-03 NOTE — PROGRESS NOTES
[facility] RHEUMATOLOGY FOLLOW UP NOTE       Date Of Service: 11/3/2021  Provider: Phill Sorto DO ,   PCP: Ana Laura Hinson DO   Name: Karyna Morrissey   MRN: [id number]        History of Present Illness (HPI)     Chief Complaint   Patient presents with    6 Month Follow-Up     osteoarthritis         Karyna Morrissey  is a(n)80 y.o. female with a hx of  has a past medical history of Chronic low back pain, CKD (chronic kidney disease) stage 3, GFR 30-59 ml/min ([address] DDD (degenerative disc disease), lumbar, Dyslipidemia, History of skin cancer, Hyperlipidemia, Hypertension, Osteoarthritis, and PONV (postoperative nausea and vomiting). here for the f/u evaluation of  Inflammatory osteoarthritis, osteoarthritis of multiple joints, troch bursitis and medication monitoring.      - Cystic changes noted along the right thumb - patient lanced with clear discharge noted. + mild tenderness at the area. Increased  Right shoulder pain after 2 falls onto shoulder over the past couple months. Painful ROm, that has slowly improved. Chronic low back pain - stable, active pain up to 6/10 over the past week. Denies weakness, numbness, radicular pain. Inflammatory Osteoarthritis - stable. With plaquenil 300mg daily. Troch bursitis bilateral - resolved with home stretching. REVIEW OF SYSTEMS: (ROS)    Review of Systems   Constitutional: Negative for diaphoresis, fatigue and fever. HENT: Negative for congestion, hearing loss and nosebleeds. Eyes: Negative. Negative for pain and redness. Respiratory: Negative for cough, shortness of breath and wheezing. Cardiovascular: Negative. Negative for chest pain. Gastrointestinal: Negative for constipation, diarrhea, nausea and vomiting. Genitourinary: Negative for difficulty urinating, frequency and hematuria. Musculoskeletal: Negative for myalgias. Skin: Negative for rash. Neurological: Negative for dizziness, weakness and headaches. 29.32 kg/m²     Physical Exam      General Appearance:  AAO x 3 ,  well-developed and well nourished  Head: NCAT  Eyes: No abnormalities. ,  Sclera non-icteric,   Ears / Nose:  normal  appearance  ears and nose. No active drainage   Mouth:  MMM, ears w/o deformities  Neck: No jugular venous distention, appears symmetric, good ROM  Lymph:  no cervical adenopathy  Pulmonary/Chest: CTA bilateral ,  symmetric chest expansion. Cardiovascular: Normal S1 and S2, NO murmur, rub, gallop  : Deferred   Abd/GI: Deferred   Neurologic: Speech normal, no facial droop,  Skin: NO rash on exposed skin. Musculoskeletal:  Upper extremities:    Shoulder Tender - Right shoulder + infraspinatous. , + speed, hawking + mild weakness in the shoulder 4/5  Hands + DIP and pip nodules. Cystic change right 1st DIP joint.      Lower extremities:  HIPS, Knees/ankles/feeto non-tender.              Exam KEY:   Tender :  T    Swelling: S,   Deformities: D,   Non-tender : NT  ,  No swelling: NS              Remission: <3  Low Disease Activity: <6  Moderate Disease Activity: >=6 and <=12  High Disease Activity: >12     LABS      Lab Results   Component Value Date    WBC 4.9 04/12/2021    HGB 12.9 04/12/2021    MCV 93.0 04/12/2021    MCHC 32.3 04/12/2021    RDW 13.3 02/19/2021     04/12/2021    NEUTROABS 2600 05/03/2018    LYMPHSABS 1.4 04/12/2021    EOSABS 0.3 04/12/2021    BASOSABS 0.0 04/12/2021         Chemistry        Component Value Date/Time     10/28/2021 0847    K 4.7 10/28/2021 0847     10/28/2021 0847    CO2 26 10/28/2021 0847    BUN 17 10/28/2021 0847    CREATININE 1.1 10/28/2021 0847        Component Value Date/Time    CALCIUM 10.1 10/28/2021 0847    ALKPHOS 114 04/12/2021 0817    AST 25 04/12/2021 0817    ALT 18 04/12/2021 0817    BILITOT 0.4 04/12/2021 0817            Lab Results   Component Value Date    SEDRATE 12 06/08/2018    CRP 0.5 06/08/2018       No results found for: VITD25    No results found for: C3, C4]    No results found for: ANASCRN  No results found for: SSA  No results found for: SSB  No results found for: ANTI-SMITH  No results found for: DSDNAAB   No results found for: ANTIRNP  No results found for: C3, C4  No results found for: CCPAB  Lab Results   Component Value Date    RF Negative 06/08/2018           RADIOLOGY / PROCEDURES:     ASSESSMENT/PLAN:     1. Erosive osteoarthritis of hands, bilateral    2. Osteoarthritis of both feet, unspecified osteoarthritis type    3. DDD (degenerative disc disease), lumbar    4. Medication monitoring encounter         Erosive osteoarthritis bilateral hands  - Chondrocalcinosis noted in left hand, mild fullness/swelling of MCPs previously appreciated. Positive WICHO but negative suck serologies. ?  CPPD arthropathy versus inflammatory/erosive osteoarthritis               - Plaquenil 300 mg daily (February 2019)     synovial cyst / ganglion cyst -- right 1st IP joint. Discussed orthopedics evaluation. Trochanteric bursitis- resolved w/ stretching. bilat  shoulder - continue home exercises. XR with mild Degnerative          - declined occupational therapy   - continue home home exercises. Lumbar spinal stenosis  & foraminal stenosis --   stable. - pseudo-claudication symptoms improved with sitting worse with standing and walking.  ' Prior tx: Failed physical therapy 2019. Lumbar epidural providing moderate relief (2019)) . Prior eval by orthopedics  October 2019 - no surgery reocmmended. Osteoarthritis bilateral feet: - mother with similar hands. XR hands with some erosive changes. - prior tx: diclofenac gel (helped but didn't last)                         -  mobic 7.5mg daily prn - would like to stop given CKD. - prilosec 20mg daily provided. - discussed topical volteran gel, tumeric, arnica          No follow-ups on file.     New Prescriptions    No medications on file

## 2021-11-13 DIAGNOSIS — M19.072 OSTEOARTHRITIS OF BOTH FEET, UNSPECIFIED OSTEOARTHRITIS TYPE: ICD-10-CM

## 2021-11-13 DIAGNOSIS — M19.071 OSTEOARTHRITIS OF BOTH FEET, UNSPECIFIED OSTEOARTHRITIS TYPE: ICD-10-CM

## 2021-11-16 RX ORDER — OMEPRAZOLE 20 MG/1
20 CAPSULE, DELAYED RELEASE ORAL DAILY
Qty: 90 CAPSULE | Refills: 3 | Status: SHIPPED | OUTPATIENT
Start: 2021-11-16

## 2021-11-16 RX ORDER — HYDROXYCHLOROQUINE SULFATE 200 MG/1
TABLET, FILM COATED ORAL
Qty: 135 TABLET | Refills: 1 | Status: SHIPPED | OUTPATIENT
Start: 2021-11-16 | End: 2022-05-12

## 2021-12-01 ENCOUNTER — TELEPHONE (OUTPATIENT)
Dept: PHYSICAL MEDICINE AND REHAB | Age: 81
End: 2021-12-01

## 2021-12-01 ENCOUNTER — OFFICE VISIT (OUTPATIENT)
Dept: PHYSICAL MEDICINE AND REHAB | Age: 81
End: 2021-12-01
Payer: MEDICARE

## 2021-12-01 VITALS
BODY MASS INDEX: 30 KG/M2 | HEIGHT: 62 IN | WEIGHT: 163 LBS | SYSTOLIC BLOOD PRESSURE: 136 MMHG | HEART RATE: 80 BPM | DIASTOLIC BLOOD PRESSURE: 74 MMHG

## 2021-12-01 DIAGNOSIS — M15.4 EROSIVE OSTEOARTHRITIS OF BOTH HANDS: ICD-10-CM

## 2021-12-01 DIAGNOSIS — M21.70 LEG LENGTH DISCREPANCY: ICD-10-CM

## 2021-12-01 DIAGNOSIS — M47.816 LUMBAR SPONDYLOSIS: Primary | ICD-10-CM

## 2021-12-01 DIAGNOSIS — M41.9 SCOLIOSIS OF THORACOLUMBAR SPINE, UNSPECIFIED SCOLIOSIS TYPE: ICD-10-CM

## 2021-12-01 PROCEDURE — 1036F TOBACCO NON-USER: CPT | Performed by: PAIN MEDICINE

## 2021-12-01 PROCEDURE — G8417 CALC BMI ABV UP PARAM F/U: HCPCS | Performed by: PAIN MEDICINE

## 2021-12-01 PROCEDURE — G8427 DOCREV CUR MEDS BY ELIG CLIN: HCPCS | Performed by: PAIN MEDICINE

## 2021-12-01 PROCEDURE — 1123F ACP DISCUSS/DSCN MKR DOCD: CPT | Performed by: PAIN MEDICINE

## 2021-12-01 PROCEDURE — 1090F PRES/ABSN URINE INCON ASSESS: CPT | Performed by: PAIN MEDICINE

## 2021-12-01 PROCEDURE — G8399 PT W/DXA RESULTS DOCUMENT: HCPCS | Performed by: PAIN MEDICINE

## 2021-12-01 PROCEDURE — 99214 OFFICE O/P EST MOD 30 MIN: CPT | Performed by: PAIN MEDICINE

## 2021-12-01 PROCEDURE — 4040F PNEUMOC VAC/ADMIN/RCVD: CPT | Performed by: PAIN MEDICINE

## 2021-12-01 PROCEDURE — G8484 FLU IMMUNIZE NO ADMIN: HCPCS | Performed by: PAIN MEDICINE

## 2021-12-01 ASSESSMENT — ENCOUNTER SYMPTOMS
COUGH: 0
GASTROINTESTINAL NEGATIVE: 1
RHINORRHEA: 0
NAUSEA: 0
SINUS PRESSURE: 0
COLOR CHANGE: 0
SORE THROAT: 0
PHOTOPHOBIA: 0
RESPIRATORY NEGATIVE: 1
SHORTNESS OF BREATH: 0
ABDOMINAL PAIN: 0
BACK PAIN: 1
CHEST TIGHTNESS: 0
WHEEZING: 0
CONSTIPATION: 0
VOMITING: 0
EYE PAIN: 0
DIARRHEA: 0

## 2021-12-01 NOTE — PROGRESS NOTES
[facility] [address]  Dept: [phone]  Dept Fax: [phone]: [phone]    Visit Date: 12/1/2021    Functionality Assessment/Goals Worksheet     On a scale of 0 (Does not Interfere) to 10 (Completely Interferes)     1. Which number describes how during the past week pain has interfered with       the following:  A. General Activity:  5  B. Mood: 7  C. Walking Ability:  5  D. Normal Work (Includes both work outside the home and housework):  5  E. Relations with Other People:   0  F. Sleep:   7  G. Enjoyment of Life:   7    2. Patient Prefers to Take their Pain Medications:     [x]  On a regular basis   []  Only when necessary    []  Does not take pain medications    3. What are the Patient's Goals/Expectations for Visiting Pain Management? []  Learn about my pain    []  Receive Medication   []  Physical Therapy     []  Treat Depression   [x]  Receive Injections    []  Treat Sleep   []  Deal with Anxiety and Stress   []  Treat Opoid Dependence/Addiction   []  Other:      HPI:   Bailee Chambers is a 80 y.o. female is here today for    Chief Complaint: Low back pain     HPI     Patient prior patient of Dr Willis Schofield. Patient had bilateral facet MBB @ T12-L1,L1-L2,L2-L3 and L3-4 on 1/20/2021. [facility] helped 75-80% helped 2-3 months. [facility] was able to drive to [place]. States now pain has increased and wants theses repeated. Pain increases with bending, reaching, pushing, pulling, walking and standing. Treatments Tried injections,Tylenol  Pain Description sharp, stabbing, pressure and aching    Medications reviewed. Patient denies side effects with medications. Patient states she is taking medications as prescribed. Shedenies receiving pain medications from other sources. She denies any ER visits since last visit.     Pain scale with out pain medications or at its worst is 10/10. Pain scale with pain medications or at its best is 6/10. Last dose of Tylenol was takes PRN. MRI LUMBAR SPINE 1/3/2020: The patientis allergic to sulfa antibiotics and other. Subjective:      Review of Systems   Constitutional: Positive for activity change. Negative for appetite change, chills, diaphoresis, fatigue, fever and unexpected weight change. HENT: Negative for congestion, ear pain, hearing loss, mouth sores, nosebleeds, rhinorrhea, sinus pressure and sore throat. Eyes: Negative for photophobia, pain and visual disturbance. Respiratory: Negative. Negative for cough, chest tightness, shortness of breath and wheezing. Cardiovascular: Negative for chest pain and palpitations. Hx HTN   Gastrointestinal: Negative. Negative for abdominal pain, constipation, diarrhea, nausea and vomiting. Endocrine: Negative. Negative for cold intolerance, heat intolerance, polydipsia, polyphagia and polyuria. Genitourinary: Positive for frequency and urgency. Negative for decreased urine volume, difficulty urinating and hematuria. Musculoskeletal: Positive for arthralgias, back pain, gait problem, joint swelling, myalgias and neck pain. Negative for neck stiffness. Skin: Negative for color change and rash. Allergic/Immunologic: Negative for food allergies and immunocompromised state. Neurological: Negative for dizziness, tremors, seizures, syncope, facial asymmetry, speech difficulty, weakness, light-headedness, numbness and headaches. Hematological: Negative. Does not bruise/bleed easily. Psychiatric/Behavioral: Positive for sleep disturbance. Negative for agitation, behavioral problems, confusion, decreased concentration, dysphoric mood, hallucinations, self-injury and suicidal ideas. The patient is not nervous/anxious and is not hyperactive.         Objective:     Vitals:    12/01/21 1327   BP: 136/74   Site: Left Upper Arm   Position: Sitting   Cuff Size: Large Adult   Pulse: 80   Weight: 163 lb (73.9 kg)   Height: 5' 2\" (1.575 m)       Physical Exam  Vitals and nursing note reviewed. Constitutional:       General: She is not in acute distress. Appearance: She is well-developed. She is not diaphoretic. HENT:      Head: Normocephalic and atraumatic. Right Ear: External ear normal.      Left Ear: External ear normal.      Nose: Nose normal.      Mouth/Throat:      Pharynx: No oropharyngeal exudate. Eyes:      General: No scleral icterus. Right eye: No discharge. Left eye: No discharge. Conjunctiva/sclera: Conjunctivae normal.      Pupils: Pupils are equal, round, and reactive to light. Neck:      Thyroid: No thyromegaly. Cardiovascular:      Rate and Rhythm: Normal rate and regular rhythm. Heart sounds: Normal heart sounds. No murmur heard. No friction rub. No gallop. Pulmonary:      Effort: Pulmonary effort is normal. No respiratory distress. Breath sounds: Normal breath sounds. No wheezing or rales. Chest:      Chest wall: No tenderness. Abdominal:      General: Bowel sounds are normal. There is no distension. Palpations: Abdomen is soft. Tenderness: There is no abdominal tenderness. There is no guarding or rebound. Musculoskeletal:      Right shoulder: Tenderness present. Decreased range of motion. Left shoulder: Tenderness present. Decreased range of motion. Right hand: Deformity and tenderness present. Left hand: Deformity and tenderness present. Cervical back: Neck supple. Tenderness present. No edema, erythema or rigidity. No muscular tenderness. Decreased range of motion. Thoracic back: Tenderness present. Scoliosis present. Lumbar back: Tenderness present. Decreased range of motion. Scoliosis present. Back:    Skin:     General: Skin is warm. Coloration: Skin is not pale. Findings: No erythema or rash.    Neurological: Mental Status: She is alert and oriented to person, place, and time. She is not disoriented. Cranial Nerves: No cranial nerve deficit. Sensory: No sensory deficit. Motor: Weakness present. No atrophy or abnormal muscle tone. Coordination: Coordination normal.      Gait: Gait abnormal.      Deep Tendon Reflexes: Babinski sign absent on the right side. Babinski sign absent on the left side. Reflex Scores:       Achilles reflexes are 1+ on the right side and 1+ on the left side. Comments: Gait-uses cane   Psychiatric:         Attention and Perception: Attention normal. She is attentive. Mood and Affect: Mood normal. Mood is not anxious or depressed. Affect is not labile, blunt, angry or inappropriate. Speech: Speech normal. She is communicative. Speech is not rapid and pressured, delayed, slurred or tangential.         Behavior: Behavior is not agitated, slowed, aggressive, withdrawn, hyperactive or combative. Behavior is cooperative. Thought Content: Thought content normal. Thought content is not paranoid or delusional. Thought content does not include homicidal or suicidal ideation. Thought content does not include homicidal or suicidal plan. Cognition and Memory: Cognition normal. Memory is not impaired. She does not exhibit impaired recent memory or impaired remote memory. Judgment: Judgment normal. Judgment is not impulsive or inappropriate. ALCIDES  Patricks test  positive  Yeoman's  positive  Gaenslen's  positive  Kemps  positive  Spurlings  positive       Assessment:     1. Lumbar spondylosis    2. Scoliosis of thoracolumbar spine, unspecified scoliosis type    3. Leg length discrepancy    4. Erosive osteoarthritis of both hands            Plan:      · OARRS reviewed. Current MED: 0  · Patient was not offered naloxone for home. · Discussed long term side effects of medications, tolerance, dependency and addiction.   · Previous UDS reviewed  · UDS preformed today for compliance. · Patient told can not receive any pain medications from any other source. · No evidence of abuse, diversion or aberrant behavior.  Medications and/or procedures to improve function and quality of life- patient understanding with this and that may not be pain free   Discussed with patient about safe storage of medications at home   Discussed possible weaning of medication dosing dependent on treatment/procedure results.  Reviewed prior notes from Dr Jin Price.  Testing: Reviewed MRI Lumbar Spine from 1/3/2020 with patient.  Procedures: Billateral L-facet MBB @ L2-3 and L3-4.  Discussed with patient about risks with procedure including infection, reaction to medication, increased pain, or bleeding.  Medications:Reviewed.  If patient is on blood thinners will need approval to hold yes or no:      Meds. Prescribed:   No orders of the defined types were placed in this encounter. Return Bilateral L-facet MBB @ L2-3 and L3-4.                Electronically signed by Hakeem Kramer MD on12/1/2021 at 5:29 PM

## 2021-12-04 DIAGNOSIS — I10 ESSENTIAL HYPERTENSION: Chronic | ICD-10-CM

## 2021-12-06 RX ORDER — LISINOPRIL AND HYDROCHLOROTHIAZIDE 12.5; 1 MG/1; MG/1
TABLET ORAL
Qty: 90 TABLET | Refills: 3 | Status: SHIPPED | OUTPATIENT
Start: 2021-12-06 | End: 2022-09-19

## 2021-12-15 ENCOUNTER — PREP FOR PROCEDURE (OUTPATIENT)
Dept: PHYSICAL MEDICINE AND REHAB | Age: 81
End: 2021-12-15

## 2021-12-21 ENCOUNTER — HOSPITAL ENCOUNTER (OUTPATIENT)
Age: 81
Setting detail: OUTPATIENT SURGERY
Discharge: HOME | End: 2021-12-21
Attending: PAIN MEDICINE | Admitting: PAIN MEDICINE
Payer: MEDICARE

## 2021-12-21 ENCOUNTER — ANESTHESIA EVENT (OUTPATIENT)
Dept: OPERATING ROOM | Age: 81
End: 2021-12-21
Payer: MEDICARE

## 2021-12-21 ENCOUNTER — ANESTHESIA (OUTPATIENT)
Dept: OPERATING ROOM | Age: 81
End: 2021-12-21
Payer: MEDICARE

## 2021-12-21 ENCOUNTER — APPOINTMENT (OUTPATIENT)
Dept: GENERAL RADIOLOGY | Age: 81
End: 2021-12-21
Attending: PAIN MEDICINE
Payer: MEDICARE

## 2021-12-21 VITALS
SYSTOLIC BLOOD PRESSURE: 134 MMHG | RESPIRATION RATE: 7 BRPM | DIASTOLIC BLOOD PRESSURE: 63 MMHG | OXYGEN SATURATION: 99 %

## 2021-12-21 VITALS
SYSTOLIC BLOOD PRESSURE: 141 MMHG | RESPIRATION RATE: 16 BRPM | HEIGHT: 63 IN | OXYGEN SATURATION: 98 % | WEIGHT: 158.8 LBS | HEART RATE: 78 BPM | BODY MASS INDEX: 28.14 KG/M2 | TEMPERATURE: 97.8 F | DIASTOLIC BLOOD PRESSURE: 65 MMHG

## 2021-12-21 PROCEDURE — 3600000054 HC PAIN LEVEL 3 BASE: Performed by: PAIN MEDICINE

## 2021-12-21 PROCEDURE — 6360000002 HC RX W HCPCS: Performed by: NURSE ANESTHETIST, CERTIFIED REGISTERED

## 2021-12-21 PROCEDURE — 3209999900 FLUORO FOR SURGICAL PROCEDURES

## 2021-12-21 PROCEDURE — 7100000010 HC PHASE II RECOVERY - FIRST 15 MIN: Performed by: PAIN MEDICINE

## 2021-12-21 PROCEDURE — 3600000055 HC PAIN LEVEL 3 ADDL 15 MIN: Performed by: PAIN MEDICINE

## 2021-12-21 PROCEDURE — 3700000001 HC ADD 15 MINUTES (ANESTHESIA): Performed by: PAIN MEDICINE

## 2021-12-21 PROCEDURE — 64493 INJ PARAVERT F JNT L/S 1 LEV: CPT | Performed by: PAIN MEDICINE

## 2021-12-21 PROCEDURE — 6360000002 HC RX W HCPCS: Performed by: PAIN MEDICINE

## 2021-12-21 PROCEDURE — 2500000003 HC RX 250 WO HCPCS: Performed by: PAIN MEDICINE

## 2021-12-21 PROCEDURE — 3700000000 HC ANESTHESIA ATTENDED CARE: Performed by: PAIN MEDICINE

## 2021-12-21 PROCEDURE — 7100000011 HC PHASE II RECOVERY - ADDTL 15 MIN: Performed by: PAIN MEDICINE

## 2021-12-21 PROCEDURE — 64494 INJ PARAVERT F JNT L/S 2 LEV: CPT | Performed by: PAIN MEDICINE

## 2021-12-21 PROCEDURE — 2709999900 HC NON-CHARGEABLE SUPPLY: Performed by: PAIN MEDICINE

## 2021-12-21 RX ORDER — METHYLPREDNISOLONE ACETATE 80 MG/ML
INJECTION, SUSPENSION INTRA-ARTICULAR; INTRALESIONAL; INTRAMUSCULAR; SOFT TISSUE PRN
Status: DISCONTINUED | OUTPATIENT
Start: 2021-12-21 | End: 2021-12-21 | Stop reason: ALTCHOICE

## 2021-12-21 RX ORDER — BUPIVACAINE HYDROCHLORIDE 2.5 MG/ML
INJECTION, SOLUTION EPIDURAL; INFILTRATION; INTRACAUDAL PRN
Status: DISCONTINUED | OUTPATIENT
Start: 2021-12-21 | End: 2021-12-21 | Stop reason: ALTCHOICE

## 2021-12-21 RX ORDER — PROPOFOL 10 MG/ML
INJECTION, EMULSION INTRAVENOUS PRN
Status: DISCONTINUED | OUTPATIENT
Start: 2021-12-21 | End: 2021-12-21 | Stop reason: SDUPTHER

## 2021-12-21 RX ORDER — LIDOCAINE HYDROCHLORIDE 10 MG/ML
INJECTION, SOLUTION INFILTRATION; PERINEURAL PRN
Status: DISCONTINUED | OUTPATIENT
Start: 2021-12-21 | End: 2021-12-21 | Stop reason: ALTCHOICE

## 2021-12-21 RX ADMIN — PROPOFOL 60 MG: 10 INJECTION, EMULSION INTRAVENOUS at 13:38

## 2021-12-21 RX ADMIN — PROPOFOL 60 MG: 10 INJECTION, EMULSION INTRAVENOUS at 13:45

## 2021-12-21 ASSESSMENT — PULMONARY FUNCTION TESTS
PIF_VALUE: 0

## 2021-12-21 ASSESSMENT — PAIN - FUNCTIONAL ASSESSMENT
PAIN_FUNCTIONAL_ASSESSMENT: PREVENTS OR INTERFERES SOME ACTIVE ACTIVITIES AND ADLS
PAIN_FUNCTIONAL_ASSESSMENT: 0-10

## 2021-12-21 ASSESSMENT — PAIN SCALES - GENERAL: PAINLEVEL_OUTOF10: 0

## 2021-12-21 ASSESSMENT — PAIN DESCRIPTION - DESCRIPTORS: DESCRIPTORS: ACHING;CONSTANT

## 2021-12-21 NOTE — H&P
H&P    Patient prior patient of Dr Faye Monroy. Patient had bilateral facet MBB @ T12-L1,L1-L2,L2-L3 and L3-4 on 1/20/2021. States helped 75-80% helped 2-3 months. [facility] was able to drive to [place].     States now pain has increased and wants theses repeated.     Pain increases with bending, reaching, pushing, pulling, walking and standing. Treatments Tried injections,Tylenol  Pain Description sharp, stabbing, pressure and aching     Medications reviewed. Patient denies side effects with medications. Patient states she is taking medications as prescribed. Shedenies receiving pain medications from other sources. She denies any ER visits since last visit.     Pain scale with out pain medications or at its worst is 10/10. Pain scale with pain medications or at its best is 6/10. Last dose of Tylenol was takes PRN.      MRI LUMBAR SPINE 1/3/2020:            The patientis allergic to sulfa antibiotics and other.                      Subjective:      Review of Systems   Constitutional: Positive for activity change. Negative for appetite change, chills, diaphoresis, fatigue, fever and unexpected weight change. HENT: Negative for congestion, ear pain, hearing loss, mouth sores, nosebleeds, rhinorrhea, sinus pressure and sore throat. Eyes: Negative for photophobia, pain and visual disturbance. Respiratory: Negative. Negative for cough, chest tightness, shortness of breath and wheezing. Cardiovascular: Negative for chest pain and palpitations. Hx HTN   Gastrointestinal: Negative. Negative for abdominal pain, constipation, diarrhea, nausea and vomiting. Endocrine: Negative. Negative for cold intolerance, heat intolerance, polydipsia, polyphagia and polyuria. Genitourinary: Positive for frequency and urgency. Negative for decreased urine volume, difficulty urinating and hematuria. Musculoskeletal: Positive for arthralgias, back pain, gait problem, joint swelling, myalgias and neck pain.  Negative for neck stiffness. Skin: Negative for color change and rash. Allergic/Immunologic: Negative for food allergies and immunocompromised state. Neurological: Negative for dizziness, tremors, seizures, syncope, facial asymmetry, speech difficulty, weakness, light-headedness, numbness and headaches. Hematological: Negative. Does not bruise/bleed easily. Psychiatric/Behavioral: Positive for sleep disturbance. Negative for agitation, behavioral problems, confusion, decreased concentration, dysphoric mood, hallucinations, self-injury and suicidal ideas. The patient is not nervous/anxious and is not hyperactive.          Objective:      Vitals       Vitals:     12/01/21 1327   BP: 136/74   Site: Left Upper Arm   Position: Sitting   Cuff Size: Large Adult   Pulse: 80   Weight: 163 lb (73.9 kg)   Height: 5' 2\" (1.575 m)            Physical Exam  Vitals and nursing note reviewed. Constitutional:       General: She is not in acute distress. Appearance: She is well-developed. She is not diaphoretic. HENT:      Head: Normocephalic and atraumatic. Right Ear: External ear normal.      Left Ear: External ear normal.      Nose: Nose normal.      Mouth/Throat:      Pharynx: No oropharyngeal exudate. Eyes:      General: No scleral icterus. Right eye: No discharge. Left eye: No discharge. Conjunctiva/sclera: Conjunctivae normal.      Pupils: Pupils are equal, round, and reactive to light. Neck:      Thyroid: No thyromegaly. Cardiovascular:      Rate and Rhythm: Normal rate and regular rhythm. Heart sounds: Normal heart sounds. No murmur heard. No friction rub. No gallop. Pulmonary:      Effort: Pulmonary effort is normal. No respiratory distress. Breath sounds: Normal breath sounds. No wheezing or rales. Chest:      Chest wall: No tenderness. Abdominal:      General: Bowel sounds are normal. There is no distension. Palpations: Abdomen is soft. Tenderness:  There is no abdominal tenderness. There is no guarding or rebound. Musculoskeletal:      Right shoulder: Tenderness present. Decreased range of motion. Left shoulder: Tenderness present. Decreased range of motion. Right hand: Deformity and tenderness present. Left hand: Deformity and tenderness present. Cervical back: Neck supple. Tenderness present. No edema, erythema or rigidity. No muscular tenderness. Decreased range of motion. Thoracic back: Tenderness present. Scoliosis present. Lumbar back: Tenderness present. Decreased range of motion. Scoliosis present. Back:    Skin:     General: Skin is warm. Coloration: Skin is not pale. Findings: No erythema or rash. Neurological:      Mental Status: She is alert and oriented to person, place, and time. She is not disoriented. Cranial Nerves: No cranial nerve deficit. Sensory: No sensory deficit. Motor: Weakness present. No atrophy or abnormal muscle tone. Coordination: Coordination normal.      Gait: Gait abnormal.      Deep Tendon Reflexes: Babinski sign absent on the right side. Babinski sign absent on the left side. Reflex Scores:       Achilles reflexes are 1+ on the right side and 1+ on the left side. Comments: Gait-uses cane   Psychiatric:         Attention and Perception: Attention normal. She is attentive. Mood and Affect: Mood normal. Mood is not anxious or depressed. Affect is not labile, blunt, angry or inappropriate. Speech: Speech normal. She is communicative. Speech is not rapid and pressured, delayed, slurred or tangential.         Behavior: Behavior is not agitated, slowed, aggressive, withdrawn, hyperactive or combative. Behavior is cooperative. Thought Content: Thought content normal. Thought content is not paranoid or delusional. Thought content does not include homicidal or suicidal ideation. Thought content does not include homicidal or suicidal plan. Cognition and Memory: Cognition normal. Memory is not impaired. She does not exhibit impaired recent memory or impaired remote memory. Judgment: Judgment normal. Judgment is not impulsive or inappropriate.         ALCIDES  Patricks test  positive  Yeoman's  positive  Gaenslen's  positive  Kemps  positive  Spurlings  positive     Assessment:      1. Lumbar spondylosis    2. Scoliosis of thoracolumbar spine, unspecified scoliosis type    3. Leg length discrepancy    4. Erosive osteoarthritis of both hands       Plan:      · OARRS reviewed. Current MED: 0  · Patient was not offered naloxone for home. · Discussed long term side effects of medications, tolerance, dependency and addiction. · Previous UDS reviewed  · UDS preformed today for compliance. · Patient told can not receive any pain medications from any other source. · No evidence of abuse, diversion or aberrant behavior. · Medications and/or procedures to improve function and quality of life- patient understanding with this and that may not be pain free  · Discussed with patient about safe storage of medications at home  · Discussed possible weaning of medication dosing dependent on treatment/procedure results. · Reviewed prior notes from Dr Eamon Galeano. · Testing: Reviewed MRI Lumbar Spine from 1/3/2020 with patient. · Procedures: Billateral L-facet MBB @ L2-3 and L3-4. · Discussed with patient about risks with procedure including infection, reaction to medication, increased pain, or bleeding. · Medications:Reviewed.   · If patient is on blood thinners will need approval to hold yes or no:                Return Bilateral L-facet MBB @ L2-3 and L3-4.

## 2021-12-21 NOTE — ANESTHESIA POSTPROCEDURE EVALUATION
Department of Anesthesiology  Postprocedure Note    Patient: Josefina Wang  MRN: [id number]  YOB: 1940  Date of evaluation: 12/21/2021  Time:  1:58 PM     Procedure Summary     Date: 12/21/21 Room / Location: [address] [place] [address] 03 / 138 [facility] [place]    Anesthesia Start: 8028 Anesthesia Stop: 3360    Procedure: Bilateral L-facet MBB @ L2-3 and L3-4 (Bilateral Back) Diagnosis: (Lumbar spondylosis)    Surgeons: Jelly Arana MD Responsible Provider: Phil Mahajan MD    Anesthesia Type: MAC ASA Status: 2          Anesthesia Type: MAC    Melissa Phase I:      Melissa Phase II: Melissa Score: 10    Last vitals: Reviewed and per EMR flowsheets. Anesthesia Post Evaluation    Patient location during evaluation: bedside  Patient participation: complete - patient cannot participate  Level of consciousness: awake and alert  Airway patency: patent  Nausea & Vomiting: no nausea and no vomiting  Complications: no  Cardiovascular status: hemodynamically stable  Respiratory status: acceptable  Hydration status: euvolemic      [address]  POST-ANESTHESIA NOTE       Name:  Josefina Wang                                         Age:  80 y.o.   MRN:  [id number]      Last Vitals:  BP (!) 141/65   Pulse 78   Temp 97.8 °F (36.6 °C) (Temporal)   Resp 16   Ht 5' 3\" (1.6 m)   Wt 158 lb 12.8 oz (72 kg)   SpO2 98%   BMI 28.13 kg/m²   Patient Vitals for the past 4 hrs:   BP Temp Temp src Pulse Resp SpO2 Height Weight   12/21/21 1354 (!) 141/65 97.8 °F (36.6 °C) Temporal 78 16 98 % -- --   12/21/21 1156 (!) 177/83 96.9 °F (36.1 °C) Temporal 86 14 96 % 5' 3\" (1.6 m) 158 lb 12.8 oz (72 kg)       Level of Consciousness:  Awake    Respiratory:  Stable    Oxygen Saturation:  Stable    Cardiovascular:  Stable    Hydration:  Adequate    PONV:  Stable    Post-op Pain:  Adequate analgesia    Post-op Assessment:  No apparent anesthetic complications    Additional Follow-Up / Treatment / Comment: None    Geraldine aHle MD  December 21, 2021   1:58 PM

## 2021-12-21 NOTE — ANESTHESIA PRE PROCEDURE
Department of Anesthesiology  Preprocedure Note       Name:  Johanna Dick   Age:  80 y.o.  :  1940                                          MRN:  [id number]         Date:  2021      Surgeon: Caitlin Quiroz):  Bobby Danielle MD    Procedure: Procedure(s):  Bilateral L-facet MBB @ L2-3 and L3-4    Medications prior to admission:   Prior to Admission medications    Medication Sig Start Date End Date Taking? Authorizing Provider   lisinopril-hydroCHLOROthiazide (PRINZIDE;ZESTORETIC) 10-12.5 MG per tablet TAKE 1 TABLET BY MOUTH DAILY 21  Yes Lucretia Burroughs, DO   hydroxychloroquine (PLAQUENIL) 200 MG tablet TAKE 1 AND 1/2 TABLETS BY MOUTH DAILY 21   Concepcion Lopez, APRN - CNP   omeprazole (PRILOSEC) 20 MG delayed release capsule TAKE 1 CAPSULE BY MOUTH DAILY 21   Concepcion Lopez, APRN - CNP   tiZANidine (ZANAFLEX) 4 MG tablet Take 1 tablet by mouth 3 times daily as needed (R knee pain/spasm) 21   Lucretia Burroughs,    meloxicam (MOBIC) 7.5 MG tablet Take 1 tablet by mouth daily as needed for Pain 21   Lucretia Burroughs, DO   vitamin C (ASCORBIC ACID) 500 MG tablet Take 500 mg by mouth daily    Historical Provider, MD   Handicap Placard MISC by Does not apply route Expires 18   Lucretia Burroughs, DO   Multiple Vitamins-Minerals (PRESERVISION AREDS PO) Take by mouth    Historical Provider, MD   Calcium Carbonate-Vit D-Min (CALCIUM 1200 PO) Take by mouth daily    Historical Provider, MD   Cholecalciferol (VITAMIN D-3 PO) Take by mouth daily    Historical Provider, MD       Current medications:    No current facility-administered medications for this encounter. Allergies:     Allergies   Allergen Reactions    Sulfa Antibiotics Swelling    Other Nausea And Vomiting     oysters       Problem List:    Patient Active Problem List   Diagnosis Code    Ganglion cyst of finger of left hand M67.442    Dyslipidemia E78.5    History of skin cancer Z85.828    Hypertension I10    Osteoarthritis M19.90    Positive WICHO (antinuclear antibody) R76.8    DDD (degenerative disc disease), lumbar M51.36    Chronic low back pain M54.50, G89.29    Hyperkalemia E87.5    CKD (chronic kidney disease) stage 3, GFR 30-59 ml/min (HCC) N18.30    Erosive osteoarthritis of both hands M15.4    Lumbar spondylosis M47.816       Past Medical History:        Diagnosis Date    Chronic low back pain     CKD (chronic kidney disease) stage 3, GFR 30-59 ml/min (HCC)     DDD (degenerative disc disease), lumbar     Dyslipidemia     History of skin cancer     BCC AND SCC    Hyperlipidemia     Hypertension     Osteoarthritis     PONV (postoperative nausea and vomiting)        Past Surgical History:        Procedure Laterality Date    BACK SURGERY      lower lumbar fusion    CARPAL TUNNEL RELEASE Bilateral      SECTION      CHOLECYSTECTOMY, LAPAROSCOPIC  10/03/2017    COLONOSCOPY      CYST REMOVAL Left 2017    left  5th digit-Dr Bowman    DILATION AND CURETTAGE OF UTERUS      FACET JOINT INJECTION Bilateral 2020    B/L L3-4 medial branch and L5 dorsal rami injection performed by Kayla Alas MD at [address] [place] [address] Bilateral     HYSTERECTOMY      JOINT REPLACEMENT Bilateral     KNEES    NERVE SURGERY N/A 2019    left L3, L4 medial branch and L5 dorsal rami injection performed by Kayla Alas MD at [place] [address] Left 2020    left L3, L4 medial branch and L5 dorsal rami injection performed by Kayla Alas MD at [place] [address] Bilateral 2021    bilateral T12, L1,L2,L3,L4,L5 medial branch block performed by Kayla Alas MD at [address] [place] [address] DX W/COLLDANE Garcia 1978 PFRMD N/A 2018    COLONOSCOPY performed by Iesha Mondragon MD at [facility]    SKIN BIOPSY      BCC AND SCC       Social History:    Social History     Tobacco Use    Smoking status: Never Smoker    Smokeless tobacco: Never Used   Substance Use Topics    Alcohol use: Yes     Comment: OCCASIONALLY                                Counseling given: Not Answered      Vital Signs (Current):   Vitals:    12/21/21 1156   BP: (!) 177/83   Pulse: 86   Resp: 14   Temp: 96.9 °F (36.1 °C)   TempSrc: Temporal   SpO2: 96%   Weight: 158 lb 12.8 oz (72 kg)   Height: 5' 3\" (1.6 m)                                              BP Readings from Last 3 Encounters:   12/21/21 (!) 177/83   12/01/21 136/74   11/03/21 130/84       NPO Status: Time of last liquid consumption: 0900 (water with pills)                        Time of last solid consumption: 2030                        Date of last liquid consumption: 12/21/21                        Date of last solid food consumption: 12/20/21    BMI:   Wt Readings from Last 3 Encounters:   12/21/21 158 lb 12.8 oz (72 kg)   12/01/21 163 lb (73.9 kg)   11/03/21 163 lb (73.9 kg)     Body mass index is 28.13 kg/m². CBC:   Lab Results   Component Value Date    WBC 4.9 04/12/2021    RBC 4.29 04/12/2021    HGB 12.9 04/12/2021    HCT 39.9 04/12/2021    MCV 93.0 04/12/2021    RDW 13.3 02/19/2021     04/12/2021       CMP:   Lab Results   Component Value Date     10/28/2021    K 4.7 10/28/2021     10/28/2021    CO2 26 10/28/2021    BUN 17 10/28/2021    CREATININE 1.1 10/28/2021    LABGLOM 48 10/28/2021    GLUCOSE 107 10/28/2021    GLUCOSE 99 05/03/2018    PROT 7.2 04/12/2021    CALCIUM 10.1 10/28/2021    BILITOT 0.4 04/12/2021    ALKPHOS 114 04/12/2021    AST 25 04/12/2021    ALT 18 04/12/2021       POC Tests: No results for input(s): POCGLU, POCNA, POCK, POCCL, POCBUN, POCHEMO, POCHCT in the last 72 hours.     Coags:   Lab Results   Component Value Date    INR 1.00 03/05/2017    APTT 32.7 03/05/2017       HCG (If Applicable): No results found for: PREGTESTUR, PREGSERUM, HCG, HCGQUANT     ABGs: No results found for: PHART, PO2ART, MBX7GLC, RWD8EOC, BEART, E7HOSNGQ     Type & Screen (If Applicable):  No results found for: LABABO, LABRH    Drug/Infectious Status (If Applicable):  No results found for: HIV, HEPCAB    COVID-19 Screening (If Applicable):   Lab Results   Component Value Date    COVID19 NOT DETECTED 05/23/2020           Anesthesia Evaluation  Patient summary reviewed   history of anesthetic complications: PONV. Airway: Mallampati: II  TM distance: >3 FB   Neck ROM: full  Mouth opening: > = 3 FB Dental: normal exam         Pulmonary:normal exam                               Cardiovascular:    (+) hypertension:, hyperlipidemia                  Neuro/Psych:               GI/Hepatic/Renal:   (+) GERD: well controlled,           Endo/Other:                     Abdominal:             Vascular: Other Findings:             Anesthesia Plan      MAC     ASA 2       Induction: intravenous. MIPS: prophylactic pharmacologic antiemetic agents not administered perioperatively for documented reasons. Anesthetic plan and risks discussed with patient.       Plan discussed with CRNA and surgical team.                  Chris Garcia MD   12/21/2021

## 2021-12-21 NOTE — OP NOTE
Pre-Procedure Note    Patient Name: Isaiah Fox   YOB: 1940  Room/Bed: [address] [place] [address] Pool/[facility]  Medical Record Number: [id number]  Date: 12/21/21      Indication:  Lower back pain  Consent: On file. Vital Signs:   Vitals:    12/21/21 1156   BP: (!) 177/83   Pulse: 86   Resp: 14   Temp: 96.9 °F (36.1 °C)   SpO2: 96%       Pre-Sedation Documentation and Exam:   Vital signs have been reviewed (see flow sheet for vitals). Sedation/ Anesthesia Plan:   MAC    Patient is an appropriate candidate for plan of sedation: yes    Preoperative Diagnosis:   L-spondylosis    Postoperative Diagnosis:   As above    Procedure Performed:  Diagnostic/Confirmatory median branch blocks at the levels of L2-3 and L3-4 bilateral under fluoroscopic guidance # 1. Indication for the Procedure: The patient has a history of chronic low back pain that is not responding well to the conservative treatment. The patient's pain is mostly axial in nature. Pain is interfering with the activities of daily living. Physical examination revealed facet tenderness and facet loading is positive. The procedure and risks  were discussed with the patient and an informed consent was obtained. Procedure:     Patient is placed in prone position and skin over  the back was prepped and draped in sterile manner. Then using fluoroscopy the junction of the transverse process of the vertebra with the superior process of the facet joint was observed and the view was optimized. The skin and deep tissues posterior were infiltrated with 15 ml of 1% lidocaine. Then a #22-gauge 3-1/2 inch spinal needle was introduced through the skin wheal under fluoroscopy guidance such that the tip of the needle lies at the junction of the transverse process with the superior processes of the facet joint. Then after negative aspiration a total of 2 ml of 0.25% Marcaine and depomedrol  (total 80  mg) was injected through the needles in divided doses. EBL-0    Patient's vital signs and neurological status remained stable through  the procedure and post procedural  Period. Patient was instructed to keep track of pain for next 24 hours. every hour and bring it with next visit. Patient tollerated the procedure well and was discharged home in stable condition.     Electronically signed by Janki Julien MD on 12/21/21 at 1:38 PM EST

## 2021-12-21 NOTE — H&P
[address] [address]  History and Physical Update    Pt Name: Simona Paulson  MRN: [id number]  YOB: 1940  Date of evaluation: 12/21/2021      I have examined the patient and reviewed the H&P/Consult and there are no changes to the patient or plans.         Electronically signed by Garcia Browne MD on 12/21/2021 at 12:19 PM

## 2021-12-21 NOTE — PROGRESS NOTES
1354 TO recovery via cart. SPont resp. VSS. Report received from surgical rn. Denies pain numbness tingling or nausea. Snack and drink given. Call bell in reach  1405 IV discontinued.    1410 Discharge to home I stable ambulatory condition with daughter

## 2022-02-08 ENCOUNTER — TELEPHONE (OUTPATIENT)
Dept: PHYSICAL MEDICINE AND REHAB | Age: 82
End: 2022-02-08

## 2022-02-08 ENCOUNTER — OFFICE VISIT (OUTPATIENT)
Dept: PHYSICAL MEDICINE AND REHAB | Age: 82
End: 2022-02-08
Payer: MEDICARE

## 2022-02-08 VITALS
DIASTOLIC BLOOD PRESSURE: 78 MMHG | WEIGHT: 158 LBS | SYSTOLIC BLOOD PRESSURE: 124 MMHG | HEIGHT: 63 IN | BODY MASS INDEX: 28 KG/M2

## 2022-02-08 DIAGNOSIS — M47.816 LUMBAR SPONDYLOSIS: Primary | ICD-10-CM

## 2022-02-08 DIAGNOSIS — M54.50 CHRONIC BILATERAL LOW BACK PAIN WITHOUT SCIATICA: ICD-10-CM

## 2022-02-08 DIAGNOSIS — Z98.890 HISTORY OF LUMBAR LAMINECTOMY: ICD-10-CM

## 2022-02-08 DIAGNOSIS — M41.9 SCOLIOSIS OF THORACOLUMBAR SPINE, UNSPECIFIED SCOLIOSIS TYPE: ICD-10-CM

## 2022-02-08 DIAGNOSIS — G89.29 CHRONIC BILATERAL LOW BACK PAIN WITHOUT SCIATICA: ICD-10-CM

## 2022-02-08 DIAGNOSIS — G89.4 CHRONIC PAIN SYNDROME: ICD-10-CM

## 2022-02-08 PROCEDURE — G8399 PT W/DXA RESULTS DOCUMENT: HCPCS | Performed by: NURSE PRACTITIONER

## 2022-02-08 PROCEDURE — G8484 FLU IMMUNIZE NO ADMIN: HCPCS | Performed by: NURSE PRACTITIONER

## 2022-02-08 PROCEDURE — G8427 DOCREV CUR MEDS BY ELIG CLIN: HCPCS | Performed by: NURSE PRACTITIONER

## 2022-02-08 PROCEDURE — 1090F PRES/ABSN URINE INCON ASSESS: CPT | Performed by: NURSE PRACTITIONER

## 2022-02-08 PROCEDURE — 1036F TOBACCO NON-USER: CPT | Performed by: NURSE PRACTITIONER

## 2022-02-08 PROCEDURE — 4040F PNEUMOC VAC/ADMIN/RCVD: CPT | Performed by: NURSE PRACTITIONER

## 2022-02-08 PROCEDURE — G8417 CALC BMI ABV UP PARAM F/U: HCPCS | Performed by: NURSE PRACTITIONER

## 2022-02-08 PROCEDURE — 1123F ACP DISCUSS/DSCN MKR DOCD: CPT | Performed by: NURSE PRACTITIONER

## 2022-02-08 PROCEDURE — 99214 OFFICE O/P EST MOD 30 MIN: CPT | Performed by: NURSE PRACTITIONER

## 2022-02-08 ASSESSMENT — ENCOUNTER SYMPTOMS
BACK PAIN: 1
RESPIRATORY NEGATIVE: 1
GASTROINTESTINAL NEGATIVE: 1

## 2022-02-08 NOTE — PROGRESS NOTES
[facility] [address]  Dept: [phone]  Dept Fax: [phone]: [phone]    Visit Date: 2/8/2022    Functionality Assessment/Goals Worksheet     On a scale of 0 (Does not Interfere) to 10 (Completely Interferes)     1. Which number describes how during the past week pain has interfered with       the following:  A. General Activity:  4  B. Mood: 6  C. Walking Ability:  6  D. Normal Work (Includes both work outside the home and housework):  6  E. Relations with Other People:   0  F. Sleep:   4  G. Enjoyment of Life:   0    2. Patient Prefers to Take their Pain Medications:     [x]  On a regular basis   []  Only when necessary    []  Does not take pain medications    3. What are the Patient's Goals/Expectations for Visiting Pain Management? []  Learn about my pain    [x]  Receive Medication   []  Physical Therapy     []  Treat Depression   [x]  Receive Injections    []  Treat Sleep   []  Deal with Anxiety and Stress   []  Treat Opoid Dependence/Addiction   []  Other:      HPI:   Charlie Lu is a 80 y.o. female is here today for    Chief Complaint: Low back pain     HPI   Fu from bilateral L-facet MBB # 2 from 12/21/2021. Received about 80% relief of pain in low back for a full week and then pain started to gradually increase. Was able to tolerate more activity with less pain. Pain has gradually been increasing still some relief from procedure but is almost to the level it was prior to procedure. Pain in low back across- sharp stabbing and aching. Pain is increased with activity and minimal with with sitting or laying. Pain increases with bending, lifting, twisting , walking, standing, getting up and down and housework or working at job, worse in the morning, and weather changes.      Taking tylenol prn, Stopped Mobic since last visit and started to take Tumeric and not noticing any increase in pain. Medications reviewed. Patient denies side effects with medications. Patient states she is taking medications as prescribed. Shedenies receiving pain medications from other sources. She denies any ER visits since last visit. Pain scale with out pain medications or at its worst is 0-8/10. The more activity the more pain hurts. The patientis allergic to sulfa antibiotics and other. Subjective:      Review of Systems   Constitutional: Negative. HENT: Negative. Eyes: Positive for visual disturbance. Respiratory: Negative. Cardiovascular: Negative. Gastrointestinal: Negative. Endocrine: Negative. Genitourinary: Negative. Musculoskeletal: Positive for arthralgias, back pain, gait problem, joint swelling and myalgias. Negative for neck pain and neck stiffness. Ambulating with quad cane    Skin: Negative. Neurological: Negative for weakness and numbness. Psychiatric/Behavioral: Negative. Objective:     Vitals:    02/08/22 0831   BP: 124/78   Weight: 158 lb (71.7 kg)   Height: 5' 3\" (1.6 m)       Physical Exam  Vitals and nursing note reviewed. Constitutional:       General: She is not in acute distress. Appearance: She is well-developed. She is not diaphoretic. HENT:      Head: Normocephalic and atraumatic. Right Ear: External ear normal.      Left Ear: External ear normal.      Nose: Nose normal.      Mouth/Throat:      Pharynx: No oropharyngeal exudate. Eyes:      General: No scleral icterus. Right eye: No discharge. Left eye: No discharge. Conjunctiva/sclera: Conjunctivae normal.      Pupils: Pupils are equal, round, and reactive to light. Neck:      Thyroid: No thyromegaly. Cardiovascular:      Rate and Rhythm: Normal rate and regular rhythm. Heart sounds: Normal heart sounds. No murmur heard. No friction rub. No gallop.     Pulmonary:      Effort: Pulmonary effort is normal. No respiratory distress. Breath sounds: Normal breath sounds. No wheezing or rales. Chest:      Chest wall: No tenderness. Abdominal:      General: Bowel sounds are normal. There is no distension. Palpations: Abdomen is soft. Tenderness: There is no abdominal tenderness. There is no guarding or rebound. Musculoskeletal:         General: Tenderness present. Right shoulder: Tenderness present. Decreased range of motion. Left shoulder: Tenderness present. Decreased range of motion. Right hand: Deformity and tenderness present. Left hand: Deformity and tenderness present. Cervical back: Neck supple. Tenderness present. No edema, erythema or rigidity. No muscular tenderness. Decreased range of motion. Thoracic back: Tenderness present. Scoliosis present. Lumbar back: Tenderness and bony tenderness present. Decreased range of motion. Negative right straight leg raise test and negative left straight leg raise test. Scoliosis present. Back:    Skin:     General: Skin is warm. Coloration: Skin is not pale. Findings: No erythema or rash. Neurological:      Mental Status: She is alert and oriented to person, place, and time. She is not disoriented. Cranial Nerves: No cranial nerve deficit. Sensory: No sensory deficit. Motor: Weakness present. No atrophy or abnormal muscle tone. Coordination: Coordination normal.      Gait: Gait abnormal.      Deep Tendon Reflexes: Babinski sign absent on the right side. Babinski sign absent on the left side. Reflex Scores:       Achilles reflexes are 1+ on the right side and 1+ on the left side. Comments: Gait-uses cane   Psychiatric:         Attention and Perception: Attention normal. She is attentive. Mood and Affect: Mood normal. Mood is not anxious or depressed. Affect is not labile, blunt, angry or inappropriate.          Speech: Speech normal. She is communicative. Speech is not rapid and pressured, delayed, slurred or tangential.         Behavior: Behavior is not agitated, slowed, aggressive, withdrawn, hyperactive or combative. Behavior is cooperative. Thought Content: Thought content normal. Thought content is not paranoid or delusional. Thought content does not include homicidal or suicidal ideation. Thought content does not include homicidal or suicidal plan. Cognition and Memory: Cognition normal. Memory is not impaired. She does not exhibit impaired recent memory or impaired remote memory. Judgment: Judgment normal. Judgment is not impulsive or inappropriate. ALCIDES  Patricks test  negative  Yeoman's  negative  Gaenslen's  negative     Assessment:     1. Lumbar spondylosis    2. Chronic bilateral low back pain without sciatica    3. Scoliosis of thoracolumbar spine, unspecified scoliosis type    4. Chronic pain syndrome    5. History of lumbar laminectomy            Plan:      · OARRS reviewed. Current MED: 0  · Patient was offered naloxone for home. · Discussed long term side effects of medications, tolerance, dependency and addiction. · Previous UDS reviewed  · UDS preformed today for compliance. · Patient told can not receive any pain medications from any other source. · No evidence of abuse, diversion or aberrant behavior.  Medications and/or procedures to improve function and quality of life- patient understanding with this and that may not be pain free   Discussed with patient about safe storage of medications at home   Discussed possible weaning of medication dosing dependent on treatment/procedure results.  Discussed with patient about risks with procedure including infection, reaction to medication, increased pain, or bleeding.    Procedure notes reveiwed in detail   Received 80% relief of low back pain for 1 full week from L-facet MBB 3 2 with improvement of mobility    Plan Bilateral L-facet RFA @ L2-3, and L3-4 for longer term pain relief. Procedure and risks discussed in detail with patient.  Continue tylenol prn       Meds. Prescribed:   No orders of the defined types were placed in this encounter. Return for Bilateral L-facet RFA @ L2-3, and L3-4. , follow up after procedure.                Electronically signed by JOSE Sales CNP on2/8/2022 at 8:55 AM

## 2022-02-21 ENCOUNTER — TELEPHONE (OUTPATIENT)
Dept: RHEUMATOLOGY | Age: 82
End: 2022-02-21

## 2022-02-21 NOTE — TELEPHONE ENCOUNTER
I called eye Dr. Mario Starr ocular coherence tomography.  1st page of report 2nd paragraph in the DIAGNOSTIC HX section

## 2022-02-22 DIAGNOSIS — M47.816 LUMBAR SPONDYLOSIS: Primary | ICD-10-CM

## 2022-02-23 ENCOUNTER — TELEPHONE (OUTPATIENT)
Dept: PHYSICAL MEDICINE AND REHAB | Age: 82
End: 2022-02-23

## 2022-02-24 ENCOUNTER — PREP FOR PROCEDURE (OUTPATIENT)
Dept: PHYSICAL MEDICINE AND REHAB | Age: 82
End: 2022-02-24

## 2022-03-04 ENCOUNTER — HOSPITAL ENCOUNTER (OUTPATIENT)
Age: 82
Setting detail: OUTPATIENT SURGERY
Discharge: HOME | End: 2022-03-04
Attending: PHYSICAL MEDICINE & REHABILITATION | Admitting: PHYSICAL MEDICINE & REHABILITATION
Payer: MEDICARE

## 2022-03-04 ENCOUNTER — APPOINTMENT (OUTPATIENT)
Dept: GENERAL RADIOLOGY | Age: 82
End: 2022-03-04
Attending: PHYSICAL MEDICINE & REHABILITATION
Payer: MEDICARE

## 2022-03-04 VITALS
SYSTOLIC BLOOD PRESSURE: 138 MMHG | RESPIRATION RATE: 16 BRPM | HEIGHT: 63 IN | HEART RATE: 71 BPM | OXYGEN SATURATION: 95 % | BODY MASS INDEX: 28.84 KG/M2 | DIASTOLIC BLOOD PRESSURE: 65 MMHG | TEMPERATURE: 97.2 F | WEIGHT: 162.8 LBS

## 2022-03-04 PROCEDURE — 99152 MOD SED SAME PHYS/QHP 5/>YRS: CPT | Performed by: PHYSICAL MEDICINE & REHABILITATION

## 2022-03-04 PROCEDURE — 2500000003 HC RX 250 WO HCPCS: Performed by: PHYSICAL MEDICINE & REHABILITATION

## 2022-03-04 PROCEDURE — 64635 DESTROY LUMB/SAC FACET JNT: CPT | Performed by: PHYSICAL MEDICINE & REHABILITATION

## 2022-03-04 PROCEDURE — 3600000054 HC PAIN LEVEL 3 BASE: Performed by: PHYSICAL MEDICINE & REHABILITATION

## 2022-03-04 PROCEDURE — 3600000055 HC PAIN LEVEL 3 ADDL 15 MIN: Performed by: PHYSICAL MEDICINE & REHABILITATION

## 2022-03-04 PROCEDURE — 7100000011 HC PHASE II RECOVERY - ADDTL 15 MIN: Performed by: PHYSICAL MEDICINE & REHABILITATION

## 2022-03-04 PROCEDURE — 7100000010 HC PHASE II RECOVERY - FIRST 15 MIN: Performed by: PHYSICAL MEDICINE & REHABILITATION

## 2022-03-04 PROCEDURE — 2709999900 HC NON-CHARGEABLE SUPPLY: Performed by: PHYSICAL MEDICINE & REHABILITATION

## 2022-03-04 PROCEDURE — 6360000002 HC RX W HCPCS: Performed by: PHYSICAL MEDICINE & REHABILITATION

## 2022-03-04 PROCEDURE — 64636 DESTROY L/S FACET JNT ADDL: CPT | Performed by: PHYSICAL MEDICINE & REHABILITATION

## 2022-03-04 PROCEDURE — 3209999900 FLUORO FOR SURGICAL PROCEDURES

## 2022-03-04 PROCEDURE — 99153 MOD SED SAME PHYS/QHP EA: CPT | Performed by: PHYSICAL MEDICINE & REHABILITATION

## 2022-03-04 RX ORDER — LIDOCAINE HYDROCHLORIDE 20 MG/ML
INJECTION, SOLUTION EPIDURAL; INFILTRATION; INTRACAUDAL; PERINEURAL PRN
Status: DISCONTINUED | OUTPATIENT
Start: 2022-03-04 | End: 2022-03-04 | Stop reason: ALTCHOICE

## 2022-03-04 RX ORDER — MIDAZOLAM HYDROCHLORIDE 1 MG/ML
INJECTION INTRAMUSCULAR; INTRAVENOUS PRN
Status: DISCONTINUED | OUTPATIENT
Start: 2022-03-04 | End: 2022-03-04 | Stop reason: ALTCHOICE

## 2022-03-04 RX ORDER — LIDOCAINE HYDROCHLORIDE 10 MG/ML
INJECTION, SOLUTION EPIDURAL; INFILTRATION; INTRACAUDAL; PERINEURAL PRN
Status: DISCONTINUED | OUTPATIENT
Start: 2022-03-04 | End: 2022-03-04 | Stop reason: ALTCHOICE

## 2022-03-04 RX ORDER — FENTANYL CITRATE 50 UG/ML
INJECTION, SOLUTION INTRAMUSCULAR; INTRAVENOUS PRN
Status: DISCONTINUED | OUTPATIENT
Start: 2022-03-04 | End: 2022-03-04 | Stop reason: ALTCHOICE

## 2022-03-04 RX ORDER — TRIAMCINOLONE ACETONIDE 40 MG/ML
INJECTION, SUSPENSION INTRA-ARTICULAR; INTRAMUSCULAR PRN
Status: DISCONTINUED | OUTPATIENT
Start: 2022-03-04 | End: 2022-03-04 | Stop reason: ALTCHOICE

## 2022-03-04 ASSESSMENT — PAIN - FUNCTIONAL ASSESSMENT: PAIN_FUNCTIONAL_ASSESSMENT: 0-10

## 2022-03-04 ASSESSMENT — PAIN SCALES - GENERAL: PAINLEVEL_OUTOF10: 0

## 2022-03-04 NOTE — PROGRESS NOTES
1411-  Patient arrived to phase II via cart. Spontaneous respiraitons even and unlabored. Placed on monitor--VSS. Report received from Surgical RN. 1412-  Assessment completed. Patient is alert and oriented x4. IV capped off-- no complications. Patient denies pain--will monitor. Injection sites clean and dry. 1415-  Snack and drink given to patient. Family in room. 1425-  All belongings in room. 1435-  IV removed-- no complications. Bandage applied. 1440-  Patient dressing. 1448-  Patient discharged in stable condition with all belongings. This RN walked patient to car.

## 2022-03-04 NOTE — OP NOTE
Operative Note      Patient: Sid Mancini  YOB: 1940  MRN: [id number]    Date of Procedure: 3/4/2022    Pre-Op Diagnosis: Lumbar spondylosis    Post-Op Diagnosis: Same       Procedure(s):  left L-facet RFA @ L2-3, and L3-4 right L2-3    Surgeon(s):  Pricilla Morel MD    Assistant:   * No surgical staff found *    CONSENT:     The risks, benefits, alternatives, plan, complications, and personnel were discussed prior to the procedure. The patient understood and agreed to proceed. PROCEDURE:     Anesthesia: Moderate sedation using 3 mg IV versed & 100 mcg IV fentanyl. The patient was positioned prone. Sign-in and time-out was performed. Identifying the site, in this case, left   L2-3-4 medial branch to address the L3-4 and L4-5 facet joints  and right L2 L3 medial branch to address the L3-4 facet joints. The L4 medial branch site was withdrawn due to significant osseous formation. Sterile technique was done in the usual manner using chlorhexidine. Thiis was followed by infiltration of a 21 ml of 1% preservative-free lidocaine. A 150 mm,   17  gauge radiofrequency needles were positioned under fluoroscopic  guidance. A total of 5 radiofrequency needles were positioned. Upon confirmation that the needle was properly positioned, a series of a stimulation was performed as follows: In both motor and sensory stimulation, there was no evidence of radicular pain pattern. Prior to lesioning, 5 mL of 2% lidocaine was injected to the sites identified. Total of 3 ml was injected. 80° for 105  seconds continuous thermal ablation was performed, within 15 seconds ramp up. This was followed by injecting solution of 40 mg of triamcinolone and  4ml of 1% preservative-free lidocaine was injected without difficulty. The patient tolerated the procedure well and went to the recovery room with stable vital signs.     Estimated Blood Loss (mL): Minimal    Complications: None    Moderate Sedation Time: > 15 minutes with a nurse administering the medication(s) under my supervision. The patient was independently monitored by a Registered Nurse assigned to the Procedure Room ( automated blood pressure, continuous EKG, Capnography and continuous pulse oximetry)    Start time:  1338  End Time:  1415        PLAN:   Home instructions were provided. The patient will follow up in 4 to 6 weeks or earlier if needed.        Electronically signed by Jenny Dumont MD on 3/4/2022 at 2:16 PM

## 2022-03-04 NOTE — H&P
Lexie Sanches MD      Khushbu Block is a 80 y.o. female, who came in for a procedure,  Bilateral L2-3-4 medial branch RFA. No change since last visit       Treatment done: physical therapy, anti-inflammatory medication and muscle relaxant    Allergies   Allergen Reactions    Sulfa Antibiotics Swelling    Other Nausea And Vomiting     oysters       Social History     Socioeconomic History    Marital status:      Spouse name: Not on file    Number of children: Not on file    Years of education: Not on file    Highest education level: Not on file   Occupational History    Not on file   Tobacco Use    Smoking status: Never Smoker    Smokeless tobacco: Never Used   Vaping Use    Vaping Use: Never used   Substance and Sexual Activity    Alcohol use: Yes     Comment: OCCASIONALLY    Drug use: No    Sexual activity: Not Currently   Other Topics Concern    Not on file   Social History Narrative    Not on file     Social Determinants of Health     Financial Resource Strain:     Difficulty of Paying Living Expenses: Not on file   Food Insecurity:     Worried About Running Out of Food in the Last Year: Not on file    Thelma of Food in the Last Year: Not on file   Transportation Needs:     Lack of Transportation (Medical): Not on file    Lack of Transportation (Non-Medical):  Not on file   Physical Activity:     Days of Exercise per Week: Not on file    Minutes of Exercise per Session: Not on file   Stress:     Feeling of Stress : Not on file   Social Connections:     Frequency of Communication with Friends and Family: Not on file    Frequency of Social Gatherings with Friends and Family: Not on file    Attends Yazidi Services: Not on file    Active Member of Clubs or Organizations: Not on file    Attends Club or Organization Meetings: Not on file    Marital Status: Not on file   Intimate Partner Violence:     Fear of Current or Ex-Partner: Not on file   [employer] Abused: Not on file    Physically Abused: Not on file    Sexually Abused: Not on file   Housing Stability:     Unable to Pay for Housing in the Last Year: Not on file    Number of Places Lived in the Last Year: Not on file    Unstable Housing in the Last Year: Not on file       Past Medical History:   Diagnosis Date    Chronic low back pain     CKD (chronic kidney disease) stage 3, GFR 30-59 ml/min ([facility])     DDD (degenerative disc disease), lumbar     Dyslipidemia     History of skin cancer     BCC AND SCC    Hyperlipidemia     Hypertension     Osteoarthritis     PONV (postoperative nausea and vomiting)        Past Surgical History:   Procedure Laterality Date    BACK SURGERY      lower lumbar fusion    CARPAL TUNNEL RELEASE Bilateral      SECTION      CHOLECYSTECTOMY, LAPAROSCOPIC  10/03/2017    COLONOSCOPY      CYST REMOVAL Left 2017    left  5th digit-Dr Bowman    DILATION AND CURETTAGE OF UTERUS      FACET JOINT INJECTION Bilateral 2020    B/L L3-4 medial branch and L5 dorsal rami injection performed by Janae Hardy MD at [address] [place] [address] Bilateral     HYSTERECTOMY      JOINT REPLACEMENT Bilateral     KNEES    NERVE SURGERY N/A 2019    left L3, L4 medial branch and L5 dorsal rami injection performed by Janae Hardy MD at [place] [address] Left 2020    left L3, L4 medial branch and L5 dorsal rami injection performed by Janae Hardy MD at [place] [address] Bilateral 2021    bilateral T12, L1,L2,L3,L4,L5 medial branch block performed by Janae Hardy MD at [place] [address] Bilateral 2021    Bilateral L-facet MBB @ L2-3 and L3-4 performed by Michael Cline MD at [address] [place] [address] DX W/MARTHA Garcia  PFRMD N/A 2018 COLONOSCOPY performed by Aleksandar Morgan MD at [address] [place] [address]       Prior to Visit Medications    Medication Sig Taking? Authorizing Provider   lisinopril-hydroCHLOROthiazide (PRINZIDE;ZESTORETIC) 10-12.5 MG per tablet TAKE 1 TABLET BY MOUTH DAILY Yes Kenn Galvan DO   hydroxychloroquine (PLAQUENIL) 200 MG tablet TAKE 1 AND 1/2 TABLETS BY MOUTH DAILY Yes JOSE Arias CNP   omeprazole (PRILOSEC) 20 MG delayed release capsule TAKE 1 CAPSULE BY MOUTH DAILY Yes JOSE Arias CNP   vitamin C (ASCORBIC ACID) 500 MG tablet Take 500 mg by mouth daily Yes Historical Provider, MD   Multiple Vitamins-Minerals (PRESERVISION AREDS PO) Take by mouth Yes Historical Provider, MD   Calcium Carbonate-Vit D-Min (CALCIUM 1200 PO) Take by mouth daily Yes Historical Provider, MD   Cholecalciferol (VITAMIN D-3 PO) Take by mouth daily Yes Historical Provider, MD   tiZANidine (ZANAFLEX) 4 MG tablet Take 1 tablet by mouth 3 times daily as needed (R knee pain/spasm)  Early Cons, DO   Handicap Placard MISC by Does not apply route Expires 29 June 2023  Early Cons, DO       Family History   Problem Relation Age of Onset    Heart Disease Mother         CHF    Alzheimer's Disease Mother     Dementia Mother     Cancer Father         LUNG    Emphysema Father     Colon Cancer Neg Hx     Breast Cancer Neg Hx          Review of Systems : All systems reviewed, all unremarkable other than HPI. No change since last visit. Physical Exam  Constitutional:       General: She is not in acute distress. Appearance: Normal appearance. HENT:      Head: Normocephalic and atraumatic. Cardiovascular:      Rate and Rhythm: Normal rate and regular rhythm. Pulses: Normal pulses. Heart sounds: Normal heart sounds. Pulmonary:      Effort: Pulmonary effort is normal. No respiratory distress. Breath sounds: Normal breath sounds.    Musculoskeletal: Cervical back: Neck supple. Neurological:      General: No focal deficit present. Mental Status: She is alert and oriented to person, place, and time. Psychiatric:         Behavior: Behavior normal.     :    Cervical Spine Exam: Full ROM, without limitation     Access: Adequate mouth opening       Assessment:   Lumbar spondylosis     PLAN:     As advertised. Planned duration of treatment: 4 weeks. Further assessment and followup in  4 weeks for follow up post injection.        Electronically signed by Roland Singh MD on 3/4/2022 at 12:17 PM

## 2022-03-22 ENCOUNTER — HOSPITAL ENCOUNTER (OUTPATIENT)
Dept: GENERAL RADIOLOGY | Age: 82
Discharge: HOME | End: 2022-03-22
Payer: MEDICARE

## 2022-03-22 ENCOUNTER — OFFICE VISIT (OUTPATIENT)
Dept: PHYSICAL MEDICINE AND REHAB | Age: 82
End: 2022-03-22
Payer: MEDICARE

## 2022-03-22 ENCOUNTER — HOSPITAL ENCOUNTER (OUTPATIENT)
Age: 82
Discharge: HOME | End: 2022-03-22
Payer: MEDICARE

## 2022-03-22 VITALS
SYSTOLIC BLOOD PRESSURE: 130 MMHG | HEIGHT: 63 IN | DIASTOLIC BLOOD PRESSURE: 80 MMHG | WEIGHT: 162 LBS | BODY MASS INDEX: 28.7 KG/M2

## 2022-03-22 DIAGNOSIS — G89.29 CHRONIC BILATERAL LOW BACK PAIN WITHOUT SCIATICA: ICD-10-CM

## 2022-03-22 DIAGNOSIS — Z98.890 HISTORY OF LUMBAR LAMINECTOMY: ICD-10-CM

## 2022-03-22 DIAGNOSIS — M41.9 SCOLIOSIS OF THORACOLUMBAR SPINE, UNSPECIFIED SCOLIOSIS TYPE: ICD-10-CM

## 2022-03-22 DIAGNOSIS — G89.4 CHRONIC PAIN SYNDROME: ICD-10-CM

## 2022-03-22 DIAGNOSIS — M54.50 CHRONIC BILATERAL LOW BACK PAIN WITHOUT SCIATICA: ICD-10-CM

## 2022-03-22 DIAGNOSIS — M53.3 SI (SACROILIAC) PAIN: ICD-10-CM

## 2022-03-22 DIAGNOSIS — M47.816 LUMBAR SPONDYLOSIS: Primary | ICD-10-CM

## 2022-03-22 DIAGNOSIS — M47.816 LUMBAR SPONDYLOSIS: ICD-10-CM

## 2022-03-22 PROCEDURE — 72202 X-RAY EXAM SI JOINTS 3/> VWS: CPT

## 2022-03-22 PROCEDURE — 1090F PRES/ABSN URINE INCON ASSESS: CPT | Performed by: NURSE PRACTITIONER

## 2022-03-22 PROCEDURE — G8417 CALC BMI ABV UP PARAM F/U: HCPCS | Performed by: NURSE PRACTITIONER

## 2022-03-22 PROCEDURE — 1036F TOBACCO NON-USER: CPT | Performed by: NURSE PRACTITIONER

## 2022-03-22 PROCEDURE — 1123F ACP DISCUSS/DSCN MKR DOCD: CPT | Performed by: NURSE PRACTITIONER

## 2022-03-22 PROCEDURE — 4040F PNEUMOC VAC/ADMIN/RCVD: CPT | Performed by: NURSE PRACTITIONER

## 2022-03-22 PROCEDURE — 72100 X-RAY EXAM L-S SPINE 2/3 VWS: CPT

## 2022-03-22 PROCEDURE — 99214 OFFICE O/P EST MOD 30 MIN: CPT | Performed by: NURSE PRACTITIONER

## 2022-03-22 PROCEDURE — G8427 DOCREV CUR MEDS BY ELIG CLIN: HCPCS | Performed by: NURSE PRACTITIONER

## 2022-03-22 PROCEDURE — G8399 PT W/DXA RESULTS DOCUMENT: HCPCS | Performed by: NURSE PRACTITIONER

## 2022-03-22 PROCEDURE — G8484 FLU IMMUNIZE NO ADMIN: HCPCS | Performed by: NURSE PRACTITIONER

## 2022-03-22 ASSESSMENT — ENCOUNTER SYMPTOMS
GASTROINTESTINAL NEGATIVE: 1
RESPIRATORY NEGATIVE: 1
BACK PAIN: 1

## 2022-03-22 NOTE — PROGRESS NOTES
sources. She denies any ER visits since last visit. Pain scale with out pain medications or at its worst is 0-10/10. Depending on activity 0/10 at this time       The patientis allergic to sulfa antibiotics and other. Subjective:      Review of Systems   Constitutional: Negative. HENT: Negative. Eyes: Positive for visual disturbance. Respiratory: Negative. Cardiovascular: Negative. Gastrointestinal: Negative. Endocrine: Negative. Genitourinary: Negative. Musculoskeletal: Positive for arthralgias, back pain, gait problem, joint swelling and myalgias. Negative for neck pain and neck stiffness. Ambulating with walking stick   Skin: Negative. Neurological: Negative for weakness and numbness. Psychiatric/Behavioral: Negative. Objective:     Vitals:    03/22/22 0914   BP: 130/80   Weight: 162 lb (73.5 kg)   Height: 5' 3\" (1.6 m)       Physical Exam  Vitals and nursing note reviewed. Constitutional:       General: She is not in acute distress. Appearance: She is well-developed. She is not diaphoretic. HENT:      Head: Normocephalic and atraumatic. Right Ear: External ear normal.      Left Ear: External ear normal.      Nose: Nose normal.      Mouth/Throat:      Pharynx: No oropharyngeal exudate. Eyes:      General: No scleral icterus. Right eye: No discharge. Left eye: No discharge. Conjunctiva/sclera: Conjunctivae normal.      Pupils: Pupils are equal, round, and reactive to light. Neck:      Thyroid: No thyromegaly. Cardiovascular:      Rate and Rhythm: Normal rate and regular rhythm. Heart sounds: Normal heart sounds. No murmur heard. No friction rub. No gallop. Pulmonary:      Effort: Pulmonary effort is normal. No respiratory distress. Breath sounds: Normal breath sounds. No wheezing or rales. Chest:      Chest wall: No tenderness. Abdominal:      General: Bowel sounds are normal. There is no distension. Palpations: Abdomen is soft. Tenderness: There is no abdominal tenderness. There is no guarding or rebound. Musculoskeletal:         General: Tenderness present. Right shoulder: Tenderness present. Decreased range of motion. Left shoulder: Tenderness present. Decreased range of motion. Right hand: Deformity and tenderness present. Left hand: Deformity and tenderness present. Cervical back: Neck supple. No edema, erythema, rigidity or tenderness. No muscular tenderness. Decreased range of motion. Thoracic back: Tenderness present. Scoliosis present. Lumbar back: Tenderness and bony tenderness present. Decreased range of motion. Negative right straight leg raise test and negative left straight leg raise test. Scoliosis present. Back:    Skin:     General: Skin is warm. Coloration: Skin is not pale. Findings: No erythema or rash. Neurological:      Mental Status: She is alert and oriented to person, place, and time. She is not disoriented. Cranial Nerves: No cranial nerve deficit. Sensory: No sensory deficit. Motor: Weakness present. No atrophy or abnormal muscle tone. Coordination: Coordination normal.      Gait: Gait abnormal.      Deep Tendon Reflexes: Babinski sign absent on the right side. Babinski sign absent on the left side. Reflex Scores:       Achilles reflexes are 1+ on the right side and 1+ on the left side. Comments: Gait-uses cane   Psychiatric:         Attention and Perception: Attention normal. She is attentive. Mood and Affect: Mood normal. Mood is not anxious or depressed. Affect is not labile, blunt, angry or inappropriate. Speech: Speech normal. She is communicative. Speech is not rapid and pressured, delayed, slurred or tangential.         Behavior: Behavior is not agitated, slowed, aggressive, withdrawn, hyperactive or combative. Behavior is cooperative. Thought Content:  Thought content normal. Thought content is not paranoid or delusional. Thought content does not include homicidal or suicidal ideation. Thought content does not include homicidal or suicidal plan. Cognition and Memory: Cognition normal. Memory is not impaired. She does not exhibit impaired recent memory or impaired remote memory. Judgment: Judgment normal. Judgment is not impulsive or inappropriate. ALCIDES  Patricks test  + left   Yeoman's + left   Gaenslen's + left        Assessment:     1. Lumbar spondylosis    2. Chronic bilateral low back pain without sciatica    3. History of lumbar laminectomy    4. Scoliosis of thoracolumbar spine, unspecified scoliosis type    5. SI (sacroiliac) pain    6. Chronic pain syndrome            Plan:      · OARRS reviewed. Current MED: 0  · Patient was offered naloxone for home. · Discussed long term side effects of medications, tolerance, dependency and addiction. · Previous UDS reviewed  · UDS preformed today for compliance. · Patient told can not receive any pain medications from any other source. · No evidence of abuse, diversion or aberrant behavior.  Medications and/or procedures to improve function and quality of life- patient understanding with this and that may not be pain free   Discussed with patient about safe storage of medications at home   Discussed possible weaning of medication dosing dependent on treatment/procedure results.  Discussed with patient about risks with procedure including infection, reaction to medication, increased pain, or bleeding.  Procedure note sreveiwed in detail   Receiving good relief in left low back and only slight in right low back from RFA. Having SI pain.  Discussed that RFA can take time to take full effect and will give more time   Ordered updated lumbar xray and bilateral sacroiliac xrays    Continue Tylenol prn    Updated UDS today and discussed possible medications    Will FU in 1 month for reevaluation     Meds. Prescribed:   No orders of the defined types were placed in this encounter. Return in about 1 month (around 4/22/2022), or if symptoms worsen or fail to improve, for follow up  for medications and xrays .                Electronically signed by JOSE Smith CNP on3/22/2022 at 9:50 AM

## 2022-03-25 ENCOUNTER — TELEPHONE (OUTPATIENT)
Dept: FAMILY MEDICINE CLINIC | Age: 82
End: 2022-03-25

## 2022-03-25 DIAGNOSIS — N18.31 STAGE 3A CHRONIC KIDNEY DISEASE (HCC): ICD-10-CM

## 2022-03-25 DIAGNOSIS — I10 ESSENTIAL HYPERTENSION: Primary | ICD-10-CM

## 2022-03-25 NOTE — TELEPHONE ENCOUNTER
Left detailed message. Okay per HIPAA.  Requested call back if any further questions        Lab slip mailed

## 2022-03-25 NOTE — TELEPHONE ENCOUNTER
Pt due for fasting labs prior to next apt on 4/27/2022. Please call to have pt complete this. Thanks! ASSESSMENT & PLAN   Diagnosis Orders   1. Essential hypertension  CBC with Auto Differential    Comprehensive Metabolic Panel    Lipid Panel    TSH with Reflex    Microalbumin / Creatinine Urine Ratio   2.  Stage 3a chronic kidney disease ([address]  CBC with Auto Differential    Comprehensive Metabolic Panel    Lipid Panel    TSH with Reflex    Microalbumin / Creatinine Urine Ratio     Future Appointments   Date Time Provider Radha Paulson   4/26/2022  9:15 AM Janus Mcburney, APRN - CNP N [facility] Pain [facility]   4/27/2022  9:40 AM Matt Bhatt DO [facility] [address]   5/4/2022 11:40 AM DO FELICIANO Alegre SR[facility]X Rheum [facility] - [facility]

## 2022-04-25 ENCOUNTER — NURSE ONLY (OUTPATIENT)
Dept: LAB | Age: 82
End: 2022-04-25

## 2022-04-25 DIAGNOSIS — I10 ESSENTIAL HYPERTENSION: ICD-10-CM

## 2022-04-25 DIAGNOSIS — N18.31 STAGE 3A CHRONIC KIDNEY DISEASE (HCC): ICD-10-CM

## 2022-04-25 LAB
ALBUMIN SERPL-MCNC: 4.3 G/DL (ref 3.5–5.1)
ALP BLD-CCNC: 108 U/L (ref 38–126)
ALT SERPL-CCNC: 18 U/L (ref 11–66)
ANION GAP SERPL CALCULATED.3IONS-SCNC: 13 MEQ/L (ref 8–16)
AST SERPL-CCNC: 25 U/L (ref 5–40)
BASOPHILS # BLD: 0.9 %
BASOPHILS ABSOLUTE: 0 THOU/MM3 (ref 0–0.1)
BILIRUB SERPL-MCNC: 0.4 MG/DL (ref 0.3–1.2)
BUN BLDV-MCNC: 24 MG/DL (ref 7–22)
CALCIUM SERPL-MCNC: 9.6 MG/DL (ref 8.5–10.5)
CHLORIDE BLD-SCNC: 103 MEQ/L (ref 98–111)
CHOLESTEROL, TOTAL: 211 MG/DL (ref 100–199)
CO2: 25 MEQ/L (ref 23–33)
CREAT SERPL-MCNC: 1.1 MG/DL (ref 0.4–1.2)
CREATININE, URINE: 89.5 MG/DL
EOSINOPHIL # BLD: 6.1 %
EOSINOPHILS ABSOLUTE: 0.3 THOU/MM3 (ref 0–0.4)
ERYTHROCYTE [DISTWIDTH] IN BLOOD BY AUTOMATED COUNT: 12.8 % (ref 11.5–14.5)
ERYTHROCYTE [DISTWIDTH] IN BLOOD BY AUTOMATED COUNT: 44.1 FL (ref 35–45)
GFR SERPL CREATININE-BSD FRML MDRD: 48 ML/MIN/1.73M2
GLUCOSE BLD-MCNC: 92 MG/DL (ref 70–108)
HCT VFR BLD CALC: 39.5 % (ref 37–47)
HDLC SERPL-MCNC: 56 MG/DL
HEMOGLOBIN: 12.6 GM/DL (ref 12–16)
IMMATURE GRANS (ABS): 0.01 THOU/MM3 (ref 0–0.07)
IMMATURE GRANULOCYTES: 0.2 %
LDL CHOLESTEROL CALCULATED: 120 MG/DL
LYMPHOCYTES # BLD: 25.8 %
LYMPHOCYTES ABSOLUTE: 1.4 THOU/MM3 (ref 1–4.8)
MCH RBC QN AUTO: 29.9 PG (ref 26–33)
MCHC RBC AUTO-ENTMCNC: 31.9 GM/DL (ref 32.2–35.5)
MCV RBC AUTO: 93.8 FL (ref 81–99)
MICROALBUMIN UR-MCNC: 3.54 MG/DL
MICROALBUMIN/CREAT UR-RTO: 40 MG/G (ref 0–30)
MONOCYTES # BLD: 12.9 %
MONOCYTES ABSOLUTE: 0.7 THOU/MM3 (ref 0.4–1.3)
NUCLEATED RED BLOOD CELLS: 0 /100 WBC
PLATELET # BLD: 228 THOU/MM3 (ref 130–400)
PMV BLD AUTO: 10.2 FL (ref 9.4–12.4)
POTASSIUM SERPL-SCNC: 4.5 MEQ/L (ref 3.5–5.2)
RBC # BLD: 4.21 MILL/MM3 (ref 4.2–5.4)
SEG NEUTROPHILS: 54.1 %
SEGMENTED NEUTROPHILS ABSOLUTE COUNT: 2.9 THOU/MM3 (ref 1.8–7.7)
SODIUM BLD-SCNC: 141 MEQ/L (ref 135–145)
TOTAL PROTEIN: 6.7 G/DL (ref 6.1–8)
TRIGL SERPL-MCNC: 174 MG/DL (ref 0–199)
TSH SERPL DL<=0.05 MIU/L-ACNC: 1.71 UIU/ML (ref 0.4–4.2)
WBC # BLD: 5.3 THOU/MM3 (ref 4.8–10.8)

## 2022-04-26 ENCOUNTER — TELEPHONE (OUTPATIENT)
Dept: FAMILY MEDICINE CLINIC | Age: 82
End: 2022-04-26

## 2022-04-26 ENCOUNTER — OFFICE VISIT (OUTPATIENT)
Dept: PHYSICAL MEDICINE AND REHAB | Age: 82
End: 2022-04-26
Payer: MEDICARE

## 2022-04-26 VITALS
WEIGHT: 162 LBS | SYSTOLIC BLOOD PRESSURE: 110 MMHG | BODY MASS INDEX: 28.7 KG/M2 | DIASTOLIC BLOOD PRESSURE: 82 MMHG | HEIGHT: 63 IN

## 2022-04-26 DIAGNOSIS — M41.9 SCOLIOSIS OF THORACOLUMBAR SPINE, UNSPECIFIED SCOLIOSIS TYPE: ICD-10-CM

## 2022-04-26 DIAGNOSIS — G89.29 CHRONIC BILATERAL LOW BACK PAIN WITHOUT SCIATICA: ICD-10-CM

## 2022-04-26 DIAGNOSIS — G89.4 CHRONIC PAIN SYNDROME: ICD-10-CM

## 2022-04-26 DIAGNOSIS — Z98.890 HISTORY OF LUMBAR LAMINECTOMY: ICD-10-CM

## 2022-04-26 DIAGNOSIS — M47.816 LUMBAR SPONDYLOSIS: ICD-10-CM

## 2022-04-26 DIAGNOSIS — M54.50 CHRONIC BILATERAL LOW BACK PAIN WITHOUT SCIATICA: ICD-10-CM

## 2022-04-26 DIAGNOSIS — M53.3 SI (SACROILIAC) PAIN: Primary | ICD-10-CM

## 2022-04-26 PROCEDURE — G8427 DOCREV CUR MEDS BY ELIG CLIN: HCPCS | Performed by: NURSE PRACTITIONER

## 2022-04-26 PROCEDURE — 1036F TOBACCO NON-USER: CPT | Performed by: NURSE PRACTITIONER

## 2022-04-26 PROCEDURE — G8417 CALC BMI ABV UP PARAM F/U: HCPCS | Performed by: NURSE PRACTITIONER

## 2022-04-26 PROCEDURE — 4040F PNEUMOC VAC/ADMIN/RCVD: CPT | Performed by: NURSE PRACTITIONER

## 2022-04-26 PROCEDURE — G8399 PT W/DXA RESULTS DOCUMENT: HCPCS | Performed by: NURSE PRACTITIONER

## 2022-04-26 PROCEDURE — 1090F PRES/ABSN URINE INCON ASSESS: CPT | Performed by: NURSE PRACTITIONER

## 2022-04-26 PROCEDURE — 1123F ACP DISCUSS/DSCN MKR DOCD: CPT | Performed by: NURSE PRACTITIONER

## 2022-04-26 PROCEDURE — 99214 OFFICE O/P EST MOD 30 MIN: CPT | Performed by: NURSE PRACTITIONER

## 2022-04-26 RX ORDER — HYDROCODONE BITARTRATE AND ACETAMINOPHEN 5; 325 MG/1; MG/1
.5-1 TABLET ORAL 2 TIMES DAILY PRN
Qty: 28 TABLET | Refills: 0 | Status: SHIPPED | OUTPATIENT
Start: 2022-04-26 | End: 2022-07-01 | Stop reason: SDUPTHER

## 2022-04-26 ASSESSMENT — ENCOUNTER SYMPTOMS
EYES NEGATIVE: 1
GASTROINTESTINAL NEGATIVE: 1
BACK PAIN: 1
RESPIRATORY NEGATIVE: 1

## 2022-04-26 NOTE — PROGRESS NOTES
Chief Complaint   Patient presents with    Medicare AWV    Follow-up     Chronic issues as noted below       History obtained from the patient. SUBJECTIVE:  Nikunj Deutsch is a 80 y.o. female that presents today for     -Low Back Pain PRIOR VISIT:     HPI:   Started 2 to 3 wks ago  Started after doing yard work  Persistent on L lower side, will radiate to the R side  Heat and ice help some  On tramadol chronically for back and joint pain, not helping.   mobic helps some  No radicular sxs  No bowel/bladder issues since back pain  Pain at it's worse a 6/10  Currently 3  Denies LUTS/dysuria     She describes the pain as dull, aching  in the lumbar region.     Inciting injury or history of trauma? No  Pain is relieved by - as above  Pain is aggravated by - as above  Radiation of the pain? No  Paresthesias of the extremities? No  Saddle anesthesia? No  Bowel or bladder incontinence? No  Treatments tried - as above. UPDATE PRIOR VISIT:   Saw in July for this  Treated with tramadol, zanaflex and pred   Got mostly better, but once done with meds, pain came back  Has been dealing with this since then  Pain in L sided low back, will radiate down to L SI joint and front of L hip  Better with rest, worse when walking a distance, stairs and laying flat  Seems to be doing worse  Has been doing HEP, not helping  No fevers, chills, night sweats or wt loss  No groin pain/numbness or bowel/bladder dysfunction. No dysuria/luts  Asking what else to do. UPDATE PRIOR VISIT:   Completed 3 wks of PT, but wasn't getting better, thought it was getting a bit worse  So PT stopped  Referred to [facility] d/t worsening sxs  They recommended surgery, she wants to hold off  Referred to pain management at [facility]  As for hip pain, that is better, got hip injection at [facility], and those sxs are better.    Back pain limiting her on certain days  Tramadol used in the past, not helping  Asking what she can do while waiting for pain management apt  Has used vicodin in the past, and helped  Using prn tizanadine a well, which helps    UPDATE TODAY:   Back pain doing much better after seeing pain management and having injections  Is off mobic  Doing HEP  Walks daily  No bowel/bladder issues  Overall happy with results       -HTN/CKD3:     HPI:  Labs stable    Taking meds as prescribed ?: yes  Tolerating well ?: yes  Side Effects ?: denies  BP at home ?: <140/90 typically   Working on TLCS ?: yes  Chest Pain/SOB/Palpitations? denies     BP Readings from Last 3 Encounters:   04/27/22 124/66   04/26/22 110/82   03/22/22 130/80       -Hyperkalemia PRIOR VISIT: dx with mild hyperkalemia by old PCP And sent to nephro at [facility]. Started on veltassa for it. However, very expensive and doesn't want to take it. No records. She has f/u soon. Asking what she should do. No prior issues until recent for elevated K+     UPDATE TODAY: pt stopped Veltessa a while ago, doesn't want to see nephro any more. Would like me to montior. labs stable over itme      -HLD:     HPI PRIOR VISIT: pt quit taking zocor 4 wks ago, doesn't want to take any more, no side effects. Watching diet. No hx of CAD or stroke. Wants to see where labas are in 6 months     UPDATE TODAY: off zocor for a while now. Labs stable over time.         -Chronic pain PRIOR VISIT: has chronic OA pain in back and R foot and ankle. Was on mobic, but stopped by old PCP. Has hx of mild CKD, no GI issues. Also gets tramadol, but having to take every 4 hours with tynol and not doing nearly as well. Asking what she can do. Starting to limit her activities. No falls. She understands risks of mobic, but really wants to resume and dec her tramadol dose.      UPDATE PRIOr VISIT: mobic working well. Due for refill of tramadol. Takes 1 to 3 per day, depending on the day. mobic helping as well. Keeps joint and back pain under good control.       UPDATE PRIOR VISIT: pain stable, taking tramadol and mobic. Working well.  Has 1 RF of tramadol yet. Keeping things under control. Placed on PPI by rheum, for GI protection. Overall doing well     UPDATE PRIOR VISIT: pt states tramadol doesn't work much for her. mobic does and wants to con't (she understands the risks of this with her CKD). She is asking if can add tylenol, finds this works as well as tramadol. Asking if ok to stop tramadol. UPDATE LAST VISIT:   Other than PRIOR pain, chronic joint pain is doing well with prn mobic and plaquenil. Has f/u with Dr. Sandra Wei in [place]  +WICHO    UPDATE TODAY:   Joint pain in hands etc doing well  Has f/u with Dr. Sandra Wei, rheum, + WICHO  On Plaquenil  Off mobic      Age/Gender Health Maintenance    Lipid -   Lab Results   Component Value Date    CHOL 211 (H) 04/25/2022    CHOL 263 (H) 10/28/2021    CHOL 266 (H) 04/12/2021     Lab Results   Component Value Date    TRIG 174 04/25/2022    TRIG 191 10/28/2021    TRIG 174 04/12/2021     Lab Results   Component Value Date    HDL 56 04/25/2022    HDL 60 10/28/2021    HDL 60 04/12/2021     Lab Results   Component Value Date    LDLCALC 120 04/25/2022    LDLCALC 165 10/28/2021    [address] 04/12/2021       ; LDL 82; HDL 74;  [facility] 2018)    DM Screen -   Lab Results   Component Value Date    GLUCOSE 92 04/25/2022    GLUCOSE 99 05/03/2018       Colon Cancer Screening -NEG SEPT 2018, no repeat ([place])  Lung Cancer Screening (Age 54 to [de-identified] with 30 pack year hx, current smoker or quit within past 15 years) - never smoker    Tetanus - UTD July 2018  Influenza Vaccine - Candidate FALL 2022  Pneumonia Vaccine - UTD PCV 13 OCT 2015.  UTD PPV 23 APR 2018  Zostavax - Zostavax competed APR 2013   Shingrix - UTD x 2    Breast Cancer Screening - NEG OCT 2021  Cervical Cancer Screening - Aged out  Osteoporosis Screening - osteopenia AUG 2018 and AUG 2020      Current Outpatient Medications   Medication Sig Dispense Refill    HYDROcodone-acetaminophen (NORCO) 5-325 MG per tablet Take 0.5-1 tablets by mouth 2 times daily as needed for Pain for up to 14 days. Take lowest dose possible to manage pain 28 tablet 0    lisinopril-hydroCHLOROthiazide (PRINZIDE;ZESTORETIC) 10-12.5 MG per tablet TAKE 1 TABLET BY MOUTH DAILY 90 tablet 3    hydroxychloroquine (PLAQUENIL) 200 MG tablet TAKE 1 AND 1/2 TABLETS BY MOUTH DAILY 135 tablet 1    omeprazole (PRILOSEC) 20 MG delayed release capsule TAKE 1 CAPSULE BY MOUTH DAILY 90 capsule 3    tiZANidine (ZANAFLEX) 4 MG tablet Take 1 tablet by mouth 3 times daily as needed (R knee pain/spasm) 45 tablet 0    vitamin C (ASCORBIC ACID) 500 MG tablet Take 500 mg by mouth daily      Handicap Placard MISC by Does not apply route Expires 29 June 2023 1 each 0    Multiple Vitamins-Minerals (PRESERVISION AREDS PO) Take by mouth      Calcium Carbonate-Vit D-Min (CALCIUM 1200 PO) Take by mouth daily      Cholecalciferol (VITAMIN D-3 PO) Take by mouth daily       No current facility-administered medications for this visit. No orders of the defined types were placed in this encounter. All medications reviewed and reconciled, including OTC and herbal medications. Updated list given to patient.        Patient Active Problem List    Diagnosis Date Noted    Ganglion cyst of finger of left hand 01/31/2017     Priority: High     Class: Chronic    Lumbar spondylosis     Erosive osteoarthritis of both hands     Positive WICHO (antinuclear antibody) 04/18/2018    Dyslipidemia     History of skin cancer      BCC AND SCC      Hypertension     Osteoarthritis     DDD (degenerative disc disease), lumbar     Chronic low back pain     Hyperkalemia     CKD (chronic kidney disease) stage 3, GFR 30-59 ml/min (HCC)        Past Medical History:   Diagnosis Date    Chronic low back pain     CKD (chronic kidney disease) stage 3, GFR 30-59 ml/min (HCC)     DDD (degenerative disc disease), lumbar     Dyslipidemia     History of skin cancer     BCC AND SCC    Hyperlipidemia     Hypertension     Relation Age of Onset    Heart Disease Mother         CHF    Alzheimer's Disease Mother     Dementia Mother     Cancer Father         LUNG    Emphysema Father     Colon Cancer Neg Hx     Breast Cancer Neg Hx          I have reviewed the patient's past medical history, past surgical history, allergies, medications, social and family history and I have made updates where appropriate. Review of Systems  Positive responses are highlighted in bold    Constitutional:  Fever, Chills, Night Sweats, Fatigue, Unexpected changes in weight  HENT:  Ear pain, Tinnitus, Nosebleeds, Trouble swallowing, Hearing loss, Sore throat  Cardiovascular:  Chest Pain, Palpitations, Orthopnea, Paroxysmal Nocturnal Dyspnea  Respiratory:  Cough, Wheezing, Shortness of breath, Chest tightness, Apnea  Gastrointestinal:  Nausea, Vomiting, Diarrhea, Constipation, Heartburn, Blood in stool  Genitourinary:  Difficulty or painful urination, Flank pain, Change in frequency, Urgency  Skin:  Color change, Rash, Itching, Wound  Musculoskeletal:  Joint pain, Back pain, Gait problems, Joint swelling, Myalgias  Neurological:  Dizziness, Headaches, Presyncope, Numbness, Seizures, Tremors  Endocrine:  Heat Intolerance, Cold Intolerance, Polydipsia, Polyphagia, Polyuria      PHYSICAL EXAM:  Vitals:    04/27/22 0942   BP: 124/66   Pulse: 72   Resp: 12   Temp: 98.1 °F (36.7 °C)   TempSrc: Oral   SpO2: 99%   Weight: 164 lb (74.4 kg)   Height: 5' 2\" (1.575 m)     Body mass index is 30 kg/m².        VS Reviewed  General Appearance: A&O x 3, No acute distress,well developed and well- nourished  Eyes: pupils equal, round, and reactive to light, extraocular eye movements intact, conjunctivae and eye lids without erythema  ENT: external ear and ear canal clear bilaterally, TMs intact and regular, nose without deformity, nasal mucosa and turbinates normal without polyps, oropharynx normal, dentition is normal for age  Neck: supple and non-tender without mass, no thyromegaly or thyroid nodules, no cervical lymphadenopathy  Pulmonary/Chest: clear to auscultation bilaterally- no wheezes, rales or rhonchi, normal air movement, no respiratory distress or retractions  Cardiovascular: S1 and S2 auscultated w/ RRR. No murmurs, rubs, clicks, or gallops, distal pulses intact. Abdomen: soft, non-tender, non-distended, bowel sounds physiologic,  no rebound or guarding, no masses or hernias noted. Liver and spleen without enlargement. Extremities: no cyanosis, clubbing or edema of the lower extremities. Skin: warm and dry, no rash or erythema      Lab Results   Component Value Date     04/25/2022    K 4.5 04/25/2022     04/25/2022    CO2 25 04/25/2022    BUN 24 04/25/2022    CREATININE 1.1 04/25/2022    GLUCOSE 92 04/25/2022    GLUCOSE 99 05/03/2018    CALCIUM 9.6 04/25/2022      No results found for: GFRESTIMATE  Lab Results   Component Value Date    LABGLOM 48 04/25/2022       ASSESSMENT & PLAN  1. Chronic left-sided low back pain without sciatica    Stable   Prn zanaflex  con't HEP  F/u pain managment    2. DDD (degenerative disc disease), lumbar    As above    3. Left hip pain    As above    4. Chronic left SI joint pain    As above    5. Essential hypertension    At goal  con't Zestoretic  Labs UTD    6. Stage 3a chronic kidney disease ([address]    As above  Repeat labs in 6 months  In call back cue    7. Microalbuminuria    As per # 6    8. Hyperkalemia    Labs stable off veltessa  Repeat BMP 6 months  In call back cue    9. Dyslipidemia    Stable  con't diet control    10. Primary osteoarthritis involving multiple joints    Stable  con't plaquenil and rheum f/u    11. Positive WICHO (antinuclear antibody)      12. Erosive osteoarthritis of both hands        DISPOSITION    Return in about 1 year (around 4/27/2023) for AWV, follow-up on chronic medical conditions, sooner as needed. Joe Nye released without restrictions.     Future Appointments   Date Time Provider Radha Paulson   5/4/2022 11:40 AM Alon Buck DO N [facility] Rheum [facility] [place]   6/6/2022 11:15 AM [facility] Cobos [email or web address]   5/3/2023  9:40 AM Isreal Garcia DO [facility]     PATIENT COUNSELING    Julianna received counseling on the following healthy behaviors: nutrition, exercise and medication adherence    Barriers to learning and self management: none    Discussed use, benefit, and side effects of prescribed medications. Barriers to medication compliance addressed. All patient questions answered. Pt voiced understanding. Electronically signed by Isreal Garcia DO on 4/27/2022 at 10:03 AM        Medicare Annual Wellness Visit    Boone Kennedy is here for Medicare AWV and Follow-up (Chronic issues as noted below)    Assessment & Plan   Medicare annual wellness visit, subsequent     Recommendations for Preventive Services Due: see orders and patient instructions/AVS.  Recommended screening schedule for the next 5-10 years is provided to the patient in written form: see Patient Instructions/AVS.     Return in about 1 year (around 4/27/2023) for AWV, follow-up on chronic medical conditions, sooner as needed. Subjective     Patient's complete Health Risk Assessment and screening values have been reviewed and are found in Flowsheets. The following problems were reviewed today and where indicated follow up appointments were made and/or referrals ordered.     Positive Risk Factor Screenings with Interventions:    Fall Risk:  Do you feel unsteady or are you worried about falling? : no  2 or more falls in past year?: no  Fall with injury in past year?: (!) yes     Fall Risk Interventions:    · Home safety tips provided              General Health and ACP:  General  In general, how would you say your health is?: Good  In the past 7 days, have you experienced any of the following: New or Increased Pain, New or Increased Fatigue, Loneliness, Social Isolation, Stress or Anger?: No  Do you get the social and emotional support that you need?: Yes  Do you have a Living Will?: Yes    Advance Directives     Power of  Living Will ACP-Advance Directive ACP-Power of     Not on File Not on File Not on File Filed      General Health Risk Interventions:  · No Living Will: ACP documents already completed- patient asked to provide copy to the office    Health Habits/Nutrition:     Physical Activity: Sufficiently Active    Days of Exercise per Week: 7 days    Minutes of Exercise per Session: 30 min     Have you lost any weight without trying in the past 3 months?: No  Body mass index: (!) 29.99  Have you seen the dentist within the past year?: Yes    Health Habits/Nutrition Interventions:  · Nutritional issues:  educational materials for healthy, well-balanced diet provided             Objective   Vitals:    04/27/22 0942   BP: 124/66   Pulse: 72   Resp: 12   Temp: 98.1 °F (36.7 °C)   TempSrc: Oral   SpO2: 99%   Weight: 164 lb (74.4 kg)   Height: 5' 2\" (1.575 m)      Body mass index is 30 kg/m². Allergies   Allergen Reactions    Sulfa Antibiotics Swelling    Other Nausea And Vomiting     oysters     Prior to Visit Medications    Medication Sig Taking? Authorizing Provider   HYDROcodone-acetaminophen (NORCO) 5-325 MG per tablet Take 0.5-1 tablets by mouth 2 times daily as needed for Pain for up to 14 days.  Take lowest dose possible to manage pain Yes JOSE Menezes CNP   lisinopril-hydroCHLOROthiazide (PRINZIDE;ZESTORETIC) 10-12.5 MG per tablet TAKE 1 TABLET BY MOUTH DAILY Yes Kenn Galvan DO   hydroxychloroquine (PLAQUENIL) 200 MG tablet TAKE 1 AND 1/2 TABLETS BY MOUTH DAILY Yes JOSE Zambrano CNP   omeprazole (PRILOSEC) 20 MG delayed release capsule TAKE 1 CAPSULE BY MOUTH DAILY Yes JOSE Zambrano CNP   tiZANidine (ZANAFLEX) 4 MG tablet Take 1 tablet by mouth 3 times daily as needed (R knee pain/spasm) Yes Andrade Noble Carmen Blanco,    vitamin C (ASCORBIC ACID) 500 MG tablet Take 500 mg by mouth daily Yes Historical Provider, MD   Handicap Placard MISC by Does not apply route Expires 29 June 2023 Yes Dangelo Dean DO   Multiple Vitamins-Minerals (PRESERVISION AREDS PO) Take by mouth Yes Historical Provider, MD   Calcium Carbonate-Vit D-Min (CALCIUM 1200 PO) Take by mouth daily Yes Historical Provider, MD   Cholecalciferol (VITAMIN D-3 PO) Take by mouth daily Yes Historical Provider, MD       CareTeam (Including outside providers/suppliers regularly involved in providing care):   Patient Care Team:  Dangelo Dean DO as PCP - General (Family Medicine)  Dangelo Dean DO as PCP - [facility] Empaneled Provider    Reviewed and updated this visit:  Tobacco  Allergies  Meds  Problems  Med Hx  Surg Hx  Soc Hx  Fam Hx                    Care plan reviewed with and given to patient.        Electronically signed by Dangelo Dean DO on 4/27/2022 at 10:03 AM

## 2022-04-26 NOTE — TELEPHONE ENCOUNTER
----- Message from Kamini Da Silva, DO sent at 4/25/2022  7:56 PM EDT -----  Please let pt know that labs are stable and appropriate. Let me know if questions, thanks!

## 2022-04-26 NOTE — PROGRESS NOTES
[address] [facility] [address]  Dept: [phone]  Dept Fax: [phone]: [phone]    Visit Date: 4/26/2022    Functionality Assessment/Goals Worksheet     On a scale of 0 (Does not Interfere) to 10 (Completely Interferes)     1. Which number describes how during the past week pain has interfered with       the following:  A. General Activity:  5  B. Mood: 6  C. Walking Ability:  7  D. Normal Work (Includes both work outside the home and housework):  6  E. Relations with Other People:   3  F. Sleep:   6  G. Enjoyment of Life:   3    2. Patient Prefers to Take their Pain Medications:     []  On a regular basis   []  Only when necessary    []  Does not take pain medications    3. What are the Patient's Goals/Expectations for Visiting Pain Management? []  Learn about my pain    []  Receive Medication   []  Physical Therapy     []  Treat Depression   []  Receive Injections    []  Treat Sleep   []  Deal with Anxiety and Stress   []  Treat Opoid Dependence/Addiction   [x]  Other:  Alternatives to hurting      HPI:   Boone Kennedy is a 80 y.o. female is here today for    Chief Complaint: Low back pain, Si pain     HPI   1 month FU to review xrays. Still not noticing much relief from bilateral L-facet RFA from 3/2/2022. Continues to have pain in low aching and sore with periods of sharp pain. Again today main complaint is pain a little bit lower SI area, buttocks sharp and aching worse in left side. Denies any radicular pain   Minimal to no pain at rest and with sitting. Increases with activity,   Pain increases with bending, lifting, twisting , walking, standing, getting up and down and housework or working at job, sometimes worse in the morning when she first gets up.      Continues extra strength tylenol prn and tumeric prn. Not much relief with current medications. Patient voiced interest in pain medications. Medications reviewed. Patient denies side effects with medications. Patient states she is taking medications as prescribed. Shedenies receiving pain medications from other sources. She denies any ER visits since last visit. Pain scale with out pain medications or at its worst is 0-10/10. Depending on activity       Drug screen reviewed from 3/22/2022 and was appropriate    Lumbar xray:   FINDINGS:    There is levoconvex scoliosis of the lumbar spine.       There is diffuse osteopenia.       There is extensive disc space narrowing throughout the lumbar spine with areas of vacuum disc phenomenon.       There is facet arthropathy.       Atherosclerotic calcifications are noted in the abdominal aorta.       The sacroiliac joints are within normal limits.           Impression   Severe degenerative changes with levoconvex scoliosis. No acute fracture. SI xrays:   FINDINGS: No fracture or bone destruction is seen. The sacroiliac joints are intact. There are mild degenerative changes left sacroiliac joint. Moderate degenerative changes lower lumbar spine.           Impression   Mild degenerative changes left sacroiliac joint. The patientis allergic to sulfa antibiotics and other. Subjective:      Review of Systems   Constitutional: Negative. HENT: Negative. Eyes: Negative. Negative for visual disturbance. Respiratory: Negative. Cardiovascular: Negative. Gastrointestinal: Negative. Endocrine: Negative. Genitourinary: Negative. Musculoskeletal: Positive for arthralgias, back pain, gait problem, joint swelling and myalgias. Negative for neck pain and neck stiffness. Ambulating with strait cane, gets leg cramps and spasms. Skin: Negative. Neurological: Negative for weakness and numbness. Psychiatric/Behavioral: Negative.         Objective:     Vitals:    04/26/22 0910   BP: 110/82   Weight: 162 lb (73.5 kg)   Height: 5' 3\" (1.6 m)       Physical Exam  Vitals and nursing note reviewed. Constitutional:       General: She is not in acute distress. Appearance: She is well-developed. She is not diaphoretic. HENT:      Head: Normocephalic and atraumatic. Right Ear: External ear normal.      Left Ear: External ear normal.      Nose: Nose normal.      Mouth/Throat:      Pharynx: No oropharyngeal exudate. Eyes:      General: No scleral icterus. Right eye: No discharge. Left eye: No discharge. Conjunctiva/sclera: Conjunctivae normal.      Pupils: Pupils are equal, round, and reactive to light. Neck:      Thyroid: No thyromegaly. Cardiovascular:      Rate and Rhythm: Normal rate and regular rhythm. Heart sounds: Normal heart sounds. No murmur heard. No friction rub. No gallop. Pulmonary:      Effort: Pulmonary effort is normal. No respiratory distress. Breath sounds: Normal breath sounds. No wheezing or rales. Chest:      Chest wall: No tenderness. Abdominal:      General: Bowel sounds are normal. There is no distension. Palpations: Abdomen is soft. Tenderness: There is no abdominal tenderness. There is no guarding or rebound. Musculoskeletal:         General: Tenderness present. Right shoulder: Tenderness present. Decreased range of motion. Left shoulder: Tenderness present. Decreased range of motion. Right hand: Deformity and tenderness present. Left hand: Deformity and tenderness present. Cervical back: Neck supple. No edema, erythema, rigidity or tenderness. No muscular tenderness. Decreased range of motion. Thoracic back: Tenderness present. Scoliosis present. Lumbar back: Tenderness and bony tenderness present. Decreased range of motion. Negative right straight leg raise test and negative left straight leg raise test. Scoliosis present. Back:    Skin:     General: Skin is warm. Coloration: Skin is not pale.       Findings: No erythema or rash. Neurological:      Mental Status: She is alert and oriented to person, place, and time. She is not disoriented. Cranial Nerves: No cranial nerve deficit. Sensory: No sensory deficit. Motor: Weakness present. No atrophy or abnormal muscle tone. Coordination: Coordination normal.      Gait: Gait abnormal.      Deep Tendon Reflexes: Babinski sign absent on the right side. Babinski sign absent on the left side. Reflex Scores:       Achilles reflexes are 1+ on the right side and 1+ on the left side. Comments: Gait-uses cane   Psychiatric:         Attention and Perception: Attention normal. She is attentive. Mood and Affect: Mood normal. Mood is not anxious or depressed. Affect is not labile, blunt, angry or inappropriate. Speech: Speech normal. She is communicative. Speech is not rapid and pressured, delayed, slurred or tangential.         Behavior: Behavior is not agitated, slowed, aggressive, withdrawn, hyperactive or combative. Behavior is cooperative. Thought Content: Thought content normal. Thought content is not paranoid or delusional. Thought content does not include homicidal or suicidal ideation. Thought content does not include homicidal or suicidal plan. Cognition and Memory: Cognition normal. Memory is not impaired. She does not exhibit impaired recent memory or impaired remote memory. Judgment: Judgment normal. Judgment is not impulsive or inappropriate. ALCIDES  Patricks test  + left   Yeoman's + left   Gaenslen's + left      Assessment:     1. SI (sacroiliac) pain    2. Lumbar spondylosis    3. Chronic bilateral low back pain without sciatica    4. History of lumbar laminectomy    5. Chronic pain syndrome    6. Scoliosis of thoracolumbar spine, unspecified scoliosis type            Plan:      · OARRS reviewed. Current MED: 0  · Patient was offered naloxone for home.    · Discussed long term side effects of medications, tolerance, dependency and addiction. · Previous UDS reviewed  · UDS preformed today for compliance. · Patient told can not receive any pain medications from any other source. · No evidence of abuse, diversion or aberrant behavior.  Medications and/or procedures to improve function and quality of life- patient understanding with this and that may not be pain free   Discussed with patient about safe storage of medications at home   Discussed possible weaning of medication dosing dependent on treatment/procedure results.  Discussed with patient about risks with procedure including infection, reaction to medication, increased pain, or bleeding.  Procedure notes reveiwed.  Minimal relief from bilateral L-facet RFA.  Reviewed Lumbar and SI xrays    Discussed possible left SI mBB # 1. Patient would like to wait as she reports that she has a lot going on and events with grand kids. She will think about it.  Reviewed UDS and appropriate. Discussed pain medications. Had Ultram in past with no relief.  Trial Norco 5/325 mg tabs, 1/2 tab to 1 tab BID prn for moderate to severe pain- 2 week trial. Continue tylenol for mild pain       Meds. Prescribed:   Orders Placed This Encounter   Medications    HYDROcodone-acetaminophen (NORCO) 5-325 MG per tablet     Sig: Take 0.5-1 tablets by mouth 2 times daily as needed for Pain for up to 14 days. Take lowest dose possible to manage pain     Dispense:  28 tablet     Refill:  0     Reduce doses taken as pain becomes manageable       Return in about 1 month (around 5/26/2022), or if symptoms worsen or fail to improve, for follow up  for medications.                Electronically signed by JOSE Lopez CNP on4/26/2022 at 9:43 AM

## 2022-04-27 ENCOUNTER — OFFICE VISIT (OUTPATIENT)
Dept: FAMILY MEDICINE CLINIC | Age: 82
End: 2022-04-27
Payer: MEDICARE

## 2022-04-27 VITALS
HEIGHT: 62 IN | SYSTOLIC BLOOD PRESSURE: 124 MMHG | RESPIRATION RATE: 12 BRPM | TEMPERATURE: 98.1 F | WEIGHT: 164 LBS | BODY MASS INDEX: 30.18 KG/M2 | DIASTOLIC BLOOD PRESSURE: 66 MMHG | HEART RATE: 72 BPM | OXYGEN SATURATION: 99 %

## 2022-04-27 DIAGNOSIS — E78.5 DYSLIPIDEMIA: ICD-10-CM

## 2022-04-27 DIAGNOSIS — G89.29 CHRONIC LEFT-SIDED LOW BACK PAIN WITHOUT SCIATICA: ICD-10-CM

## 2022-04-27 DIAGNOSIS — R76.8 POSITIVE ANA (ANTINUCLEAR ANTIBODY): ICD-10-CM

## 2022-04-27 DIAGNOSIS — R80.9 MICROALBUMINURIA: ICD-10-CM

## 2022-04-27 DIAGNOSIS — M25.552 LEFT HIP PAIN: ICD-10-CM

## 2022-04-27 DIAGNOSIS — Z00.00 MEDICARE ANNUAL WELLNESS VISIT, SUBSEQUENT: Primary | ICD-10-CM

## 2022-04-27 DIAGNOSIS — M53.3 CHRONIC LEFT SI JOINT PAIN: ICD-10-CM

## 2022-04-27 DIAGNOSIS — G89.29 CHRONIC LEFT SI JOINT PAIN: ICD-10-CM

## 2022-04-27 DIAGNOSIS — E87.5 HYPERKALEMIA: ICD-10-CM

## 2022-04-27 DIAGNOSIS — M54.50 CHRONIC LEFT-SIDED LOW BACK PAIN WITHOUT SCIATICA: ICD-10-CM

## 2022-04-27 DIAGNOSIS — M15.9 PRIMARY OSTEOARTHRITIS INVOLVING MULTIPLE JOINTS: ICD-10-CM

## 2022-04-27 DIAGNOSIS — M15.4 EROSIVE OSTEOARTHRITIS OF BOTH HANDS: ICD-10-CM

## 2022-04-27 DIAGNOSIS — N18.31 STAGE 3A CHRONIC KIDNEY DISEASE (HCC): ICD-10-CM

## 2022-04-27 DIAGNOSIS — I10 ESSENTIAL HYPERTENSION: ICD-10-CM

## 2022-04-27 DIAGNOSIS — M51.36 DDD (DEGENERATIVE DISC DISEASE), LUMBAR: ICD-10-CM

## 2022-04-27 PROCEDURE — 4040F PNEUMOC VAC/ADMIN/RCVD: CPT | Performed by: FAMILY MEDICINE

## 2022-04-27 PROCEDURE — G0439 PPPS, SUBSEQ VISIT: HCPCS | Performed by: FAMILY MEDICINE

## 2022-04-27 PROCEDURE — 1123F ACP DISCUSS/DSCN MKR DOCD: CPT | Performed by: FAMILY MEDICINE

## 2022-04-27 ASSESSMENT — PATIENT HEALTH QUESTIONNAIRE - PHQ9
SUM OF ALL RESPONSES TO PHQ QUESTIONS 1-9: 0
1. LITTLE INTEREST OR PLEASURE IN DOING THINGS: 0
2. FEELING DOWN, DEPRESSED OR HOPELESS: 0
SUM OF ALL RESPONSES TO PHQ9 QUESTIONS 1 & 2: 0
SUM OF ALL RESPONSES TO PHQ QUESTIONS 1-9: 0

## 2022-04-27 ASSESSMENT — LIFESTYLE VARIABLES
HOW OFTEN DO YOU HAVE A DRINK CONTAINING ALCOHOL: 2-4 TIMES A MONTH
HOW MANY STANDARD DRINKS CONTAINING ALCOHOL DO YOU HAVE ON A TYPICAL DAY: 1 OR 2

## 2022-04-27 NOTE — PATIENT INSTRUCTIONS
Personalized Preventive Plan for Kristy Camejo - 4/27/2022  Medicare offers a range of preventive health benefits. Some of the tests and screenings are paid in full while other may be subject to a deductible, co-insurance, and/or copay. Some of these benefits include a comprehensive review of your medical history including lifestyle, illnesses that may run in your family, and various assessments and screenings as appropriate. After reviewing your medical record and screening and assessments performed today your provider may have ordered immunizations, labs, imaging, and/or referrals for you. A list of these orders (if applicable) as well as your Preventive Care list are included within your After Visit Summary for your review. Other Preventive Recommendations:    · A preventive eye exam performed by an eye specialist is recommended every 1-2 years to screen for glaucoma; cataracts, macular degeneration, and other eye disorders. · A preventive dental visit is recommended every 6 months. · Try to get at least 150 minutes of exercise per week or 10,000 steps per day on a pedometer . · Order or download the FREE \"Exercise & Physical Activity: Your Everyday Guide\" from [employer]. Call 0-812.590.9303 or search [employer] online. · You need 9129-0779 mg of calcium and 6041-7029 IU of vitamin D per day. It is possible to meet your calcium requirement with diet alone, but a vitamin D supplement is usually necessary to meet this goal.  · When exposed to the sun, use a sunscreen that protects against both UVA and UVB radiation with an SPF of 30 or greater. Reapply every 2 to 3 hours or after sweating, drying off with a towel, or swimming. · Always wear a seat belt when traveling in a car. Always wear a helmet when riding a bicycle or motorcycle.

## 2022-05-04 ENCOUNTER — OFFICE VISIT (OUTPATIENT)
Dept: RHEUMATOLOGY | Age: 82
End: 2022-05-04
Payer: MEDICARE

## 2022-05-04 VITALS
HEIGHT: 62 IN | WEIGHT: 163.8 LBS | BODY MASS INDEX: 30.14 KG/M2 | DIASTOLIC BLOOD PRESSURE: 70 MMHG | HEART RATE: 74 BPM | SYSTOLIC BLOOD PRESSURE: 140 MMHG | OXYGEN SATURATION: 97 %

## 2022-05-04 DIAGNOSIS — M19.071 OSTEOARTHRITIS OF BOTH FEET, UNSPECIFIED OSTEOARTHRITIS TYPE: ICD-10-CM

## 2022-05-04 DIAGNOSIS — Z51.81 MEDICATION MONITORING ENCOUNTER: ICD-10-CM

## 2022-05-04 DIAGNOSIS — M15.4 EROSIVE OSTEOARTHRITIS OF HANDS, BILATERAL: Primary | ICD-10-CM

## 2022-05-04 DIAGNOSIS — M19.072 OSTEOARTHRITIS OF BOTH FEET, UNSPECIFIED OSTEOARTHRITIS TYPE: ICD-10-CM

## 2022-05-04 DIAGNOSIS — M51.36 DDD (DEGENERATIVE DISC DISEASE), LUMBAR: ICD-10-CM

## 2022-05-04 PROCEDURE — 1090F PRES/ABSN URINE INCON ASSESS: CPT | Performed by: INTERNAL MEDICINE

## 2022-05-04 PROCEDURE — 99214 OFFICE O/P EST MOD 30 MIN: CPT | Performed by: INTERNAL MEDICINE

## 2022-05-04 PROCEDURE — G8399 PT W/DXA RESULTS DOCUMENT: HCPCS | Performed by: INTERNAL MEDICINE

## 2022-05-04 PROCEDURE — G8417 CALC BMI ABV UP PARAM F/U: HCPCS | Performed by: INTERNAL MEDICINE

## 2022-05-04 PROCEDURE — 1123F ACP DISCUSS/DSCN MKR DOCD: CPT | Performed by: INTERNAL MEDICINE

## 2022-05-04 PROCEDURE — G8427 DOCREV CUR MEDS BY ELIG CLIN: HCPCS | Performed by: INTERNAL MEDICINE

## 2022-05-04 PROCEDURE — 1036F TOBACCO NON-USER: CPT | Performed by: INTERNAL MEDICINE

## 2022-05-04 PROCEDURE — 4040F PNEUMOC VAC/ADMIN/RCVD: CPT | Performed by: INTERNAL MEDICINE

## 2022-05-04 ASSESSMENT — ENCOUNTER SYMPTOMS
DIARRHEA: 0
EYE REDNESS: 0
NAUSEA: 0
VOMITING: 0
EYE PAIN: 0
WHEEZING: 0
SHORTNESS OF BREATH: 0
EYES NEGATIVE: 1
COUGH: 0
CONSTIPATION: 0

## 2022-05-04 NOTE — PROGRESS NOTES
[facility] RHEUMATOLOGY FOLLOW UP NOTE       Date Of Service: 5/4/2022  Provider: Aimee Palacios DO ,   PCP: David Tran DO   Name: Emilie Lozano   MRN: [id number]        History of Present Illness (HPI)     Chief Complaint   Patient presents with    6 Month Follow-Up        Emilie Lozano  is a(n)81 y.o. female with a hx of  has a past medical history of Chronic low back pain, CKD (chronic kidney disease) stage 3, GFR 30-59 ml/min ([address] DDD (degenerative disc disease), lumbar, Dyslipidemia, History of skin cancer, Hyperlipidemia, Hypertension, Osteoarthritis, and PONV (postoperative nausea and vomiting). here for the f/u evaluation of  Inflammatory osteoarthritis, osteoarthritis of multiple joints, troch bursitis and medication monitoring.      - Cystic changes along the dosal left 5th PIP.+ overlying tenderness,     - osteoarthritis of multiple joints / erosive osteoarthritis - stable with tumeric and plaquenil. - Chronic low back pain - stable, active pain up to 6/10 over the past week. Denies weakness, numbness, radicular pain. Aggravated with standing, walking, + left gluteal pain with standing, walking right gluteal pain patient was standing walking. REVIEW OF SYSTEMS: (ROS)    Review of Systems   Constitutional: Negative for diaphoresis, fatigue and fever. HENT: Negative for congestion, hearing loss and nosebleeds. Eyes: Negative. Negative for pain and redness. Respiratory: Negative for cough, shortness of breath and wheezing. Cardiovascular: Negative. Negative for chest pain. Gastrointestinal: Negative for constipation, diarrhea, nausea and vomiting. Genitourinary: Negative for difficulty urinating, frequency and hematuria. Musculoskeletal: Negative for myalgias. Skin: Negative for rash. Neurological: Negative for dizziness, weakness and headaches. Hematological: Does not bruise/bleed easily. Psychiatric/Behavioral: Negative for sleep disturbance. The patient is not nervous/anxious. PAST MEDICAL HISTORY     has a past medical history of Chronic low back pain, CKD (chronic kidney disease) stage 3, GFR 30-59 ml/min (HCC), DDD (degenerative disc disease), lumbar, Dyslipidemia, History of skin cancer, Hyperlipidemia, Hypertension, Osteoarthritis, and PONV (postoperative nausea and vomiting). SOCIAL HISTORY     reports that she has never smoked. She has never used smokeless tobacco. She reports current alcohol use. She reports that she does not use drugs. ALLERGIES     Allergies   Allergen Reactions    Sulfa Antibiotics Swelling    Other Nausea And Vomiting     oysters       CURRENT MEDICATIONS      Current Outpatient Medications:     HYDROcodone-acetaminophen (NORCO) 5-325 MG per tablet, Take 0.5-1 tablets by mouth 2 times daily as needed for Pain for up to 14 days.  Take lowest dose possible to manage pain, Disp: 28 tablet, Rfl: 0    lisinopril-hydroCHLOROthiazide (PRINZIDE;ZESTORETIC) 10-12.5 MG per tablet, TAKE 1 TABLET BY MOUTH DAILY, Disp: 90 tablet, Rfl: 3    hydroxychloroquine (PLAQUENIL) 200 MG tablet, TAKE 1 AND 1/2 TABLETS BY MOUTH DAILY, Disp: 135 tablet, Rfl: 1    omeprazole (PRILOSEC) 20 MG delayed release capsule, TAKE 1 CAPSULE BY MOUTH DAILY, Disp: 90 capsule, Rfl: 3    tiZANidine (ZANAFLEX) 4 MG tablet, Take 1 tablet by mouth 3 times daily as needed (R knee pain/spasm), Disp: 45 tablet, Rfl: 0    vitamin C (ASCORBIC ACID) 500 MG tablet, Take 500 mg by mouth daily, Disp: , Rfl:     Handicap Placard [facility], by Does not apply route Expires 29 June 2023, Disp: 1 each, Rfl: 0    Multiple Vitamins-Minerals (PRESERVISION AREDS PO), Take by mouth, Disp: , Rfl:     Calcium Carbonate-Vit D-Min (CALCIUM 1200 PO), Take by mouth daily, Disp: , Rfl:     Cholecalciferol (VITAMIN D-3 PO), Take by mouth daily, Disp: , Rfl:     PHYSICAL EXAMINATION / OBJECTIVE     Objective:  BP (!) 140/70 (Site: Right Upper Arm, Position: Sitting, Cuff Size: Medium Adult)   Pulse 74   Ht 5' 2.01\" (1.575 m)   Wt 163 lb 12.8 oz (74.3 kg)   SpO2 97%   BMI 29.95 kg/m²     Physical Exam      General Appearance:  AAO x 3 ,  well-developed and well nourished  Head: NCAT  Eyes: No abnormalities. ,  Sclera non-icteric,   Ears / Nose:  normal  appearance  ears and nose. No active drainage   Mouth:  MMM, ears w/o deformities  Neck: No jugular venous distention, appears symmetric, good ROM  Lymph:  no cervical adenopathy  Pulmonary/Chest: CTA bilateral ,  symmetric chest expansion. Cardiovascular: Normal S1 and S2, NO murmur, rub, gallop  Neurologic: Speech normal, no facial droop,  Skin: NO rash on exposed skin. Musculoskeletal:  Upper extremities:    Shoulder Tender - Right shoulder   Hands + DIP and pip nodules. Left 5th pip mobile nodule      Lower extremities:  HIPS, Knees/ankles/feeto non-tender. Spine: + trandelenbug left. + scoliosis, negative SLR             LABS      Lab Results   Component Value Date    WBC 5.3 04/25/2022    HGB 12.6 04/25/2022    MCV 93.8 04/25/2022    MCHC 31.9 (L) 04/25/2022    RDW 13.3 02/19/2021     04/25/2022    NEUTROABS 2600 05/03/2018    LYMPHSABS 1.4 04/25/2022    EOSABS 0.3 04/25/2022    BASOSABS 0.0 04/25/2022         Chemistry        Component Value Date/Time     04/25/2022 0805    K 4.5 04/25/2022 0805     04/25/2022 0805    CO2 25 04/25/2022 0805    BUN 24 (H) 04/25/2022 0805    CREATININE 1.1 04/25/2022 0805        Component Value Date/Time    CALCIUM 9.6 04/25/2022 0805    ALKPHOS 108 04/25/2022 0805    AST 25 04/25/2022 0805    ALT 18 04/25/2022 0805    BILITOT 0.4 04/25/2022 0805            Lab Results   Component Value Date    SEDRATE 12 06/08/2018    CRP 0.5 06/08/2018     Lab Results   Component Value Date    RF Negative 06/08/2018           RADIOLOGY / PROCEDURES:     ASSESSMENT/PLAN:     1. Erosive osteoarthritis of hands, bilateral    2.  Osteoarthritis of both feet, unspecified osteoarthritis type    3. DDD (degenerative disc disease), lumbar    4. Medication monitoring encounter         Erosive osteoarthritis bilateral hands  osteoarthritis bilateral shoulders  Osteoarthritis bilateral feet: -      - prior tx: diclofenac gel (helped but didn't last), mobic - stopped b/c age and  CKD   - Chondrocalcinosis noted in left hand, mild fullness/swelling of MCPs previously appreciated. Positive WICHO but negative suck serologies. ?  CPPD arthropathy versus inflammatory/erosive osteoarthritis               - Plaquenil 300 mg daily (February 2019)   -Tumeric daily . synovial cyst / ganglion cyst -- left 5th pip. , right 1st DIPjoint. Discussed orthopedics evaluation. Lumbar spinal stenosis  & foraminal stenosis --   stable. - pseudo-claudication symptoms improved with sitting worse with standing and walking.  ' Prior tx: Failed physical therapy 2019. Lumbar epidural providing moderate relief (2019)) . Prior eval by orthopedics  October 2019 - no surgery reocmmended. - referral to physical therapy given the lower back pain, + trendelenberg (concern for gluteal weakness). Return in about 6 months (around 11/4/2022). New Prescriptions    No medications on file       5/4/2022    Electronically signed by Moi Lennon DO on 11/3/21 at 10:59 AM EDT  Please contact the office if you have any questions or change of symptoms. Christal Raygoza

## 2022-05-10 ENCOUNTER — HOSPITAL ENCOUNTER (OUTPATIENT)
Dept: PHYSICAL THERAPY | Age: 82
Setting detail: THERAPIES SERIES
Discharge: HOME | End: 2022-05-10
Payer: MEDICARE

## 2022-05-10 PROCEDURE — 97161 PT EVAL LOW COMPLEX 20 MIN: CPT

## 2022-05-10 PROCEDURE — 97110 THERAPEUTIC EXERCISES: CPT

## 2022-05-10 NOTE — PROGRESS NOTES
** PLEASE SIGN, DATE AND TIME CERTIFICATION BELOW AND RETURN TO [facility]PATIENT [facility]ILITATION (FAX #: [phone]). ATTEST/CO-SIGN IF ACCESSING VIA [email or web address]. THANK YOU.**    I certify that I have examined the patient below and determined that Physical Medicine and Rehabilitation service is necessary and that I approve the established plan of care for up to 90 days or as specifically noted. Attestation, signature or co-signature of physician indicates approval of certification requirements.    ________________________ ____________ __________  Physician Signature   Date   Time    [address] [facility]  PHYSICAL THERAPY  [x] EVALUATION  [] DAILY NOTE (LAND) [] DAILY NOTE (AQUATIC ) [] PROGRESS NOTE [] DISCHARGE NOTE    [x] [facility]   [] [place] [address]    [] [facility]   [] Merlinda Box    Date: 5/10/2022  Patient Name:  Kourtney Olson  : 1940  MRN: [id number]  CSN: [id number]    Referring Practitioner Avril Salomon DO   Diagnosis Other intervertebral disc degeneration, lumbar region [M51.36]    Treatment Diagnosis Right shoulder pain, R shoulder stiffness, lumbago, spinal stiffness, scoliosis, abnormal posture, Left hip pain, left hip stiffness, R hip stiffness, difficulty walking, fall history, fall risk, unsteady   Date of Evaluation 5/10/22    Additional Pertinent History XR Lumbar: Severe degenerative changes with levoconvex scoliosis. No acute fracture. XR Pelvis: Mild degenerative changes left sacroiliac joint.  - Lumbar fusion surgery   Chronic R shoulder pain, upper trap tightness.        Functional Outcome Measure Used Modified Oswestry LBP    Functional Outcome Score 19/50 = 38% impaired  (5/10/22)       Insurance: Primary: Payor: Rick Bernstein /  /  / ,   Secondary: [employer]   Authorization Information: Unlimited visits, aquatic and modalities covered, no ionto, no heat/cold    Visit # 1, 1/10 for progress note   Visits Allowed: Unlimited    Recertification Date: 2/7/9374    Physician Follow-Up: Pain management 6/6/22  Moriah Tejeda November 2022    Physician Orders:    History of Present Illness: Chronic back pain, predominantly Left lower back - most recent ablation March 4th, and following this noted new pain in Left hip/ buttock. Feels sharp and stabbing in left buttock. Has tried hydrocodeine at night, Tylenol arthritis for day. SUBJECTIVE: C/o pain in left buttock, lateral hip. 4/10 pain. Presents with single point cane. Sitting long durations in [employer] pew causes pain, she is limited to walking with forward flexed posture. Difficulty with heavier cleaning tasks, pushing and pulling, heavy lifting. Still able to bend to care for her dog and perform ADLs. Able to tolerate standing 30-45 mins but then the back gets tired and sore and she needs to sit. Reports falling 3 times in past year. Social/Functional History and Current Status:  Medications and Allergies have been reviewed and are listed on Medical History Questionnaire. [address] lives alone in a multiple floor home, bedroom upstairs, full bath upstairs. Walk-in shower 1st floor, laundry main floor with stairs and a handrail to enter. - detached garage, railings to access deck     Task Previous Current   ADLs  Independent Modified Independent   [address] [place] [address] Required - family helps with yard work, heavier cleaning    Ambulation Independent Modified Independent - single point cane    Transfers Independent Independent   Recreation Independent Modified Independent - wants to get better at walking her dog, pulling on leash, has been unable to serve [place] due to needing cane   [employer] Independent Independent   Driving Active  Active    Work Retired  Retired     Objective:    OBSERVATION   Pain 4/10 lower back pain, Left SI/ buttock, Left greater trochanter.   R shoulder pain and stiffness - limited reach behind back   Palpation    Sensation WFL  Occasional shooting pains to B anterior thighs at night while sleeping    Edema    Spinal Accessory Motion    Bed Mobility Mod I - slow and guarded movement, difficulty with bridge and lateral scoot   Transfers IND   Ambulation Mod I - uses single point cane   Stairs    Balance See Tinetti       POSTURE    No Deficit Deficit Comments   Forward Head      Rounded Shoulders      Kyphosis      Lordosis      Lateral Shift  x Left scoliosis    Scoliosis  x    Iliac Crest  x R iliac crest higher than left    PSIS      ASIS      Leg Length Discrp x  Legs measure same length - 32 inch from ASIS to med malleolus   Slumped Sitting          TRUNK RANGE OF MOTION   Flexion: 0-45 deg   Extension: Limited to 0   Lateral Flexion Left: 20-40 deg   Lateral Flexion Right: Unable to achieve 0 deg lumbar   Rotation Left:    Rotation Right:    Trunk Range of Motion is [facility]  []     LOWER EXTREMITY RANGE OF MOTION    Left Right Comments   Hip Flexion knee to chest 0-100  0-100    Hip Extension 0-10 0-10     Hip ABDuction 0-30 0-30    Hip ADDuction      Hip Internal Rotation 0-20 0-20 tight   Hip External Rotation 0-30 0-30 tight   Hip Range of Motion is [facility]  []      Knee Flexion      Knee Extension      Knee Range of Motion is [facility]  []     RIGHT SHOULDER AROM: behind back limited to R PSIS, overhead 0-120 deg flexion, 0-90 deg abduction    LOWER EXTREMITY STRENGTH    Left Right Comments   Hip Flexion 4-/5 4-/5    Hip Abduction 4/5 4/5    Hip Adduction 4+/5 4+/5    Hip Extension 2/5 2/5 Bridge sharply painful    Hip External Rotation      Knee Flexion      Knee Extension      Ankle DF      Ankle PF      LE strength is [facility] []      SPECIAL TESTS (+/-)    Left Right Comments   SLR  neg neg No nerve tension   90/90 Hamstring length Lacks 20 Lacks 20    SKTC neg neg    DKTC      Slump      SI Compression/Distraction      ALCIDES POS POS    FADIR POS POS    Bennie Meza POS POS [place]        TINETTI BALANCE TEST    BALANCE Patient is seated in a hard, armless chair   1 SITTING BALANCE 1 - Steady, safe   2. RISES FROM CHAIR 1 - Able, uses arms to help   3. ATTEMPTS TO RISE 2 - Able to rise, 1 attempt   4. IMMEDIATE STANDING BALANCE (FIRST 5 SECONDS) 2 - Steady without walker or other support   5. STANDING BALANCE 1 - Steady but wide stance and uses support   6. NUDGED 1 - Staggers, grabs, catches   7. EYES CLOSED 1 - Steady   8. TURNING 360 DEGREES 1 - Continuous and 1 - Steady   9. SITTING DOWN 1 - Uses arms or not a smooth motion   BALANCE SCORE 12/16       GAIT SECTION - single point cane Patient stands with therapist, walks across room (+/- aids), fist at usual pace, then at rapid pace. 1.  INDICATION OF GAIT (Immediately after told to \"go\" 1 - No hesitancy   2. STEP LENGTH AND HEIGHT 1 - Step through Right and 1 - Step through Left   3. FOOT CLEARANCE 1 - Left foot clears floor and 1 - Right foot clears floor   4. STEP SYMMETRY 0 - Right and left step length not equal   5. STEP CONTINUITY 1 - Steps appear continuous   6. PATH 1 - Mild/moderate deviation or uses walking aid   7. TRUNK 0 - Marked sway or uses walking aid   8. WALKING TIME 0 - Heels apart   GAIT SCORE 7/12   TOTAL SCORE 19/28   Risk Indicators:  Less than/equal to 18 = high risk  19-23 Moderate risk  Greater than/equal to 24 = low risk Moderate risk of falling             TREATMENT   Precautions: Wears R heel lift, Left scoliosis.    HX of Lumbar fusion - NO traction  B TKAs    Pain:     X in shaded column indicates activity completed today   Modalities Parameters/  Location  Notes                     Manual Therapy Time/Technique  Notes                     Exercise/Intervention   Notes   Hooklying hip flexor stretch 2x 10 sec x    Piriformis stretch - leg crossed pulling knee 2x 10 sec x    Longitudinal hip ER/IR  10x ea  x                                                              Specific Interventions Next Treatment: Start session with ultrasound L greater trochanter. Follow with passive stretching hip (IT band, hip flexor, piriformis, hamstring). Gentle strengthening of lumbar/ SI and hip. (clamshell, reverse clam, posterior pelvic tilt, prone knee curls with pillow under hips). Gentle alignment/ scoliosis stretching (sidelying Right over bolster/pillow, prone with lateral shift - hips shifted left). R shoulder stretching and strengthening as needed - scapular retractions, wall slides, cane AAROM    Activity/Treatment Tolerance:  []  Patient tolerated treatment well  []  Patient limited by fatigue  [x]  Patient limited by pain   []  Patient limited by medical complications  []  Other:     Assessment: Patient presents with chronic lower back pain and left scoliosis. These chronic deformities affect her posture and gives her a leg length discrepancy. She wears a R heel lift in shoe but it is worn thin, advised to replace it. Since her most recent ablation in March, most of her back pain is localized to the left buttock and left hip greater trochanter. She also struggles with chronic R shoulder pain and is unable to tolerate sleeping on R side, which is contributing to her left hip bursitis pain. She will benefit from physical therapy to decrease pain of the R shoulder, lower back, and left hip in order to improve her flexibility, strength, and balance so she can care for her dog, volunteer at [employer], and walk more frequently for exercise.      Body Structures/Functions/Activity Limitations: impaired activity tolerance, impaired balance, impaired ROM, impaired strength, pain, abnormal gait and abnormal posture  Prognosis: good      GOALS:  Patient Goal: walk farther distances for walking her dog, decrease fall risk    Short Term Goals:  Time Frame: 6 weeks    Patient will report decreased lower back and L hip pain from baseline 4/10 to less than 2/10 during therapy exercises in order to sleep comfortably on either side. Patient will improve BLE PROM to 0-90 deg hamstring, 0-120 deg ALCIDES, 0-25 deg hip extension in order to bend and lift without straining lumbar spine. Patient will improve R shoulder AROM to 0-145 deg flexion, 0-130 deg Abduction, and behind back to at least L3 in order to don/doff bra, perform cooking tasks, and sleep comfortably on R shoulder. Patient will demonstrate good abdominal bracing and body mechanics during therapy session in order to preserve neutral spine during household tasks. Patient will be IND with HEP in order to meet long term goals. Long Term Goals:  Time Frame: 12 weeks    Patient will improve score of Modified Oswestry LBP questionnaire from baseline 19/50 to less than 9/50 in order to stand longer durations for preparing meals with family and serving at [address] [place] [address]. Patient will improve sore of Tinetti Balance and Gait measurement from 19/28 to at least 24/28 in order to decrease frequency of falling. Patient will squat to lift 10 lb weight from floor to standing with good body mechanics in order to bend easier for feeding her dog. Patient will improve BLE strength to 4+/5 hips and knees in order to squat, lunge, and navigate steps without difficulty. Patient Education:   [x]  HEP/Education Completed: Plan of Care, Goals, HEP handout given    [employer] Access Code: [email or web address]  []  No new Education completed  []  Reviewed Prior HEP      [x]  Patient verbalized and/or demonstrated understanding of education provided. []  Patient unable to verbalize and/or demonstrate understanding of education provided. Will continue education.   []  Barriers to learning:     PLAN:  Treatment Recommendations: Strengthening, Range of Motion, Balance Training, Functional Mobility Training, Transfer Training, Gait Training, Stair Training, Manual Therapy - Soft Tissue Mobilization, Manual Therapy - Joint Manipulation, Pain Management, Home Exercise Program, Patient Education, Integrative Dry Needling, Vestibular Rehabilitation, Aquatics and Modalities    [x]  Plan of care initiated. Plan to see patient 2 times per week for 12 weeks to address the treatment planned outlined above.   []  Continue with current plan of care  []  Modify plan of care as follows:    []  Hold pending physician visit  []  Discharge    Time In 1340   Time Out 1440   Timed Code Minutes: 15 min   Total Treatment Time: 60 min     Electronically Signed by: Carmela Perales PT

## 2022-05-12 DIAGNOSIS — M19.072 OSTEOARTHRITIS OF BOTH FEET, UNSPECIFIED OSTEOARTHRITIS TYPE: ICD-10-CM

## 2022-05-12 DIAGNOSIS — M19.071 OSTEOARTHRITIS OF BOTH FEET, UNSPECIFIED OSTEOARTHRITIS TYPE: ICD-10-CM

## 2022-05-12 RX ORDER — HYDROXYCHLOROQUINE SULFATE 200 MG/1
TABLET, FILM COATED ORAL
Qty: 135 TABLET | Refills: 1 | Status: SHIPPED | OUTPATIENT
Start: 2022-05-12

## 2022-05-13 ENCOUNTER — HOSPITAL ENCOUNTER (OUTPATIENT)
Dept: PHYSICAL THERAPY | Age: 82
Setting detail: THERAPIES SERIES
Discharge: HOME | End: 2022-05-13
Payer: MEDICARE

## 2022-05-13 PROCEDURE — 97140 MANUAL THERAPY 1/> REGIONS: CPT

## 2022-05-13 PROCEDURE — 97110 THERAPEUTIC EXERCISES: CPT

## 2022-05-13 NOTE — PROGRESS NOTES
[address] [facility]  PHYSICAL THERAPY  [] EVALUATION  [x] DAILY NOTE (LAND) [] DAILY NOTE (AQUATIC ) [] PROGRESS NOTE [] DISCHARGE NOTE    [x] OUTPATIENT REHABILITATION CENTER [facility]   [] [place] [address]    [] [facility]   [] Sylwia Smyth    Date: 2022  Patient Name:  Crystal [address]  : 1940  MRN: [id number]  CSN: [id number]    Referring Practitioner Irish Pierre DO   Diagnosis Other intervertebral disc degeneration, lumbar region [M51.36]    Treatment Diagnosis Right shoulder pain, R shoulder stiffness, lumbago, spinal stiffness, scoliosis, abnormal posture, Left hip pain, left hip stiffness, R hip stiffness, difficulty walking, fall history, fall risk, unsteady   Date of Evaluation 5/10/22    Additional Pertinent History XR Lumbar: Severe degenerative changes with levoconvex scoliosis. No acute fracture. XR Pelvis: Mild degenerative changes left sacroiliac joint.  - Lumbar fusion surgery   Chronic R shoulder pain, upper trap tightness. Functional Outcome Measure Used Modified Oswestry LBP    Functional Outcome Score 19/50 = 38% impaired  (5/10/22)       Insurance: Primary: Payor: Fawn Stallworth /  /  / ,   Secondary: [place]: Unlimited visits, aquatic and modalities covered, no ionto, no heat/cold    Visit # 2, 2/10 for progress note   Visits Allowed: Unlimited    Recertification Date: 3/1/0727    Physician Follow-Up: Pain management 22  Moriah Tejeda 2022    Physician Orders:    History of Present Illness: Chronic back pain, predominantly Left lower back - most recent ablation , and following this noted new pain in Left hip/ buttock. Feels sharp and stabbing in left buttock. Has tried hydrocodeine at night, Tylenol arthritis for day. SUBJECTIVE: Patient has been starting to attempt HEP and doing ok.  But yesterday she did some yardwork with her grandsons and this morning her back is sore. TREATMENT   Precautions: Wears R heel lift, Left scoliosis. HX of Lumbar fusion - NO traction  B TKAs    Pain:     X in shaded column indicates activity completed today   Modalities Parameters/  Location  Notes                     Manual Therapy Time/Technique  Notes   Observation standing - hips shifted left, trunk shifted Right  x    Observation - RLE shorter than LLE supine   x    RLE gentle longitudinal pull while having patient cough 3x  x    R hip isometric flexion MET 5x 5 sec x Legs appeared equal after manual techniques. Also encouraged to wear R heel lift as needed for comfort. Exercise/Intervention   Notes                 Hooklying hip flexor stretch 2x 10 sec x    Piriformis stretch - true ALCIDES keeping the knee rotated outward before pulling. Other leg supported on bolster  2x 10 sec x    Longitudinal hip ER/IR  10x ea  x    Hooklying with bolster- PPT 10x 5 sec x    Hooklying with bolster - glute set  10x 5 sec X            Sidelying clamshell - bilateral 10x  x                                  Specific Interventions Next Treatment: Start session with ultrasound L greater trochanter. Follow with passive stretching hip (IT band, hip flexor, piriformis, hamstring). Gentle strengthening of lumbar/ SI and hip. (clamshell, reverse clam, posterior pelvic tilt, prone knee curls with pillow under hips). Gentle alignment/ scoliosis stretching (sidelying left over bolster/pillow, prone with lateral shift - hips shifted left). R shoulder stretching and strengthening as needed - scapular retractions, wall slides, cane AAROM    Activity/Treatment Tolerance:  []  Patient tolerated treatment well  []  Patient limited by fatigue  [x]  Patient limited by pain   []  Patient limited by medical complications  []  Other:     Assessment: Patient continues to present with R scoliosis, hip shifted left and trunk shifted right in standing. Today her R SI and R hip felt tighter than the left.  Attempted some gentle manual therapy due to R iliac upslip vs R iliac posterior rotation. Patient felt more sore at end of session, encouraged to use ice, pain medication, drink water to avoid soreness. Next time may try ultrasound for bursa pain in hips as needed. Body Structures/Functions/Activity Limitations: impaired activity tolerance, impaired balance, impaired ROM, impaired strength, pain, abnormal gait and abnormal posture  Prognosis: good      GOALS:  Patient Goal: walk farther distances for walking her dog, decrease fall risk    Short Term Goals:  Time Frame: 6 weeks    Patient will report decreased lower back and L hip pain from baseline 4/10 to less than 2/10 during therapy exercises in order to sleep comfortably on either side. Patient will improve BLE PROM to 0-90 deg hamstring, 0-120 deg ALCIDES, 0-25 deg hip extension in order to bend and lift without straining lumbar spine. Patient will improve R shoulder AROM to 0-145 deg flexion, 0-130 deg Abduction, and behind back to at least L3 in order to don/doff bra, perform cooking tasks, and sleep comfortably on R shoulder. Patient will demonstrate good abdominal bracing and body mechanics during therapy session in order to preserve neutral spine during household tasks. Patient will be IND with HEP in order to meet long term goals. Long Term Goals:  Time Frame: 12 weeks    Patient will improve score of Modified Oswestry LBP questionnaire from baseline 19/50 to less than 9/50 in order to stand longer durations for preparing meals with family and serving at [address] [place] [address]. Patient will improve sore of Tinetti Balance and Gait measurement from 19/28 to at least 24/28 in order to decrease frequency of falling. Patient will squat to lift 10 lb weight from floor to standing with good body mechanics in order to bend easier for feeding her dog.     Patient will improve BLE strength to 4+/5 hips and knees in order to squat, lunge, and navigate steps without difficulty. Patient Education:   []  HEP/Education Completed: Plan of Care, Goals, HEP handout given    [employer] Access Code: [email or web address]  []  No new Education completed  [x]  Reviewed Prior HEP      [x]  Patient verbalized and/or demonstrated understanding of education provided. []  Patient unable to verbalize and/or demonstrate understanding of education provided. Will continue education. []  Barriers to learning:     PLAN:  Treatment Recommendations: Strengthening, Range of Motion, Balance Training, Functional Mobility Training, Transfer Training, Gait Training, Stair Training, Manual Therapy - Soft Tissue Mobilization, Manual Therapy - Joint Manipulation, Pain Management, Home Exercise Program, Patient Education, Integrative Dry Needling, Vestibular Rehabilitation, Aquatics and Modalities    []  Plan of care initiated. Plan to see patient 2 times per week for 12 weeks to address the treatment planned outlined above.   [x]  Continue with current plan of care  []  Modify plan of care as follows:    []  Hold pending physician visit  []  Discharge    Time In 935   Time Out 1015   Timed Code Minutes: 40   Total Treatment Time: 40     Electronically Signed by: Kesha Nation PT

## 2022-05-18 ENCOUNTER — HOSPITAL ENCOUNTER (OUTPATIENT)
Dept: PHYSICAL THERAPY | Age: 82
Setting detail: THERAPIES SERIES
Discharge: HOME | End: 2022-05-18
Payer: MEDICARE

## 2022-05-18 PROCEDURE — 97110 THERAPEUTIC EXERCISES: CPT

## 2022-05-18 NOTE — PROGRESS NOTES
[facility]  PHYSICAL THERAPY  [] EVALUATION  [x] DAILY NOTE (LAND) [] DAILY NOTE (AQUATIC ) [] PROGRESS NOTE [] DISCHARGE NOTE    [x] OUTPATIENT REHABILITATION CENTER [facility]   [] [place] [address]    [] [facility]   [facility]    Date: 2022  Patient Name:  Jena Magallon  : 1940  MRN: [id number]  CSN: [id number]    Referring Practitioner Adelita Tabares DO   Diagnosis Other intervertebral disc degeneration, lumbar region [M51.36]    Treatment Diagnosis Right shoulder pain, R shoulder stiffness, lumbago, spinal stiffness, scoliosis, abnormal posture, Left hip pain, left hip stiffness, R hip stiffness, difficulty walking, fall history, fall risk, unsteady   Date of Evaluation 5/10/22    Additional Pertinent History XR Lumbar: Severe degenerative changes with levoconvex scoliosis. No acute fracture. XR Pelvis: Mild degenerative changes left sacroiliac joint.  - Lumbar fusion surgery   Chronic R shoulder pain, upper trap tightness. Functional Outcome Measure Used Modified Oswestry LBP    Functional Outcome Score 19/50 = 38% impaired  (5/10/22)       Insurance: Primary: Payor: [place] /  /  / ,   Secondary: [place]: Unlimited visits, aquatic and modalities covered, no ionto, no heat/cold    Visit # 2, 2/10 for progress note   Visits Allowed: Unlimited    Recertification Date: [id number]    Physician Follow-Up: Pain management 22  Garret Braxton 2022    Physician Orders:    History of Present Illness: Chronic back pain, predominantly Left lower back - most recent ablation , and following this noted new pain in Left hip/ buttock. Feels sharp and stabbing in left buttock. Has tried hydrocodeine at night, Tylenol arthritis for day. SUBJECTIVE: LBP 7/10 today. TREATMENT   Precautions: Wears R heel lift, Left scoliosis.    HX of Lumbar fusion - NO traction  B TKAs Pain:     X in shaded column indicates activity completed today   Modalities Parameters/  Location  Notes   mhp to LB during ex  x                Manual Therapy Time/Technique  Notes   Observation standing - hips shifted left, trunk shifted Right      Observation - RLE shorter than LLE supine       RLE gentle longitudinal pull  3x  x    R hip isometric flexion MET 5x 5 sec  Legs appeared equal after manual techniques. Also encouraged to wear R heel lift as needed for comfort. Exercise/Intervention   Notes   Passive SKTC, HS, piriformis and IT band stretch  3x20s  x    LTR  5  x    Hooklying hip flexor stretch 2x 10 sec x    Piriformis stretch - true ALCIDES keeping the knee rotated outward before pulling. Other leg supported on bolster  2x 10 sec x    Longitudinal hip ER/IR  10x ea  x    Hooklying with bolster- PPT 10x 5 sec x    Hooklying with bolster - glute set  10x 5 sec X            Sidelying clamshell - bilateral 10x                                    Specific Interventions Next Treatment: Start session with ultrasound L greater trochanter. Follow with passive stretching hip (IT band, hip flexor, piriformis, hamstring). Gentle strengthening of lumbar/ SI and hip. (clamshell, reverse clam, posterior pelvic tilt, prone knee curls with pillow under hips). Gentle alignment/ scoliosis stretching (sidelying left over bolster/pillow, prone with lateral shift - hips shifted left). R shoulder stretching and strengthening as needed - scapular retractions, wall slides, cane AAROM    Activity/Treatment Tolerance:  []  Patient tolerated treatment well  []  Patient limited by fatigue  [x]  Patient limited by pain   []  Patient limited by medical complications  []  Other:     Assessment: Initiated passive stretches with patient tolerating well and reporting pain 4/10 following. Overall did have more pain upon standing up from supine but stating this is normal for her.        GOALS:  Patient Goal: walk farther distances for walking her dog, decrease fall risk    Short Term Goals:  Time Frame: 6 weeks    Patient will report decreased lower back and L hip pain from baseline 4/10 to less than 2/10 during therapy exercises in order to sleep comfortably on either side. Patient will improve BLE PROM to 0-90 deg hamstring, 0-120 deg ALCIDES, 0-25 deg hip extension in order to bend and lift without straining lumbar spine. Patient will improve R shoulder AROM to 0-145 deg flexion, 0-130 deg Abduction, and behind back to at least L3 in order to don/doff bra, perform cooking tasks, and sleep comfortably on R shoulder. Patient will demonstrate good abdominal bracing and body mechanics during therapy session in order to preserve neutral spine during household tasks. Patient will be IND with HEP in order to meet long term goals. Long Term Goals:  Time Frame: 12 weeks    Patient will improve score of Modified Oswestry LBP questionnaire from baseline 19/50 to less than 9/50 in order to stand longer durations for preparing meals with family and serving at [employer]. Patient will improve sore of Tinetti Balance and Gait measurement from 19/28 to at least 24/28 in order to decrease frequency of falling. Patient will squat to lift 10 lb weight from floor to standing with good body mechanics in order to bend easier for feeding her dog. Patient will improve BLE strength to 4+/5 hips and knees in order to squat, lunge, and navigate steps without difficulty. Patient Education:   [x]  HEP/Education Completed: Passive stretches    MedPlayData Access Code: [email or web address]  []  No new Education completed  []  Reviewed Prior HEP      [x]  Patient verbalized and/or demonstrated understanding of education provided. []  Patient unable to verbalize and/or demonstrate understanding of education provided. Will continue education.   []  Barriers to learning:     PLAN:  Treatment Recommendations: Strengthening, Range of Motion, Balance Training, Functional Mobility Training, Transfer Training, Gait Training, Stair Training, Manual Therapy - Soft Tissue Mobilization, Manual Therapy - Joint Manipulation, Pain Management, Home Exercise Program, Patient Education, Integrative Dry Needling, Vestibular Rehabilitation, Aquatics and Modalities    []  Plan of care initiated. Plan to see patient 2 times per week for 12 weeks to address the treatment planned outlined above.   [x]  Continue with current plan of care  []  Modify plan of care as follows:    []  Hold pending physician visit  []  Discharge    Time In 950   Time Out 1032   Timed Code Minutes: 42   Total Treatment Time: 42     Electronically Signed by: Nadya Juares PTA

## 2022-05-20 ENCOUNTER — HOSPITAL ENCOUNTER (OUTPATIENT)
Dept: PHYSICAL THERAPY | Age: 82
Setting detail: THERAPIES SERIES
Discharge: HOME | End: 2022-05-20
Payer: MEDICARE

## 2022-05-20 PROCEDURE — 97110 THERAPEUTIC EXERCISES: CPT

## 2022-05-20 NOTE — PROGRESS NOTES
[address] [facility]  PHYSICAL THERAPY  [] EVALUATION  [x] DAILY NOTE (LAND) [] DAILY NOTE (AQUATIC ) [] PROGRESS NOTE [] DISCHARGE NOTE    [x] OUTPATIENT REHABILITATION CENTER - [place]   [] [place] [address]    [] [facility]   [] Pinky Lefort    Date: 2022  Patient Name:  Richmond Knight   : 1940  MRN: [id number]  CSN: [id number]    Referring Practitioner Winter Mccoy DO   Diagnosis Other intervertebral disc degeneration, lumbar region [M51.36]    Treatment Diagnosis Right shoulder pain, R shoulder stiffness, lumbago, spinal stiffness, scoliosis, abnormal posture, Left hip pain, left hip stiffness, R hip stiffness, difficulty walking, fall history, fall risk, unsteady   Date of Evaluation 5/10/22    Additional Pertinent History XR Lumbar: Severe degenerative changes with levoconvex scoliosis. No acute fracture. XR Pelvis: Mild degenerative changes left sacroiliac joint.  - Lumbar fusion surgery   Chronic R shoulder pain, upper trap tightness. Functional Outcome Measure Used Modified Oswestry LBP    Functional Outcome Score 19/50 = 38% impaired  (5/10/22)       Insurance: Primary: Payor: Verenice Po /  /  / ,   Secondary: [place]: Unlimited visits, aquatic and modalities covered, no ionto, no heat/cold    Visit # 4, 4/10 for progress note   Visits Allowed: Unlimited    Recertification Date: [id number]    Physician Follow-Up: Pain management 22  Supriya Arleen 2022    Physician Orders:    History of Present Illness: Chronic back pain, predominantly Left lower back - most recent ablation , and following this noted new pain in Left hip/ buttock. Feels sharp and stabbing in left buttock. Has tried hydrocodeine at night, Tylenol arthritis for day.       SUBJECTIVE: Since starting therapy she feels the left side of her back and left hip is hurting less, except for this morning she sat in the waiting room here and felt a strain in left buttock. She still has a great deal of R side pain around R piriformis/ SI region. TREATMENT   Precautions: Wears R heel lift, Left scoliosis. HX of Lumbar fusion - NO traction  B TKAs    Pain:     X in shaded column indicates activity completed today   Modalities Parameters/  Location  Notes   mhp to LB during ex                  Manual Therapy Time/Technique  Notes   Observation standing - hips shifted left, trunk shifted Right      Observation - RLE shorter than LLE supine       RLE gentle longitudinal pull  3x      R hip isometric flexion MET 5x 5 sec  Legs appeared equal after manual techniques. Also encouraged to wear R heel lift as needed for comfort. Exercise/Intervention   Notes   Passive HS, piriformis and IT band stretch  3x20s      LTR  5  x    Hooklying hip flexor stretch 2x 10 sec x    Piriformis stretch - true ALCIDES keeping the knee rotated outward before pulling. Other leg supported on chair  2x 10 sec x    Longitudinal hip ER/IR  10x ea      90/90 unloading position - PPT 10x 5 sec x Using chair for foot support   90/90 unloading - marching 5x ea  x    90/90 unloading - hip add set               Hooklying with bolster - glute set  10x 5 sec X            Sidelying clamshell, reverse clam RLE  10x  x    Left sidelying - Right hip flexor stretch 3x 10 sec x                           Specific Interventions Next Treatment: Start session with ultrasound L greater trochanter. Follow with passive stretching hip (IT band, hip flexor, piriformis, hamstring). Gentle strengthening of lumbar/ SI and hip. (clamshell, reverse clam, posterior pelvic tilt, prone knee curls with pillow under hips). Gentle alignment/ scoliosis stretching (sidelying left over bolster/pillow, prone with lateral shift - hips shifted left).  R shoulder stretching and strengthening as needed - scapular retractions, wall slides, cane AAROM    Activity/Treatment Tolerance:  []  Patient tolerated treatment well  []  Patient limited by fatigue  [x]  Patient limited by pain   []  Patient limited by medical complications  []  Other:     Assessment: Patient continues to have R sided SI pain, however leg lengths appeared equal after stretching today. She tolerated posterior tilts much better with legs propped on chair in 90/90 position. Since starting therapy the left sided hip and back pain is becoming less severe. GOALS:  Patient Goal: walk farther distances for walking her dog, decrease fall risk    Short Term Goals:  Time Frame: 6 weeks    Patient will report decreased lower back and L hip pain from baseline 4/10 to less than 2/10 during therapy exercises in order to sleep comfortably on either side. Patient will improve BLE PROM to 0-90 deg hamstring, 0-120 deg ALCIDES, 0-25 deg hip extension in order to bend and lift without straining lumbar spine. Patient will improve R shoulder AROM to 0-145 deg flexion, 0-130 deg Abduction, and behind back to at least L3 in order to don/doff bra, perform cooking tasks, and sleep comfortably on R shoulder. Patient will demonstrate good abdominal bracing and body mechanics during therapy session in order to preserve neutral spine during household tasks. Patient will be IND with HEP in order to meet long term goals. Long Term Goals:  Time Frame: 12 weeks    Patient will improve score of Modified Oswestry LBP questionnaire from baseline 19/50 to less than 9/50 in order to stand longer durations for preparing meals with family and serving at [employer]. Patient will improve sore of Tinetti Balance and Gait measurement from 19/28 to at least 24/28 in order to decrease frequency of falling. Patient will squat to lift 10 lb weight from floor to standing with good body mechanics in order to bend easier for feeding her dog. Patient will improve BLE strength to 4+/5 hips and knees in order to squat, lunge, and navigate steps without difficulty. Patient Education:   [x]  HEP/Education Completed: Passive stretches    Medbridge Access Code: [email or web address]  []  No new Education completed  []  Reviewed Prior HEP      [x]  Patient verbalized and/or demonstrated understanding of education provided. []  Patient unable to verbalize and/or demonstrate understanding of education provided. Will continue education. []  Barriers to learning:     PLAN:  Treatment Recommendations: Strengthening, Range of Motion, Balance Training, Functional Mobility Training, Transfer Training, Gait Training, Stair Training, Manual Therapy - Soft Tissue Mobilization, Manual Therapy - Joint Manipulation, Pain Management, Home Exercise Program, Patient Education, Integrative Dry Needling, Vestibular Rehabilitation, Aquatics and Modalities    []  Plan of care initiated. Plan to see patient 2 times per week for 12 weeks to address the treatment planned outlined above.   [x]  Continue with current plan of care  []  Modify plan of care as follows:    []  Hold pending physician visit  []  Discharge    Time In 935   Time Out 1010   Timed Code Minutes: 35   Total Treatment Time: 35     Electronically Signed by: Anne Bhagat PT

## 2022-05-25 ENCOUNTER — HOSPITAL ENCOUNTER (OUTPATIENT)
Dept: PHYSICAL THERAPY | Age: 82
Setting detail: THERAPIES SERIES
Discharge: HOME | End: 2022-05-25
Payer: MEDICARE

## 2022-05-25 PROCEDURE — 97110 THERAPEUTIC EXERCISES: CPT

## 2022-05-25 NOTE — PROGRESS NOTES
[address] [facility]  PHYSICAL THERAPY  [] EVALUATION  [x] DAILY NOTE (LAND) [] DAILY NOTE (AQUATIC ) [] PROGRESS NOTE [] DISCHARGE NOTE    [x] OUTPATIENT REHABILITATION CENTER [facility]   [] [place] [address]    [] [facility]   [] Camille Pérez    Date: 2022  Patient Name:  Darline Reid   : 1940  MRN: [id number]  CSN: [id number]    Referring Practitioner Kurt Hough DO   Diagnosis Other intervertebral disc degeneration, lumbar region [M51.36]    Treatment Diagnosis Right shoulder pain, R shoulder stiffness, lumbago, spinal stiffness, scoliosis, abnormal posture, Left hip pain, left hip stiffness, R hip stiffness, difficulty walking, fall history, fall risk, unsteady   Date of Evaluation 5/10/22    Additional Pertinent History XR Lumbar: Severe degenerative changes with levoconvex scoliosis. No acute fracture. XR Pelvis: Mild degenerative changes left sacroiliac joint.  - Lumbar fusion surgery   Chronic R shoulder pain, upper trap tightness. Functional Outcome Measure Used Modified Oswestry LBP    Functional Outcome Score 19/50 = 38% impaired  (5/10/22)       Insurance: Primary: Payor: Elisabeth Cooney /  /  / ,   Secondary: [place]: Unlimited visits, aquatic and modalities covered, no ionto, no heat/cold    Visit # 5, 5/10 for progress note   Visits Allowed: Unlimited    Recertification Date: [id number]    Physician Follow-Up: Pain management 22  Brianna Mcgarry 2022    Physician Orders:    History of Present Illness: Chronic back pain, predominantly Left lower back - most recent ablation , and following this noted new pain in Left hip/ buttock. Feels sharp and stabbing in left buttock. Has tried hydrocodeine at night, Tylenol arthritis for day. SUBJECTIVE: Patient reporting that her pain is about a 10/10 today.  Did a little more yesterday and attributing pain to the colder rainy weather today. TREATMENT   Precautions: Wears R heel lift, Left scoliosis. HX of Lumbar fusion - NO traction  B TKAs    Pain:     X in shaded column indicates activity completed today   Modalities Parameters/  Location  Notes   mhp to LB during ex  x                Manual Therapy Time/Technique  Notes   Observation standing - hips shifted left, trunk shifted Right      Observation - RLE shorter than LLE supine       RLE gentle longitudinal pull  3x      R hip isometric flexion MET 5x 5 sec  Legs appeared equal after manual techniques. Also encouraged to wear R heel lift as needed for comfort. Exercise/Intervention   Notes   Passive HS, piriformis and IT band stretch  3x20s      LTR  10  x    Hooklying hip flexor stretch 2x 10 sec x    Piriformis stretch - true ALCIDES keeping the knee rotated outward before pulling. Other leg supported on chair  2x 10 sec x    Longitudinal hip ER/IR  10x ea  x    90/90 unloading position - PPT 2x10 5 sec x Using chair for foot support   90/90 unloading - marching 5x ea  x    90/90 unloading - hip add set        90/90 unloading shoulder flex 10 alt  x    90/90 unloading - glute set  15 5 sec X     Hook lying bent knee fall out  10  x    Sidelying clamshell, reverse clam R/L LE  10x  x    Right/left hip flexor stretch 3x 10 sec x                           Specific Interventions Next Treatment: Start session with ultrasound L greater trochanter. Follow with passive stretching hip (IT band, hip flexor, piriformis, hamstring). Gentle strengthening of lumbar/ SI and hip. (clamshell, reverse clam, posterior pelvic tilt, prone knee curls with pillow under hips). Gentle alignment/ scoliosis stretching (sidelying left over bolster/pillow, prone with lateral shift - hips shifted left).  R shoulder stretching and strengthening as needed - scapular retractions, wall slides, cane AAROM    Activity/Treatment Tolerance:  []  Patient tolerated treatment well  []  Patient limited by fatigue  [x]  Patient limited by pain   []  Patient limited by medical complications  []  Other:     Assessment: Continued with program as noted with patient tolerating well. Still pain full with transitions but reporting less pain 1-2/10 at end of session. GOALS:  Patient Goal: walk farther distances for walking her dog, decrease fall risk    Short Term Goals:  Time Frame: 6 weeks    Patient will report decreased lower back and L hip pain from baseline 4/10 to less than 2/10 during therapy exercises in order to sleep comfortably on either side. Patient will improve BLE PROM to 0-90 deg hamstring, 0-120 deg ALCIDES, 0-25 deg hip extension in order to bend and lift without straining lumbar spine. Patient will improve R shoulder AROM to 0-145 deg flexion, 0-130 deg Abduction, and behind back to at least L3 in order to don/doff bra, perform cooking tasks, and sleep comfortably on R shoulder. Patient will demonstrate good abdominal bracing and body mechanics during therapy session in order to preserve neutral spine during household tasks. Patient will be IND with HEP in order to meet long term goals. Long Term Goals:  Time Frame: 12 weeks    Patient will improve score of Modified Oswestry LBP questionnaire from baseline 19/50 to less than 9/50 in order to stand longer durations for preparing meals with family and serving at [facility]. Patient will improve sore of Tinetti Balance and Gait measurement from 19/28 to at least 24/28 in order to decrease frequency of falling. Patient will squat to lift 10 lb weight from floor to standing with good body mechanics in order to bend easier for feeding her dog. Patient will improve BLE strength to 4+/5 hips and knees in order to squat, lunge, and navigate steps without difficulty.          Patient Education:   []  HEP/Education Completed: Passive stretches    Accumulate Access Code: [email or web address]  [x]  No new Education completed  []  Reviewed Prior HEP      [x]  Patient verbalized and/or demonstrated understanding of education provided. []  Patient unable to verbalize and/or demonstrate understanding of education provided. Will continue education. []  Barriers to learning:     PLAN:  Treatment Recommendations: Strengthening, Range of Motion, Balance Training, Functional Mobility Training, Transfer Training, Gait Training, Stair Training, Manual Therapy - Soft Tissue Mobilization, Manual Therapy - Joint Manipulation, Pain Management, Home Exercise Program, Patient Education, Integrative Dry Needling, Vestibular Rehabilitation, Aquatics and Modalities    []  Plan of care initiated. Plan to see patient 2 times per week for 12 weeks to address the treatment planned outlined above.   [x]  Continue with current plan of care  []  Modify plan of care as follows:    []  Hold pending physician visit  []  Discharge    Time In 945   Time Out 1026   Timed Code Minutes: 41   Total Treatment Time: 41     Electronically Signed by: Rowena Schneider PTA

## 2022-05-27 ENCOUNTER — HOSPITAL ENCOUNTER (OUTPATIENT)
Dept: PHYSICAL THERAPY | Age: 82
Setting detail: THERAPIES SERIES
Discharge: HOME | End: 2022-05-27
Payer: MEDICARE

## 2022-05-27 PROCEDURE — 97110 THERAPEUTIC EXERCISES: CPT

## 2022-05-27 NOTE — PROGRESS NOTES
[facility]  PHYSICAL THERAPY  [] EVALUATION  [x] DAILY NOTE (LAND) [] DAILY NOTE (AQUATIC ) [] PROGRESS NOTE [] DISCHARGE NOTE    [x] OUTPATIENT [facility]HABIL[facility]ATION [facility]   [] [place] [address]    [] [facility]   [] Froylan Amabil    Date: 2022  Patient Name:  Kanu Molina    : 1940  MRN: [id number]  CSN: [id number]    Referring Practitioner Huber Love DO   Diagnosis Other intervertebral disc degeneration, lumbar region [M51.36]    Treatment Diagnosis Right shoulder pain, R shoulder stiffness, lumbago, spinal stiffness, scoliosis, abnormal posture, Left hip pain, left hip stiffness, R hip stiffness, difficulty walking, fall history, fall risk, unsteady   Date of Evaluation 5/10/22    Additional Pertinent History XR Lumbar: Severe degenerative changes with levoconvex scoliosis. No acute fracture. XR Pelvis: Mild degenerative changes left sacroiliac joint.  - Lumbar fusion surgery   Chronic R shoulder pain, upper trap tightness. Functional Outcome Measure Used Modified Oswestry LBP    Functional Outcome Score 19/50 = 38% impaired  (5/10/22)       Insurance: Primary: Payor: Amy Dukes /  /  / ,   Secondary: [place]: Unlimited visits, aquatic and modalities covered, no ionto, no heat/cold    Visit # 6, 6/10 for progress note   Visits Allowed: Unlimited    Recertification Date: 4/3/3205    Physician Follow-Up: Pain management 22  Giselle Rivera 2022    Physician Orders:    History of Present Illness: Chronic back pain, predominantly Left lower back - most recent ablation , and following this noted new pain in Left hip/ buttock. Feels sharp and stabbing in left buttock. Has tried hydrocodeine at night, Tylenol arthritis for day. SUBJECTIVE: Patient feels that the left leg radiating pain has been gone the past few days, no buttock pain.  She still has pain in right lower back and presents with walking stick. TREATMENT   Precautions: Wears R heel lift, Left scoliosis. HX of Lumbar fusion - NO traction  B TKAs    Pain:     X in shaded column indicates activity completed today   Modalities Parameters/  Location  Notes   mhp to LB during ex  x                Manual Therapy Time/Technique  Notes   Observation standing - hips shifted left, trunk shifted Right      Observation - RLE shorter than LLE supine       RLE gentle longitudinal pull  3x      R hip isometric flexion MET 5x 5 sec  Legs appeared equal after manual techniques. Also encouraged to wear R heel lift as needed for comfort. Exercise/Intervention   Notes   LTR  10      Hooklying hip flexor stretch 2x 10 sec     Piriformis stretch - true ALCIDES keeping the knee rotated outward before pulling. Other leg supported on chair  2x 10 sec     Longitudinal hip ER/IR  10x ea      90/90 unloading position - PPT 2x10 5 sec x Using chair for foot support   90/90 unloading - marching 5x ea      90/90 unloading - hip add set        90/90 unloading - glute set  15 5 sec X     Hook lying bent knee fall out  10      Sidelying clamshell, reverse clam R/L LE  10x  x    Right/left hip flexor stretch 3x 10 sec x    Right shoulder - sidelying Left - Right shoulder extension/IR reaching for bra hook 5x 10 sec  x Very restricted motion   Right shoulder Cane AAROM - flexion, ER @90 deg abd full ROM 5x 10 sec x Supine over moist heat   Right shoulder IR stretching, extension - standing with cane behind back 10x  x      Specific Interventions Next Treatment: Start session with ultrasound R SI . Follow with passive stretching hip (IT band, hip flexor, piriformis, hamstring). Gentle strengthening of lumbar/ SI and hip. (clamshell, reverse clam, posterior pelvic tilt, prone knee curls with pillow under hips). Gentle alignment/ scoliosis stretching (sidelying left over bolster/pillow, prone with lateral shift - hips shifted left).  R shoulder stretching and strengthening as needed - scapular retractions, wall slides, cane AAROM    Activity/Treatment Tolerance:  []  Patient tolerated treatment well  []  Patient limited by fatigue  [x]  Patient limited by pain   []  Patient limited by medical complications  []  Other:     Assessment: Patient notes centralization of Left buttock and lateral thigh pain this week -but her Right lower back pain has been the same severity rated 9/10 today. Held some of stretching to address Right scoliosis and right shoulder and thoracic stretching. Good tolerance to added stretches and given for HEP. Patient interested in ultrasound, planning to try it for right sided back pain next session. GOALS:  Patient Goal: walk farther distances for walking her dog, decrease fall risk    Short Term Goals:  Time Frame: 6 weeks    Patient will report decreased lower back and L hip pain from baseline 4/10 to less than 2/10 during therapy exercises in order to sleep comfortably on either side. Patient will improve BLE PROM to 0-90 deg hamstring, 0-120 deg ALCIDES, 0-25 deg hip extension in order to bend and lift without straining lumbar spine. Patient will improve R shoulder AROM to 0-145 deg flexion, 0-130 deg Abduction, and behind back to at least L3 in order to don/doff bra, perform cooking tasks, and sleep comfortably on R shoulder. Patient will demonstrate good abdominal bracing and body mechanics during therapy session in order to preserve neutral spine during household tasks. Patient will be IND with HEP in order to meet long term goals. Long Term Goals:  Time Frame: 12 weeks    Patient will improve score of Modified Oswestry LBP questionnaire from baseline 19/50 to less than 9/50 in order to stand longer durations for preparing meals with family and serving at [employer]. Patient will improve sore of Tinetti Balance and Gait measurement from 19/28 to at least 24/28 in order to decrease frequency of falling. Patient will squat to lift 10 lb weight from floor to standing with good body mechanics in order to bend easier for feeding her dog. Patient will improve BLE strength to 4+/5 hips and knees in order to squat, lunge, and navigate steps without difficulty. Patient Education:   []  HEP/Education Completed: Passive stretches    Medbridge Access Code: [email or web address]  [x]  No new Education completed  []  Reviewed Prior HEP      [x]  Patient verbalized and/or demonstrated understanding of education provided. []  Patient unable to verbalize and/or demonstrate understanding of education provided. Will continue education. []  Barriers to learning:     PLAN:  Treatment Recommendations: Strengthening, Range of Motion, Balance Training, Functional Mobility Training, Transfer Training, Gait Training, Stair Training, Manual Therapy - Soft Tissue Mobilization, Manual Therapy - Joint Manipulation, Pain Management, Home Exercise Program, Patient Education, Integrative Dry Needling, Vestibular Rehabilitation, Aquatics and Modalities    []  Plan of care initiated. Plan to see patient 2 times per week for 12 weeks to address the treatment planned outlined above.   [x]  Continue with current plan of care  []  Modify plan of care as follows:    []  Hold pending physician visit  []  Discharge    Time In 918   Time Out 1000   Timed Code Minutes: 42   Total Treatment Time: 42     Electronically Signed by: Jenni Johnson PT

## 2022-06-01 ENCOUNTER — HOSPITAL ENCOUNTER (OUTPATIENT)
Dept: PHYSICAL THERAPY | Age: 82
Setting detail: THERAPIES SERIES
Discharge: HOME | End: 2022-06-01
Payer: MEDICARE

## 2022-06-01 PROCEDURE — 97110 THERAPEUTIC EXERCISES: CPT

## 2022-06-01 PROCEDURE — 97035 APP MDLTY 1+ULTRASOUND EA 15: CPT

## 2022-06-01 NOTE — PROGRESS NOTES
[facility]  PHYSICAL THERAPY  [] EVALUATION  [x] DAILY NOTE (LAND) [] DAILY NOTE (AQUATIC ) [] PROGRESS NOTE [] DISCHARGE NOTE    [x] [facility] [place]   [] [place] [address]    [] [facility]   [facility]    Date: [facility]22  Patient Name:  Rima Soler    : 1940  MRN: [id number]  CSN: [id number]    Referring Practitioner Nolberto Varela DO   Diagnosis Other intervertebral disc degeneration, lumbar region [M51.36]    Treatment Diagnosis Right shoulder pain, R shoulder stiffness, lumbago, spinal stiffness, scoliosis, abnormal posture, Left hip pain, left hip stiffness, R hip stiffness, difficulty walking, fall history, fall risk, unsteady   Date of Evaluation 5/10/22    Additional Pertinent History XR Lumbar: Severe degenerative changes with levoconvex scoliosis. No acute fracture. XR Pelvis: Mild degenerative changes left sacroiliac joint.  - Lumbar fusion surgery   Chronic R shoulder pain, upper trap tightness. Functional Outcome Measure Used Modified Oswestry LBP    Functional Outcome Score 19/50 = 38% impaired  (5/10/22)       Insurance: Primary: Payor: William Borja /  /  / ,   Secondary: [facility]   Authorization Information: Unlimited visits, aquatic and modalities covered, no ionto, no heat/cold    Visit # 7, 7/10 for progress note   Visits Allowed: Unlimited    Recertification Date:     Physician Follow-Up: Pain management 22 - hoping to help the right side. Jodie Holland 2022    Physician Orders:    History of Present Illness: Chronic back pain, predominantly Left lower back - most recent ablation , and following this noted new pain in Left hip/ buttock. Feels sharp and stabbing in left buttock. Has tried hydrocodeine at night, Tylenol arthritis for day.       SUBJECTIVE: Patient feels that the left leg radiating pain has been gone the past week, and she has been able to tolerate her pain and walk her dog [place] about 30 to 45 mins. Able to run vacuum sweeper over the main floor of her house. She has been trying some of her shoulder HEP but it made her upper trap sore. TREATMENT   Precautions: Wears R heel lift, Left scoliosis. HX of Lumbar fusion - NO traction  B TKAs    Pain:     X in shaded column indicates activity completed today   Modalities Parameters/  Location  Notes   mhp to LB during ex      Ultrasound Right SI/ lumbar paraspinals - Left sidelying  8 mins, 1.0 MHz, 1.0 w/cm2  x          Manual Therapy Time/Technique  Notes   Observation standing - hips shifted left, trunk shifted Right      Observation - RLE shorter than LLE supine       RLE gentle longitudinal pull  3x  x    R hip isometric flexion MET 5x 5 sec x Legs appeared equal after manual techniques. Also encouraged to wear R heel lift as needed for comfort. Exercise/Intervention   Notes   LTR  10      Hooklying hip flexor stretch 2x 10 sec     Piriformis stretch - true ALCIDES keeping the knee rotated outward before pulling. Other leg supported on chair  2x 10 sec x    Longitudinal hip ER/IR - Right  10x ea  x    90/90 unloading position - PPT 2x10 5 sec x Using chair for foot support   90/90 unloading - marching 5x ea      90/90 unloading - hip add set        90/90 unloading - glute set  15 5 sec     Hook lying bent knee fall out  10      Sidelying clamshell, reverse clam R/L LE  10x      Left sidelying - pillows under left hip - overhead reach and RLE extension 5x 10 sec x Feels good stretch in R side of body. Right shoulder - sidelying Left - Right shoulder extension/IR reaching for bra hook 5x 10 sec  x Very restricted motion   Right shoulder IR stretching, extension - standing with cane behind back 10x  x      Specific Interventions Next Treatment: Start session with ultrasound R SI . Follow with passive stretching hip (IT band, hip flexor, piriformis, hamstring). Gentle strengthening of lumbar/ SI and hip. (clamshell, reverse clam, posterior pelvic tilt, prone knee curls with pillow under hips). Gentle alignment/ scoliosis stretching (sidelying left over bolster/pillow, prone with lateral shift - hips shifted left). R shoulder stretching and strengthening as needed - scapular retractions, wall slides, cane AAROM. Activity/Treatment Tolerance:  []  Patient tolerated treatment well  []  Patient limited by fatigue  [x]  Patient limited by pain   []  Patient limited by medical complications  []  Other:     Assessment: Initiated R SI ultrasound today with fair tolerance, and followed with manual techniques for R iliac upslip vs R iliac posterior rotation causing scoliosis curve and lateral shift. Patient sees pain management next week and interested in continuing PT to progress her strength and mobility of the back and hips. GOALS:  Patient Goal: walk farther distances for walking her dog, decrease fall risk    Short Term Goals:  Time Frame: 6 weeks    Patient will report decreased lower back and L hip pain from baseline 4/10 to less than 2/10 during therapy exercises in order to sleep comfortably on either side. Patient will improve BLE PROM to 0-90 deg hamstring, 0-120 deg ALCIDES, 0-25 deg hip extension in order to bend and lift without straining lumbar spine. Patient will improve R shoulder AROM to 0-145 deg flexion, 0-130 deg Abduction, and behind back to at least L3 in order to don/doff bra, perform cooking tasks, and sleep comfortably on R shoulder. Patient will demonstrate good abdominal bracing and body mechanics during therapy session in order to preserve neutral spine during household tasks. Patient will be IND with HEP in order to meet long term goals.     Long Term Goals:  Time Frame: 12 weeks    Patient will improve score of Modified Oswestry LBP questionnaire from baseline 19/50 to less than 9/50 in order to stand longer durations for preparing meals with family and serving at [employer]. Patient will improve sore of Tinetti Balance and Gait measurement from 19/28 to at least 24/28 in order to decrease frequency of falling. Patient will squat to lift 10 lb weight from floor to standing with good body mechanics in order to bend easier for feeding her dog. Patient will improve BLE strength to 4+/5 hips and knees in order to squat, lunge, and navigate steps without difficulty. Patient Education:   []  HEP/Education Completed: Passive stretches    Medbridge Access Code: [email or web address]  [x]  No new Education completed  []  Reviewed Prior HEP      [x]  Patient verbalized and/or demonstrated understanding of education provided. []  Patient unable to verbalize and/or demonstrate understanding of education provided. Will continue education. []  Barriers to learning:     PLAN:  Treatment Recommendations: Strengthening, Range of Motion, Balance Training, Functional Mobility Training, Transfer Training, Gait Training, Stair Training, Manual Therapy - Soft Tissue Mobilization, Manual Therapy - Joint Manipulation, Pain Management, Home Exercise Program, Patient Education, Integrative Dry Needling, Vestibular Rehabilitation, Aquatics and Modalities    []  Plan of care initiated. Plan to see patient 2 times per week for 12 weeks to address the treatment planned outlined above.   [x]  Continue with current plan of care  []  Modify plan of care as follows:    []  Hold pending physician visit  []  Discharge    Time In 950   Time Out 1030   Timed Code Minutes: 40   Total Treatment Time: 40     Electronically Signed by: Almaz Traylor, PT

## 2022-06-03 ENCOUNTER — HOSPITAL ENCOUNTER (OUTPATIENT)
Dept: PHYSICAL THERAPY | Age: 82
Setting detail: THERAPIES SERIES
Discharge: HOME | End: 2022-06-03
Payer: MEDICARE

## 2022-06-03 PROCEDURE — 97110 THERAPEUTIC EXERCISES: CPT

## 2022-06-03 PROCEDURE — 97035 APP MDLTY 1+ULTRASOUND EA 15: CPT

## 2022-06-03 NOTE — PROGRESS NOTES
[facility]  PHYSICAL THERAPY  [] EVALUATION  [] DAILY NOTE (LAND) [] DAILY NOTE (AQUATIC ) [x] PROGRESS NOTE [] DISCHARGE NOTE    [x] [facility] [place]   [] [place] [address]    [] [facility]   [] [facility]    Date: 6/3/2022  Patient Name:  Mars Leo    : 1940  MRN: [id number]  CSN: [id number]    Referring Practitioner Gloria Kaufman DO   Diagnosis Other intervertebral disc degeneration, lumbar region [M51.36]    Treatment Diagnosis Right shoulder pain, R shoulder stiffness, lumbago, spinal stiffness, scoliosis, abnormal posture, Left hip pain, left hip stiffness, R hip stiffness, difficulty walking, fall history, fall risk, unsteady   Date of Evaluation 5/10/22    Additional Pertinent History XR Lumbar: Severe degenerative changes with levoconvex scoliosis. No acute fracture. XR Pelvis: Mild degenerative changes left sacroiliac joint.  - Lumbar fusion surgery   Chronic R shoulder pain, upper trap tightness. Functional Outcome Measure Used Modified Oswestry LBP    Functional Outcome Score 19/50 = 38% impaired  (5/10/22)   18/50 = 36% impaired (6/3/22)      Insurance: Primary: Payor: Christiano Lynn /  /  / ,   Secondary: [facility]   Authorization Information: Unlimited visits, aquatic and modalities covered, no ionto, no heat/cold    Visit # 8, 0/10 for progress note   Visits Allowed: Unlimited    Recertification Date: [id number]    Physician Follow-Up: Pain management 22 - hoping to help the right side. [place] Led 2022    Physician Orders:    History of Present Illness: Chronic back pain, predominantly Left lower back - most recent ablation , and following this noted new pain in Left hip/ buttock. Feels sharp and stabbing in left buttock. Has tried hydrocodeine at night, Tylenol arthritis for day.       SUBJECTIVE: Since starting therapy patient has been feeling good relief of the left buttock pain, but continues to have R SI lower back pain 5-6/10. She has been able to do more cleaning activities, able to volunteer at [employer] and walk up the stairs at [employer], but it does aggravate her R sided back pain. Doing stretches and HEP consistently. TREATMENT   Precautions: Wears R heel lift, Left scoliosis. HX of Lumbar fusion - NO traction  B TKAs    Pain:     X in shaded column indicates activity completed today   Modalities Parameters/  Location  Notes   mhp to LB during ex      Ultrasound Right SI/ lumbar paraspinals - Left sidelying  8 mins, 1.0 MHz, 1.0 w/cm2  x          Manual Therapy Time/Technique  Notes   Observation standing - hips shifted left, trunk shifted Right      Observation - RLE shorter than LLE supine       RLE gentle longitudinal pull  3x      R hip isometric flexion MET 5x 5 sec  Legs appeared equal after manual techniques. Also encouraged to wear R heel lift as needed for comfort. Exercise/Intervention   Notes   LTR  10      Hooklying hip flexor stretch 2x 10 sec     Piriformis stretch - true ALCIDES keeping the knee rotated outward before pulling. Other leg supported on chair  2x 10 sec x    Longitudinal hip ER/IR - Right  10x ea      90/90 unloading position - PPT 2x10 5 sec x Using chair for foot support   90/90 unloading - marching 5x ea      90/90 unloading - hip add set        90/90 unloading - glute set  15 5 sec     Hook lying bent knee fall out  10      Sidelying clamshell, reverse clam R/L LE  10x      Left sidelying - pillows under left hip - overhead reach and RLE extension 5x 10 sec  Feels good stretch in R side of body. Right shoulder - sidelying Left - Right shoulder extension/IR reaching for bra hook 5x 10 sec   Very restricted motion   Right shoulder IR stretching, extension - standing with cane behind back 10x        TINETTI BALANCE TEST    BALANCE Patient is seated in a hard, armless chair   1 SITTING BALANCE 1 - Steady, safe   2. RISES FROM CHAIR 1 - Able, uses arms to help   3. ATTEMPTS TO RISE 2 - Able to rise, 1 attempt   4. IMMEDIATE STANDING BALANCE (FIRST 5 SECONDS) 2 - Steady without walker or other support   5. STANDING BALANCE 2 - Narrow stance without support   6. NUDGED 2 - Steady   7. EYES CLOSED 1 - Steady   8. TURNING 360 DEGREES 1 - Continuous and 1 - Steady   9. SITTING DOWN 1 - Uses arms or not a smooth motion   BALANCE SCORE 14/16       GAIT SECTION Patient stands with therapist, walks across room (+/- aids), fist at usual pace, then at rapid pace. 1.  INDICATION OF GAIT (Immediately after told to \"go\" 1 - No hesitancy   2. STEP LENGTH AND HEIGHT 1 - Step through Right and 1 - Step through Left   3. FOOT CLEARANCE 1 - Left foot clears floor and 1 - Right foot clears floor   4. STEP SYMMETRY 0 - Right and left step length not equal   5. STEP CONTINUITY 1 - Steps appear continuous   6. PATH 2 - Straight without walking aid   7. TRUNK 0 - Marked sway or uses walking aid   8. WALKING TIME 0 - Heels apart   GAIT SCORE 8/12   TOTAL SCORE 22/28   Risk Indicators:  Less than/equal to 18 = high risk  19-23 Moderate risk  Greater than/equal to 24 = low risk Moderate risk            Specific Interventions Next Treatment: Start session with ultrasound R SI . Follow with passive stretching hip (IT band, hip flexor, piriformis, hamstring). Gentle strengthening of lumbar/ SI and hip. (clamshell, reverse clam, posterior pelvic tilt, prone knee curls with pillow under hips). Gentle alignment/ scoliosis stretching (sidelying left over bolster/pillow, prone with lateral shift - hips shifted left). R shoulder stretching and strengthening as needed - scapular retractions, wall slides, cane AAROM.        Activity/Treatment Tolerance:  []  Patient tolerated treatment well  []  Patient limited by fatigue  [x]  Patient limited by pain   []  Patient limited by medical complications  []  Other:     Assessment: Since starting therapy last month patient has had good relief of the left buttock/ thigh radiating pain. However at baseline she has chronic back issues causing R scoliosis and R sided SI pain. She gets temporary relief from ultrasound treatment but the more she stands and walks the pain increases. She is scheduled to see pain management on Monday 6/6/22 and is hopeful to have some procedure to help R sided pain. Plan to continue PT to progress back strengthening, especially if she gets relief with pain procedures. Offered aquatic therapy as an option for strengthening. GOALS:  Patient Goal: walk farther distances for walking her dog, decrease fall risk    Short Term Goals:  Time Frame: 6 weeks    Patient will report decreased lower back and L hip pain from baseline 4/10 to less than 2/10 during therapy exercises in order to sleep comfortably on either side. GOAL MET:  Left buttock pain has been 0-1/10. Still has chronic R lower back pain. Discontinue Goal    NEW GOAL: patient will report decreased R sided lower back pain from 5/10 to less than 2/10 in order to stand and walk longer durations. Patient will improve BLE PROM to 0-90 deg hamstring, 0-120 deg ALCIDES, 0-25 deg hip extension in order to bend and lift without straining lumbar spine. GOAL NOT MET: Right hamstring 0-70 and produces back pain in R SI, all others WFL. Eliana [place] Continue Goal    Patient will improve R shoulder AROM to 0-145 deg flexion, 0-130 deg Abduction, and behind back to at least L3 in order to don/doff bra, perform cooking tasks, and sleep comfortably on R shoulder. GOAL NOT MET: Met for overhead reaching, but behind back to L5 . Continue Goal    Patient will demonstrate good abdominal bracing and body mechanics during therapy session in order to preserve neutral spine during household tasks. GOAL MET:  Good posterior tilt technique. Discontinue Goal    Patient will be IND with HEP in order to meet long term goals.   GOAL MET:  performing HEP consistently. .  Continue Goal      Long Term Goals:  Time Frame: 12 weeks    Patient will improve score of Modified Oswestry LBP questionnaire from baseline 19/50 to less than 9/50 in order to stand longer durations for preparing meals with family and serving at [employer]. GOAL NOT MET: 18/50 no significant change. Continue Goal    Patient will improve sore of Tinetti Balance and Gait measurement from 19/28 to at least 24/28 in order to decrease frequency of falling. GOAL NOT MET: 22/28 - improved a few points. Continue Goal    Patient will squat to lift 10 lb weight from floor to standing with good body mechanics in order to bend easier for feeding her dog. GOAL MET:  using good squat technique with lifting tasks, no issue with light weights. Discontinue Goal    Patient will improve BLE strength to 4+/5 hips and knees in order to squat, lunge, and navigate steps without difficulty. GOAL NOT MET: Hip flex 4+/5 left, 4/5 right. .  Continue Goal          Patient Education:   []  HEP/Education Completed: Passive stretches    Qoof Access Code: [email or web address]  [x]  No new Education completed  []  Reviewed Prior HEP      [x]  Patient verbalized and/or demonstrated understanding of education provided. []  Patient unable to verbalize and/or demonstrate understanding of education provided. Will continue education. []  Barriers to learning:     PLAN:  Treatment Recommendations: Strengthening, Range of Motion, Balance Training, Functional Mobility Training, Transfer Training, Gait Training, Stair Training, Manual Therapy - Soft Tissue Mobilization, Manual Therapy - Joint Manipulation, Pain Management, Home Exercise Program, Patient Education, Integrative Dry Needling, Vestibular Rehabilitation, Aquatics and Modalities    []  Plan of care initiated. Plan to see patient 2 times per week for 12 weeks to address the treatment planned outlined above.   [x]  Continue with current plan of care  []  Modify plan of

## 2022-06-06 ENCOUNTER — OFFICE VISIT (OUTPATIENT)
Dept: PHYSICAL MEDICINE AND REHAB | Age: 82
End: 2022-06-06
Payer: MEDICARE

## 2022-06-06 VITALS
WEIGHT: 163 LBS | HEIGHT: 62 IN | SYSTOLIC BLOOD PRESSURE: 120 MMHG | BODY MASS INDEX: 30 KG/M2 | DIASTOLIC BLOOD PRESSURE: 62 MMHG

## 2022-06-06 DIAGNOSIS — M54.50 CHRONIC BILATERAL LOW BACK PAIN WITHOUT SCIATICA: ICD-10-CM

## 2022-06-06 DIAGNOSIS — G89.4 CHRONIC PAIN SYNDROME: ICD-10-CM

## 2022-06-06 DIAGNOSIS — M47.816 LUMBAR SPONDYLOSIS: Primary | ICD-10-CM

## 2022-06-06 DIAGNOSIS — Z98.890 HISTORY OF LUMBAR LAMINECTOMY: ICD-10-CM

## 2022-06-06 DIAGNOSIS — M41.9 SCOLIOSIS OF THORACOLUMBAR SPINE, UNSPECIFIED SCOLIOSIS TYPE: ICD-10-CM

## 2022-06-06 DIAGNOSIS — G89.29 CHRONIC BILATERAL LOW BACK PAIN WITHOUT SCIATICA: ICD-10-CM

## 2022-06-06 PROBLEM — N18.30 CHRONIC RENAL DISEASE, STAGE III (HCC): Status: ACTIVE | Noted: 2022-06-06

## 2022-06-06 PROCEDURE — G8399 PT W/DXA RESULTS DOCUMENT: HCPCS | Performed by: NURSE PRACTITIONER

## 2022-06-06 PROCEDURE — 1090F PRES/ABSN URINE INCON ASSESS: CPT | Performed by: NURSE PRACTITIONER

## 2022-06-06 PROCEDURE — 1123F ACP DISCUSS/DSCN MKR DOCD: CPT | Performed by: NURSE PRACTITIONER

## 2022-06-06 PROCEDURE — 99214 OFFICE O/P EST MOD 30 MIN: CPT | Performed by: NURSE PRACTITIONER

## 2022-06-06 PROCEDURE — G8417 CALC BMI ABV UP PARAM F/U: HCPCS | Performed by: NURSE PRACTITIONER

## 2022-06-06 PROCEDURE — G8427 DOCREV CUR MEDS BY ELIG CLIN: HCPCS | Performed by: NURSE PRACTITIONER

## 2022-06-06 PROCEDURE — 1036F TOBACCO NON-USER: CPT | Performed by: NURSE PRACTITIONER

## 2022-06-06 ASSESSMENT — ENCOUNTER SYMPTOMS
RESPIRATORY NEGATIVE: 1
GASTROINTESTINAL NEGATIVE: 1
EYES NEGATIVE: 1
BACK PAIN: 1

## 2022-06-06 NOTE — PROGRESS NOTES
[address] [place] [address]  Dept: [phone]  Dept Fax: [phone]: [phone]    Visit Date: 6/6/2022    Functionality Assessment/Goals Worksheet     On a scale of 0 (Does not Interfere) to 10 (Completely Interferes)     1. Which number describes how during the past week pain has interfered with       the following:  A. General Activity:  4  B. Mood: 3  C. Walking Ability:  5  D. Normal Work (Includes both work outside the home and housework):  3  E. Relations with Other People:   0  F. Sleep:   4  G. Enjoyment of Life:   1    2. Patient Prefers to Take their Pain Medications:     []  On a regular basis   [x]  Only when necessary    []  Does not take pain medications    3. What are the Patient's Goals/Expectations for Visiting Pain Management? []  Learn about my pain    [x]  Receive Medication   []  Physical Therapy     []  Treat Depression   [x]  Receive Injections    []  Treat Sleep   []  Deal with Anxiety and Stress   []  Treat Opoid Dependence/Addiction   []  Other:      HPI:   Marisela Gann is a 80 y.o. female is here today for    Chief Complaint: Low back pain     HPI   6 week FU to reevaluate medications. Patient here with complaints of pain in low back worse on right side today mainly- aching pain with periods iof sharp pains. Overall improved sicne last visit especially left side. Feels now she is getting relief from L-facet RFA. Not having any SI pain   Is working with outpatient PT for the past 2 weeks and fells that it is helping \"some, not a lot, but some\"    Denies ant radicular pain    Feels that addition of Norco prn really helps. Only taking prn. Usually has been taking 1/2 tab when leeting them.    Pain increases with bending, lifting, twisting , walking, standing, getting up and down and housework or working at job, rainy weather Medications reviewed. Patient denies side effects with medications. Patient states she is taking medications as prescribed. Shedenies receiving pain medications from other sources. She denies any ER visits since last visit. Pain scale with out pain medications or at its worst is 8/10. 0/10 without activity, with increased activity anywhere from 4-8/10  Pain scale with pain medications or at its best is 2/10. Last dose of Leming was today   Drug screen reviewed from 3/22/2022 and was appropriate  Pill count completed  today and WNL: Yes      The patientis allergic to sulfa antibiotics and other. Subjective:      Review of Systems   Constitutional: Negative. HENT: Negative. Eyes: Negative. Negative for visual disturbance. Respiratory: Negative. Cardiovascular: Negative. Gastrointestinal: Negative. Endocrine: Negative. Genitourinary: Negative. Musculoskeletal: Positive for arthralgias, back pain, gait problem, joint swelling and myalgias. Negative for neck pain and neck stiffness. Ambulating with strait cane, gets leg cramps and spasms. Skin: Negative. Neurological: Negative for weakness and numbness. Psychiatric/Behavioral: Negative. Objective:     Vitals:    06/06/22 1105   BP: 120/62   Weight: 163 lb (73.9 kg)   Height: 5' 2.01\" (1.575 m)       Physical Exam  Vitals and nursing note reviewed. Constitutional:       General: She is not in acute distress. Appearance: Normal appearance. She is well-developed. She is not diaphoretic. HENT:      Head: Normocephalic and atraumatic. Right Ear: External ear normal.      Left Ear: External ear normal.      Nose: Nose normal.      Mouth/Throat:      Pharynx: No oropharyngeal exudate. Eyes:      General: No scleral icterus. Right eye: No discharge. Left eye: No discharge. Conjunctiva/sclera: Conjunctivae normal.      Pupils: Pupils are equal, round, and reactive to light.    Neck: Thyroid: No thyromegaly. Cardiovascular:      Rate and Rhythm: Normal rate and regular rhythm. Heart sounds: Normal heart sounds. No murmur heard. No friction rub. No gallop. Pulmonary:      Effort: Pulmonary effort is normal. No respiratory distress. Breath sounds: Normal breath sounds. No wheezing or rales. Chest:      Chest wall: No tenderness. Abdominal:      General: Bowel sounds are normal. There is no distension. Palpations: Abdomen is soft. Tenderness: There is no abdominal tenderness. There is no guarding or rebound. Musculoskeletal:         General: Tenderness present. Right shoulder: Tenderness present. Decreased range of motion. Left shoulder: Tenderness present. Decreased range of motion. Right hand: Deformity and tenderness present. Left hand: Deformity and tenderness present. Cervical back: Neck supple. No edema, erythema, rigidity or tenderness. No muscular tenderness. Decreased range of motion. Thoracic back: Tenderness present. Scoliosis present. Lumbar back: Tenderness and bony tenderness present. Decreased range of motion. Negative right straight leg raise test and negative left straight leg raise test. Scoliosis present. Back:    Skin:     General: Skin is warm. Coloration: Skin is not pale. Findings: No erythema or rash. Neurological:      Mental Status: She is alert and oriented to person, place, and time. She is not disoriented. Cranial Nerves: No cranial nerve deficit. Sensory: No sensory deficit. Motor: Weakness present. No atrophy or abnormal muscle tone. Coordination: Coordination normal.      Gait: Gait abnormal.      Deep Tendon Reflexes: Babinski sign absent on the right side. Babinski sign absent on the left side. Reflex Scores:       Achilles reflexes are 1+ on the right side and 1+ on the left side.      Comments: Gait-uses cane   Psychiatric:         Attention and Perception: Attention normal. She is attentive. Mood and Affect: Mood normal. Mood is not anxious or depressed. Affect is not labile, blunt, angry or inappropriate. Speech: Speech normal. She is communicative. Speech is not rapid and pressured, delayed, slurred or tangential.         Behavior: Behavior is not agitated, slowed, aggressive, withdrawn, hyperactive or combative. Behavior is cooperative. Thought Content: Thought content normal. Thought content is not paranoid or delusional. Thought content does not include homicidal or suicidal ideation. Thought content does not include homicidal or suicidal plan. Cognition and Memory: Cognition normal. Memory is not impaired. She does not exhibit impaired recent memory or impaired remote memory. Judgment: Judgment normal. Judgment is not impulsive or inappropriate. ALCIDES  Patricks test  negative  Yeoman's  or Gaenslen's negative       Assessment:     1. Lumbar spondylosis    2. Chronic bilateral low back pain without sciatica    3. History of lumbar laminectomy    4. Chronic pain syndrome    5. Scoliosis of thoracolumbar spine, unspecified scoliosis type            Plan:      · OARRS reviewed. Current MED: 0  · Patient was offered naloxone for home. · Discussed long term side effects of medications, tolerance, dependency and addiction. · Previous UDS reviewed  · UDS preformed today for compliance. · Patient told can not receive any pain medications from any other source. · No evidence of abuse, diversion or aberrant behavior.  Medications and/or procedures to improve function and quality of life- patient understanding with this and that may not be pain free   Discussed with patient about safe storage of medications at home   Discussed possible weaning of medication dosing dependent on treatment/procedure results.     Discussed with patient about risks with procedure including infection, reaction to medication, increased pain, or bleeding. · Procedure notes reveiwed.  Feels relief from L-facet RFA now    Pain improved overall in low back. SI pain resolved.  Continue Norco 5/325 mg tabs BID- 1/2 tab to 1 tab BID prn- filled 5/13/2022 for 14 tabs and still has plenty takes prn still has about 10 tabs left which will last a while for severe pain. Continue tylenol prn for mild pain    Updated UDS ordered today    Continue physical therapy       Meds. Prescribed:   No orders of the defined types were placed in this encounter. Return in about 2 months (around 8/6/2022), or if symptoms worsen or fail to improve, for follow up  for medications.                Electronically signed by JOSE Lopez CNP on6/6/2022 at 11:31 AM

## 2022-06-15 ENCOUNTER — HOSPITAL ENCOUNTER (OUTPATIENT)
Dept: PHYSICAL THERAPY | Age: 82
Setting detail: THERAPIES SERIES
Discharge: HOME | End: 2022-06-15
Payer: MEDICARE

## 2022-06-15 ENCOUNTER — APPOINTMENT (OUTPATIENT)
Dept: PHYSICAL THERAPY | Age: 82
End: 2022-06-15
Payer: MEDICARE

## 2022-06-15 PROCEDURE — 97035 APP MDLTY 1+ULTRASOUND EA 15: CPT

## 2022-06-15 PROCEDURE — 97110 THERAPEUTIC EXERCISES: CPT

## 2022-06-15 NOTE — PROGRESS NOTES
[address] [facility]  PHYSICAL THERAPY  [x] DAILY NOTE (LAND) [] DAILY NOTE (AQUATIC ) [] PROGRESS NOTE [] DISCHARGE NOTE    [x] [facility] [place]   [] [place] [address]    [] [facility]   [] Jayesh Favors    Date: 6/15/2022  Patient Name:  Stas Linares    : 1940  MRN: [id number]  CSN: [id number]    Referring Practitioner Rolo Hunter DO   Diagnosis Other intervertebral disc degeneration, lumbar region [M51.36]    Treatment Diagnosis Right shoulder pain, R shoulder stiffness, lumbago, spinal stiffness, scoliosis, abnormal posture, Left hip pain, left hip stiffness, R hip stiffness, difficulty walking, fall history, fall risk, unsteady   Date of Evaluation 5/10/22    Additional Pertinent History XR Lumbar: Severe degenerative changes with levoconvex scoliosis. No acute fracture. XR Pelvis: Mild degenerative changes left sacroiliac joint.  - Lumbar fusion surgery   Chronic R shoulder pain, upper trap tightness. Functional Outcome Measure Used Modified Oswestry LBP    Functional Outcome Score 19/50 = 38% impaired  (5/10/22)   18/50 = 36% impaired (6/3/22)      Insurance: Primary: Payor: Jolly Best /  /  / ,   Secondary: [facility]   Authorization Information: Unlimited visits, aquatic and modalities covered, no ionto, no heat/cold    Visit # 9, 1/10 for progress note   Visits Allowed: Unlimited    Recertification Date: [id number]    Physician Follow-Up: Pain management 22 - hoping to help the right side. Shakeel Dean 2022    Physician Orders:    History of Present Illness: Chronic back pain, predominantly Left lower back - most recent ablation , and following this noted new pain in Left hip/ buttock. Feels sharp and stabbing in left buttock. Has tried hydrocodeine at night, Tylenol arthritis for day. SUBJECTIVE: Patient reporting not feeling too bad today.   TREATMENT   Precautions: Wears R heel lift, Left scoliosis. HX of Lumbar fusion - NO traction  B TKAs    Pain:     X in shaded column indicates activity completed today   Modalities Parameters/  Location  Notes   mhp to LB during ex      Ultrasound Right SI/ lumbar paraspinals - Left side lying  10 mins, 1.0 MHz, 1.0 w/cm2  x          Manual Therapy Time/Technique  Notes   Observation standing - hips shifted left, trunk shifted Right      Observation - RLE shorter than LLE supine       RLE gentle longitudinal pull  3x      R hip isometric flexion MET 5x 5 sec  Legs appeared equal after manual techniques. Also encouraged to wear R heel lift as needed for comfort. Exercise/Intervention   Notes   LTR  10      Hook lying hip flexor stretch 2x 10 sec     Piriformis stretch - true ALCIDES keeping the knee rotated outward before pulling. Other leg supported on chair  2x 10 sec x    Longitudinal hip ER/IR - Right  10x ea      90/90 unloading position - PPT 2x10 5 sec x Using chair for foot support   90/90 unloading - marching 5x ea      90/90 unloading - hip add set        90/90 unloading - glute set  15 5 sec     Hook lying bent knee fall out  10      Side lying clamshell, reverse clam R/L LE  10x  x clamshell   Left side lying - pillows under left hip - overhead reach and RLE extension 5x 10 sec x Feels good stretch in R side of body. Right shoulder - side lying Left - Right shoulder extension/IR reaching for bra hook 5x 10 sec  x Very restricted motion   Right shoulder IR stretching, extension - standing with cane behind back 10x      Specific Interventions Next Treatment: Start session with ultrasound R SI . Follow with passive stretching hip (IT band, hip flexor, piriformis, hamstring). Gentle strengthening of lumbar/ SI and hip. (clamshell, reverse clam, posterior pelvic tilt, prone knee curls with pillow under hips).  Gentle alignment/ scoliosis stretching (side lying left over bolster/pillow, prone with lateral shift - hips shifted left). R shoulder stretching and strengthening as needed - scapular retractions, wall slides, cane AAROM. Activity/Treatment Tolerance:  []  Patient tolerated treatment well  []  Patient limited by fatigue  [x]  Patient limited by pain   []  Patient limited by medical complications  []  Other:     Assessment: Continued with US and exercises as noted with patient tolerating well. Still having difficulty with bed mobility due to pain and tightness in shoulder. GOALS:  Patient Goal: walk farther distances for walking her dog, decrease fall risk    Short Term Goals:  Time Frame: 6 weeks  Patient will report decreased R sided lower back pain from 5/10 to less than 2/10 in order to stand and walk longer durations. Patient will improve BLE PROM to 0-90 deg hamstring, 0-120 deg ALCIDES, 0-25 deg hip extension in order to bend and lift without straining lumbar spine. Patient will improve R shoulder AROM to 0-145 deg flexion, 0-130 deg Abduction, and behind back to at least L3 in order to don/doff bra, perform cooking tasks, and sleep comfortably on R shoulder. Patient will be IND with HEP in order to meet long term goals. Long Term Goals:  Time Frame: 12 weeks    Patient will improve score of Modified Oswestry LBP questionnaire from baseline 19/50 to less than 9/50 in order to stand longer durations for preparing meals with family and serving at [employer]. Patient will improve sore of Tinetti Balance and Gait measurement from 19/28 to at least 24/28 in order to decrease frequency of falling. Patient will improve BLE strength to 4+/5 hips and knees in order to squat, lunge, and navigate steps without difficulty. Patient Education:   []  HEP/Education Completed: Passive stretches    [employer] Access Code: [email or web address]  [x]  No new Education completed  []  Reviewed Prior HEP      [x]  Patient verbalized and/or demonstrated understanding of education provided.   []  Patient unable to verbalize and/or demonstrate understanding of education provided. Will continue education. []  Barriers to learning:     PLAN:  Treatment Recommendations: Strengthening, Range of Motion, Balance Training, Functional Mobility Training, Transfer Training, Gait Training, Stair Training, Manual Therapy - Soft Tissue Mobilization, Manual Therapy - Joint Manipulation, Pain Management, Home Exercise Program, Patient Education, Integrative Dry Needling, Vestibular Rehabilitation, Aquatics and Modalities     Plan to see patient 2 times per week for 12 weeks to address the treatment planned outlined above.   [x]  Continue with current plan of care  []  Modify plan of care as follows:    []  Hold pending physician visit  []  Discharge    Time In 1337   Time Out 1425   Timed Code Minutes: 48   Total Treatment Time: 48     Electronically Signed by: Bernard Phillips PTA

## 2022-06-17 ENCOUNTER — APPOINTMENT (OUTPATIENT)
Dept: PHYSICAL THERAPY | Age: 82
End: 2022-06-17
Payer: MEDICARE

## 2022-06-17 ENCOUNTER — HOSPITAL ENCOUNTER (OUTPATIENT)
Dept: PHYSICAL THERAPY | Age: 82
Setting detail: THERAPIES SERIES
Discharge: HOME | End: 2022-06-17
Payer: MEDICARE

## 2022-06-17 PROCEDURE — 97035 APP MDLTY 1+ULTRASOUND EA 15: CPT

## 2022-06-17 PROCEDURE — 97110 THERAPEUTIC EXERCISES: CPT

## 2022-06-17 NOTE — PROGRESS NOTES
[address] [facility]  PHYSICAL THERAPY  [x] DAILY NOTE (LAND) [] DAILY NOTE (AQUATIC ) [] PROGRESS NOTE [] DISCHARGE NOTE    [x] [facility] [place]   [] [place] [address]    [] [facility]   [] Rolando Ledesma    Date: 2022  Patient Name:  Rima Soler    : 1940  MRN: [id number]  CSN: [id number]    Referring Practitioner Nolberto Varela DO   Diagnosis Other intervertebral disc degeneration, lumbar region [M51.36]    Treatment Diagnosis Right shoulder pain, R shoulder stiffness, lumbago, spinal stiffness, scoliosis, abnormal posture, Left hip pain, left hip stiffness, R hip stiffness, difficulty walking, fall history, fall risk, unsteady   Date of Evaluation 5/10/22    Additional Pertinent History XR Lumbar: Severe degenerative changes with levoconvex scoliosis. No acute fracture. XR Pelvis: Mild degenerative changes left sacroiliac joint.  - Lumbar fusion surgery   Chronic R shoulder pain, upper trap tightness. Functional Outcome Measure Used Modified Oswestry LBP    Functional Outcome Score 19/50 = 38% impaired  (5/10/22)   18/50 = 36% impaired (6/3/22)      Insurance: Primary: Payor: William Borja /  /  / ,   Secondary: [facility]   Authorization Information: Unlimited visits, aquatic and modalities covered, no ionto, no heat/cold    Visit # 10, 2/10 for progress note   Visits Allowed: Unlimited    Recertification Date: [id number]    Physician Follow-Up: Pain management 22 - hoping to help the right side. Jodie Holland 2022    Physician Orders:    History of Present Illness: Chronic back pain, predominantly Left lower back - most recent ablation , and following this noted new pain in Left hip/ buttock. Feels sharp and stabbing in left buttock. Has tried hydrocodeine at night, Tylenol arthritis for day. SUBJECTIVE: Patient continues to have pain in R SI region.  Therapy has helped with left leg radiating pain. Pain management met with her last week and they will wait to perform any procedures after therapy - nothing scheduled yet. She gets relief from ultrasound for about 2 days. TREATMENT   Precautions: Wears R heel lift, Left scoliosis. HX of Lumbar fusion - NO traction  B TKAs    Pain:     X in shaded column indicates activity completed today   Modalities Parameters/  Location  Notes   mhp to LB during ex      Ultrasound Right SI/ lumbar paraspinals - Left side lying  10 mins, 1.0 MHz, 1.0 w/cm2  x          Manual Therapy Time/Technique  Notes   Observation standing - hips shifted left, trunk shifted Right      Observation - RLE shorter than LLE supine       RLE gentle longitudinal pull  3x      R hip isometric flexion MET 5x 5 sec  Legs appeared equal after manual techniques. Also encouraged to wear R heel lift as needed for comfort. Exercise/Intervention   Notes   LTR  10      Hook lying hip flexor stretch 2x 10 sec     Piriformis stretch - true ALCIDES keeping the knee rotated outward before pulling. Other leg supported on chair  2x 10 sec x    Longitudinal hip ER/IR - Right  10x ea      90/90 unloading position - PPT  2x10 5 sec  Using chair for foot support   90/90 unloading - marching 5x ea      90/90 unloading - hip add set        90/90 unloading - glute set  15 5 sec     Hook lying bent knee fall out  10      Side lying clamshell, reverse clam R/L LE  10x  x Clamshell    Left side lying - pillows under left hip - overhead reach and RLE extension 5x 10 sec x Feels good stretch in R side of body. Right shoulder - side lying Left - Right shoulder extension/IR reaching for bra hook 5x 10 sec   Very restricted motion   Right shoulder IR stretching, extension - standing with cane behind back 10x      Specific Interventions Next Treatment: Start session with ultrasound R SI . Follow with passive stretching hip (IT band, hip flexor, piriformis, hamstring).  Gentle strengthening of HEP/Education Completed: Passive stretches    [employer] Access Code: [email or web address]  [x]  No new Education completed  []  Reviewed Prior HEP      [x]  Patient verbalized and/or demonstrated understanding of education provided. []  Patient unable to verbalize and/or demonstrate understanding of education provided. Will continue education. []  Barriers to learning:     PLAN:  Treatment Recommendations: Strengthening, Range of Motion, Balance Training, Functional Mobility Training, Transfer Training, Gait Training, Stair Training, Manual Therapy - Soft Tissue Mobilization, Manual Therapy - Joint Manipulation, Pain Management, Home Exercise Program, Patient Education, Integrative Dry Needling, Vestibular Rehabilitation, Aquatics and Modalities     Plan to see patient 2 times per week for 12 weeks to address the treatment planned outlined above.   [x]  Continue with current plan of care  []  Modify plan of care as follows:    []  Hold pending physician visit  []  Discharge    Time In 1535   Time Out 1600   Timed Code Minutes: 25   Total Treatment Time: 25     Electronically Signed by: Luisito Hodges PT

## 2022-06-22 ENCOUNTER — HOSPITAL ENCOUNTER (OUTPATIENT)
Dept: PHYSICAL THERAPY | Age: 82
Setting detail: THERAPIES SERIES
Discharge: HOME | End: 2022-06-22
Payer: MEDICARE

## 2022-06-22 PROCEDURE — G0283 ELEC STIM OTHER THAN WOUND: HCPCS

## 2022-06-22 PROCEDURE — 97110 THERAPEUTIC EXERCISES: CPT

## 2022-06-22 NOTE — PROGRESS NOTES
[address] [facility]  PHYSICAL THERAPY  [x] DAILY NOTE (LAND) [] DAILY NOTE (AQUATIC ) [] PROGRESS NOTE [] DISCHARGE NOTE    [x] [facility] [place]   [] [place] [address]    [] [facility]   [] Rolando Ledesma    Date: 2022  Patient Name:  Rima Soler    : 1940  MRN: [id number]  CSN: [id number]    Referring Practitioner Nolberto Varela DO   Diagnosis Other intervertebral disc degeneration, lumbar region [M51.36]    Treatment Diagnosis Right shoulder pain, R shoulder stiffness, lumbago, spinal stiffness, scoliosis, abnormal posture, Left hip pain, left hip stiffness, R hip stiffness, difficulty walking, fall history, fall risk, unsteady   Date of Evaluation 5/10/22    Additional Pertinent History XR Lumbar: Severe degenerative changes with levoconvex scoliosis. No acute fracture. XR Pelvis: Mild degenerative changes left sacroiliac joint.  - Lumbar fusion surgery   Chronic R shoulder pain, upper trap tightness. Functional Outcome Measure Used Modified Oswestry LBP    Functional Outcome Score 19/50 = 38% impaired  (5/10/22)   18/50 = 36% impaired (6/3/22)      Insurance: Primary: Payor: William Borja /  /  / ,   Secondary: [facility]   Authorization Information: Unlimited visits, aquatic and modalities covered, no ionto, no heat/cold    Visit # 11, 3/10 for progress note   Visits Allowed: Unlimited    Recertification Date:     Physician Follow-Up: Pain management 22 - hoping to help the right side. Jodie Holland 2022    Physician Orders:    History of Present Illness: Chronic back pain, predominantly Left lower back - most recent ablation , and following this noted new pain in Left hip/ buttock. Feels sharp and stabbing in left buttock. Has tried hydro codeine at night, Tylenol arthritis for day.       SUBJECTIVE: Patient reporting having more pain today and rating it at 9/10 in right SI and into groin on both sides. TREATMENT   Precautions: Wears R heel lift, Left scoliosis. HX of Lumbar fusion - NO traction  B TKA's    Pain: 9/10 right side/LB and into groin on L/R    X in shaded column indicates activity completed today   Modalities Parameters/  Location  Notes   mhp to LB during ex      Ultrasound Right SI/ lumbar paraspinals - Left side lying  10 mins, 1.0 MHz, 1.0 w/cm2      Estim in 90/90 position  15 minutes. Intensity 15.5 x    Manual Therapy Time/Technique  Notes   Observation standing - hips shifted left, trunk shifted Right      Observation - RLE shorter than LLE supine       RLE gentle longitudinal pull  3x      R hip isometric flexion MET 5x 5 sec  Legs appeared equal after manual techniques. Also encouraged to wear R heel lift as needed for comfort. Exercise/Intervention   Notes   LTR  10      Hook lying hip flexor stretch 2x 10 sec     Piriformis stretch - true ALCIDES keeping the knee rotated outward before pulling. Other leg supported on chair  2x 10 sec x    Longitudinal hip ER/IR - Right  10x ea      90/90 unloading position - PPT  10 5 sec x Using chair for foot support   90/90 unloading - marching 5x ea  x    90/90 unloading - hip add set        90/90 unloading - glute set  15 5 sec x    Hook lying bent knee fall out  10      Side lying clamshell, reverse clam R/L LE  10x  x Clamshell    Left side lying - pillows under left hip - overhead reach and RLE extension 5x 10 sec x Feels good stretch in R side of body. Right shoulder - side lying Left - Right shoulder extension/IR reaching for bra hook 5x 10 sec   Very restricted motion   Right shoulder IR stretching, extension - standing with cane behind back 10x      Specific Interventions Next Treatment: Start session with ultrasound R SI . Follow with passive stretching hip (IT band, hip flexor, piriformis, hamstring). Gentle strengthening of lumbar/ SI and hip.  (clamshell, reverse clam, posterior pelvic tilt, prone knee curls with pillow under hips). Gentle alignment/ scoliosis stretching (side lying left over bolster/pillow, prone with lateral shift - hips shifted left). R shoulder stretching and strengthening as needed - scapular retractions, wall slides, cane AAROM. Activity/Treatment Tolerance:  []  Patient tolerated treatment well  []  Patient limited by fatigue  [x]  Patient limited by pain   []  Patient limited by medical complications  []  Other:     Assessment: Did try estim today due to patient having a little more wide spread pain with good tolerance. Patient reporting feeling better at end of session but still feeling tight with stretches. GOALS:  Patient Goal: walk farther distances for walking her dog, decrease fall risk    Short Term Goals:  Time Frame: 6 weeks  Patient will report decreased R sided lower back pain from 5/10 to less than 2/10 in order to stand and walk longer durations. Patient will improve BLE PROM to 0-90 deg hamstring, 0-120 deg ALCIDES, 0-25 deg hip extension in order to bend and lift without straining lumbar spine. Patient will improve R shoulder AROM to 0-145 deg flexion, 0-130 deg Abduction, and behind back to at least L3 in order to don/doff bra, perform cooking tasks, and sleep comfortably on R shoulder. Patient will be IND with HEP in order to meet long term goals. Long Term Goals:  Time Frame: 12 weeks    Patient will improve score of Modified Oswestry LBP questionnaire from baseline 19/50 to less than 9/50 in order to stand longer durations for preparing meals with family and serving at [employer]. Patient will improve sore of Tinetti Balance and Gait measurement from 19/28 to at least 24/28 in order to decrease frequency of falling. Patient will improve BLE strength to 4+/5 hips and knees in order to squat, lunge, and navigate steps without difficulty.          Patient Education:   [x]  HEP/Education Completed: monitor response to estim/purpose of arcadio   [facility] Access Code: [email or web address]  []  No new Education completed  []  Reviewed Prior HEP      [x]  Patient verbalized and/or demonstrated understanding of education provided. []  Patient unable to verbalize and/or demonstrate understanding of education provided. Will continue education. []  Barriers to learning:     PLAN:  Treatment Recommendations: Strengthening, Range of Motion, Balance Training, Functional Mobility Training, Transfer Training, Gait Training, Stair Training, Manual Therapy - Soft Tissue Mobilization, Manual Therapy - Joint Manipulation, Pain Management, Home Exercise Program, Patient Education, Integrative Dry Needling, Vestibular Rehabilitation, Aquatics and Modalities     Plan to see patient 2 times per week for 12 weeks to address the treatment planned outlined above.   [x]  Continue with current plan of care  []  Modify plan of care as follows:    []  Hold pending physician visit  []  Discharge    Time In 908   Time Out 945   Timed Code Minutes: 13   Total Treatment Time: 37     Electronically Signed by: Zaid Morales PTA

## 2022-06-24 ENCOUNTER — HOSPITAL ENCOUNTER (OUTPATIENT)
Dept: PHYSICAL THERAPY | Age: 82
Setting detail: THERAPIES SERIES
Discharge: HOME | End: 2022-06-24
Payer: MEDICARE

## 2022-06-24 PROCEDURE — 97110 THERAPEUTIC EXERCISES: CPT

## 2022-06-24 NOTE — PROGRESS NOTES
[facility]    TREATMENT   Precautions: Wears R heel lift, Left scoliosis. HX of Lumbar fusion - NO traction  B TKA's    Pain: 9/10 right side/LB and into groin on L/R    X in shaded column indicates activity completed today   Modalities Parameters/  Location  Notes   mhp to LB during ex      Ultrasound Right SI/ lumbar paraspinals - Left side lying  10 mins, 1.0 MHz, 1.0 w/cm2      Estim in 90/90 position  15 minutes. Intensity 15.5 x    Manual Therapy Time/Technique  Notes   Observation standing - hips shifted left, trunk shifted Right      Observation - RLE shorter than LLE supine       RLE gentle longitudinal pull  3x      R hip isometric flexion MET 5x 5 sec  Legs appeared equal after manual techniques. Also encouraged to wear R heel lift as needed for comfort. Exercise/Intervention   Notes   LTR  2 minutes  x    Hook lying hip flexor stretch 2x 10 sec     Piriformis stretch - true ALCIDES keeping the knee rotated outward before pulling. Other leg supported on chair  2x 30 sec x    Longitudinal hip ER/IR - Right  10x ea      90/90 unloading position - PPT  10 5 sec  Using chair for foot support   90/90 unloading - marching 5x ea  x    90/90 unloading - hip add set        90/90 unloading - glute set  15 5 sec x    Hook lying bent knee fall out  15x  x    Side lying clamshell, reverse clam R/L LE  15x2  x Clamshell    Left side lying - pillows under left hip - overhead reach and RLE extension 5x 10 sec x Feels good stretch in R side of body. Hip rotation supine bilaterally 2 minutes  x    Right shoulder - side lying Left - Right shoulder extension/IR reaching for bra hook 5x 10 sec   Very restricted motion   Right shoulder IR stretching, extension - standing with cane behind back 10x      Bed Mobility: hooklying roll with upper/lower body at same time; scooting in sidelying hooklying; sidelying to seated   x    Specific Interventions Next Treatment: Start session with ultrasound R SI .  Follow with passive stretching hip (IT band, hip flexor, piriformis, hamstring). Gentle strengthening of lumbar/ SI and hip. (clamshell, reverse clam, posterior pelvic tilt, prone knee curls with pillow under hips). Gentle alignment/ scoliosis stretching (side lying left over bolster/pillow, prone with lateral shift - hips shifted left). R shoulder stretching and strengthening as needed - scapular retractions, wall slides, cane AAROM. Activity/Treatment Tolerance:  []  Patient tolerated treatment well  []  Patient limited by fatigue  [x]  Patient limited by pain   []  Patient limited by medical complications  []  Other:     Assessment: Yakelin Virk is tolerating the exercises, and moving in a slow and controlled manner. States she does not have much pain at start of session. We discussed her sleeping surface, a mattress, and she says it is 8years old and wakes up feeling sore. We discussed option of considering a new mattress as able, and that will be Julianna's choice. We reviewed bed mobility such as sidelying hooklying scooting as opposed to bridge scooting to move from one side of bed to the other. GOALS:  Patient Goal: walk farther distances for walking her dog, decrease fall risk    Short Term Goals:  Time Frame: 6 weeks  Patient will report decreased R sided lower back pain from 5/10 to less than 2/10 in order to stand and walk longer durations. Patient will improve BLE PROM to 0-90 deg hamstring, 0-120 deg ALCIDES, 0-25 deg hip extension in order to bend and lift without straining lumbar spine. Patient will improve R shoulder AROM to 0-145 deg flexion, 0-130 deg Abduction, and behind back to at least L3 in order to don/doff bra, perform cooking tasks, and sleep comfortably on R shoulder. Patient will be IND with HEP in order to meet long term goals.     Long Term Goals:  Time Frame: 12 weeks    Patient will improve score of Modified Oswestry LBP questionnaire from baseline 19/50 to less than 9/50 in order to stand longer durations for preparing meals with family and serving at [employer]. Patient will improve sore of Tinetti Balance and Gait measurement from 19/28 to at least 24/28 in order to decrease frequency of falling. Patient will improve BLE strength to 4+/5 hips and knees in order to squat, lunge, and navigate steps without difficulty. Patient Education:   [x]  HEP/Education Completed: hooklying log rolling, sidelying to seated   [employer] Access Code: [email or web address]  []  No new Education completed  []  Reviewed Prior HEP      [x]  Patient verbalized and/or demonstrated understanding of education provided. []  Patient unable to verbalize and/or demonstrate understanding of education provided. Will continue education. []  Barriers to learning:     PLAN:  Treatment Recommendations: Strengthening, Range of Motion, Balance Training, Functional Mobility Training, Transfer Training, Gait Training, Stair Training, Manual Therapy - Soft Tissue Mobilization, Manual Therapy - Joint Manipulation, Pain Management, Home Exercise Program, Patient Education, Integrative Dry Needling, Vestibular Rehabilitation, Aquatics and Modalities     Plan to see patient 2 times per week for 12 weeks to address the treatment planned outlined above.   [x]  Continue with current plan of care  []  Modify plan of care as follows:    []  Hold pending physician visit  []  Discharge    Time In 0919   Time Out 0956   Timed Code Minutes: 32   Total Treatment Time: 37     Electronically Signed by: Cindy Hernandez PT

## 2022-06-25 NOTE — TELEPHONE ENCOUNTER
----- Message from Umm Nava DO sent at 4/12/2021  7:28 PM EDT -----  Please let pt know that labs are back  Most are ok. Renal fxn slightly worse, suspect dehydration. Ensure she inc her fluid intake. Recommend BMP repeat early next wk. Non-fasting ok. Lipids higher then they've been. Will discuss at her f/u visit. Let me know if questions, thanks!
LMOM for patient to return a call back  Order in -2 tray
Pt informed and verbalized understanding.   Lab orders in epic
Respiratory

## 2022-06-29 ENCOUNTER — HOSPITAL ENCOUNTER (OUTPATIENT)
Dept: PHYSICAL THERAPY | Age: 82
Setting detail: THERAPIES SERIES
Discharge: HOME | End: 2022-06-29
Payer: MEDICARE

## 2022-06-29 PROCEDURE — 97110 THERAPEUTIC EXERCISES: CPT

## 2022-06-29 NOTE — PROGRESS NOTES
[address] [facility]  PHYSICAL THERAPY  [x] DAILY NOTE (LAND) [] DAILY NOTE (AQUATIC ) [] PROGRESS NOTE [] DISCHARGE NOTE    [x] OUTPATIENT REHABILITATION CENTER [facility]   [] [place] [address]    [] [facility]   [] Terance Snellen    Date: 2022  Patient Name:  Nessa Toledo    : 1940  MRN: [id number]  CSN: [id number]    Referring Practitioner Kumar Morgan DO   Diagnosis Other intervertebral disc degeneration, lumbar region [M51.36]    Treatment Diagnosis Right shoulder pain, R shoulder stiffness, lumbago, spinal stiffness, scoliosis, abnormal posture, Left hip pain, left hip stiffness, R hip stiffness, difficulty walking, fall history, fall risk, unsteady   Date of Evaluation 5/10/22    Additional Pertinent History XR Lumbar: Severe degenerative changes with levoconvex scoliosis. No acute fracture. XR Pelvis: Mild degenerative changes left sacroiliac joint.  - Lumbar fusion surgery   Chronic R shoulder pain, upper trap tightness. Functional Outcome Measure Used Modified Oswestry LBP    Functional Outcome Score 19/50 = 38% impaired  (5/10/22)   18/50 = 36% impaired (6/3/22)       Insurance: Primary: Payor: Gemma Randolph /  /  / ,   Secondary: [facility]   Authorization Information: Unlimited visits, aquatic and modalities covered, no ionto, no heat/cold    Visit # 13, 5/10 for progress note   Visits Allowed: Unlimited    Recertification Date:     Physician Follow-Up: Pain management 22 - hoping to help the right side. Juan Pablo Guerra 2022    Physician Orders:    History of Present Illness: Chronic back pain, predominantly Left lower back - most recent ablation , and following this noted new pain in Left hip/ buttock. Feels sharp and stabbing in left buttock. Has tried hydro codeine at night, Tylenol arthritis for day.       SUBJECTIVE: Patient did not get much good sleep, still gets relief from estim and ultrasound for a few days. Overall still experiences relief of the left leg radicular symptoms, but severe 8/10 pain this morning in Right SI joint     TREATMENT   Precautions: Wears R heel lift, Left scoliosis. HX of Lumbar fusion - NO traction  B TKA's    Pain: 9/10 right side/LB and into groin on L/R    X in shaded column indicates activity completed today   Modalities Parameters/  Location  Notes   mhp to LB during ex      Ultrasound Right SI/ lumbar paraspinals - Left side lying  10 mins, 1.0 MHz, 1.0 w/cm2      Estim in 90/90 position with moist heat  15 minutes. Intensity 15.5 x Concurrent with other exercises    Manual Therapy Time/Technique  Notes   Observation standing - hips shifted left, trunk shifted Right  x    Observation - legs appearing same length supine today  x    RLE gentle longitudinal pull  3x 30 sec x No SI pain, just pulling in groin   R hip isometric flexion MET 5x 5 sec  Legs appeared equal after manual techniques. Also encouraged to wear R heel lift as needed for comfort. Exercise/Intervention   Notes   LTR  2 minutes  x    Hook lying hip flexor stretch 2x 10 sec     Piriformis stretch - true ALCIDES keeping the knee rotated outward before pulling. Other leg supported on chair  2x 30 sec     Longitudinal hip ER/IR - Right  10x ea      90/90 unloading position - PPT  10 5 sec  Using chair for foot support   90/90 unloading - marching 5x ea      90/90 unloading - hip add set        90/90 unloading - glute set  15 5 sec x    Hook lying bent knee fall out  15x  x    Side lying clamshell, reverse clam R/L LE  15x2   Clamshell    Left side lying - pillows under left hip - overhead reach and RLE extension 5x 10 sec  Feels good stretch in R side of body.     Right shoulder - side lying Left - Right shoulder extension/IR reaching for bra hook 5x 10 sec   Very restricted motion   Right shoulder IR stretching, extension - standing with cane behind back 10x      Specific Interventions Next Treatment: Start session with . Follow with passive stretching hip (IT band, hip flexor, piriformis, hamstring). Gentle strengthening of lumbar/ SI and hip. (clamshell, reverse clam, posterior pelvic tilt, prone knee curls with pillow under hips). Gentle alignment/ scoliosis stretching (side lying left over bolster/pillow, prone with lateral shift - hips shifted left). R shoulder stretching and strengthening as needed - scapular retractions, wall slides, cane AAROM. Activity/Treatment Tolerance:  []  Patient tolerated treatment well  []  Patient limited by fatigue  [x]  Patient limited by pain   []  Patient limited by medical complications  []  Other:     Assessment: Patient continues to rate high levels of pain in Right SI region. She feels therapy helped with the left leg/buttock radicular pain. She sees pain management in August, planning to discharge next session. Advised that she gets a few days relief from estim and could benefit from purchasing TENS unit. GOALS:  Patient Goal: walk farther distances for walking her dog, decrease fall risk    Short Term Goals:  Time Frame: 6 weeks  Patient will report decreased R sided lower back pain from 5/10 to less than 2/10 in order to stand and walk longer durations. Patient will improve BLE PROM to 0-90 deg hamstring, 0-120 deg ALCIDES, 0-25 deg hip extension in order to bend and lift without straining lumbar spine. Patient will improve R shoulder AROM to 0-145 deg flexion, 0-130 deg Abduction, and behind back to at least L3 in order to don/doff bra, perform cooking tasks, and sleep comfortably on R shoulder. Patient will be IND with HEP in order to meet long term goals. Long Term Goals:  Time Frame: 12 weeks    Patient will improve score of Modified Oswestry LBP questionnaire from baseline 19/50 to less than 9/50 in order to stand longer durations for preparing meals with family and serving at [employer].     Patient will improve sore stanley Palumbo Balance and Gait measurement from 19/28 to at least 24/28 in order to decrease frequency of falling. Patient will improve BLE strength to 4+/5 hips and knees in order to squat, lunge, and navigate steps without difficulty. Patient Education:   [x]  HEP/Education Completed: continue HEP, consider purchasing TENS   Med[employer] Access Code: [email or web address]  []  No new Education completed  []  Reviewed Prior HEP      [x]  Patient verbalized and/or demonstrated understanding of education provided. []  Patient unable to verbalize and/or demonstrate understanding of education provided. Will continue education. []  Barriers to learning:     PLAN:  Treatment Recommendations: Strengthening, Range of Motion, Balance Training, Functional Mobility Training, Transfer Training, Gait Training, Stair Training, Manual Therapy - Soft Tissue Mobilization, Manual Therapy - Joint Manipulation, Pain Management, Home Exercise Program, Patient Education, Integrative Dry Needling, Vestibular Rehabilitation, Aquatics and Modalities     Plan to see patient 2 times per week for 12 weeks to address the treatment planned outlined above.   [x]  Continue with current plan of care  []  Modify plan of care as follows:    []  Hold pending physician visit  []  Discharge    Time In 935   Time Out 1010   Timed Code Minutes: 35   Total Treatment Time: 35     Electronically Signed by: Ashu Calabrese PT

## 2022-07-01 ENCOUNTER — HOSPITAL ENCOUNTER (OUTPATIENT)
Dept: PHYSICAL THERAPY | Age: 82
Setting detail: THERAPIES SERIES
Discharge: HOME | End: 2022-07-01
Payer: MEDICARE

## 2022-07-01 DIAGNOSIS — G89.4 CHRONIC PAIN SYNDROME: ICD-10-CM

## 2022-07-01 DIAGNOSIS — M54.50 CHRONIC BILATERAL LOW BACK PAIN WITHOUT SCIATICA: ICD-10-CM

## 2022-07-01 DIAGNOSIS — M53.3 SI (SACROILIAC) PAIN: ICD-10-CM

## 2022-07-01 DIAGNOSIS — M47.816 LUMBAR SPONDYLOSIS: ICD-10-CM

## 2022-07-01 DIAGNOSIS — G89.29 CHRONIC BILATERAL LOW BACK PAIN WITHOUT SCIATICA: ICD-10-CM

## 2022-07-01 DIAGNOSIS — M41.9 SCOLIOSIS OF THORACOLUMBAR SPINE, UNSPECIFIED SCOLIOSIS TYPE: ICD-10-CM

## 2022-07-01 DIAGNOSIS — Z98.890 HISTORY OF LUMBAR LAMINECTOMY: ICD-10-CM

## 2022-07-01 PROCEDURE — 97110 THERAPEUTIC EXERCISES: CPT

## 2022-07-01 RX ORDER — HYDROCODONE BITARTRATE AND ACETAMINOPHEN 5; 325 MG/1; MG/1
.5-1 TABLET ORAL 2 TIMES DAILY PRN
Qty: 28 TABLET | Refills: 0 | Status: SHIPPED | OUTPATIENT
Start: 2022-07-01 | End: 2022-09-12 | Stop reason: SDUPTHER

## 2022-07-01 NOTE — DISCHARGE SUMMARY
[address] [facility]  PHYSICAL THERAPY  [] DAILY NOTE (LAND) [] DAILY NOTE (AQUATIC ) [] PROGRESS NOTE [x] DISCHARGE NOTE    [x] OUTPATIENT REHABILITATION CENTER - [place]   [] [place] [address]    [] [facility]   [] Yudelka Payment    Date: 2022  Patient Name:  Samanta Ariza    : 1940  MRN: [id number]  CSN: [id number]    Referring Practitioner Janusz Barbosa DO   Diagnosis Other intervertebral disc degeneration, lumbar region [M51.36]    Treatment Diagnosis Right shoulder pain, R shoulder stiffness, lumbago, spinal stiffness, scoliosis, abnormal posture, Left hip pain, left hip stiffness, R hip stiffness, difficulty walking, fall history, fall risk, unsteady   Date of Evaluation 5/10/22    Additional Pertinent History XR Lumbar: Severe degenerative changes with levoconvex scoliosis. No acute fracture. XR Pelvis: Mild degenerative changes left sacroiliac joint.  - Lumbar fusion surgery   Chronic R shoulder pain, upper trap tightness. Functional Outcome Measure Used Modified Oswestry LBP    Functional Outcome Score 19/50 = 38% impaired  (5/10/22)   18/50 = 36% impaired (6/3/22)   17/50 = 34% impaired (22)       Insurance: Primary: Payor: Sommer Galindo /  /  / ,   Secondary: [facility]   Authorization Information: Unlimited visits, aquatic and modalities covered, no ionto, no heat/cold    Visit # 14, 6/10 for progress note   Visits Allowed: Unlimited    Recertification Date: 3202    Physician Follow-Up: Pain management 22 - hoping to help the right side. Hal Wesley 2022    Physician Orders:    History of Present Illness: Chronic back pain, predominantly Left lower back - most recent ablation , and following this noted new pain in Left hip/ buttock. Feels sharp and stabbing in left buttock. Has tried hydro codeine at night, Tylenol arthritis for day.       SUBJECTIVE: Since starting therapy in May, patient experiences relief of the left leg radicular symptoms, but still having strong levels of pain in Right SI joint, occasional stabbing pain in bilateral anterior thighs while sleeping. TINETTI BALANCE TEST     BALANCE Patient is seated in a hard, armless chair   1 SITTING BALANCE 1 - Steady, safe   2. RISES FROM CHAIR 2 - Able without use of arms   3. ATTEMPTS TO RISE 2 - Able to rise, 1 attempt   4. IMMEDIATE STANDING BALANCE (FIRST 5 SECONDS) 2 - Steady without walker or other support   5. STANDING BALANCE 2 - Narrow stance without support   6. NUDGED 1 - Staggers, grabs, catches   7. EYES CLOSED 1 - Steady   8. TURNING 360 DEGREES 0 - Discontinuous steps and 1 - Steady   9. SITTING DOWN 2 - Safe,smooth motion   BALANCE SCORE 14/16       GAIT SECTION Patient stands with therapist, walks across room (+/- aids), fist at usual pace, then at rapid pace. 1.  INDICATION OF GAIT (Immediately after told to \"go\" 1 - No hesitancy   2. STEP LENGTH AND HEIGHT 1 - Step through Right and 1 - Step through Left   3. FOOT CLEARANCE 1 - Left foot clears floor and 1 - Right foot clears floor   4. STEP SYMMETRY 1 - Right and left step length appear equal   5. STEP CONTINUITY 1 - Steps appear continuous   6. PATH 1 - Mild/moderate deviation or uses walking aid   7. TRUNK 0 - Marked sway or uses walking aid   8. WALKING TIME 0 - Heels apart   GAIT SCORE 8/12   TOTAL SCORE 22/28   Risk Indicators:  Less than/equal to 18 = high risk  19-23 Moderate risk  Greater than/equal to 24 = low risk Moderate risk, using walking stick             TREATMENT   Precautions: Wears R heel lift, Left scoliosis.    HX of Lumbar fusion - NO traction  B TKA's    Pain: 9/10 right side/LB and into groin on L/R    X in shaded column indicates activity completed today   Modalities Parameters/  Location  Notes   mhp to LB during ex      Ultrasound Right SI/ lumbar paraspinals - Left side lying  10 mins, 1.0 MHz, 1.0 w/cm2      Estim in 90/90 position with moist heat  15 minutes. Intensity 15.5 x Concurrent with other exercises    Manual Therapy Time/Technique  Notes   Observation standing - hips shifted left, trunk shifted Right      Observation - legs appearing same length supine today      RLE gentle longitudinal pull  3x 30 sec  No SI pain, just pulling in groin   R hip isometric flexion MET 5x 5 sec  Legs appeared equal after manual techniques. Also encouraged to wear R heel lift as needed for comfort. Exercise/Intervention   Notes   LTR  2 minutes  x    Hook lying hip flexor stretch 2x 10 sec     Piriformis stretch - true ALCIDES keeping the knee rotated outward before pulling. Other leg supported on chair  2x 30 sec     Longitudinal hip ER/IR - Right  10x ea  x    90/90 unloading position - PPT  10 5 sec x Using chair for foot support   90/90 unloading - marching 5x ea      90/90 unloading - hip add set        90/90 unloading - glute set  15 5 sec     Hook lying bent knee fall out  15x      Side lying clamshell, reverse clam R/L LE  15x2   Clamshell    Left side lying - pillows under left hip - overhead reach and RLE extension 5x 10 sec  Feels good stretch in R side of body. Right shoulder - side lying Left - Right shoulder extension/IR reaching for bra hook 5x 10 sec   Very restricted motion   Right shoulder IR stretching, extension - standing with cane behind back 10x        Activity/Treatment Tolerance:  []  Patient tolerated treatment well  []  Patient limited by fatigue  [x]  Patient limited by pain   []  Patient limited by medical complications  []  Other:     Assessment: Patient continues to rate high levels of pain in Right SI region. She feels therapy helped with the left leg/buttock radicular pain. She sees pain management in August, planning to discharge today. Advised that she gets a few days relief from estim and could benefit from purchasing TENS unit.      GOALS:  Patient Goal: walk farther distances for walking her dog, decrease fall risk    Short Term Goals:  Time Frame: 6 weeks  Patient will report decreased R sided lower back pain from 5/10 to less than 2/10 in order to stand and walk longer durations. GOAL NOT MET: still having 7-8/10 pain. Discontinue Goal    Patient will improve BLE PROM to 0-90 deg hamstring, 0-120 deg ALCIDES, 0-25 deg hip extension in order to bend and lift without straining lumbar spine. GOAL NOT MET: 0-80 deg hamstring, 0-110 deg ALCIDES, 0-15 deg hip extension. Discontinue Goal    Patient will improve R shoulder AROM to 0-145 deg flexion, 0-130 deg Abduction, and behind back to at least L3 in order to don/doff bra, perform cooking tasks, and sleep comfortably on R shoulder. GOAL NOT MET: R shoulder artrhitis still limits motion, 0-130 deg flexion, 0-100 deg abduction, IR behind back to L3. performing HEP. Discontinue Goal    Patient will be IND with HEP in order to meet long term goals. GOAL MET:  performing HEP handouts given. Discontinue Goal    Long Term Goals:  Time Frame: 12 weeks    Patient will improve score of Modified Oswestry LBP questionnaire from baseline 19/50 to less than 9/50 in order to stand longer durations for preparing meals with family and serving at [employer]. GOAL NOT MET: 17/50 score only changed a few points. Discontinue Goal    Patient will improve sore of Tinetti Balance and Gait measurement from 19/28 to at least 24/28 in order to decrease frequency of falling. GOAL NOT MET: score improved to 22/28, using walking stick for support. Discontinue Goal    Patient will improve BLE strength to 4+/5 hips and knees in order to squat, lunge, and navigate steps without difficulty. GOAL NOT MET: B hip weakness persists, hip flex 4+/5, hip abd 4/5, hip ext 4/5.   Discontinue Goal        Patient Education:   [x]  HEP/Education Completed: continue HEP, consider purchasing TENS   [employer] Access Code: [email or web address]  []  No new Education completed  []  Reviewed Prior HEP      [x]  Patient verbalized and/or demonstrated understanding of education provided. []  Patient unable to verbalize and/or demonstrate understanding of education provided. Will continue education.   []  Barriers to learning:     PLAN:    [x]  Discharge    Time In 945   Time Out 1015   Timed Code Minutes: 30   Total Treatment Time: 30     Electronically Signed by: Phuong Jones PT

## 2022-07-01 NOTE — TELEPHONE ENCOUNTER
[facility] called requesting a refill on the following medications:  Requested Prescriptions     Pending Prescriptions Disp Refills    HYDROcodone-acetaminophen (NORCO) 5-325 MG per tablet 28 tablet 0     Sig: Take 0.5-1 tablets by mouth 2 times daily as needed for Pain for up to 14 days. Take lowest dose possible to manage pain     Pharmacy verified: [facility] on [address] [place] [address] Drive, [address]  . pv      Date of last visit: 6/06/2022  Date of next visit (if applicable): 2/4/9033

## 2022-07-24 ENCOUNTER — HOSPITAL ENCOUNTER (EMERGENCY)
Age: 82
Discharge: HOME | End: 2022-07-24
Admitting: EMERGENCY MEDICINE
Payer: MEDICARE

## 2022-07-24 ENCOUNTER — APPOINTMENT (OUTPATIENT)
Dept: GENERAL RADIOLOGY | Age: 82
End: 2022-07-24
Payer: MEDICARE

## 2022-07-24 VITALS
HEIGHT: 63 IN | OXYGEN SATURATION: 97 % | TEMPERATURE: 98.4 F | RESPIRATION RATE: 18 BRPM | HEART RATE: 63 BPM | DIASTOLIC BLOOD PRESSURE: 78 MMHG | WEIGHT: 160 LBS | BODY MASS INDEX: 28.35 KG/M2 | SYSTOLIC BLOOD PRESSURE: 156 MMHG

## 2022-07-24 DIAGNOSIS — S09.90XA INJURY OF HEAD, INITIAL ENCOUNTER: ICD-10-CM

## 2022-07-24 DIAGNOSIS — S40.022A CONTUSION OF MULTIPLE SITES OF LEFT SHOULDER AND UPPER ARM, INITIAL ENCOUNTER: Primary | ICD-10-CM

## 2022-07-24 DIAGNOSIS — S40.012A CONTUSION OF MULTIPLE SITES OF LEFT SHOULDER AND UPPER ARM, INITIAL ENCOUNTER: Primary | ICD-10-CM

## 2022-07-24 PROCEDURE — 99213 OFFICE O/P EST LOW 20 MIN: CPT

## 2022-07-24 PROCEDURE — 73060 X-RAY EXAM OF HUMERUS: CPT

## 2022-07-24 PROCEDURE — 99213 OFFICE O/P EST LOW 20 MIN: CPT | Performed by: EMERGENCY MEDICINE

## 2022-07-24 PROCEDURE — 99214 OFFICE O/P EST MOD 30 MIN: CPT

## 2022-07-24 RX ORDER — SENNOSIDES 8.6 MG
650 CAPSULE ORAL EVERY 8 HOURS PRN
COMMUNITY

## 2022-07-24 ASSESSMENT — PAIN SCALES - GENERAL: PAINLEVEL_OUTOF10: 4

## 2022-07-24 ASSESSMENT — PAIN DESCRIPTION - DESCRIPTORS
DESCRIPTORS: TENDER
DESCRIPTORS_2: SORE

## 2022-07-24 ASSESSMENT — ENCOUNTER SYMPTOMS
COUGH: 0
VOMITING: 0
NAUSEA: 0
COLOR CHANGE: 0
ABDOMINAL PAIN: 0
SHORTNESS OF BREATH: 0

## 2022-07-24 ASSESSMENT — PAIN DESCRIPTION - INTENSITY: RATING_2: 10

## 2022-07-24 ASSESSMENT — PAIN DESCRIPTION - LOCATION
LOCATION: HEAD
LOCATION_2: ARM

## 2022-07-24 ASSESSMENT — PAIN - FUNCTIONAL ASSESSMENT
PAIN_FUNCTIONAL_ASSESSMENT: ACTIVITIES ARE NOT PREVENTED
PAIN_FUNCTIONAL_ASSESSMENT_SITE2: PREVENTS OR INTERFERES SOME ACTIVE ACTIVITIES AND ADLS
PAIN_FUNCTIONAL_ASSESSMENT: 0-10

## 2022-07-24 ASSESSMENT — PAIN DESCRIPTION - ORIENTATION
ORIENTATION_2: RIGHT;LEFT
ORIENTATION: LEFT

## 2022-07-24 ASSESSMENT — PAIN DESCRIPTION - PAIN TYPE: TYPE: ACUTE PAIN

## 2022-07-24 ASSESSMENT — PAIN DESCRIPTION - FREQUENCY: FREQUENCY: INTERMITTENT

## 2022-07-24 ASSESSMENT — PAIN DESCRIPTION - ONSET: ONSET: SUDDEN

## 2022-07-24 NOTE — ED NOTES
Patient verbalized understanding of discharge instructions. Denies questions or concerns at this time.       Marlon Monahan RN  07/24/22 [id number]

## 2022-07-24 NOTE — ED NOTES
Pt here with c/o bilateral upper arm pain from fall. Pt states that she was tieing up her tomato plants and lost her balance causing her to fall onto her left side into the rocks. Pt states that she hit her head on a piece of flag staff stone, but denies any loss of consciousness. Pt has small amount of swelling noted to the left side of temple.       Lin Watson RN  07/24/22 9048

## 2022-07-24 NOTE — ED PROVIDER NOTES
[place] [address]  Urgent Care Encounter       CHIEF COMPLAINT       Chief Complaint   Patient presents with    Fall     Lost balance and fell over on to the left side falling into the rocks. Head Injury     Hit head on a piece of flag stone        Nurses Notes reviewed and I agree except as noted in the HPI. HISTORY OF PRESENT ILLNESS   Inocencia Maguire is a 80 y.o. female who presents for complaints of left arm pain that occurred after a fall. Patient states approximately 2 hours prior to arrival she was doing gardening in her backyard. She stepped off of a platform and lost her balance. She fell onto her left side. She states she did strike her head on flagstone. She did not lose consciousness. She denies any headache. She has not vomited. She denies neck pain. She has pain to the left upper arm. No hip or pelvis pain. Mild pain to the right upper arm but has full range of motion. She rates her arm pain a 4 on a 10 scale. Patient does not take any blood thinners. HPI    REVIEW OF SYSTEMS     Review of Systems   Constitutional:  Negative for activity change, diaphoresis, fatigue and fever. Respiratory:  Negative for cough and shortness of breath. Cardiovascular:  Negative for chest pain. Gastrointestinal:  Negative for abdominal pain, nausea and vomiting. Musculoskeletal:  Positive for arthralgias (left upper arm). Negative for neck pain and neck stiffness. Skin:  Negative for color change. Neurological:  Negative for dizziness, light-headedness and headaches. Psychiatric/Behavioral:  Negative for behavioral problems.       PAST MEDICAL HISTORY         Diagnosis Date    Chronic low back pain     CKD (chronic kidney disease) stage 3, GFR 30-59 ml/min ([facility])     DDD (degenerative disc disease), lumbar     Dyslipidemia     History of skin cancer     BCC AND SCC    Hyperlipidemia     Hypertension     Osteoarthritis     PONV (postoperative nausea and vomiting) SURGICALHISTORY     Patient  has a past surgical history that includes joint replacement (Bilateral); Foot surgery (Bilateral); Hysterectomy;  section; Dilation and curettage of uterus; Carpal tunnel release (Bilateral); Finger surgery; skin biopsy; back surgery (); Colonoscopy; cyst removal (Left, 2017); Cholecystectomy, laparoscopic (10/03/2017); pr colonoscopy flx dx w/collj spec when pfrmd (N/A, 2018); Nerve Surgery (N/A, 2019); Pain management procedure (Left, 2020); Facet joint injection (Bilateral, 2020); Pain management procedure (Bilateral, 2021); Pain management procedure (Bilateral, 2021); and Pain management procedure (Bilateral, 3/4/2022). CURRENT MEDICATIONS       Previous Medications    ACETAMINOPHEN (TYLENOL 8 HOUR ARTHRITIS PAIN) 650 MG EXTENDED RELEASE TABLET    Take 650 mg by mouth every 8 hours as needed for Pain    CALCIUM CARBONATE-VIT D-MIN (CALCIUM 1200 PO)    Take by mouth daily    CHOLECALCIFEROL (VITAMIN D-3 PO)    Take by mouth daily    HANDICAP PLACARD MISC    by Does not apply route Expires 2023    HYDROCODONE-ACETAMINOPHEN (NORCO) 5-325 MG PER TABLET    Take 0.5-1 tablets by mouth 2 times daily as needed for Pain for up to 30 days. Take lowest dose possible to manage pain    HYDROXYCHLOROQUINE (PLAQUENIL) 200 MG TABLET    TAKE 1 AND 1/2 TABLETS BY MOUTH DAILY    LISINOPRIL-HYDROCHLOROTHIAZIDE (PRINZIDE;ZESTORETIC) 10-12.5 MG PER TABLET    TAKE 1 TABLET BY MOUTH DAILY    MULTIPLE VITAMINS-MINERALS (PRESERVISION AREDS PO)    Take by mouth    OMEPRAZOLE (PRILOSEC) 20 MG DELAYED RELEASE CAPSULE    TAKE 1 CAPSULE BY MOUTH DAILY    VITAMIN C (ASCORBIC ACID) 500 MG TABLET    Take 500 mg by mouth daily       ALLERGIES     Patient is is allergic to sulfa antibiotics and other.     Patients   Immunization History   Administered Date(s) Administered    COVID-19, MODERNA BLUE border, Primary or Immunocompromised, (age 12y+), IM, 100 mcg/0.5mL 02/11/2021, 03/11/2021, 10/29/2021    Influenza Virus Vaccine 10/01/2014, 09/20/2018    Influenza, High Dose (Fluzone 65 yrs and older) 09/24/2016, 10/28/2020    Influenza, Quadv, IM, PF (6 mo and older Fluzone, Flulaval, Fluarix, and 3 yrs and older Afluria) 09/18/2019    Influenza, Quadv, adjuvanted, 65 yrs +, IM, PF (Fluad) 09/28/2021    Pneumococcal Conjugate 13-valent ([id number]) 10/23/2015    Pneumococcal Polysaccharide ([id number]) 04/18/2018    Tdap (Boostrix, Adacel) 07/29/2018    Zoster Live (Zostavax) 04/16/2013    Zoster Recombinant (Shingrix) 11/13/2019, 01/13/2020       FAMILY HISTORY     Patient's family history includes Alzheimer's Disease in her mother; Cancer in her father; Dementia in her mother; Emphysema in her father; Heart Disease in her mother. SOCIAL HISTORY     Patient  reports that she has never smoked. She has never used smokeless tobacco. She reports current alcohol use. She reports that she does not use drugs. PHYSICAL EXAM     ED TRIAGE VITALS  BP: (!) 156/78, Temp: 98.4 °F (36.9 °C), Heart Rate: 63, Resp: 18, SpO2: 97 %,Estimated body mass index is 28.34 kg/m² as calculated from the following:    Height as of this encounter: 5' 3\" (1.6 m). Weight as of this encounter: 160 lb (72.6 kg). ,No LMP recorded. Patient has had a hysterectomy. Physical Exam  Constitutional:       General: She is not in acute distress. Appearance: She is normal weight. She is not ill-appearing. HENT:      Head: Normocephalic and atraumatic. No raccoon eyes, Newman's sign, contusion or laceration. Nose: Nose normal.      Mouth/Throat:      Mouth: Mucous membranes are moist.   Eyes:      Extraocular Movements: Extraocular movements intact. Pupils: Pupils are equal, round, and reactive to light. Cardiovascular:      Rate and Rhythm: Normal rate and regular rhythm. Pulses: Normal pulses. Heart sounds: Normal heart sounds.    Pulmonary:      Effort: Pulmonary effort is normal.      Breath sounds: Normal breath sounds. Musculoskeletal:        Arms:       Cervical back: Normal range of motion. No tenderness. Comments: Tenderness to the proximal and mid left humerus. Shoulder pain with movement. Full range of motion of the left shoulder. Movement pain of the right shoulder. Full range of motion. Minimal pain   Skin:     General: Skin is warm and dry. Neurological:      General: No focal deficit present. Mental Status: She is alert. Psychiatric:         Mood and Affect: Mood normal.         Behavior: Behavior normal.       DIAGNOSTIC RESULTS     Labs:No results found for this visit on 07/24/22. IMAGING:    XR HUMERUS LEFT (MIN 2 VIEWS)   Final Result      No fracture or dislocation. Final report electronically signed by Dr. Oli Hinds on 7/24/2022 5:14 PM            EKG:      URGENT CARE COURSE:     Vitals:    07/24/22 1618   BP: (!) 156/78   Pulse: 63   Resp: 18   Temp: 98.4 °F (36.9 °C)   TempSrc: Temporal   SpO2: 97%   Weight: 160 lb (72.6 kg)   Height: 5' 3\" (1.6 m)       Medications - No data to display         PROCEDURES:  None    FINAL IMPRESSION      1. Contusion of multiple sites of left shoulder and upper arm, initial encounter    2. Injury of head, initial encounter          DISPOSITION/ PLAN     Patient presents for injuries related to a fall. Patient did strike her head during the fall but did not lose consciousness. She denies any headache. No nausea or vomiting. She is acting appropriate and family member states she is acting her normal self. Patient is not on any blood thinners. I discussed transfer to the emergency department for possible CT scan and patient and family member state they would prefer not to be transferred. With negative neurologic findings, I recommended that family member stay with her this evening and watch her for any of the above symptoms.   Advised to go directly to the emergency department if any symptoms develop. X-rays were performed of the left humerus. X-rays were negative for acute fracture or dislocation. She will be discharged advised to ice the area frequently, take Tylenol as needed for pain. Follow-up with orthopedics if no improvement.       PATIENT REFERRED TO:  [address] / [id number] [facility] [address]      DISCHARGE MEDICATIONS:  New Prescriptions    No medications on file       Discontinued Medications    TIZANIDINE (ZANAFLEX) 4 MG TABLET    Take 1 tablet by mouth 3 times daily as needed (R knee pain/spasm)       Current Discharge Medication List          JOSE Carson CNP    (Please note that portions of this note were completed with a voice recognition program. Efforts were made to edit the dictations but occasionally words are mis-transcribed.)           JOSE Carson CNP  07/24/22 2472

## 2022-07-24 NOTE — DISCHARGE INSTRUCTIONS
Ice to the area frequently    Tylenol as needed for pain    Go to the emergency department for any headache, nausea, vomiting, not acting right, problems thinking, or any other concern

## 2022-07-29 ENCOUNTER — APPOINTMENT (OUTPATIENT)
Dept: GENERAL RADIOLOGY | Age: 82
End: 2022-07-29
Payer: MEDICARE

## 2022-07-29 ENCOUNTER — HOSPITAL ENCOUNTER (EMERGENCY)
Age: 82
Discharge: HOME | End: 2022-07-29
Payer: MEDICARE

## 2022-07-29 ENCOUNTER — OFFICE VISIT (OUTPATIENT)
Dept: FAMILY MEDICINE CLINIC | Age: 82
End: 2022-07-29
Payer: MEDICARE

## 2022-07-29 VITALS
WEIGHT: 163 LBS | HEART RATE: 72 BPM | HEIGHT: 62 IN | OXYGEN SATURATION: 97 % | TEMPERATURE: 98.1 F | RESPIRATION RATE: 12 BRPM | DIASTOLIC BLOOD PRESSURE: 64 MMHG | BODY MASS INDEX: 30 KG/M2 | SYSTOLIC BLOOD PRESSURE: 116 MMHG

## 2022-07-29 VITALS
TEMPERATURE: 97.9 F | SYSTOLIC BLOOD PRESSURE: 133 MMHG | HEART RATE: 75 BPM | DIASTOLIC BLOOD PRESSURE: 65 MMHG | OXYGEN SATURATION: 96 % | RESPIRATION RATE: 16 BRPM

## 2022-07-29 DIAGNOSIS — M25.511 ACUTE PAIN OF RIGHT SHOULDER: Primary | ICD-10-CM

## 2022-07-29 DIAGNOSIS — M79.18 MUSCULOSKELETAL PAIN: Primary | ICD-10-CM

## 2022-07-29 PROCEDURE — 99213 OFFICE O/P EST LOW 20 MIN: CPT

## 2022-07-29 PROCEDURE — 73060 X-RAY EXAM OF HUMERUS: CPT

## 2022-07-29 PROCEDURE — 99213 OFFICE O/P EST LOW 20 MIN: CPT | Performed by: FAMILY MEDICINE

## 2022-07-29 PROCEDURE — G8399 PT W/DXA RESULTS DOCUMENT: HCPCS | Performed by: FAMILY MEDICINE

## 2022-07-29 PROCEDURE — 99214 OFFICE O/P EST MOD 30 MIN: CPT | Performed by: NURSE PRACTITIONER

## 2022-07-29 PROCEDURE — 73000 X-RAY EXAM OF COLLAR BONE: CPT

## 2022-07-29 PROCEDURE — G8427 DOCREV CUR MEDS BY ELIG CLIN: HCPCS | Performed by: FAMILY MEDICINE

## 2022-07-29 PROCEDURE — 1036F TOBACCO NON-USER: CPT | Performed by: FAMILY MEDICINE

## 2022-07-29 PROCEDURE — 1090F PRES/ABSN URINE INCON ASSESS: CPT | Performed by: FAMILY MEDICINE

## 2022-07-29 PROCEDURE — 1123F ACP DISCUSS/DSCN MKR DOCD: CPT | Performed by: FAMILY MEDICINE

## 2022-07-29 PROCEDURE — G8417 CALC BMI ABV UP PARAM F/U: HCPCS | Performed by: FAMILY MEDICINE

## 2022-07-29 ASSESSMENT — PAIN - FUNCTIONAL ASSESSMENT: PAIN_FUNCTIONAL_ASSESSMENT: 0-10

## 2022-07-29 ASSESSMENT — ENCOUNTER SYMPTOMS
SHORTNESS OF BREATH: 0
COUGH: 0
CHEST TIGHTNESS: 0

## 2022-07-29 ASSESSMENT — PAIN SCALES - GENERAL: PAINLEVEL_OUTOF10: 10

## 2022-07-29 NOTE — ED PROVIDER NOTES
[address]       Chief Complaint   Patient presents with    Fall    Arm Pain     Right side, upper arm, shoulder collar bone       Nurses Notes reviewed and I agree except as noted in the HPI. HISTORY OF PRESENT ILLNESS   Patricia Nelson is a 80 y.o. female who presents for re-evaluation. She states that she was seen here in the urgent care on 22 after falling and had xray's on her left side but recently started having right arm pain from just above the elbow, up to her shoulder, and collar bone. She is requesting xray's of these areas today. She has not been able to follow with her PCP or [facility]. The patient/patient representative has no other acute complaints at this time. REVIEW OF SYSTEMS     Review of Systems   Constitutional:  Negative for chills and fever. Respiratory:  Negative for cough, chest tightness and shortness of breath. Cardiovascular:  Negative for chest pain. Musculoskeletal:  Positive for arthralgias. Skin:  Negative for rash. Allergic/Immunologic: Negative for environmental allergies and food allergies. Neurological:  Negative for headaches. Hematological:  Negative for adenopathy. PAST MEDICAL HISTORY         Diagnosis Date    Chronic low back pain     CKD (chronic kidney disease) stage 3, GFR 30-59 ml/min ([facility])     DDD (degenerative disc disease), lumbar     Dyslipidemia     History of skin cancer     BCC AND SCC    Hyperlipidemia     Hypertension     Osteoarthritis     PONV (postoperative nausea and vomiting)        SURGICAL HISTORY     Patient  has a past surgical history that includes joint replacement (Bilateral); Foot surgery (Bilateral); Hysterectomy;  section; Dilation and curettage of uterus; Carpal tunnel release (Bilateral); Finger surgery; skin biopsy; back surgery (); Colonoscopy; cyst removal (Left, 2017);  Cholecystectomy, laparoscopic (10/03/2017); pr colonoscopy flx dx w/collj spec when pfrmd (N/A, 9/7/2018); Nerve Surgery (N/A, 12/4/2019); Pain management procedure (Left, 1/29/2020); Facet joint injection (Bilateral, 5/27/2020); Pain management procedure (Bilateral, 1/20/2021); Pain management procedure (Bilateral, 12/21/2021); and Pain management procedure (Bilateral, 3/4/2022). CURRENT MEDICATIONS       Previous Medications    ACETAMINOPHEN (TYLENOL 8 HOUR ARTHRITIS PAIN) 650 MG EXTENDED RELEASE TABLET    Take 650 mg by mouth every 8 hours as needed for Pain    CALCIUM CARBONATE-VIT D-MIN (CALCIUM 1200 PO)    Take by mouth daily    CHOLECALCIFEROL (VITAMIN D-3 PO)    Take by mouth daily    HANDICAP PLACARD MISC    by Does not apply route Expires 29 June 2023    HYDROCODONE-ACETAMINOPHEN (NORCO) 5-325 MG PER TABLET    Take 0.5-1 tablets by mouth 2 times daily as needed for Pain for up to 30 days. Take lowest dose possible to manage pain    HYDROXYCHLOROQUINE (PLAQUENIL) 200 MG TABLET    TAKE 1 AND 1/2 TABLETS BY MOUTH DAILY    LISINOPRIL-HYDROCHLOROTHIAZIDE (PRINZIDE;ZESTORETIC) 10-12.5 MG PER TABLET    TAKE 1 TABLET BY MOUTH DAILY    MULTIPLE VITAMINS-MINERALS (PRESERVISION AREDS PO)    Take by mouth    OMEPRAZOLE (PRILOSEC) 20 MG DELAYED RELEASE CAPSULE    TAKE 1 CAPSULE BY MOUTH DAILY    VITAMIN C (ASCORBIC ACID) 500 MG TABLET    Take 500 mg by mouth daily       ALLERGIES     Patient is is allergic to sulfa antibiotics and other. FAMILY HISTORY     Patient'sfamily history includes Alzheimer's Disease in her mother; Cancer in her father; Dementia in her mother; Emphysema in her father; Heart Disease in her mother. SOCIAL HISTORY     Patient  reports that she has never smoked. She has never used smokeless tobacco. She reports current alcohol use. She reports that she does not use drugs. PHYSICAL EXAM     ED TRIAGE VITALS  BP: 133/65, Temp: 97.9 °F (36.6 °C), Heart Rate: 75, Resp: 16, SpO2: 96 %  Physical Exam  Vitals and nursing note reviewed. Constitutional:       General: She is not in acute distress. Appearance: Normal appearance. She is well-developed and well-groomed. HENT:      Head: Normocephalic and atraumatic. Right Ear: External ear normal.      Left Ear: External ear normal.      Nose: Nose normal.      Mouth/Throat:      Lips: Pink. Mouth: Mucous membranes are moist.   Eyes:      Conjunctiva/sclera: Conjunctivae normal.      Right eye: Right conjunctiva is not injected. Left eye: Left conjunctiva is not injected. Pupils: Pupils are equal.   Cardiovascular:      Rate and Rhythm: Normal rate. Heart sounds: Normal heart sounds. Pulmonary:      Effort: Pulmonary effort is normal. No respiratory distress. Breath sounds: Normal breath sounds. Musculoskeletal:      Cervical back: Normal range of motion. Comments: Right shoulder pain with movement  Right humerus normal exam  Right clavicle normal exam    Skin:     General: Skin is warm and dry. Findings: No rash (on exposed surfaces). Neurological:      Mental Status: She is alert and oriented to person, place, and time. Gait: Gait is intact. Psychiatric:         Mood and Affect: Mood normal.         Speech: Speech normal.         Behavior: Behavior is cooperative. DIAGNOSTIC RESULTS   Labs:  Abnormal Labs Reviewed - No data to display     IMAGING:  XR HUMERUS RIGHT (MIN 2 VIEWS)   Final Result   No acute findings            **This report has been created using voice recognition software. It may contain minor errors which are inherent in voice recognition technology. **      Final report electronically signed by Dr. Dana Martinez on 7/29/2022 9:35 AM      XR CLAVICLE RIGHT   Final Result   No acute fracture            **This report has been created using voice recognition software. It may contain minor errors which are inherent in voice recognition technology. **      Final report electronically signed by Dr. Dana Martinez on 7/29/2022 9:26 AM        URGENT CARE COURSE:     Vitals:    07/29/22 0902   BP: 133/65   Pulse: 75   Resp: 16   Temp: 97.9 °F (36.6 °C)   TempSrc: Temporal   SpO2: 96%       Medications - No data to display  PROCEDURES:  FINALIMPRESSION      1. Musculoskeletal pain        DISPOSITION/PLAN   DISPOSITION Decision To Discharge 07/29/2022 09:46:15 AM    Previous epic notes reviewed. X-ray's reviewed per radiologist read. No acute findings. Problem List Items Addressed This Visit    None  Visit Diagnoses       Musculoskeletal pain    -  Primary            Physical assessment findings, diagnostic testing(s) if applicable, and vital signs reviewed with patient/patient representative. Differential diagnosis(s) discussed with patient/patient representative. Prescription medications and/or over-the-counter medications for symptom management discussed. Patient is to follow-up with family care provider in 2-3 days if no improvement. If symptoms should worsen or change, go to the ED. Patient/patient representative is aware of care plan, questions answered, verbalizes understanding and is in agreement. Printed instructions attached to after visit summary. If COVID-19 positive or COVID-19 by PCR is pending at time of discharge patient is to quarantine/isolate according to [facility] guidelines. PATIENT REFERRED TO:  [address] [place] [address]  [phone]    Schedule an appointment as soon as possible for a visit in 3 days  For further evaluation. , If symptoms change/worsen, go to the [address]  [phone]  Schedule an appointment as soon as possible for a visit   as needed, For further evaluation. , If symptoms change/worsen, go to the [address] [place], [place] [address]N - CNP    Please note that some or all of this chart was generated using [employer] voice recognition software.  Although every effort was made to ensure the accuracy

## 2022-07-29 NOTE — ED NOTES
To [facility] with complaints of falling on Sunday and being seen here Sunday. States she fell onto her left side but now her right upper arm, shoulder, collar bone are hurting. States it doesn't hurt unless she moves it.       Madalyn Canales RN  07/29/22 3548

## 2022-07-29 NOTE — PROGRESS NOTES
Chief Complaint   Patient presents with    Follow-up     UC with fall, R shoulder pain       History obtained from the patient. SUBJECTIVE:  Piper Ji is a 80 y.o. female that presents today for     -UC f/u:  Elizabeth Galaviz Sunday  Landed on L side and hit head  Neg w/u in ER with xrays on left side  Was having inc R side in the R clavicle and R arm  So went to UC, with neg xray of clavicle and R humerus   Still having pain in the R shoulder, hurts at rest, worse with movement. Age/Gender Health Maintenance    Lipid -   Lab Results   Component Value Date    CHOL 211 (H) 04/25/2022    CHOL 263 (H) 10/28/2021    CHOL 266 (H) 04/12/2021     Lab Results   Component Value Date    TRIG 174 04/25/2022    TRIG 191 10/28/2021    TRIG 174 04/12/2021     Lab Results   Component Value Date    HDL 56 04/25/2022    HDL 60 10/28/2021    HDL 60 04/12/2021     Lab Results   Component Value Date    LDLCALC 120 04/25/2022    LDLCALC 165 10/28/2021    LDLCALC 171 04/12/2021       ; LDL 82; HDL 74;  [facility] 2018)    DM Screen -   Lab Results   Component Value Date/Time    GLUCOSE 92 04/25/2022 08:05 AM    GLUCOSE 99 05/03/2018 11:02 AM       Colon Cancer Screening -NEG SEPT 2018, no repeat (Johanne Fernando)  Lung Cancer Screening (Age 54 to [de-identified] with 30 pack year hx, current smoker or quit within past 15 years) - never smoker    Tetanus - UTD July 2018  Influenza Vaccine - Candidate FALL 2022  Pneumonia Vaccine - UTD PCV 13 OCT 2015.  UTD PPV 23 APR 2018  Zostavax - Zostavax competed APR 2013   Shingrix - UTD x 2    Breast Cancer Screening - NEG OCT 2021  Cervical Cancer Screening - Aged out  Osteoporosis Screening - osteopenia AUG 2018 and AUG 2020      Current Outpatient Medications   Medication Sig Dispense Refill    acetaminophen (TYLENOL) 650 MG extended release tablet Take 650 mg by mouth every 8 hours as needed for Pain      HYDROcodone-acetaminophen (NORCO) 5-325 MG per tablet Take 0.5-1 tablets by mouth 2 times daily as needed for Pain for up to 30 days. Take lowest dose possible to manage pain 28 tablet 0    hydroxychloroquine (PLAQUENIL) 200 MG tablet TAKE 1 AND 1/2 TABLETS BY MOUTH DAILY 135 tablet 1    lisinopril-hydroCHLOROthiazide (PRINZIDE;ZESTORETIC) 10-12.5 MG per tablet TAKE 1 TABLET BY MOUTH DAILY 90 tablet 3    omeprazole (PRILOSEC) 20 MG delayed release capsule TAKE 1 CAPSULE BY MOUTH DAILY 90 capsule 3    vitamin C (ASCORBIC ACID) 500 MG tablet Take 500 mg by mouth daily      Handicap Placard MISC by Does not apply route Expires 29 June 2023 1 each 0    Multiple Vitamins-Minerals (PRESERVISION AREDS PO) Take by mouth      Calcium Carbonate-Vit D-Min (CALCIUM 1200 PO) Take by mouth daily      Cholecalciferol (VITAMIN D-3 PO) Take by mouth daily       No current facility-administered medications for this visit. No orders of the defined types were placed in this encounter. All medications reviewed and reconciled, including OTC and herbal medications. Updated list given to patient.        Patient Active Problem List    Diagnosis Date Noted    Ganglion cyst of finger of left hand 01/31/2017     Priority: High     Class: Chronic    Chronic renal disease, stage III [facility]) [[id number]] 06/06/2022     Priority: Medium    Lumbar spondylosis     Erosive osteoarthritis of both hands     Positive WICHO (antinuclear antibody) 04/18/2018    Dyslipidemia     History of skin cancer      BCC AND SCC      Hypertension     Osteoarthritis     DDD (degenerative disc disease), lumbar     Chronic low back pain     Hyperkalemia     CKD (chronic kidney disease) stage 3, GFR 30-59 ml/min (HCC)        Past Medical History:   Diagnosis Date    Chronic low back pain     CKD (chronic kidney disease) stage 3, GFR 30-59 ml/min (HCC)     DDD (degenerative disc disease), lumbar     Dyslipidemia     History of skin cancer     BCC AND SCC    Hyperlipidemia     Hypertension     Osteoarthritis     PONV (postoperative nausea and vomiting)        Past Surgical History:   Procedure Laterality Date    BACK SURGERY      lower lumbar fusion    CARPAL TUNNEL RELEASE Bilateral      SECTION      CHOLECYSTECTOMY, LAPAROSCOPIC  10/03/2017    COLONOSCOPY      CYST REMOVAL Left 2017    left  5th digit-Dr Bowman    DILATION AND CURETTAGE OF UTERUS      FACET JOINT INJECTION Bilateral 2020    B/L L3-4 medial branch and L5 dorsal rami injection performed by Jhony Hu MD at [address] [place] [address] Bilateral     HYSTERECTOMY (CERVIX STATUS UNKNOWN)      JOINT REPLACEMENT Bilateral     KNEES    NERVE SURGERY N/A 2019    left L3, L4 medial branch and L5 dorsal rami injection performed by Jhony Hu MD at [facility] Left 2020    left L3, L4 medial branch and L5 dorsal rami injection performed by Jhony Hu MD at [facility] Bilateral 2021    bilateral T12, L1,L2,L3,L4,L5 medial branch block performed by Jhony Hu MD at [facility] Bilateral 2021    Bilateral L-facet MBB @ L2-3 and L3-4 performed by Tahir Kelly MD at [facility] Bilateral 3/4/2022    left L-facet RFA @ L2-3, and L3-4 right L2-3 performed by Jhony Hu MD at [address] FLX DX W/COLLJ [address] WHEN PFRMD N/A 2018    COLONOSCOPY performed by Keiko Sebastian MD at [facility]    SKIN BIOPSY      BCC AND SCC       Allergies   Allergen Reactions    Sulfa Antibiotics Swelling    Other Nausea And Vomiting     oysters       Social History     Tobacco Use    Smoking status: Never    Smokeless tobacco: Never   Substance Use Topics    Alcohol use: Yes     Comment: OCCASIONALLY       Family History   Problem Relation Age of Onset    Heart Disease Mother         CHF    Alzheimer's Disease Mother     Dementia Mother     Cancer Father         LUNG    Emphysema Father     Colon Cancer Neg Hx     Breast Cancer Neg Hx          I have reviewed the patient's past medical history, past surgical history, allergies, medications, social and family history and I have made updates where appropriate. Review of Systems  Positive responses are highlighted in bold    Constitutional:  Fever, Chills, Night Sweats, Fatigue, Unexpected changes in weight  HENT:  Ear pain, Tinnitus, Nosebleeds, Trouble swallowing, Hearing loss, Sore throat  Cardiovascular:  Chest Pain, Palpitations, Orthopnea, Paroxysmal Nocturnal Dyspnea  Respiratory:  Cough, Wheezing, Shortness of breath, Chest tightness, Apnea  Gastrointestinal:  Nausea, Vomiting, Diarrhea, Constipation, Heartburn, Blood in stool  Genitourinary:  Difficulty or painful urination, Flank pain, Change in frequency, Urgency  Skin:  Color change, Rash, Itching, Wound  Musculoskeletal:  Joint pain, Back pain, Gait problems, Joint swelling, Myalgias  Neurological:  Dizziness, Headaches, Presyncope, Numbness, Seizures, Tremors  Endocrine:  Heat Intolerance, Cold Intolerance, Polydipsia, Polyphagia, Polyuria      PHYSICAL EXAM:  Vitals:    07/29/22 1219   BP: 116/64   Pulse: 72   Resp: 12   Temp: 98.1 °F (36.7 °C)   TempSrc: Oral   SpO2: 97%   Weight: 163 lb (73.9 kg)   Height: 5' 1.5\" (1.562 m)       Body mass index is 30.3 kg/m².        VS Reviewed  General Appearance: A&O x 3, No acute distress,well developed and well- nourished  Eyes: pupils equal, round, and reactive to light, extraocular eye movements intact, conjunctivae and eye lids without erythema  ENT: external ear and ear canal clear bilaterally, TMs intact and regular, nose without deformity, nasal mucosa and turbinates normal without polyps, oropharynx normal, dentition is normal for age  Neck: supple and non-tender without mass, no thyromegaly or thyroid nodules, no cervical lymphadenopathy  Pulmonary/Chest: clear to auscultation bilaterally- no wheezes, rales or rhonchi, normal air movement, no respiratory distress or retractions  Cardiovascular: S1 and S2 auscultated w/ RRR. No murmurs, rubs, clicks, or gallops, distal pulses intact. Abdomen: soft, non-tender, non-distended, bowel sounds physiologic,  no rebound or guarding, no masses or hernias noted. Liver and spleen without enlargement. Extremities: no cyanosis, clubbing or edema of the lower extremities. Skin: warm and dry, no rash or erythema  R Shoulder:  SWELLING: none  DEFORMITY: none  TENDERNESS: mild  ROM: dec to int/ext rotation  STRENGTH: external rotation grade 5 of 5, internal rotation grade 5 of 5, supraspinatus grade 5 of 5, infraspinatus grade 5 of 5, belly press grade 5 of 5, and deltoid grade 5 of 5  STABILITY: normal  SPECIAL TESTS: Rayna Centers' test: positive, Cross-chest abduction: positive, Yergason's test: negative, Speed's test: negative, and Denton's test: negative  Neurologic Exam: normal sensation and reflexes  Vascular Exam: radial pulse present and good color & capillary refill  Neck: supple, tender no, and ROM: normal      Narrative   PROCEDURE: XR HUMERUS RIGHT (MIN 2 VIEWS)       CLINICAL INFORMATION: fall . TECHNIQUE: AP and lateral projections       COMPARISON: No prior study. FINDINGS: There is no fracture or dislocation. Bones are osteopenic. There are degenerative change in the humeral head. Degenerative change at the elbow joint. No distinct soft tissue abnormality identified. Impression   No acute findings               **This report has been created using voice recognition software. It may contain minor errors which are inherent in voice recognition technology. **       Final report electronically signed by Dr. Desi Coe on 7/29/2022 9:35 AM       Narrative   PROCEDURE: XR CLAVICLE RIGHT       CLINICAL INFORMATION: fall . TECHNIQUE: 2 projections       COMPARISON: No prior study. FINDINGS: There is no fracture or bone destruction identified. AC joint is intact. Degenerative change of the right shoulder noted. Impression   No acute fracture               **This report has been created using voice recognition software. It may contain minor errors which are inherent in voice recognition technology. **       Final report electronically signed by Dr. Chayo Painter on 7/29/2022 9:26 AM       ASSESSMENT & PLAN  1. Acute pain of right shoulder    Impingement vs tendonitis  Xray reports above reviewed  Con't prn tylenol and norco she gets from pain clinic  Refer to OT  F/u if no better after that, sooner any changes    - [address]    Return if symptoms worsen or fail to improve. Suann Clock released without restrictions. Future Appointments   Date Time Provider Radha Paulson   8/8/2022 11:30 AM Elora Curling, APRN - CNP [facility] Pain [facility]   11/7/2022  1:00 PM JOSE Bernard CNP N [facility] Rhe[facility]   5/3/2023  9:40 AM Nahed Benavides DO [facility]     PATIENT COUNSELING    Julianna received counseling on the following healthy behaviors: nutrition, exercise and medication adherence    Barriers to learning and self management: none    Discussed use, benefit, and side effects of prescribed medications. Barriers to medication compliance addressed. All patient questions answered. Pt voiced understanding.        Electronically signed by Nahed Benavides DO on 7/29/2022 at 11:23 PM

## 2022-08-08 ENCOUNTER — OFFICE VISIT (OUTPATIENT)
Dept: PHYSICAL MEDICINE AND REHAB | Age: 82
End: 2022-08-08
Payer: MEDICARE

## 2022-08-08 VITALS
BODY MASS INDEX: 30 KG/M2 | SYSTOLIC BLOOD PRESSURE: 120 MMHG | DIASTOLIC BLOOD PRESSURE: 80 MMHG | HEIGHT: 62 IN | WEIGHT: 163 LBS

## 2022-08-08 DIAGNOSIS — G89.29 CHRONIC BILATERAL LOW BACK PAIN WITHOUT SCIATICA: ICD-10-CM

## 2022-08-08 DIAGNOSIS — M41.9 SCOLIOSIS OF THORACOLUMBAR SPINE, UNSPECIFIED SCOLIOSIS TYPE: ICD-10-CM

## 2022-08-08 DIAGNOSIS — G89.4 CHRONIC PAIN SYNDROME: ICD-10-CM

## 2022-08-08 DIAGNOSIS — M79.601 PAIN IN BOTH UPPER EXTREMITIES: ICD-10-CM

## 2022-08-08 DIAGNOSIS — M54.50 CHRONIC BILATERAL LOW BACK PAIN WITHOUT SCIATICA: ICD-10-CM

## 2022-08-08 DIAGNOSIS — M47.816 LUMBAR SPONDYLOSIS: Primary | ICD-10-CM

## 2022-08-08 DIAGNOSIS — M79.602 PAIN IN BOTH UPPER EXTREMITIES: ICD-10-CM
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
## 2022-08-08 DIAGNOSIS — Z98.890 HISTORY OF LUMBAR LAMINECTOMY: ICD-10-CM

## 2022-08-08 PROCEDURE — G8427 DOCREV CUR MEDS BY ELIG CLIN: HCPCS | Performed by: NURSE PRACTITIONER

## 2022-08-08 PROCEDURE — 1036F TOBACCO NON-USER: CPT | Performed by: NURSE PRACTITIONER

## 2022-08-08 PROCEDURE — 1123F ACP DISCUSS/DSCN MKR DOCD: CPT | Performed by: NURSE PRACTITIONER

## 2022-08-08 PROCEDURE — G8399 PT W/DXA RESULTS DOCUMENT: HCPCS | Performed by: NURSE PRACTITIONER

## 2022-08-08 PROCEDURE — 1090F PRES/ABSN URINE INCON ASSESS: CPT | Performed by: NURSE PRACTITIONER

## 2022-08-08 PROCEDURE — 99214 OFFICE O/P EST MOD 30 MIN: CPT | Performed by: NURSE PRACTITIONER

## 2022-08-08 PROCEDURE — G8417 CALC BMI ABV UP PARAM F/U: HCPCS | Performed by: NURSE PRACTITIONER

## 2022-08-08 ASSESSMENT — ENCOUNTER SYMPTOMS
GASTROINTESTINAL NEGATIVE: 1
EYES NEGATIVE: 1
BACK PAIN: 1
RESPIRATORY NEGATIVE: 1

## 2022-08-08 NOTE — PROGRESS NOTES
[facility] [address]  Dept: [phone]  Dept Fax: [phone]: [phone]    Visit Date: 8/8/2022    Functionality Assessment/Goals Worksheet     On a scale of 0 (Does not Interfere) to 10 (Completely Interferes)     1. Which number describes how during the past week pain has interfered with       the following:  A. General Activity:  7  B. Mood: 6  C. Walking Ability:  7  D. Normal Work (Includes both work outside the home and housework):  7  E. Relations with Other People:   1  F. Sleep:   8  G. Enjoyment of Life:   5    2. Patient Prefers to Take their Pain Medications:     [x]  On a regular basis   [x]  Only when necessary    []  Does not take pain medications    3. What are the Patient's Goals/Expectations for Visiting Pain Management? []  Learn about my pain    [x]  Receive Medication   []  Physical Therapy     []  Treat Depression   [x]  Receive Injections    []  Treat Sleep   []  Deal with Anxiety and Stress   []  Treat Opoid Dependence/Addiction   []  Other:      HPI:   Kel Melendez is a 80 y.o. female is here today for    Chief Complaint: Low back pain, arm pain and shoulder pain     HPI   2 month FU. She states she had a fall 2 weeks ago when gardening and standing on a raised bed and loss balance went to surgery care and ER. No worsening of low back pain. Continue sto have pain in low back more bothersome right side. Pain is intermittent and not all the times- aching with periods of sharp and shooting,     No pain at rest or sitting,     Since fall having pain in bilateral upper arms more right- dull pain sometimes sharp intermittent. Feels it with raising right arm to side.  Is going to start OT tomorrow has initial evaluation     Pain increases with bending, lifting, twisting , reaching, pushing, pulling, standing, raising arms, getting up and down, and housework or working at job. Taking Norco only prn and remains very effective in decreasing pain     Medications reviewed. Patient denies side effects with medications. Patient states she is taking medications as prescribed. Shedenies receiving pain medications from other sources. She  had 2 urgent care visit since last visit     Pain scale with out pain medications or at its worst is 1-2/10. Can get up yo 8/10 with overdoing activity   Pain scale with pain medications or at its best is 0-2/10. Last dose of Norco was today half tab   Drug screen reviewed from 6/6/2022 and was appropriate  Pill count completed  today and WNL: Yes      The patientis allergic to sulfa antibiotics and other. Subjective:      Review of Systems   Constitutional: Negative. HENT: Negative. Eyes: Negative. Negative for visual disturbance. Respiratory: Negative. Cardiovascular: Negative. Gastrointestinal: Negative. Endocrine: Negative. Genitourinary: Negative. Musculoskeletal:  Positive for arthralgias, back pain, gait problem, joint swelling and myalgias. Negative for neck pain and neck stiffness. Ambulating with strait cane   Skin: Negative. Neurological:  Positive for weakness. Negative for numbness. Psychiatric/Behavioral: Negative. Objective:     Vitals:    08/08/22 1118   BP: 120/80   Weight: 163 lb (73.9 kg)   Height: 5' 1.5\" (1.562 m)       Physical Exam  Vitals and nursing note reviewed. Constitutional:       General: She is not in acute distress. Appearance: Normal appearance. She is well-developed. She is not diaphoretic. HENT:      Head: Normocephalic and atraumatic. Right Ear: External ear normal.      Left Ear: External ear normal.      Nose: Nose normal.      Mouth/Throat:      Pharynx: No oropharyngeal exudate. Eyes:      General: No scleral icterus. Right eye: No discharge. Left eye: No discharge.       Conjunctiva/sclera: Conjunctivae normal.      Pupils: Pupils are equal, round, and reactive to light. Neck:      Thyroid: No thyromegaly. Cardiovascular:      Rate and Rhythm: Normal rate and regular rhythm. Heart sounds: Normal heart sounds. No murmur heard. No friction rub. No gallop. Pulmonary:      Effort: Pulmonary effort is normal. No respiratory distress. Breath sounds: Normal breath sounds. No wheezing or rales. Chest:      Chest wall: No tenderness. Abdominal:      General: Bowel sounds are normal. There is no distension. Palpations: Abdomen is soft. Tenderness: There is no abdominal tenderness. There is no guarding or rebound. Musculoskeletal:         General: Tenderness present. Right shoulder: Tenderness and bony tenderness present. Decreased range of motion. Left shoulder: Tenderness and bony tenderness present. Decreased range of motion. Right hand: Deformity and tenderness present. Left hand: Deformity and tenderness present. Cervical back: Neck supple. No edema, erythema, rigidity or tenderness. No muscular tenderness. Decreased range of motion. Thoracic back: Tenderness present. Scoliosis present. Lumbar back: Tenderness and bony tenderness present. Decreased range of motion. Negative right straight leg raise test and negative left straight leg raise test. Scoliosis present. Back:    Skin:     General: Skin is warm. Coloration: Skin is not pale. Findings: No erythema or rash. Neurological:      Mental Status: She is alert and oriented to person, place, and time. She is not disoriented. Cranial Nerves: No cranial nerve deficit. Sensory: No sensory deficit. Motor: Weakness present. No atrophy or abnormal muscle tone. Coordination: Coordination normal.      Gait: Gait abnormal.      Deep Tendon Reflexes: Babinski sign absent on the right side. Babinski sign absent on the left side.       Reflex Scores: Achilles reflexes are 1+ on the right side and 1+ on the left side. Comments: Gait-uses cane   Psychiatric:         Attention and Perception: Attention normal. She is attentive. Mood and Affect: Mood normal. Mood is not anxious or depressed. Affect is not labile, blunt, angry or inappropriate. Speech: Speech normal. She is communicative. Speech is not rapid and pressured, delayed, slurred or tangential.         Behavior: Behavior is not agitated, slowed, aggressive, withdrawn, hyperactive or combative. Behavior is cooperative. Thought Content: Thought content normal. Thought content is not paranoid or delusional. Thought content does not include homicidal or suicidal ideation. Thought content does not include homicidal or suicidal plan. Cognition and Memory: Cognition normal. Memory is not impaired. She does not exhibit impaired recent memory or impaired remote memory. Judgment: Judgment normal. Judgment is not impulsive or inappropriate. ALCIDES  Patricks test  negative  Yeoman's  or Gaenslen's negative       Assessment:     1. Lumbar spondylosis    2. Chronic bilateral low back pain without sciatica    3. History of lumbar laminectomy    4. Chronic pain syndrome    5. Scoliosis of thoracolumbar spine, unspecified scoliosis type    6. Fall, subsequent encounter    7. Pain in both upper extremities            Plan:      OARRS reviewed. Current MED: 5.00  Patient was offered naloxone for home. Discussed long term side effects of medications, tolerance, dependency and addiction. Previous UDS reviewed  UDS preformed today for compliance. Patient told can not receive any pain medications from any other source. No evidence of abuse, diversion or aberrant behavior.   Medications and/or procedures to improve function and quality of life- patient understanding with this and that may not be pain free  Discussed with patient about safe storage of medications at home  Discussed possible weaning of medication dosing dependent on treatment/procedure results. Discussed with patient about risks with procedure including infection, reaction to medication, increased pain, or bleeding. Still relief from L-facet RFA   Continue Norco 5/325 mg tabs BID- 1/2 tab to 1 tab BID prn- filled 28 tabs on 7/1/2022 and takes prn and has plenty. Continue tylenol prn for mild pain  Reviewed xrays after fall. Awaiting to start OT    Meds. Prescribed:   No orders of the defined types were placed in this encounter. Return in about 3 months (around 11/8/2022), or if symptoms worsen or fail to improve, for follow up  for medications.                Electronically signed by Elora Curling, APRN - CNP on8/8/2022 at 11:50 AM

## 2022-08-09 ENCOUNTER — HOSPITAL ENCOUNTER (OUTPATIENT)
Dept: OCCUPATIONAL THERAPY | Age: 82
Setting detail: THERAPIES SERIES
Discharge: HOME | End: 2022-08-09
Payer: MEDICARE

## 2022-08-09 PROCEDURE — 97165 OT EVAL LOW COMPLEX 30 MIN: CPT

## 2022-08-09 PROCEDURE — 97110 THERAPEUTIC EXERCISES: CPT

## 2022-08-09 NOTE — PROGRESS NOTES
** PLEASE SIGN, DATE AND TIME CERTIFICATION BELOW AND RETURN TO [facility] OUTPATIENT REHABILITATION (FAX #: [phone]). ATTEST/CO-SIGN IF ACCESSING VIA IN"[employer]". THANK YOU.**    I certify that I have examined the patient below and determined that Physical Medicine and Rehabilitation service is necessary and that I approve the established plan of care for up to 90 days or as specifically noted. Attestation, signature or co-signature of physician indicates approval of certification requirements.    ________________________ ____________ __________  Physician Signature   Date   Time   [address] THERAPY  [x] EVALUATION  [] DAILY NOTE (LAND) [] DAILY NOTE (AQUATIC ) [] PROGRESS NOTE [] DISCHARGE NOTE    [x] [facility]   [] [facility] 90    [] [facility]   [] [facility]    Date: 2022  Patient Name:  Isaiah Fox  : 1940  MRN: [id number]  CSN: [id number]    Referring Practitioner Yimi Lizarraga DO   Diagnosis Pain in right shoulder [M25.511]    Treatment Diagnosis Decreased    Date of Evaluation 22      Functional Outcome Measure Used UEFS   Functional Outcome Score 41/80 (22)       Insurance: Primary: Payor: Roanne Board /  /  / ,   Secondary: [facility]   Authorization Information: No limit - based on medical necessity   Visit # 1, 1/10 for progress note   Visits Allowed:    Recertification Date: Oct. 4, 2022   Physician Follow-Up: Does not currently have an appointment set   Physician Orders: Eval and treat   Pertinent History: 2 weeks ago, pt. Was gardening in her raised garden bed and fell, hit her head and states she had to lay on the ground because she could not get her arms to work to get up. Pt's daughter took her to urgent care and did xrays of left shoulder - xrays were negative for any fractures.  Pt. Willi Russell back to urgent care a week later because her right shoulder was hurting so bad - xrays of right shoulder were negative as well. Pt. Alen Chavez to her family doctor for follow up and was referred to therapy for right shoulder pain. Pt. States both shoulders are hurting but her right hurts worse. PMH includes chronic back pain, chronic pain syndrome     SUBJECTIVE: Pt. Reports 6/10 pain in right shoulder today, mild discomfort in left. States she cannot  anything heavy with right UE (like gallon of milk). States pain is mostly in lateral upper arms up to joint of shoulder    Social/Functional History:  Electronic Medical Record reviewed and up to date    [employer] lives alone - has a dog  Task Prior Level of Function  (current level of function addressed below)   ADLs  Independent   Ambulation Modified Independent - using walking stick   Transfers Modified Independent   Hobbies Gardening, Advent activities, helps with  dinners, walk her dog   Driving Active    Work Retired; does volunteer work at [employer]     OBJECTIVE:  Hand Dominance right handed   Palpation Pt. Does have knotty musculature along lateral deltoid of RUE; Large knot in right upper trap   Observation Right scapula is protracted and winging, pt.  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, wrists   Posture Rounded shoulders   Edema    Special Tests        ADL's Cannot lift items with RUE (gallon of milk), painful to mop and clean, difficult to pull pants up, cannot fasten her bra behind her now, difficult to get wet clothes out of the washing machine, difficulty ironing clothes   Bed Mobility     Transfers    Balance        Sensation No numbness or tingling in either UE   Coordination WFL           BILATERAL UPPER EXTREMITY  RANGE OF MOTION     RUE AROM LUE   AROM COMMENTS         Shoulder Flexion 125  140    Shoulder Extension 52 55    Shoulder Abduction 127 145    Shoulder Adduction      Shoulder External Rotation 64 70    Shoulder Internal Rotation Able to get hand behind hip Able to get thumb tip up to bra line behind back    Shoulder Range of Motion is [facility] [place]  []      Elbow Flexion      Elbow Extension      Forearm Pronation      Forearm Supination      Elbow Range of Motion is [facility] [place]  [x]      Wrist Flexion      Wrist Extension      Wrist Radial Deviation      Wrist Ulnar Deviation      Wrist Range of Motion is [facility] [place]  [x]   If no measurement is recorded, no formal assessment was completed for that motion. BILATERAL UPPER EXTREMITY  STRENGTH     RUE Strength Rating LUE Strength Rating Comments   Shoulder Flexion 3+ 4-    Shoulder Extension 4- 4    Shoulder Abduction 3 3+    Shoulder Adduction 3+ 3+    Shoulder External Rotation      Shoulder Internal Rotation      []  Shoulder Strength is grossly WFL. Elbow Flexion      Elbow Extension      Forearm Pronation      Forearm Supination      [x] Elbow Strength is grossly WFL. Wrist Flexion      Wrist Extension      Wrist Radial Deviation      Wrist Ulnar Deviation      [x]  Wrist Strength is grossly WFL. Right Left    Strength Setting:      Pinch Strength Tip Pinch:      Lateral Pinch           If no ratings are recorded, no formal assessment was completed. TREATMENT   Precautions: Pt.  Has chronic back pain, unsteady without her walking stick    Pain: 6/10 right lateral shoulder and upper trap     X in shaded column indicates Activity Completed Today   Modalities Parameters/  Location  Notes/Comments                     Manual Therapy Time/  Technique  Notes/Comments                     Exercises   Sets/  Sec Reps  Notes/Comments   Instructed on HEP                                                 Activities Time    Notes/Comments                       Specific Interventions Next Treatment: Ultrasound to right upper trap, upper trap/levator stretching, anterior shoulder/chest stretching,ROM RUE, periscapular strengthening, STM to tight musculature, trial kinesiotape    Activity/Treatment Tolerance:  [x]  Patient tolerated treatment well  [] Patient limited by fatigue  []  Patient limited by pain   []  Patient limited by other medical complications  []  Other:     Assessment: Pt. Presents for evaluation of right shoulder pain following a fall 2 weeks ago. Pt. States both shoulders are hurting but right shoulder is worse than left, and so doctor only sent an order for right shoulder. X-rays of both shoulders were negative. Pt. Does have arthritis throughout her body. RIght upper trap is very tight and knotty as well as right lateral deltoid. Right scapula is protracted and winging. Pt. Would benefit from skilled OT for modalities, STM, ROM, stretching for improved function and decreased pain in right shoulder for daily routines  Areas for Improvement: impaired ROM, impaired strength, and pain  Prognosis: good    GOALS:  Patient Goal: to decrease pain in shoulders, especially right shoulder to be able to complete cleaning and lift household items with RUE    Short Term Goals:  Time Frame: 4 weeks  *  Pt. will demo improved AROM right shoulder to flexion= 140, abd= 140, IR to thumb tip 1\" above waistline, and ER= 70 for ease with donning and doffing shirts  *  Pt. will report decreased pain with active use of right shoulder to no greater than 3/10 for ability to complete housework  *  Patient will demonstrate equal scapular posture for improved mechanics when lifting. *  Pt. will demo independence with HEP for right shoulder for improved motion and decreased pain for dressing self and completing needed housework    Long Term Goals:  Time Frame: 8 weeks  *  Patient will report dressing and bathing with no greater than minimal pain increase.     *  Pt. will be able to don shirt and wash back using Right UE without pain in shoulder  *  Pt. will demo 4/5 MMT right shoulder for independence with exercise routine     Patient Education:   [x]  HEP/Education Completed: Plan of Care, Goals, posture  Medbridge for HEP: Scapular pinches with depression with back against wall, wall slides with arms in neutral, corner stretch, upper trap stretch, levator scap stretch - handouts given  []  No new Education completed  []  Reviewed Prior HEP      [x]  Patient verbalized and/or demonstrated understanding of education provided. []  Patient unable to verbalize and/or demonstrate understanding of education provided. Will continue education. [x]  Barriers to learning: none    PLAN:  Treatment Recommendations: Strengthening, Range of Motion, Manual Therapy - Soft Tissue Mobilization, Home Exercise Program, and Self-Care Education and Training    [x]  Plan of care initiated. Plan to see patient 2 times per week for 8 weeks to address the treatment planned outlined above.   []  Continue with current plan of care  []  Modify plan of care as follows:    []  Hold pending physician visit  []  Discharge    Time In 1515   Time Out 1600   Timed Code Minutes: 10 min   Total Treatment Time: 45 min       Electronically Signed by: AIDAN Hernandez/LINH [id number]

## 2022-08-11 ENCOUNTER — APPOINTMENT (OUTPATIENT)
Dept: OCCUPATIONAL THERAPY | Age: 82
End: 2022-08-11
Payer: MEDICARE

## 2022-08-17 ENCOUNTER — HOSPITAL ENCOUNTER (OUTPATIENT)
Dept: OCCUPATIONAL THERAPY | Age: 82
Setting detail: THERAPIES SERIES
End: 2022-08-17
Payer: MEDICARE

## 2022-08-19 ENCOUNTER — HOSPITAL ENCOUNTER (OUTPATIENT)
Dept: OCCUPATIONAL THERAPY | Age: 82
Setting detail: THERAPIES SERIES
Discharge: HOME | End: 2022-08-19
Payer: MEDICARE

## 2022-08-19 PROCEDURE — 97110 THERAPEUTIC EXERCISES: CPT

## 2022-08-19 PROCEDURE — 97035 APP MDLTY 1+ULTRASOUND EA 15: CPT

## 2022-08-19 NOTE — PROGRESS NOTES
[address] THERAPY  [] EVALUATION  [x] DAILY NOTE (LAND) [] DAILY NOTE (AQUATIC ) [] PROGRESS NOTE [] DISCHARGE NOTE    [x] OUTPATIENT REHABILITATION [facility]   [] [place] [address]    [] [facility][1]   [] Pam Crimes    Date: 2022  Patient Name:  Zeny Cruz  : 1940  MRN: [id number]  CSN: [id number]    Referring Practitioner Ed DO Yessica   Diagnosis Pain in right shoulder [M25.511]    Treatment Diagnosis Decreased    Date of Evaluation 22      Functional Outcome Measure Used UEFS   Functional Outcome Score 41/80 (22)       Insurance: Primary: Payor: Stephanie Hardin /  /  / ,   Secondary: [facility]   Authorization Information: No limit - based on medical necessity   Visit # 2, 2/10 for progress note   Visits Allowed:    Recertification Date: Oct. 4, 2022   Physician Follow-Up: Does not currently have an appointment set   Physician Orders: Eval and treat   Pertinent History: 2 weeks ago, pt. Was gardening in her raised garden bed and fell, hit her head and states she had to lay on the ground because she could not get her arms to work to get up. Pt's daughter took her to urgent care and did xrays of left shoulder - xrays were negative for any fractures. PtJuancho Zapien Rank back to urgent care a week later because her right shoulder was hurting so bad - xrays of right shoulder were negative as well. PtJuancho Zapien Rank to her family doctor for follow up and was referred to therapy for right shoulder pain. Pt. States both shoulders are hurting but her right hurts worse. PMH includes chronic back pain, chronic pain syndrome     SUBJECTIVE: Patient reports she took a friend to a hair appointment on Wednesday, this friend has a walker and she feels she strained her left shoulder lifting it. Relates right should is \"not too bad\" today. HEP is going well with no concerns or questions. TREATMENT   Precautions: Pt.  Has chronic back pain, unsteady without her walking stick    Pain: No pain at rest in R shoulder, left arm is sore today 6/10     X in shaded column indicates Activity Completed Today   Modalities Parameters/  Location  Notes/Comments   Ultrasound to R upper trap 100% MHz 1.2 w/CM2 x8 minutes  X                Manual Therapy Time/  Technique  Notes/Comments   IASTM to bilateral upper traps, R deltoid, scap border, bicep, pec minor to decrease tightness  X                Exercises   Sets/  Sec Reps  Notes/Comments   Supine PROM to R shoulder all motions to tolerance    X Mild tightness in end ranges but overall Good ROM. Seated scapular depression/retraction  1 10 X    Supine chest press 1 10 X    Supine circles Cw and Ccw  1 10 X    Supine ER with elbow at side 1 10 X                  Activities Time    Notes/Comments                       Specific Interventions Next Treatment: Ultrasound to right upper trap, upper trap/levator stretching, anterior shoulder/chest stretching,ROM RUE, periscapular strengthening, STM to tight musculature, trial kinesiotape    Activity/Treatment Tolerance:  [x]  Patient tolerated treatment well  []  Patient limited by fatigue  []  Patient limited by pain   []  Patient limited by other medical complications  []  Other:     Assessment: Patient is progressing towards goals. ROM in R shoulder is improving. Initiated scapular strengthening today with no complaints.    Areas for Improvement: impaired ROM, impaired strength, and pain  Prognosis: good    GOALS:  Patient Goal: to decrease pain in shoulders, especially right shoulder to be able to complete cleaning and lift household items with RUE    Short Term Goals:  Time Frame: 4 weeks  *  Pt. will demo improved AROM right shoulder to flexion= 140, abd= 140, IR to thumb tip 1\" above waistline, and ER= 70 for ease with donning and doffing shirts  *  Pt. will report decreased pain with active use of right shoulder to no greater than 3/10 for ability to complete housework  *  Patient will demonstrate equal scapular posture for improved mechanics when lifting. *  Pt. will demo independence with HEP for right shoulder for improved motion and decreased pain for dressing self and completing needed housework    Long Term Goals:  Time Frame: 8 weeks  *  Patient will report dressing and bathing with no greater than minimal pain increase. *  Pt. will be able to don shirt and wash back using Right UE without pain in shoulder  *  Pt. will demo 4/5 MMT right shoulder for independence with exercise routine     Patient Education:   []  HEP/Education Completed: Plan of Care, Goals, posture  Medbridge for HEP: Scapular pinches with depression with back against wall, wall slides with arms in neutral, corner stretch, upper trap stretch, levator scap stretch - handouts given  []  No new Education completed  [x]  Reviewed Prior HEP      [x]  Patient verbalized and/or demonstrated understanding of education provided. []  Patient unable to verbalize and/or demonstrate understanding of education provided. Will continue education. [x]  Barriers to learning: none    PLAN:  Treatment Recommendations: Strengthening, Range of Motion, Manual Therapy - Soft Tissue Mobilization, Home Exercise Program, and Self-Care Education and Training    []  Plan of care initiated. Plan to see patient 2 times per week for 8 weeks to address the treatment planned outlined above.   [x]  Continue with current plan of care  []  Modify plan of care as follows:    []  Hold pending physician visit  []  Discharge    Time In 1015   Time Out 1100   Timed Code Minutes: 45 min   Total Treatment Time: 45 min       GUADALUPE GALLO/LINH #[id number]

## 2022-08-24 ENCOUNTER — HOSPITAL ENCOUNTER (OUTPATIENT)
Dept: OCCUPATIONAL THERAPY | Age: 82
Setting detail: THERAPIES SERIES
Discharge: HOME | End: 2022-08-24
Payer: MEDICARE

## 2022-08-24 PROCEDURE — 97140 MANUAL THERAPY 1/> REGIONS: CPT

## 2022-08-24 PROCEDURE — 97110 THERAPEUTIC EXERCISES: CPT

## 2022-08-24 NOTE — PROGRESS NOTES
[address] THER[address]Y  [] EVALUATION  [x] DAILY NOTE (LAND) [] DAILY NOTE (AQUATIC ) [] PROGRESS NOTE [] DISCHARGE NOTE    [x] OUTPATIENT REHABILITATION [facility]   [] [place] [address]    [] [facility]   [] Siria Cervantes    Date: 2022  Patient Name:  Jose Jauregui  : 1940  MRN: [id number]  CSN: [id number]    Referring Practitioner Violet Angela DO   Diagnosis Pain in right shoulder [M25.511]    Treatment Diagnosis Decreased    Date of Evaluation 22      Functional Outcome Measure Used UEFS   Functional Outcome Score 41/80 (22)       Insurance: Primary: Payor: Nicole Tristan /  /  / ,   Secondary: [facility]   Authorization Information: No limit - based on medical necessity   Visit # 3, 3/10 for progress note   Visits Allowed:    Recertification Date: Oct. 4, 2022   Physician Follow-Up: Does not currently have an appointment set   Physician Orders: Eval and treat   Pertinent History: 2 weeks ago, pt. Was gardening in her raised garden bed and fell, hit her head and states she had to lay on the ground because she could not get her arms to work to get up. Pt's daughter took her to urgent care and did xrays of left shoulder - xrays were negative for any fractures. PtJuancho Mohamud back to urgent care a week later because her right shoulder was hurting so bad - xrays of right shoulder were negative as well. Pt. Christine Mohamud to her family doctor for follow up and was referred to therapy for right shoulder pain. Pt. States both shoulders are hurting but her right hurts worse. PMH includes chronic back pain, chronic pain syndrome     SUBJECTIVE: Patient reports that's starting yesterday, everything hurts. States pain was so back she took a pain pill. Does not know why she is hurting so bad. Pt. States her left shoulder is more sore today than her right. TREATMENT   Precautions: Pt.  Has chronic back pain, unsteady without her walking stick Pain: No pain at rest in R shoulder, left arm is sore today 6/10     X in shaded column indicates Activity Completed Today   Modalities Parameters/  Location  Notes/Comments   Ultrasound to R upper trap 100% MHz 1.2 w/CM2 x8 minutes      MHP to bilateral shoulders at start of session due to reports of generalized pain completing side to side ER with dowel gentle stretching, and scap pinches backward 10 min. x          Manual Therapy Time/  Technique  Notes/Comments   Manual STM to bilateral upper traps, to decrease tightness  x    While seated in chair, placed ball between pt's shoulder blades and applied pressure on anterior shoulders pulling back to provide stretch to anterior shoulders and chest  x Very rounded posture                     Exercises   Sets/  Sec Reps  Notes/Comments   Supine PROM to R shoulder all motions to tolerance     Mild tightness in end ranges but overall Good ROM. Seated scapular depression/retraction  1 10  Done while on MH   Supine chest press with dowel, then SA punches while holding dowel with BUE 1 10 ea x    Supine circles Cw and Ccw BUE 1 10 x    Supine ER with elbow at side 1 10  Done while on [facility]   Supine cross body stretch by pt.  BUE 10 sec holds 5 ea side x    Seated BUE ER with 1# in each hand 1 10 x                        Activities Time    Notes/Comments   Applied kinesiotape to bilateral shoulders in 2 I strips anterior to posterior for mechanical correction  x Trial to see if this helps to reduce pain                 Specific Interventions Next Treatment: Ultrasound to right upper trap, upper trap/levator stretching, anterior shoulder/chest stretching,ROM RUE, periscapular strengthening, STM to tight musculature, trial kinesiotape    Activity/Treatment Tolerance:  []  Patient tolerated treatment well  []  Patient limited by fatigue  [x]  Patient limited by pain   []  Patient limited by other medical complications  []  Other:     Assessment: Patient is progressing towards goals slowly. Reporting diffuse pain throughout entire body today. Trialing kinesiotape to both shoulders    Areas for Improvement: impaired ROM, impaired strength, and pain  Prognosis: good    GOALS:  Patient Goal: to decrease pain in shoulders, especially right shoulder to be able to complete cleaning and lift household items with RUE    Short Term Goals:  Time Frame: 4 weeks  *  Pt. will demo improved AROM right shoulder to flexion= 140, abd= 140, IR to thumb tip 1\" above waistline, and ER= 70 for ease with donning and doffing shirts  *  Pt. will report decreased pain with active use of right shoulder to no greater than 3/10 for ability to complete housework  *  Patient will demonstrate equal scapular posture for improved mechanics when lifting. *  Pt. will demo independence with HEP for right shoulder for improved motion and decreased pain for dressing self and completing needed housework    Long Term Goals:  Time Frame: 8 weeks  *  Patient will report dressing and bathing with no greater than minimal pain increase. *  Pt. will be able to don shirt and wash back using Right UE without pain in shoulder  *  Pt. will demo 4/5 MMT right shoulder for independence with exercise routine     Patient Education:   []  HEP/Education Completed: Plan of Care, Goals, posture  Medbridge for HEP: Scapular pinches with depression with back against wall, wall slides with arms in neutral, corner stretch, upper trap stretch, levator scap stretch - handouts given  []  No new Education completed  [x]  Reviewed Prior HEP and educated on kinesiotape      [x]  Patient verbalized and/or demonstrated understanding of education provided. []  Patient unable to verbalize and/or demonstrate understanding of education provided. Will continue education.   [x]  Barriers to learning: none    PLAN:  Treatment Recommendations: Strengthening, Range of Motion, Manual Therapy - Soft Tissue Mobilization, Home Exercise Program, and Self-Care Education and Training    []  Plan of care initiated. Plan to see patient 2 times per week for 8 weeks to address the treatment planned outlined above.   [x]  Continue with current plan of care  []  Modify plan of care as follows:    []  Hold pending physician visit  []  Discharge    Time In 0930   Time Out 1015   Timed Code Minutes: 45 min   Total Treatment Time: 45 min       Arm[place] 70, OTR/L [id number]

## 2022-08-26 ENCOUNTER — HOSPITAL ENCOUNTER (OUTPATIENT)
Dept: OCCUPATIONAL THERAPY | Age: 82
Setting detail: THERAPIES SERIES
Discharge: HOME | End: 2022-08-26
Payer: MEDICARE

## 2022-08-26 PROCEDURE — 97140 MANUAL THERAPY 1/> REGIONS: CPT

## 2022-08-26 PROCEDURE — 97110 THERAPEUTIC EXERCISES: CPT

## 2022-08-26 NOTE — PROGRESS NOTES
[address]  [] EVALUATION  [x] DAILY NOTE (LAND) [] DAILY NOTE (AQUATIC ) [] PROGRESS NOTE [] DISCHARGE NOTE    [x] OUTPATIENT REHABILITATION CENTER [facility]   [] [place] [address]    [] [facility]   [] Ibrahima Koehler    Date: 2022  Patient Name:  Amol Lira  : 1940  MRN: [id number]  CSN: [id number]    Referring Practitioner Andre Alvarez DO   Diagnosis Pain in right shoulder [M25.511]    Treatment Diagnosis Decreased    Date of Evaluation 22      Functional Outcome Measure Used UEFS   Functional Outcome Score 41/80 (22)       Insurance: Primary: Payor: Samina Ferreira /  /  / ,   Secondary: Danuta Torrez   Authorization Information: No limit - based on medical necessity   Visit # 4, 4/10 for progress note   Visits Allowed:    Recertification Date: Oct. 4, 2022   Physician Follow-Up: Does not currently have an appointment set   Physician Orders: Eval and treat   Pertinent History: 2 weeks ago, pt. Was gardening in her raised garden bed and fell, hit her head and states she had to lay on the ground because she could not get her arms to work to get up. Pt's daughter took her to urgent care and did xrays of left shoulder - xrays were negative for any fractures. Pt. Heriberto Delvalle back to urgent care a week later because her right shoulder was hurting so bad - xrays of right shoulder were negative as well. Pt. Heriberto Delvalle to her family doctor for follow up and was referred to therapy for right shoulder pain. Pt. States both shoulders are hurting but her right hurts worse. PMH includes chronic back pain, chronic pain syndrome     SUBJECTIVE: Patient reports that shoulders are not as painful as last session. Feels that the kinesiotape has helped. States she was able to vacuum 1 room at home. TREATMENT   Precautions: Pt.  Has chronic back pain, unsteady without her walking stick    Pain: 2/10 pain in both shoulders today     X in shaded Assessment: Patient is progressing towards goals slowly - reported less pain in shoulders today. Tolerated gentle isotonic strength in supine   Areas for Improvement: impaired ROM, impaired strength, and pain  Prognosis: good    GOALS:  Patient Goal: to decrease pain in shoulders, especially right shoulder to be able to complete cleaning and lift household items with RUE    Short Term Goals:  Time Frame: 4 weeks  *  Pt. will demo improved AROM right shoulder to flexion= 140, abd= 140, IR to thumb tip 1\" above waistline, and ER= 70 for ease with donning and doffing shirts  *  Pt. will report decreased pain with active use of right shoulder to no greater than 3/10 for ability to complete housework  *  Patient will demonstrate equal scapular posture for improved mechanics when lifting. *  Pt. will demo independence with HEP for right shoulder for improved motion and decreased pain for dressing self and completing needed housework    Long Term Goals:  Time Frame: 8 weeks  *  Patient will report dressing and bathing with no greater than minimal pain increase. *  Pt. will be able to don shirt and wash back using Right UE without pain in shoulder  *  Pt. will demo 4/5 MMT right shoulder for independence with exercise routine     Patient Education:   []  HEP/Education Completed: Plan of Care, Goals, posture  Medbridge for HEP: Scapular pinches with depression with back against wall, wall slides with arms in neutral, corner stretch, upper trap stretch, levator scap stretch - handouts given  []  No new Education completed  [x]  Reviewed Prior HEP      [x]  Patient verbalized and/or demonstrated understanding of education provided. []  Patient unable to verbalize and/or demonstrate understanding of education provided. Will continue education.   [x]  Barriers to learning: none    PLAN:  Treatment Recommendations: Strengthening, Range of Motion, Manual Therapy - Soft Tissue Mobilization, Home Exercise Program, and Self-Care Education and Training    []  Plan of care initiated. Plan to see patient 2 times per week for 8 weeks to address the treatment planned outlined above.   [x]  Continue with current plan of care  []  Modify plan of care as follows:    []  Hold pending physician visit  []  Discharge    Time In 1030   Time Out 1111   Timed Code Minutes: 41 min   Total Treatment Time: 41 min       Chacho Orta, OTR/L [phone]

## 2022-08-31 ENCOUNTER — HOSPITAL ENCOUNTER (OUTPATIENT)
Dept: OCCUPATIONAL THERAPY | Age: 82
Setting detail: THERAPIES SERIES
Discharge: HOME | End: 2022-08-31
Payer: MEDICARE

## 2022-08-31 PROCEDURE — 97110 THERAPEUTIC EXERCISES: CPT

## 2022-08-31 NOTE — PROGRESS NOTES
[address] THERAP[address]  [] EVALUATION  [x] DAILY NOTE (LAND) [] DAILY NOTE (AQUATIC ) [] PROGRESS NOTE [] DISCHARGE NOTE    [x] OUTPATIENT REHABILITATION [facility]   [] [place] [address]    [] [facility]   [] Luz Citizen    Date: 2022  Patient Name:  Marlyn Myers  : 1940  MRN: [id number]  CSN: [id number]    Referring Practitioner Fabio Koyanagi, DO   Diagnosis Pain in right shoulder [M25.511]    Treatment Diagnosis Decreased    Date of Evaluation 22      Functional Outcome Measure Used UEFS   Functional Outcome Score 41/80 (22)       Insurance: Primary: Payor: Ingrid Osullivan /  /  / ,   Secondary: Travis Lucio   Authorization Information: No limit - based on medical necessity   Visit # 5, 5/10 for progress note   Visits Allowed:    Recertification Date: Oct. 4, 2022   Physician Follow-Up: Does not currently have an appointment set   Physician Orders: Eval and treat   Pertinent History: 2 weeks ago, pt. Was gardening in her raised garden bed and fell, hit her head and states she had to lay on the ground because she could not get her arms to work to get up. Pt's daughter took her to urgent care and did xrays of left shoulder - xrays were negative for any fractures. Pt. Alice Sandoval back to urgent care a week later because her right shoulder was hurting so bad - xrays of right shoulder were negative as well. Pt. Alice Sandoval to her family doctor for follow up and was referred to therapy for right shoulder pain. Pt. States both shoulders are hurting but her right hurts worse. PMH includes chronic back pain, chronic pain syndrome     SUBJECTIVE: Patient reports that shoulders are not too painful this morning but states she has not done much yet today. Pt. States her left shoulder is what is consistently bothering her    TREATMENT   Precautions: Pt.  Has chronic back pain, unsteady without her walking stick    Pain: 5/10 pain in both shoulders pain in left shoulder worse than right the past 3 visits. Working on ROM and periscapular strengthening. Schedule for 2 more weeks. Explained to pt. That if she is not noticing an improvement after 4 weeks of therapy, would recommend follow up visit with her doctor for possible more testing. Areas for Improvement: impaired ROM, strength, pain  Prognosis: good    GOALS:  Patient Goal: to decrease pain in shoulders, especially right shoulder to be able to complete cleaning and lift household items with RUE    Short Term Goals:  Time Frame: 4 weeks  *  Pt. will demo improved AROM right shoulder to flexion= 140, abd= 140, IR to thumb tip 1\" above waistline, and ER= 70 for ease with donning and doffing shirts  *  Pt. will report decreased pain with active use of right shoulder to no greater than 3/10 for ability to complete housework  *  Patient will demonstrate equal scapular posture for improved mechanics when lifting. *  Pt. will demo independence with HEP for right shoulder for improved motion and decreased pain for dressing self and completing needed housework    Long Term Goals:  Time Frame: 8 weeks  *  Patient will report dressing and bathing with no greater than minimal pain increase. *  Pt. will be able to don shirt and wash back using Right UE without pain in shoulder  *  Pt. will demo 4/5 MMT right shoulder for independence with exercise routine     Patient Education:   []  HEP/Education Completed: Plan of Care, Goals, posture  Medbridge for HEP: Scapular pinches with depression with back against wall, wall slides with arms in neutral, corner stretch, upper trap stretch, levator scap stretch - handouts given  []  No new Education completed  [x]  Reviewed Prior HEP - pt. Admits she is not completing this consistently      [x]  Patient verbalized and/or demonstrated understanding of education provided. []  Patient unable to verbalize and/or demonstrate understanding of education provided.   Will continue education. [x]  Barriers to learning: none    PLAN:  Treatment Recommendations: Strengthening, Range of Motion, Manual Therapy - Soft Tissue Mobilization, Home Exercise Program, and Self-Care Education and Training    []  Plan of care initiated. Plan to see patient 2 times per week for 8 weeks to address the treatment planned outlined above.   [x]  Continue with current plan of care  []  Modify plan of care as follows:    []  Hold pending physician visit  []  Discharge    Time In 0930   Time Out 1015   Timed Code Minutes: 45 min   Total Treatment Time: 45 min       Davis Palafox OTR/L [phone]

## 2022-09-02 ENCOUNTER — HOSPITAL ENCOUNTER (OUTPATIENT)
Dept: OCCUPATIONAL THERAPY | Age: 82
Setting detail: THERAPIES SERIES
Discharge: HOME | End: 2022-09-02
Payer: MEDICARE

## 2022-09-02 PROCEDURE — 97110 THERAPEUTIC EXERCISES: CPT

## 2022-09-02 NOTE — PROGRESS NOTES
[address] THER[address]  [] EVALUATION  [x] DAILY NOTE (LAND) [] DAILY NOTE (AQUATIC ) [] PROGRESS NOTE [] DISCHARGE NOTE    [x] OUTPATIENT REHABILITATION CENTER [facility]   [] [place] [address]    [] [facility]   [] Jordin Cook    Date: 2022  Patient Name:  Piper Ji  : 1940  MRN: [id number]  CSN: [id number]    Referring Practitioner Jewel Delvalle, DO   Diagnosis Other intervertebral disc degeneration, lumbar region [M51.36]    Treatment Diagnosis Decreased    Date of Evaluation 22      Functional Outcome Measure Used UEFS   Functional Outcome Score 41/80 (22)       Insurance: Primary: Payor: Pawel Davis /  /  / ,   Secondary: [facility]   Authorization Information: No limit - based on medical necessity   Visit # 6, 10 for progress note   Visits Allowed:    Recertification Date: Oct. 4, 2022   Physician Follow-Up: Does not currently have an appointment set   Physician Orders: Eval and treat   Pertinent History: 2 weeks ago, pt. Was gardening in her raised garden bed and fell, hit her head and states she had to lay on the ground because she could not get her arms to work to get up. Pt's daughter took her to urgent care and did xrays of left shoulder - xrays were negative for any fractures. Pt. Verlinda Canavan back to urgent care a week later because her right shoulder was hurting so bad - xrays of right shoulder were negative as well. PtJuancho Lopezn to her family doctor for follow up and was referred to therapy for right shoulder pain. Pt. States both shoulders are hurting but her right hurts worse. PMH includes chronic back pain, chronic pain syndrome     SUBJECTIVE: Patient reports that left shoulder was so sore last night that she could not sleep on her left side. States she thinks it may be due to having sat at a soccer game yesterday with limited back support    TREATMENT   Precautions: Pt.  Has chronic back pain, unsteady without her walking stick    Pain: 5/10 pain in both shoulders today     X in shaded column indicates Activity Completed Today   Modalities Parameters/  Location  Notes/Comments   Ultrasound to R upper trap 100% MHz 1.2 w/CM2 x8 minutes      MHP to bilateral shoulders at start of session, completing ER side to side with dowel 10 min. xx Supine with bolster under knees         Manual Therapy Time/  Technique  Notes/Comments   Manual STM to left upper trap, to decrease tightness  xx Very tender; knotty musculature   While seated in chair, placed ball between pt's shoulder blades and applied pressure on anterior shoulders pulling back to provide stretch to anterior shoulders and chest   Very rounded posture                     Exercises   Sets/  Sec Reps  Notes/Comments   Supine PROM to R shoulder all motions to tolerance     Mild tightness in end ranges but overall Good ROM. Bilateral upper trap stretch 10 sec 3 ea side xx    Seated scapular depression/retraction  1 10 xx    Supine chest press with dowel, then SA punches while holding dowel with BUE 1 10 ea xx    Supine circles Cw and Ccw BUE with 1# each hand 1 10 xx    Supine ER with elbow at side using dowel 1 10  Done while on MHP today   Supine cross body stretch by pt. BUE 10 sec holds 5 ea side xx    Supine Riivald peach band, shoulder extension down to mat from 90 1 10 ea xx    Seated BUE ER with peach band 1 10 xx                        Activities Time    Notes/Comments   Re-Applied kinesiotape to bilateral shoulders in 2 I strips anterior to posterior for mechanical correction  xx Pt.  Feels tape is helpful to reduce pain in shoulders                 Specific Interventions Next Treatment: Ultrasound to right upper trap, upper trap/levator stretching, anterior shoulder/chest stretching,ROM RUE, periscapular strengthening, STM to tight musculature, trial kinesiotape    Activity/Treatment Tolerance:  [x]  Patient tolerated treatment well  []  Patient limited by fatigue  []  Patient limited by pain   []  Patient limited by other medical complications  []  Other:     Assessment: Patient progressing slowly - still reports pain in shoulders. Upon applying kinesiotape, it is evident that pt. Has scoliosis which is contributing to scapular displacement   Areas for Improvement: impaired ROM, strength, pain  Prognosis: good    GOALS:  Patient Goal: to decrease pain in shoulders, especially right shoulder to be able to complete cleaning and lift household items with RUE    Short Term Goals:  Time Frame: 4 weeks  *  Pt. will demo improved AROM right shoulder to flexion= 140, abd= 140, IR to thumb tip 1\" above waistline, and ER= 70 for ease with donning and doffing shirts  *  Pt. will report decreased pain with active use of right shoulder to no greater than 3/10 for ability to complete housework  *  Patient will demonstrate equal scapular posture for improved mechanics when lifting. *  Pt. will demo independence with HEP for right shoulder for improved motion and decreased pain for dressing self and completing needed housework    Long Term Goals:  Time Frame: 8 weeks  *  Patient will report dressing and bathing with no greater than minimal pain increase. *  Pt. will be able to don shirt and wash back using Right UE without pain in shoulder  *  Pt. will demo 4/5 MMT right shoulder for independence with exercise routine     Patient Education:   []  HEP/Education Completed: Plan of Care, Goals, posture  Medbridge for HEP: Scapular pinches with depression with back against wall, wall slides with arms in neutral, corner stretch, upper trap stretch, levator scap stretch - handouts given  [x]  No new Education completed  []  Reviewed Prior HEP - pt. Admits she is not completing this consistently      []  Patient verbalized and/or demonstrated understanding of education provided. []  Patient unable to verbalize and/or demonstrate understanding of education provided.   Will continue education. [x]  Barriers to learning: none    PLAN:  Treatment Recommendations: Strengthening, Range of Motion, Manual Therapy - Soft Tissue Mobilization, Home Exercise Program, and Self-Care Education and Training    []  Plan of care initiated. Plan to see patient 2 times per week for 8 weeks to address the treatment planned outlined above.   [x]  Continue with current plan of care  []  Modify plan of care as follows:    []  Hold pending physician visit  []  Discharge    Time In 0945   Time Out 1030   Timed Code Minutes: 45 min   Total Treatment Time: 45 min       Arminda 70, OTR/L [phone]

## 2022-09-06 ENCOUNTER — HOSPITAL ENCOUNTER (OUTPATIENT)
Dept: OCCUPATIONAL THERAPY | Age: 82
Setting detail: THERAPIES SERIES
Discharge: HOME | End: 2022-09-06
Payer: MEDICARE

## 2022-09-06 PROCEDURE — 97110 THERAPEUTIC EXERCISES: CPT

## 2022-09-06 NOTE — PROGRESS NOTES
[address] THER[address]  [] EVALUATION  [x] DAILY NOTE (LAND) [] DAILY NOTE (AQUATIC )   [] PROGRESS NOTE [] DISCHARGE NOTE    [x] OUTPATIENT REHABILITATION CENTER [facility]   [] [place] [address]    [] [facility]   [] Jess Sky    Date: 2022  Patient Name:  Melo Hurley  : 1940  MRN: [id number]  CSN: [id number]    Referring Practitioner Nataly Gustafson DO   Diagnosis Other intervertebral disc degeneration, lumbar region [M51.36]    Treatment Diagnosis Decreased    Date of Evaluation 22      Functional Outcome Measure Used UEFS   Functional Outcome Score 41/80 (22)       Insurance: Primary: Payor: Garret Nye /  /  / ,   Secondary: Lidya Horton Information: No limit - based on medical necessity   Visit # 7, 7/10 for progress note   Visits Allowed:    Recertification Date: Oct. 4, 2022   Physician Follow-Up: Does not currently have an appointment set   Physician Orders: Eval and treat   Pertinent History: 2 weeks ago, pt. Was gardening in her raised garden bed and fell, hit her head and states she had to lay on the ground because she could not get her arms to work to get up. Pt's daughter took her to urgent care and did xrays of left shoulder - xrays were negative for any fractures. Pt. Evaline Bullion back to urgent care a week later because her right shoulder was hurting so bad - xrays of right shoulder were negative as well. Pt. Shanika Thorneion to her family doctor for follow up and was referred to therapy for right shoulder pain. Pt. States both shoulders are hurting but her right hurts worse. PMH includes chronic back pain, chronic pain syndrome     SUBJECTIVE: Patient reports she is noticing decreased pain in both shoulders. Continues to have difficulty sleeping, cannot lay on the left shoulder. TREATMENT   Precautions: Pt.  Has chronic back pain, unsteady without her walking stick    Pain: /10 in L shoulder, pain is worse in the evening/night time. X in shaded column indicates Activity Completed Today   Modalities Parameters/  Location  Notes/Comments   Ultrasound to R upper trap 100% MHz 1.2 w/CM2 x8 minutes      MHP to bilateral shoulders at start of session, completing ER side to side with dowel 15 min. xx Supine with bolster under knees         Manual Therapy Time/  Technique  Notes/Comments   Manual STM to bilateral upper trap, to decrease tightness  xx Very tender; knotty musculature   While seated in chair, placed ball between pt's shoulder blades and applied pressure on anterior shoulders pulling back to provide stretch to anterior shoulders and chest   Very rounded posture                     Exercises   Sets/  Sec Reps  Notes/Comments   Supine PROM to bilateral shoulders all motions to tolerance    xx Mild tightness in end ranges but overall Good ROM. Bilateral upper trap stretch 10 sec 3 ea side xx    Seated scapular depression/retraction  1 10 xx    Supine chest press with dowel, then SA punches while holding dowel with BUE 1 10 ea     Supine circles Cw and Ccw, chest press, scapular punch  1 10 ea xx 1# bilaterally - discomfort in L shoulder. Supine ER with elbow at side using dowel 1 10  Done while on MHP today   Supine cross body stretch by pt. BUE 10 sec holds 5 ea side     Supine Riivald peach band, shoulder extension down to mat from 90 1 10 ea xx    Seated BUE ER with peach band 1 10 xx Discomfort in R shoulder   Seated B horiztonal abduction peach band 1 10 xx Fatigue noted last 3 reps                Activities Time    Notes/Comments   Re-Applied kinesiotape to bilateral shoulders in 2 I strips anterior to posterior for mechanical correction   Pt.  Feels tape is helpful to reduce pain in shoulders                 Specific Interventions Next Treatment: Ultrasound to right upper trap, upper trap/levator stretching, anterior shoulder/chest stretching,ROM RUE, periscapular strengthening, STM to tight musculature, trial kinesiotape    Activity/Treatment Tolerance:  [x]  Patient tolerated treatment well  []  Patient limited by fatigue  []  Patient limited by pain   []  Patient limited by other medical complications  []  Other:     Assessment: Patient progressing slowly - still reports pain in shoulders. Slight progression with strengthening today, tolerated well. Plan to add to HEP next session if tolerated well. Areas for Improvement: impaired ROM, strength, pain  Prognosis: good    GOALS:  Patient Goal: to decrease pain in shoulders, especially right shoulder to be able to complete cleaning and lift household items with RUE    Short Term Goals:  Time Frame: 4 weeks  *  Pt. will demo improved AROM right shoulder to flexion= 140, abd= 140, IR to thumb tip 1\" above waistline, and ER= 70 for ease with donning and doffing shirts  *  Pt. will report decreased pain with active use of right shoulder to no greater than 3/10 for ability to complete housework  *  Patient will demonstrate equal scapular posture for improved mechanics when lifting. *  Pt. will demo independence with HEP for right shoulder for improved motion and decreased pain for dressing self and completing needed housework    Long Term Goals:  Time Frame: 8 weeks  *  Patient will report dressing and bathing with no greater than minimal pain increase. *  Pt. will be able to don shirt and wash back using Right UE without pain in shoulder  *  Pt. will demo 4/5 MMT right shoulder for independence with exercise routine     Patient Education:   []  HEP/Education Completed: Plan of Care, Goals, posture  Medbridge for HEP: Scapular pinches with depression with back against wall, wall slides with arms in neutral, corner stretch, upper trap stretch, levator scap stretch - handouts given  [x]  No new Education completed  []  Reviewed Prior HEP - pt.  Admits she is not completing this consistently      []  Patient verbalized and/or demonstrated understanding of education provided. []  Patient unable to verbalize and/or demonstrate understanding of education provided. Will continue education. [x]  Barriers to learning: none    PLAN:  Treatment Recommendations: Strengthening, Range of Motion, Manual Therapy - Soft Tissue Mobilization, Home Exercise Program, and Self-Care Education and Training    []  Plan of care initiated. Plan to see patient 2 times per week for 8 weeks to address the treatment planned outlined above.   [x]  Continue with current plan of care  []  Modify plan of care as follows:    []  Hold pending physician visit  []  Discharge    Time In 1100   Time Out 1145   Timed Code Minutes: 45 min   Total Treatment Time: 45 min       GUADALUPE GALLO/LINH #[id number]

## 2022-09-12 DIAGNOSIS — M41.9 SCOLIOSIS OF THORACOLUMBAR SPINE, UNSPECIFIED SCOLIOSIS TYPE: ICD-10-CM

## 2022-09-12 DIAGNOSIS — M54.50 CHRONIC BILATERAL LOW BACK PAIN WITHOUT SCIATICA: ICD-10-CM

## 2022-09-12 DIAGNOSIS — G89.29 CHRONIC BILATERAL LOW BACK PAIN WITHOUT SCIATICA: ICD-10-CM

## 2022-09-12 DIAGNOSIS — G89.4 CHRONIC PAIN SYNDROME: ICD-10-CM

## 2022-09-12 DIAGNOSIS — M53.3 SI (SACROILIAC) PAIN: ICD-10-CM

## 2022-09-12 DIAGNOSIS — Z98.890 HISTORY OF LUMBAR LAMINECTOMY: ICD-10-CM

## 2022-09-12 DIAGNOSIS — M47.816 LUMBAR SPONDYLOSIS: ICD-10-CM

## 2022-09-12 RX ORDER — HYDROCODONE BITARTRATE AND ACETAMINOPHEN 5; 325 MG/1; MG/1
.5-1 TABLET ORAL 2 TIMES DAILY PRN
Qty: 30 TABLET | Refills: 0 | Status: SHIPPED | OUTPATIENT
Start: 2022-09-12 | End: 2022-10-12

## 2022-09-12 NOTE — TELEPHONE ENCOUNTER
Ivone Molina called requesting a refill on the following medications:  Requested Prescriptions     Pending Prescriptions Disp Refills    HYDROcodone-acetaminophen (NORCO) 5-325 MG per tablet 28 tablet 0     Sig: Take 0.5-1 tablets by mouth 2 times daily as needed for Pain for up to 30 days.  Take lowest dose possible to manage pain     Pharmacy verified:  [email or web address]      Date of last visit: 08-08-22  Date of next visit (if applicable): 09/5/9435

## 2022-09-14 ENCOUNTER — HOSPITAL ENCOUNTER (OUTPATIENT)
Dept: OCCUPATIONAL THERAPY | Age: 82
Setting detail: THERAPIES SERIES
Discharge: HOME | End: 2022-09-14
Payer: MEDICARE

## 2022-09-14 PROCEDURE — 97110 THERAPEUTIC EXERCISES: CPT

## 2022-09-14 NOTE — PROGRESS NOTES
[address] THERAPY  [] EVALUATION  [] DAILY NOTE (LAND) [] DAILY NOTE (AQUATIC )   [x] PROGRESS NOTE [] DISCHARGE NOTE    [x] [facility] - [place]   [] [place] [address]    [] [facility]   [] Pam Crimes    Date: 2022  Patient Name:  Zeny Cruz  : 1940  MRN: [id number]  CSN: [id number]    Referring Practitioner Ed Abt, DO   Diagnosis Other intervertebral disc degeneration, lumbar region [M51.36]    Treatment Diagnosis Decreased    Date of Evaluation 22      Functional Outcome Measure Used UEFS   Functional Outcome Score 41/80 (22)       Insurance: Primary: Payor: Stephanie Hardin /  /  / ,   Secondary: [facility]   Authorization Information: No limit - based on medical necessity   Visit # 8, (progress note22)   Visits Allowed:    Recertification Date: Oct. 4, 2022   Physician Follow-Up: Does not currently have an appointment set   Physician Orders: Eval and treat   Pertinent History: 2 weeks ago, pt. Was gardening in her raised garden bed and fell, hit her head and states she had to lay on the ground because she could not get her arms to work to get up. Pt's daughter took her to urgent care and did xrays of left shoulder - xrays were negative for any fractures. PtJuancho Zapien Rank back to urgent care a week later because her right shoulder was hurting so bad - xrays of right shoulder were negative as well. PtJuancho Zapien Rank to her family doctor for follow up and was referred to therapy for right shoulder pain. Pt. States both shoulders are hurting but her right hurts worse. PMH includes chronic back pain, chronic pain syndrome     SUBJECTIVE: Patient reports she feels her right shoulder is doing better than her left. States it still hurts to push the vacuum and to sweep. TREATMENT   Precautions: Pt.  Has chronic back pain, unsteady without her walking stick    Pain: 3/10 in L shoulder, pain is worse in the tolerated treatment well  []  Patient limited by fatigue  []  Patient limited by pain   []  Patient limited by other medical complications  []  Other:     Assessment: Patient has been seen x1 month in OT for bilateral shoulder pain/OA. Pt. States some days her shoulders feel better, but other days they do not - states the weather impacts her shoulder pain. Pt. States she does feel that her shoulders are improving since starting therapy. Pt. Reports shoulders are not as painful with trying to walk her dog. Also reports she feels that getting arms into shirts is less difficult than 4 weeks ago. Pt. Does demo some improvement in AROM of right shoulder with flexion improving by 11 degrees, abd increase by 8 degrees, ER by 4 degrees, and pt. Now able to get right hand behind her back to her waistband vs. To only her hip. Pt. Is now reporting more pain in her left shoulder compared to her right. There have been minimal to no improvements with left shoulder motion since eval. Pt. Is tolerating gentle periscapular strengthening well.  Recommend continuation of current POC to facilitate functional ROM and strength and decreased pain  Areas for Improvement: impaired ROM, strength, pain  Prognosis: good    GOALS:  Patient Goal: to decrease pain in shoulders, especially right shoulder to be able to complete cleaning and lift household items with RUE    Short Term Goals:  Time Frame: 4 weeks  *  Pt. will demo improved AROM right shoulder to flexion= 140, abd= 140, IR to thumb tip 1\" above waistline, and ER= 70 for ease with donning and doffing shirts - GOAL NOT MET 9/14/22 HOWEVER IMPROVED FROM EVAL MEASUREMENTS WITH ACTIVE RIGHT SHOULDER FLEXION= 136, ABD= 135, IR TO THUMB TIP TO WAISTBAND BEHIND BACK, AND ER= 68  Continue goal    *  Pt. will report decreased pain with active use of right shoulder to no greater than 3/10 for ability to complete housework - GOAL NOT MET 9/14/22 WITH PT. REPORTING THAT THE MORE SHE DOES, THE MORE HER SHOULDERS HURT UP TO 7/10, SO SHE HAS TO STOP AND TAKE REST BREAKS  Continue Goal    *  Patient will demonstrate equal scapular posture for improved mechanics when lifting. - GOAL NOT MET 9/14/22 WITH   CONTINUED SCAPULAR WINGING HOWEVER NOT AS BADLY AS INITIAL EVAL  Continue Goal    *  Pt. will demo independence with HEP for right shoulder for improved motion and decreased pain for dressing self and completing needed housework - GOAL MET 9/14/22 WITH PT. REPORTING  INDEPENDENCE WITH UPPER TRAP AND LEVATOR STRETCHING AND WITH CORNER STRETCH. STATES SHE HAS NOT BEEN DOING THE WALL SLIDES. Continue goal with additions for gentle strengthening    Long Term Goals:  Time Frame: 8 weeks  *  Patient will report dressing and bathing with no greater than minimal pain increase.  - GOAL NOT MET BUT IMPROVING 9/14/22 WITH PT. REPORTING ANYTHING THAT REQUIRES IR BEHIND HER BACK REMAINS PAINFUL  Continue goal     *  Pt. will be able to don shirt and wash back using Right UE without pain in shoulder - GOAL MET 9/14/22 WITH PT. REPORTING THAT RIGHT SHOULDER IS NOW FEELING BETTER BUT LEFT SHOULDER IS STILL HURTING FOR SELF CARE TASKS  Continue Goal      *  Pt. will demo 4/5 MMT right shoulder for independence with exercise routine - GOAL NOT MET 9/14/22 BUT IMPROVING WITH 3+/5 MMT FOR SHOULDER FLEXION AND EXTENSION  Continue Goal     Patient Education:   []  HEP/Education Completed: Plan of Care, Goals, posture  Medbridge for HEP: Scapular pinches with depression with back against wall, wall slides with arms in neutral, corner stretch, upper trap stretch, levator scap stretch - handouts given  []  No new Education completed  [x]  Reviewed Prior HEP and discussed POC and goals today      [x]  Patient verbalized and/or demonstrated understanding of education provided. []  Patient unable to verbalize and/or demonstrate understanding of education provided. Will continue education.   [x]  Barriers to learning: none    PLAN:  Treatment Recommendations: Strengthening, Range of Motion, Manual Therapy - Soft Tissue Mobilization, Home Exercise Program, and Self-Care Education and Training    []  Plan of care initiated. Plan to see patient 2 times per week for 8 weeks to address the treatment planned outlined above.   [x]  Continue with current plan of care  []  Modify plan of care as follows:    []  Hold pending physician visit  []  Discharge    Time In 0915   Time Out 1000   Timed Code Minutes: 45 min   Total Treatment Time: 45 min       Arminda 70, OTR/L [phone]

## 2022-09-16 ENCOUNTER — HOSPITAL ENCOUNTER (OUTPATIENT)
Dept: OCCUPATIONAL THERAPY | Age: 82
Setting detail: THERAPIES SERIES
Discharge: HOME | End: 2022-09-16
Payer: MEDICARE

## 2022-09-16 PROCEDURE — 97110 THERAPEUTIC EXERCISES: CPT

## 2022-09-16 NOTE — PROGRESS NOTES
[address] THERAPY  [] EVALUATION  [x] DAILY NOTE (LAND) [] DAILY NOTE (AQUATIC )   [] PROGRESS NOTE [] DISCHARGE NOTE    [x] OUTPATIENT REHABILITATION CENTER [facility]   [] [place] [address]    [] [facility]   [] [address]    Date: 2022  Patient Name:  Joanne Gutierrez  : 1940  MRN: [id number]  CSN: [id number]    Referring Practitioner Rakan Lenz DO   Diagnosis Other intervertebral disc degeneration, lumbar region [M51.36]    Treatment Diagnosis Decreased    Date of Evaluation 22      Functional Outcome Measure Used UEFS   Functional Outcome Score 41/80 (22)       Insurance: Primary: Payor: [facility] /  /  / ,   Secondary: [facility]   Authorization Information: No limit - based on medical necessity   Visit # 9, 1/10 for PN (progress note22)   Visits Allowed:    Recertification Date: Oct. 4, 2022   Physician Follow-Up: Does not currently have an appointment set   Physician Orders: Eval and treat   Pertinent History: 2 weeks ago, pt. Was gardening in her raised garden bed and fell, hit her head and states she had to lay on the ground because she could not get her arms to work to get up. Pt's daughter took her to urgent care and did xrays of left shoulder - xrays were negative for any fractures. Pt. Rafia Seats back to urgent care a week later because her right shoulder was hurting so bad - xrays of right shoulder were negative as well. Pt. Rafia Seats to her family doctor for follow up and was referred to therapy for right shoulder pain. Pt. States both shoulders are hurting but her right hurts worse. PMH includes chronic back pain, chronic pain syndrome     SUBJECTIVE: Patient reports she feels her right shoulder is doing better than her left. States it still hurts to push the vacuum and to sweep. She reports L shoulder \"caught\" this morning when she was opening and closing her newspaper. TREATMENT   Precautions: Pt.  Has chronic back pain, unsteady without her walking stick    Pain: 3/10 in L shoulder, pain is worse in the evening/night time. X in shaded column indicates Activity Completed Today   Modalities Parameters/  Location  Notes/Comments   Ultrasound to R upper trap 100% MHz 1.2 w/CM2 x8 minutes      MHP to bilateral shoulders at start of session 15 min. X Supine with bolster under knees         Manual Therapy Time/  Technique  Notes/Comments   Manual STM to bilateral upper trap, to decrease tightness  X    While seated in chair, placed ball between pt's shoulder blades and applied pressure on anterior shoulders pulling back to provide stretch to anterior shoulders and chest   Very rounded posture                     Exercises   Sets/  Sec Reps  Notes/Comments   Supine PROM to bilateral shoulders all motions to tolerance    X Mild tightness in end ranges but overall Good ROM. Bilateral upper trap stretch 10 sec 3 ea side     Seated scapular depression/retraction  1 10     Supine chest press with dowel, then SA punches while holding dowel with BUE 1 10 ea     Supine circles Cw and Ccw, chest press, scapular punch  1 10 ea  1# bilaterally - reports discomfort in left shoulder   Supine ER with elbow at side using dowel 1 10  Done while on MHP today   Supine cross body stretch by pt.  BUE 10 sec holds 3 ea side     Supine horiz abd, diagonals with peach band 1 10 ea X    Seated BUE ER with peach band 1 10 X Discomfort in R shoulder   Seated B horiztonal abduction peach band 1 10     pulley 1 10 X          Activities Time    Notes/Comments   Kinesiotape for L shoulder for RC dysfunction, deltoid and upper trap inhibition, mechanical correction anterior to posterior shoulder  X                  Specific Interventions Next Treatment: Ultrasound to right upper trap, upper trap/levator stretching, anterior shoulder/chest stretching,ROM RUE, periscapular strengthening, STM to tight musculature, trial kinesiotape    Activity/Treatment Tolerance:  [x]  Patient tolerated treatment well  []  Patient limited by fatigue  []  Patient limited by pain   []  Patient limited by other medical complications  []  Other:     Assessment: Progressing toward goals with RC weakness demonstrated especially LUE. Areas for Improvement: impaired ROM, strength, pain  Prognosis: good    GOALS:  Patient Goal: to decrease pain in shoulders, especially right shoulder to be able to complete cleaning and lift household items with RUE    Short Term Goals:  Time Frame: 4 weeks  *  Pt. will demo improved AROM right shoulder to flexion= 140, abd= 140, IR to thumb tip 1\" above waistline, and ER= 70 for ease with donning and doffing shirts - GOAL NOT MET 9/14/22 HOWEVER IMPROVED FROM EVAL MEASUREMENTS WITH ACTIVE RIGHT SHOULDER FLEXION= 136, ABD= 135, IR TO THUMB TIP TO WAISTBAND BEHIND BACK, AND ER= 68  Continue goal    *  Pt. will report decreased pain with active use of right shoulder to no greater than 3/10 for ability to complete housework - GOAL NOT MET 9/14/22 WITH PT. REPORTING THAT THE MORE SHE DOES, THE MORE HER SHOULDERS HURT UP TO 7/10, SO SHE HAS TO STOP AND TAKE REST BREAKS  Continue Goal    *  Patient will demonstrate equal scapular posture for improved mechanics when lifting. - GOAL NOT MET 9/14/22 WITH   CONTINUED SCAPULAR WINGING HOWEVER NOT AS BADLY AS INITIAL EVAL  Continue Goal    *  Pt. will demo independence with HEP for right shoulder for improved motion and decreased pain for dressing self and completing needed housework - GOAL MET 9/14/22 WITH PT. REPORTING  INDEPENDENCE WITH UPPER TRAP AND LEVATOR STRETCHING AND WITH CORNER STRETCH. STATES SHE HAS NOT BEEN DOING THE WALL SLIDES.    Continue goal with additions for gentle strengthening    Long Term Goals:  Time Frame: 8 weeks  *  Patient will report dressing and bathing with no greater than minimal pain increase.  - GOAL NOT MET BUT IMPROVING 9/14/22 WITH PT. REPORTING ANYTHING THAT REQUIRES IR BEHIND HER BACK REMAINS PAINFUL  Continue goal     *  Pt. will be able to don shirt and wash back using Right UE without pain in shoulder - GOAL MET 9/14/22 WITH PT. REPORTING THAT RIGHT SHOULDER IS NOW FEELING BETTER BUT LEFT SHOULDER IS STILL HURTING FOR SELF CARE TASKS  Continue Goal      *  Pt. will demo 4/5 MMT right shoulder for independence with exercise routine - GOAL NOT MET 9/14/22 BUT IMPROVING WITH 3+/5 MMT FOR SHOULDER FLEXION AND EXTENSION  Continue Goal     Patient Education:   []  HEP/Education Completed: Plan of Care, Goals, posture  Medbridge for HEP: Scapular pinches with depression with back against wall, wall slides with arms in neutral, corner stretch, upper trap stretch, levator scap stretch - handouts given  []  No new Education completed  [x]  Reviewed Prior HEP and discussed POC and goals today      [x]  Patient verbalized and/or demonstrated understanding of education provided. []  Patient unable to verbalize and/or demonstrate understanding of education provided. Will continue education. [x]  Barriers to learning: none    PLAN:  Treatment Recommendations: Strengthening, Range of Motion, Manual Therapy - Soft Tissue Mobilization, Home Exercise Program, and Self-Care Education and Training    []  Plan of care initiated. Plan to see patient 2 times per week for 8 weeks to address the treatment planned outlined above.   [x]  Continue with current plan of care  []  Modify plan of care as follows:    []  Hold pending physician visit  []  Discharge    Time In 1030   Time Out 1115   Timed Code Minutes: 45 min   Total Treatment Time: 45 min       Rina Aschoff OTR/L, Mohit 6

## 2022-09-18 DIAGNOSIS — I10 ESSENTIAL HYPERTENSION: Chronic | ICD-10-CM

## 2022-09-19 RX ORDER — LISINOPRIL AND HYDROCHLOROTHIAZIDE 12.5; 1 MG/1; MG/1
TABLET ORAL
Qty: 90 TABLET | Refills: 3 | Status: SHIPPED | OUTPATIENT
Start: 2022-09-19

## 2022-09-19 NOTE — TELEPHONE ENCOUNTER
Recent Visits  Date Type Provider Dept   07/29/22 Office Visit Jocelynn Guerra, DO [facility]   04/27/22 Office Visit Jocelynn Guerra, DO [facility]   09/28/21 Office Visit Jocelynn Guerra, DO [facility]   04/21/21 Office Visit Jocelynn Guerra, DO [facility]   Showing recent visits within past 540 days with a meds authorizing provider and meeting all other requirements  Future Appointments  No visits were found meeting these conditions.   Showing future appointments within next 150 days with a meds authorizing provider and meeting all other requirements      Future Appointments   Date Time Provider Radha Paulson   9/29/2022 10:15 AM Henrik Acres, OT [facility] [place] [facility]   10/5/2022 10:30 AM Darus Marine, OT [facility] [place] [facility]   10/7/2022 10:00 AM Ridott Acres, [facility] [place] [facility] [place]   10/12/2022 10:00 AM Henrik Acres, OT [facility] [place] [facility] [facility]   10/14/2022 10:00 AM Darus Marine, OT [facility] [place] [facility]   10/19/2022  9:45 AM Henrik Acres, OT [facility] [place] [facility]   10/21/2022 10:00 AM Ridott Acres, OT [facility] [place] [facility]   11/7/2022 11:00 AM Myles Gibson APRN - CNP N [facility] [place]   11/7/2022  1:00 PM Colleen Gale APRN - CNP N [facility] Rheum [facility] [place]   5/3/2023  9:40 AM Jocelynn Guerra, DO [facility] [address]

## 2022-09-29 ENCOUNTER — HOSPITAL ENCOUNTER (OUTPATIENT)
Dept: OCCUPATIONAL THERAPY | Age: 82
Setting detail: THERAPIES SERIES
Discharge: HOME | End: 2022-09-29
Payer: MEDICARE

## 2022-09-29 PROCEDURE — 97110 THERAPEUTIC EXERCISES: CPT

## 2022-09-29 NOTE — DISCHARGE SUMMARY
strengthening, STM to tight musculature, trial kinesiotape    Activity/Treatment Tolerance:  [x]  Patient tolerated treatment well  []  Patient limited by fatigue  []  Patient limited by pain   []  Patient limited by other medical complications  []  Other:     Assessment: Pt. Has been seen since 8/9/22 for bilateral shoulder pain. Pt. Has a history of OA and scoliosis which are contributing to her pain. Pt. Has had xrays but no MRI. Pt. Continues to report constant nagging pain in both shoulders that increases to 7/10 with activity such as housework and self care. Pt. States she is able to bathe and dress self, but any tasks that require behind her back or head are still painful and difficult. Pt. Has been instructed on ROM and stretching program and light resistive band for RC strengthening. Due to lack of progress with pain and motion, pt. Is being discharged this date and will call and get a follow up appointment with Dr. Bo Rubalcava. Unfortunately, this does appear to be arthritic in nature, but pt. Wants to see if doctor will order further testing.    Areas for Improvement: impaired ROM, strength, pain  Prognosis: good    GOALS:  Patient Goal: to decrease pain in shoulders, especially right shoulder to be able to complete cleaning and lift household items with RUE    Short Term Goals:  Time Frame: 4 weeks  *  Pt. will demo improved AROM right shoulder to flexion= 140, abd= 140, IR to thumb tip 1\" above waistline, and ER= 70 for ease with donning and doffing shirts - GOAL NOT MET 9/29/22 HOWEVER IMPROVED FROM EVAL MEASUREMENTS WITH ACTIVE RIGHT SHOULDER FLEXION= 122, ABD= 124, IR TO THUMB TIP TO WAISTBAND BEHIND BACK, AND ER= 77  Continue goal    *  Pt. will report decreased pain with active use of right shoulder to no greater than 3/10 for ability to complete housework - GOAL NOT MET 9/29/22 WITH PT. REPORTING THAT THE MORE SHE DOES, THE MORE HER SHOULDERS HURT UP TO 7/10, SO SHE HAS TO STOP AND TAKE REST BREAKS      *  Patient will demonstrate equal scapular posture for improved mechanics when lifting. - GOAL NOT MET 9/29/22 WITH   CONTINUED SCAPULAR WINGING HOWEVER NOT AS BADLY AS INITIAL EVAL - PT. DOES HAVE SOME SCOLIOSIS OF THE SPINE WHICH IS EFFECTING HER SCAPULAR POSITION      *  Pt. will demo independence with HEP for right shoulder for improved motion and decreased pain for dressing self and completing needed housework - GOAL MET 9/29/22 WITH PT. REPORTING  INDEPENDENCE WITH UPPER TRAP AND LEVATOR STRETCHING AND WITH CORNER STRETCH. STATES SHE HAS NOT BEEN DOING THE WALL SLIDES. Long Term Goals:  Time Frame: 8 weeks  *  Patient will report dressing and bathing with no greater than minimal pain increase.  - GOAL NOT MET 9/29/22 WITH PT. REPORTING ANYTHING THAT REQUIRES IR BEHIND HER BACK REMAINS PAINFUL AND DIFFICULT     *  Pt. will be able to don shirt and wash back using Right UE without pain in shoulder - GOAL MET 9/29/22 WITH PT. REPORTING THAT SHE IS ABLE TO USE A SEE SAW MOTION WITH A [address] TO [address] HER BACK      *  Pt. will demo 4/5 MMT right shoulder for independence with exercise routine - GOAL NOT MET 9/29/22 WITH 3+/5 MMT FOR SHOULDER FLEXION AND EXTENSION  Continue Goal     Patient Education:   []  HEP/Education Completed: Plan of Care, Goals, posture  Medbridge for HEP: Scapular pinches with depression with back against wall, wall slides with arms in neutral, corner stretch, upper trap stretch, levator scap stretch - handouts given  []  No new Education completed  [x]  Reviewed Prior HEP and discussed POC and goals today, Gave peach theraband for HEP      [x]  Patient verbalized and/or demonstrated understanding of education provided. []  Patient unable to verbalize and/or demonstrate understanding of education provided. Will continue education.   [x]  Barriers to learning: none    PLAN:  Treatment Recommendations: Strengthening, Range of Motion, Manual Therapy - Soft Tissue Mobilization, Home Exercise Program, and Self-Care Education and Training    []  Plan of care initiated. Plan to see patient 2 times per week for 8 weeks to address the treatment planned outlined above.   []  Continue with current plan of care  []  Modify plan of care as follows:    []  Hold pending physician visit  [x]  Discharge    Time In 1015   Time Out 1100   Timed Code Minutes: 45 min   Total Treatment Time: 45 min       Robyn Yang, OTR/L [phone]

## 2022-10-06 ENCOUNTER — TELEPHONE (OUTPATIENT)
Dept: FAMILY MEDICINE CLINIC | Age: 82
End: 2022-10-06

## 2022-10-06 DIAGNOSIS — N18.31 STAGE 3A CHRONIC KIDNEY DISEASE (HCC): Primary | Chronic | ICD-10-CM

## 2022-10-06 NOTE — TELEPHONE ENCOUNTER
Please let pt know that she is due for 6 month f/u labs to monitor renal fxn    Fast 8 hours  Next few wks is fine  Let me know if questions, thanks! Diagnosis Orders   1.  Stage 3a chronic kidney disease ([facility] [address]  Basic Metabolic Panel    Microalbumin / Creatinine Urine Ratio        Future Appointments   Date Time Provider Radha Paulson   10/17/2022 11:00 AM STR MAMMOGRAPHY [facility]  [facility] STR Radiolog   11/7/2022 11:00 AM JOSE Infante CNP N [facility] Pain [facility] [place]   11/7/2022  1:00 PM JOSE Hernandez CNP N [facility] Rheum [facility] [place]   5/3/2023  9:40 AM Olga Méndez DO [facility] [address]

## 2022-10-17 ENCOUNTER — HOSPITAL ENCOUNTER (OUTPATIENT)
Dept: WOMENS IMAGING | Age: 82
Discharge: HOME | End: 2022-10-17
Payer: MEDICARE

## 2022-10-17 DIAGNOSIS — Z12.31 VISIT FOR SCREENING MAMMOGRAM: ICD-10-CM

## 2022-10-17 PROCEDURE — 77063 BREAST TOMOSYNTHESIS BI: CPT

## 2022-10-18 ENCOUNTER — TELEPHONE (OUTPATIENT)
Dept: FAMILY MEDICINE CLINIC | Age: 82
End: 2022-10-18

## 2022-10-18 NOTE — TELEPHONE ENCOUNTER
----- Message from Pascale Thrasher, DO sent at 10/18/2022  9:25 AM EDT -----  Please let pt know that mammogram is normal. Let me know if questions, thanks!

## 2022-10-20 ENCOUNTER — TELEPHONE (OUTPATIENT)
Dept: FAMILY MEDICINE CLINIC | Age: 82
End: 2022-10-20

## 2022-10-20 ENCOUNTER — NURSE ONLY (OUTPATIENT)
Dept: LAB | Age: 82
End: 2022-10-20

## 2022-10-20 DIAGNOSIS — N18.31 STAGE 3A CHRONIC KIDNEY DISEASE (HCC): Chronic | ICD-10-CM

## 2022-10-20 LAB
ANION GAP SERPL CALCULATED.3IONS-SCNC: 10 MEQ/L (ref 8–16)
BUN BLDV-MCNC: 18 MG/DL (ref 7–22)
CALCIUM SERPL-MCNC: 9.5 MG/DL (ref 8.5–10.5)
CHLORIDE BLD-SCNC: 102 MEQ/L (ref 98–111)
CO2: 28 MEQ/L (ref 23–33)
CREAT SERPL-MCNC: 1.1 MG/DL (ref 0.4–1.2)
CREATININE, URINE: 95.6 MG/DL
GFR SERPL CREATININE-BSD FRML MDRD: 51 ML/MIN/1.73M2
GLUCOSE BLD-MCNC: 91 MG/DL (ref 70–108)
MICROALBUMIN UR-MCNC: 9.17 MG/DL
MICROALBUMIN/CREAT UR-RTO: 96 MG/G (ref 0–30)
POTASSIUM SERPL-SCNC: 3.9 MEQ/L (ref 3.5–5.2)
SODIUM BLD-SCNC: 140 MEQ/L (ref 135–145)

## 2022-10-20 NOTE — TELEPHONE ENCOUNTER
Left message on answering machine requesting pt to call back at earliest convenience.        HIPAA not updated

## 2022-10-20 NOTE — TELEPHONE ENCOUNTER
----- Message from Sánchez Ornelas DO sent at 10/20/2022  4:05 PM EDT -----  Please let pt know that labs are stable  Renal fxn remains appropriate  Let me know if questions, thanks!

## 2022-11-07 ENCOUNTER — OFFICE VISIT (OUTPATIENT)
Dept: RHEUMATOLOGY | Age: 82
End: 2022-11-07
Payer: MEDICARE

## 2022-11-07 ENCOUNTER — OFFICE VISIT (OUTPATIENT)
Dept: PHYSICAL MEDICINE AND REHAB | Age: 82
End: 2022-11-07
Payer: MEDICARE

## 2022-11-07 ENCOUNTER — TELEPHONE (OUTPATIENT)
Dept: PHYSICAL MEDICINE AND REHAB | Age: 82
End: 2022-11-07

## 2022-11-07 VITALS
HEART RATE: 62 BPM | OXYGEN SATURATION: 97 % | WEIGHT: 164.4 LBS | DIASTOLIC BLOOD PRESSURE: 64 MMHG | HEIGHT: 61 IN | BODY MASS INDEX: 31.04 KG/M2 | SYSTOLIC BLOOD PRESSURE: 130 MMHG

## 2022-11-07 VITALS
DIASTOLIC BLOOD PRESSURE: 78 MMHG | HEIGHT: 62 IN | SYSTOLIC BLOOD PRESSURE: 120 MMHG | WEIGHT: 163 LBS | BODY MASS INDEX: 30 KG/M2

## 2022-11-07 DIAGNOSIS — M15.4 EROSIVE OSTEOARTHRITIS OF HANDS, BILATERAL: Primary | ICD-10-CM

## 2022-11-07 DIAGNOSIS — M54.50 CHRONIC BILATERAL LOW BACK PAIN WITHOUT SCIATICA: ICD-10-CM

## 2022-11-07 DIAGNOSIS — M51.36 DDD (DEGENERATIVE DISC DISEASE), LUMBAR: ICD-10-CM

## 2022-11-07 DIAGNOSIS — G89.29 CHRONIC BILATERAL LOW BACK PAIN WITHOUT SCIATICA: ICD-10-CM

## 2022-11-07 DIAGNOSIS — M47.816 LUMBAR SPONDYLOSIS: Primary | ICD-10-CM

## 2022-11-07 DIAGNOSIS — G89.4 CHRONIC PAIN SYNDROME: ICD-10-CM

## 2022-11-07 DIAGNOSIS — M19.071 OSTEOARTHRITIS OF BOTH FEET, UNSPECIFIED OSTEOARTHRITIS TYPE: ICD-10-CM

## 2022-11-07 DIAGNOSIS — M25.512 CHRONIC LEFT SHOULDER PAIN: ICD-10-CM

## 2022-11-07 DIAGNOSIS — Z98.890 HISTORY OF LUMBAR LAMINECTOMY: ICD-10-CM

## 2022-11-07 DIAGNOSIS — M71.349 SYNOVIAL CYST OF HAND: ICD-10-CM

## 2022-11-07 DIAGNOSIS — Z51.81 MEDICATION MONITORING ENCOUNTER: ICD-10-CM

## 2022-11-07 DIAGNOSIS — M19.072 OSTEOARTHRITIS OF BOTH FEET, UNSPECIFIED OSTEOARTHRITIS TYPE: ICD-10-CM

## 2022-11-07 DIAGNOSIS — G89.29 CHRONIC LEFT SHOULDER PAIN: ICD-10-CM

## 2022-11-07 DIAGNOSIS — M41.9 SCOLIOSIS OF THORACOLUMBAR SPINE, UNSPECIFIED SCOLIOSIS TYPE: ICD-10-CM

## 2022-11-07 DIAGNOSIS — M79.602 LEFT ARM PAIN: ICD-10-CM

## 2022-11-07 PROCEDURE — 1123F ACP DISCUSS/DSCN MKR DOCD: CPT | Performed by: NURSE PRACTITIONER

## 2022-11-07 PROCEDURE — G8484 FLU IMMUNIZE NO ADMIN: HCPCS | Performed by: NURSE PRACTITIONER

## 2022-11-07 PROCEDURE — 1090F PRES/ABSN URINE INCON ASSESS: CPT | Performed by: NURSE PRACTITIONER

## 2022-11-07 PROCEDURE — 3074F SYST BP LT 130 MM HG: CPT | Performed by: NURSE PRACTITIONER

## 2022-11-07 PROCEDURE — G8417 CALC BMI ABV UP PARAM F/U: HCPCS | Performed by: NURSE PRACTITIONER

## 2022-11-07 PROCEDURE — 1036F TOBACCO NON-USER: CPT | Performed by: NURSE PRACTITIONER

## 2022-11-07 PROCEDURE — 99214 OFFICE O/P EST MOD 30 MIN: CPT | Performed by: NURSE PRACTITIONER

## 2022-11-07 PROCEDURE — G8399 PT W/DXA RESULTS DOCUMENT: HCPCS | Performed by: NURSE PRACTITIONER

## 2022-11-07 PROCEDURE — 3078F DIAST BP <80 MM HG: CPT | Performed by: NURSE PRACTITIONER

## 2022-11-07 PROCEDURE — G8427 DOCREV CUR MEDS BY ELIG CLIN: HCPCS | Performed by: NURSE PRACTITIONER

## 2022-11-07 RX ORDER — HYDROXYCHLOROQUINE SULFATE 200 MG/1
TABLET, FILM COATED ORAL
Qty: 135 TABLET | Refills: 1 | Status: SHIPPED | OUTPATIENT
Start: 2022-11-07

## 2022-11-07 ASSESSMENT — ENCOUNTER SYMPTOMS
DIARRHEA: 0
GASTROINTESTINAL NEGATIVE: 1
EYES NEGATIVE: 1
COUGH: 0
CONSTIPATION: 0
EYE ITCHING: 0
NAUSEA: 0
EYE PAIN: 0
COLOR CHANGE: 1
ABDOMINAL PAIN: 0
TROUBLE SWALLOWING: 0
BACK PAIN: 1
RESPIRATORY NEGATIVE: 1
ROS SKIN COMMENTS: LEFT ARM BRUISING
BACK PAIN: 1
SHORTNESS OF BREATH: 0

## 2022-11-07 NOTE — PROGRESS NOTES
[address] [facility] [address]  Dept: [phone]  Dept Fax: [phone]: [phone]    Visit Date: 11/7/2022    Functionality Assessment/Goals Worksheet     On a scale of 0 (Does not Interfere) to 10 (Completely Interferes)     1. Which number describes how during the past week pain has interfered with       the following:  A. General Activity:  5  B. Mood: 6  C. Walking Ability:  5  D. Normal Work (Includes both work outside the home and housework):  5  E. Relations with Other People:   6  F. Sleep:   5  G. Enjoyment of Life:   5    2. Patient Prefers to Take their Pain Medications:     []  On a regular basis   [x]  Only when necessary    []  Does not take pain medications    3. What are the Patient's Goals/Expectations for Visiting Pain Management? []  Learn about my pain    [x]  Receive Medication   []  Physical Therapy     []  Treat Depression   [x]  Receive Injections    []  Treat Sleep   []  Deal with Anxiety and Stress   []  Treat Opoid Dependence/Addiction   []  Other:      HPI:   Varinder López is a [de-identified] y.o. female is here today for    Chief Complaint: Low back pain, left arm and shoulder pain     HPI   3 month FU. Continues to have pain in low back- aching pain worse with increased activity. States has been up and down and has been increasing with time as effects from L-facet RFA are waning. Pain is only on right side still doing really well and not having any pain in left lower back     Has pain in left upper arm and shoulder that has continues since fall a few months afgo. Has bruising and swelling states maybe she felt like a tearing buring sensation with movement last week but has full ROM. Pain increases with bending, lifting, twisting , reaching, pushing, pulling, standing, raising arms, getting up and down, and housework or working at job.       Continues Norco only prn when pain increases and it helps a lot. Medications reviewed. Patient denies side effects with medications. Patient states she is taking medications as prescribed. Shedenies receiving pain medications from other sources. She denies any ER visits since last visit. Pain scale with out pain medications or at its worst is 5-6/10. Takes Rodman Racer only if pain increases to 9-10/10. Pain scale with pain medications or at its best is 2-3/10. Last dose of Norco was 2 days ago   Drug screen reviewed from 8/8/2022 and was appropriate  Pill count completed  today and WNL: Yes      The patientis allergic to sulfa antibiotics and other. Subjective:      Review of Systems   Constitutional: Negative. HENT: Negative. Eyes: Negative. Negative for visual disturbance. Respiratory: Negative. Cardiovascular: Negative. Gastrointestinal: Negative. Endocrine: Negative. Genitourinary: Negative. Musculoskeletal:  Positive for arthralgias, back pain, gait problem, joint swelling and myalgias. Negative for neck pain and neck stiffness. Ambulating with walking stick    Skin:  Positive for color change. Left arm bruising    Neurological:  Positive for weakness. Negative for numbness. Psychiatric/Behavioral: Negative. Objective:     Vitals:    11/07/22 1058   BP: 120/78   Weight: 163 lb (73.9 kg)   Height: 5' 1.5\" (1.562 m)       Physical Exam  Vitals and nursing note reviewed. Constitutional:       General: She is not in acute distress. Appearance: Normal appearance. She is well-developed. She is not diaphoretic. HENT:      Head: Normocephalic and atraumatic. Right Ear: External ear normal.      Left Ear: External ear normal.      Nose: Nose normal.      Mouth/Throat:      Pharynx: No oropharyngeal exudate. Eyes:      General: No scleral icterus. Right eye: No discharge. Left eye: No discharge.       Conjunctiva/sclera: Conjunctivae normal. Pupils: Pupils are equal, round, and reactive to light. Neck:      Thyroid: No thyromegaly. Cardiovascular:      Rate and Rhythm: Normal rate and regular rhythm. Heart sounds: Normal heart sounds. No murmur heard. No friction rub. No gallop. Pulmonary:      Effort: Pulmonary effort is normal. No respiratory distress. Breath sounds: Normal breath sounds. No wheezing or rales. Chest:      Chest wall: No tenderness. Abdominal:      General: Bowel sounds are normal. There is no distension. Palpations: Abdomen is soft. Tenderness: There is no abdominal tenderness. There is no guarding or rebound. Musculoskeletal:         General: Tenderness present. Right shoulder: Tenderness and bony tenderness present. Decreased range of motion. Left shoulder: Swelling, deformity, tenderness and bony tenderness present. Decreased range of motion. Right hand: Deformity and tenderness present. Left hand: Deformity and tenderness present. Arms:       Cervical back: Neck supple. No edema, erythema, rigidity or tenderness. No muscular tenderness. Decreased range of motion. Thoracic back: Tenderness present. Scoliosis present. Lumbar back: Tenderness and bony tenderness present. Decreased range of motion. Negative right straight leg raise test and negative left straight leg raise test. Scoliosis present. Back:    Skin:     General: Skin is warm. Coloration: Skin is not pale. Findings: Bruising present. No erythema or rash. Neurological:      Mental Status: She is alert and oriented to person, place, and time. She is not disoriented. Cranial Nerves: No cranial nerve deficit. Sensory: No sensory deficit. Motor: Weakness present. No atrophy or abnormal muscle tone. Coordination: Coordination normal.      Gait: Gait abnormal.      Deep Tendon Reflexes: Babinski sign absent on the right side. Babinski sign absent on the left side. Reflex Scores:       Achilles reflexes are 1+ on the right side and 1+ on the left side. Comments: Gait-uses cane   Psychiatric:         Attention and Perception: Attention normal. She is attentive. Mood and Affect: Mood normal. Mood is not anxious or depressed. Affect is not labile, blunt, angry or inappropriate. Speech: Speech normal. She is communicative. Speech is not rapid and pressured, delayed, slurred or tangential.         Behavior: Behavior is not agitated, slowed, aggressive, withdrawn, hyperactive or combative. Behavior is cooperative. Thought Content: Thought content normal. Thought content is not paranoid or delusional. Thought content does not include homicidal or suicidal ideation. Thought content does not include homicidal or suicidal plan. Cognition and Memory: Cognition normal. Memory is not impaired. She does not exhibit impaired recent memory or impaired remote memory. Judgment: Judgment normal. Judgment is not impulsive or inappropriate. ALCIDES  Patricks test  negative  Yeoman's  or Gaenslen's negative         Assessment:     1. Lumbar spondylosis    2. Chronic bilateral low back pain without sciatica    3. History of lumbar laminectomy    4. Scoliosis of thoracolumbar spine, unspecified scoliosis type    5. Left arm pain    6. Chronic left shoulder pain    7. Chronic pain syndrome            Plan:      OARRS reviewed. Current MED: 5.00  Patient was offered naloxone for home. Discussed long term side effects of medications, tolerance, dependency and addiction. Previous UDS reviewed  UDS preformed today for compliance. Patient told can not receive any pain medications from any other source. No evidence of abuse, diversion or aberrant behavior.   Medications and/or procedures to improve function and quality of life- patient understanding with this and that may not be pain free  Discussed with patient about safe storage of medications at home  Discussed possible weaning of medication dosing dependent on treatment/procedure results. Discussed with patient about risks with procedure including infection, reaction to medication, increased pain, or bleeding. Procedure notes reviewed in detail   Received about a year of relief from previous L-facet RFA. Still receiving relief of left low back pain only having right low back pain at this time. Plan Right L-facet RFA @ L2--3 and L3-4 for longer term pain relief. Procedure discussed in detail   Continue Norco 5/325 mg tabs BID- 1/2 tab to 1 tab BID prn- filled 9/12/2022 and takes prn and has plenty. Will get next UDS at next FU   Continue tylenol prn for mild pain  Discussed getting updated imaging of left shoulder and left humerus if pain does not improve     Meds. Prescribed:   No orders of the defined types were placed in this encounter. Return for Right L-facet RFA @ L2--3 and L3-4 . , follow up after procedure.                Electronically signed by JOSE Calles CNP on11/7/2022 at 1:45 PM

## 2022-11-07 NOTE — PROGRESS NOTES
[facility] RHEUMATOLOGY FOLLOW UP NOTE       Date Of Service: 11/7/2022  Provider: Patsy Pérez APRN - CNP    Name: Reji Kevin   MRN: [id number]    Chief Complaint(s)     Chief Complaint   Patient presents with    Follow-up     6 month follow up        History of Present Illness (HPI)     Reji Kevin  is a(n)82 y.o. female with a hx of chronic lo wback pain, CKD stage 3, DDD lumbar spine, dyslipidemia, h/o skin cancer, hyperlipidemia, hypertension, osteoarthritis here for the f/u evaluation of inflammatory osteoarthritis, osteoarthritis of multiple joints, troch bursitis     Interval hx:    - had a fall a couple months ago with some increased shoulder pain. Xrays without findings. Did PT which did help some. pain affecting the right hip, left shoulder, back  Pain on a scale 0-10: 7.5/10   Aggravating factors: standing, walking    denies swelling/  Redness/ warmth, + AM stiffness lasting ~ a few minutes      REVIEW OF SYSTEMS: (ROS)    Review of Systems   Constitutional:  Negative for fatigue, fever and unexpected weight change. HENT:  Negative for congestion and trouble swallowing. Eyes:  Negative for pain and itching. Respiratory:  Negative for cough and shortness of breath. Cardiovascular:  Negative for chest pain and leg swelling. Gastrointestinal:  Negative for abdominal pain, constipation, diarrhea and nausea. Endocrine: Negative for cold intolerance and heat intolerance. Genitourinary:  Negative for difficulty urinating, frequency and urgency. Musculoskeletal:  Positive for arthralgias and back pain. Negative for joint swelling. Skin:  Negative for rash. Neurological:  Negative for dizziness, weakness, numbness and headaches. Psychiatric/Behavioral:  The patient is not nervous/anxious.       PAST MEDICAL HISTORY      Past Medical History:   Diagnosis Date    Chronic low back pain     CKD (chronic kidney disease) stage 3, GFR 30-59 ml/min ([facility])     DDD (degenerative disc disease), lumbar     Dyslipidemia     History of skin cancer     BCC AND SCC    Hyperlipidemia     Hypertension     Osteoarthritis     PONV (postoperative nausea and vomiting)        PAST SURGICAL HISTORY    .   Past Surgical History:   Procedure Laterality Date    BACK SURGERY      lower lumbar fusion    CARPAL TUNNEL RELEASE Bilateral      SECTION      CHOLECYSTECTOMY, LAPAROSCOPIC  10/03/2017    COLONOSCOPY      CYST REMOVAL Left 2017    left  5th digit-Dr Bowman    DILATION AND CURETTAGE OF UTERUS      FACET JOINT INJECTION Bilateral 2020    B/L L3-4 medial branch and L5 dorsal rami injection performed by Tommie Cooney MD at [address] [place] [address] Bilateral     HYSTERECTOMY (CERVIX STATUS UNKNOWN)      JOINT REPLACEMENT Bilateral     KNEES    NERVE SURGERY N/A 2019    left L3, L4 medial branch and L5 dorsal rami injection performed by Tommie Cooney MD at [facility] Left 2020    left L3, L4 medial branch and L5 dorsal rami injection performed by Tommie Cooney MD at [facility] Bilateral 2021    bilateral T12, L1,L2,L3,L4,L5 medial branch block performed by Tommie Cooney MD at [facility] Bilateral 2021    Bilateral L-facet MBB @ L2-3 and L3-4 performed by Kush Meier MD at [facility] Bilateral 3/4/2022    left L-facet RFA @ L2-3, and L3-4 right L2-3 performed by Tommie Cooney MD at [facility] [address] FLX DX W/COLLJ [address] [facility] [address] WHEN PFRMD N/A 2018    COLONOSCOPY performed by Robbie Caba MD at [facility]    SKIN BIOPSY      BCC AND SCC       FAMILY HISTORY      Family History   Problem Relation Age of Onset    Heart Disease Mother         CHF    Alzheimer's Disease Mother     Dementia Mother Cancer Father         LUNG    Emphysema Father     Colon Cancer Neg Hx     Breast Cancer Neg Hx        SOCIAL HISTORY      Social History       Tobacco History       Smoking Status  Never      Smokeless Tobacco Use  Never              Alcohol History       Alcohol Use Status  Yes Comment  OCCASIONALLY              Drug Use       Drug Use Status  No              Sexual Activity       Sexually Active  Not Currently                    ALLERGIES     Allergies   Allergen Reactions    Sulfa Antibiotics Swelling    Other Nausea And Vomiting     oysters       CURRENT MEDICATIONS      Current Outpatient Medications   Medication Sig Dispense Refill    lisinopril-hydroCHLOROthiazide (PRINZIDE;ZESTORETIC) 10-12.5 MG per tablet TAKE 1 TABLET BY MOUTH DAILY 90 tablet 3    acetaminophen (TYLENOL) 650 MG extended release tablet Take 650 mg by mouth every 8 hours as needed for Pain      hydroxychloroquine (PLAQUENIL) 200 MG tablet TAKE 1 AND 1/2 TABLETS BY MOUTH DAILY 135 tablet 1    omeprazole (PRILOSEC) 20 MG delayed release capsule TAKE 1 CAPSULE BY MOUTH DAILY 90 capsule 3    vitamin C (ASCORBIC ACID) 500 MG tablet Take 500 mg by mouth daily      Handicap Placard MISC by Does not apply route Expires 29 June 2023 1 each 0    Multiple Vitamins-Minerals (PRESERVISION AREDS PO) Take by mouth      Calcium Carbonate-Vit D-Min (CALCIUM 1200 PO) Take by mouth daily      Cholecalciferol (VITAMIN D-3 PO) Take by mouth daily       No current facility-administered medications for this visit. PHYSICAL EXAMINATION / OBJECTIVE   Objective:  /64 (Site: Left Upper Arm, Position: Sitting, Cuff Size: Large Adult)   Pulse 62   Ht 5' 1.5\" (1.562 m)   Wt 164 lb 6.4 oz (74.6 kg)   SpO2 97%   BMI 30.56 kg/m²     Physical Exam  Vitals reviewed. Constitutional:       Appearance: She is well-developed. Cardiovascular:      Rate and Rhythm: Normal rate and regular rhythm.    Pulmonary:      Effort: Pulmonary effort is normal. Breath sounds: Normal breath sounds. Musculoskeletal:      Cervical back: Normal range of motion and neck supple. Skin:     General: Skin is warm and dry. Findings: No rash. Neurological:      Mental Status: She is alert and oriented to person, place, and time. Psychiatric:         Thought Content:  Thought content normal.       Upper extremities:    SHOULDERS tender right ,   ELBOWS nontender,   WRISTS nontender,   HANDS/FINGERS nontender, + DIP and PIP nodules    Lower extremities:  HIPS nontender  KNEES nontender  ANKLES nonteder   FEET : nontender        LABS    CBC  Lab Results   Component Value Date/Time    WBC 5.3 04/25/2022 08:05 AM    RBC 4.21 04/25/2022 08:05 AM    HGB 12.6 04/25/2022 08:05 AM    HCT 39.5 04/25/2022 08:05 AM    MCV 93.8 04/25/2022 08:05 AM    MCH 29.9 04/25/2022 08:05 AM    MCHC 31.9 04/25/2022 08:05 AM    RDW 13.3 02/19/2021 09:25 AM     04/25/2022 08:05 AM       CMP  Lab Results   Component Value Date/Time    CALCIUM 9.5 10/20/2022 07:11 AM    LABALBU 4.3 04/25/2022 08:05 AM    PROT 6.7 04/25/2022 08:05 AM     10/20/2022 07:11 AM    K 3.9 10/20/2022 07:11 AM    CO2 28 10/20/2022 07:11 AM     10/20/2022 07:11 AM    BUN 18 10/20/2022 07:11 AM    CREATININE 1.1 10/20/2022 07:11 AM    ALKPHOS 108 04/25/2022 08:05 AM    ALT 18 04/25/2022 08:05 AM    AST 25 04/25/2022 08:05 AM       HgBA1c: No components found for: HGBA1C    No results found for: VITD25      No results found for: ANASCRN  No results found for: SSA  No results found for: SSB  No results found for: ANTI-SMITH  No results found for: DSDNAAB   No results found for: ANTIRNP  No results found for: C3, C4  No results found for: CCPAB  Lab Results   Component Value Date    RF Negative 06/08/2018       No components found for: CANCASCRN, APANCASCRN  Lab Results   Component Value Date    SEDRATE 12 06/08/2018     Lab Results   Component Value Date    CRP 0.5 06/08/2018       RADIOLOGY: ASSESSMENT/PLAN    Assessment   Plan     Erosive osteoarthritis bilateral hands  Osteoarthritis bilateral shoulders  Osteoarthritis bilateral feet  - prior tx: diclofenac gel, mobic. Chondrocalcinosis noted in left hand, mild fullness/swelling of the MCPs previously appreciated. Positive WICHO but neg sub serologies- ? CPPD arthropathy vs inflammatory/erosive osteoarthritis   - plaquenil 300 mg daily (feb 2019)   - tumeric daily    Synovial cyst/ganglion cyst- left 5th PIP, right 1st DIP joint    Lumbar spinal stenosis & foraminal stenosis   - pseudo claudication symptoms improved with sitting, worse with standing and walking. Prior tx: PT, lumbar epidural. Prior eval by ortho- no surgery recommended        No follow-ups on file. Electronically signed by JOSE Brooks CNP on 11/7/2022 at 1:06 PM    New Prescriptions    No medications on file       Thank you for allowing me to participate in the care of this patient. Please call if there are any questions.

## 2022-11-08 DIAGNOSIS — M19.072 OSTEOARTHRITIS OF BOTH FEET, UNSPECIFIED OSTEOARTHRITIS TYPE: ICD-10-CM

## 2022-11-08 DIAGNOSIS — M19.071 OSTEOARTHRITIS OF BOTH FEET, UNSPECIFIED OSTEOARTHRITIS TYPE: ICD-10-CM

## 2022-11-08 RX ORDER — HYDROXYCHLOROQUINE SULFATE 200 MG/1
TABLET, FILM COATED ORAL
Qty: 135 TABLET | Refills: 1 | OUTPATIENT
Start: 2022-11-08

## 2022-11-09 ENCOUNTER — PREP FOR PROCEDURE (OUTPATIENT)
Dept: PHYSICAL MEDICINE AND REHAB | Age: 82
End: 2022-11-09

## 2022-11-14 NOTE — DISCHARGE INSTRUCTIONS
ANESTHESIA INSTRUCTIONS FOLLOWING SURGERY        Since you may experience some intermittent light-headedness for the next several hours, we suggest you plan on bed rest or quiet relaxation this evening. You must have a friend or relative stay with you tonight. Because of the sedation you have received, it is recommended that you do not drive a motor vehicle, operate any kind of machinery, or sign any contractual agreement for 24 hours following the procedure. You should not take alcoholic beverages tonight and only take sleeping medication that has been specifically prescribed for you by your physician. Call office [phone] if you have:  Temperature greater than 100.4  Persistent nausea and vomiting  Severe uncontrolled pain  Redness, tenderness, or signs of infection (pain, swelling, redness, odor or green/yellow discharge around the site)  Difficulty breathing, headache or visual disturbances  Hives  Persistent dizziness or light-headedness  Extreme fatigue  Any other questions or concerns you may have after discharge    In an emergency, call 911 or go to an Emergency Department at a nearby hospital    It is important to bring a complete, current list of your medications to any medical appointments or hospitalizations. REMINDER:   Carry a list of your medications and allergies with you at all times  Call your pharmacy at least 1 week in advance to refill prescriptions    Diet: Resume your usual diet. Good nutrition promotes healing. Increase fluid intake. Activity: Rest for 24 hrs then resume normal activity. HOME MEDICATIONS:      If on blood thinning products such as; Aspirin, NSAIDS, Plavix, Coumadin, Xarelto, Fish Oil, Multi-Vitamins or Herbal Supplements restart in 24 hours      Restart Metformin in 48 hours if you had procedure with dye. Restart Metformin in 24 hours if no dye used during procedure.         Education Materials Received: {yes/no:333500}  Belongings Returned: {yes/no:509080}          I understand and acknowledge receipt of the above instructions. Patient or Guardian Signature                                                         Date/Time                                                                                                                                            Physician's or R.N.'s Signature                                                                  Date/Time      The discharge instructions have been reviewed with the patient and/or Guardian. Patient and/or Guardian signed and retained a printed copy. Call office [phone] if you have:  Temperature greater than 100.4  Persistent nausea and vomiting  Severe uncontrolled pain  Redness, tenderness, or signs of infection (pain, swelling, redness, odor or green/yellow discharge around the site)  Difficulty breathing, headache or visual disturbances  Hives  Persistent dizziness or light-headedness  Extreme fatigue  Any other questions or concerns you may have after discharge    In an emergency, call 911 or go to an Emergency Department at a nearby hospital    It is important to bring a complete, current list of your medications to any medical appointments or hospitalizations. REMINDER:   Carry a list of your medications and allergies with you at all times  Call your pharmacy at least 1 week in advance to refill prescriptions    Diet: Resume your usual diet. Good nutrition promotes healing. Increase fluid intake. Activity: Rest for 24 hrs then resume normal activity. Education Materials Received: {yes/no:644518}  Belongings Returned: {yes/no:047667}          I understand and acknowledge receipt of the above instructions. Patient or Guardian Signature                                                         Date/Time                                                                                                                                            Physician's or R.N.'s Signature                                                                  Date/Time      The discharge instructions have been reviewed with the patient and/or Guardian. Patient and/or Guardian signed and retained a printed copy.

## 2022-11-15 ENCOUNTER — ANESTHESIA EVENT (OUTPATIENT)
Dept: OPERATING ROOM | Age: 82
End: 2022-11-15
Payer: MEDICARE

## 2022-11-15 ENCOUNTER — APPOINTMENT (OUTPATIENT)
Dept: GENERAL RADIOLOGY | Age: 82
End: 2022-11-15
Attending: PAIN MEDICINE
Payer: MEDICARE

## 2022-11-15 ENCOUNTER — ANESTHESIA (OUTPATIENT)
Dept: OPERATING ROOM | Age: 82
End: 2022-11-15
Payer: MEDICARE

## 2022-11-15 ENCOUNTER — HOSPITAL ENCOUNTER (OUTPATIENT)
Age: 82
Setting detail: OUTPATIENT SURGERY
Discharge: HOME | End: 2022-11-15
Attending: PAIN MEDICINE | Admitting: PAIN MEDICINE
Payer: MEDICARE

## 2022-11-15 VITALS
BODY MASS INDEX: 29.06 KG/M2 | TEMPERATURE: 97.1 F | SYSTOLIC BLOOD PRESSURE: 119 MMHG | WEIGHT: 164 LBS | OXYGEN SATURATION: 95 % | HEIGHT: 63 IN | RESPIRATION RATE: 16 BRPM | HEART RATE: 69 BPM | DIASTOLIC BLOOD PRESSURE: 58 MMHG

## 2022-11-15 PROCEDURE — 3600000055 HC PAIN LEVEL 3 ADDL 15 MIN: Performed by: PAIN MEDICINE

## 2022-11-15 PROCEDURE — 3209999900 FLUORO FOR SURGICAL PROCEDURES

## 2022-11-15 PROCEDURE — 2500000003 HC RX 250 WO HCPCS: Performed by: PAIN MEDICINE

## 2022-11-15 PROCEDURE — 7100000010 HC PHASE II RECOVERY - FIRST 15 MIN: Performed by: PAIN MEDICINE

## 2022-11-15 PROCEDURE — 64635 DESTROY LUMB/SAC FACET JNT: CPT | Performed by: PAIN MEDICINE

## 2022-11-15 PROCEDURE — 3600000054 HC PAIN LEVEL 3 BASE: Performed by: PAIN MEDICINE

## 2022-11-15 PROCEDURE — 3700000000 HC ANESTHESIA ATTENDED CARE: Performed by: PAIN MEDICINE

## 2022-11-15 PROCEDURE — 7100000011 HC PHASE II RECOVERY - ADDTL 15 MIN: Performed by: PAIN MEDICINE

## 2022-11-15 PROCEDURE — 64636 DESTROY L/S FACET JNT ADDL: CPT | Performed by: PAIN MEDICINE

## 2022-11-15 PROCEDURE — 2709999900 HC NON-CHARGEABLE SUPPLY: Performed by: PAIN MEDICINE

## 2022-11-15 PROCEDURE — 6360000002 HC RX W HCPCS: Performed by: STUDENT IN AN ORGANIZED HEALTH CARE EDUCATION/TRAINING PROGRAM

## 2022-11-15 PROCEDURE — 3700000001 HC ADD 15 MINUTES (ANESTHESIA): Performed by: PAIN MEDICINE

## 2022-11-15 RX ORDER — BUPIVACAINE HYDROCHLORIDE 2.5 MG/ML
INJECTION, SOLUTION EPIDURAL; INFILTRATION; INTRACAUDAL PRN
Status: DISCONTINUED | OUTPATIENT
Start: 2022-11-15 | End: 2022-11-15 | Stop reason: ALTCHOICE

## 2022-11-15 RX ORDER — HYDROCODONE BITARTRATE AND ACETAMINOPHEN 5; 325 MG/1; MG/1
1 TABLET ORAL EVERY 6 HOURS PRN
COMMUNITY

## 2022-11-15 RX ORDER — FENTANYL CITRATE 50 UG/ML
INJECTION, SOLUTION INTRAMUSCULAR; INTRAVENOUS PRN
Status: DISCONTINUED | OUTPATIENT
Start: 2022-11-15 | End: 2022-11-15 | Stop reason: SDUPTHER

## 2022-11-15 RX ORDER — LIDOCAINE HYDROCHLORIDE 10 MG/ML
INJECTION, SOLUTION EPIDURAL; INFILTRATION; INTRACAUDAL; PERINEURAL PRN
Status: DISCONTINUED | OUTPATIENT
Start: 2022-11-15 | End: 2022-11-15 | Stop reason: ALTCHOICE

## 2022-11-15 RX ADMIN — FENTANYL CITRATE 100 MCG: 50 INJECTION, SOLUTION INTRAMUSCULAR; INTRAVENOUS at 09:45

## 2022-11-15 RX ADMIN — PROPOFOL 60 MG: 10 INJECTION, EMULSION INTRAVENOUS at 09:52

## 2022-11-15 ASSESSMENT — PAIN - FUNCTIONAL ASSESSMENT: PAIN_FUNCTIONAL_ASSESSMENT: 0-10

## 2022-11-15 ASSESSMENT — PAIN DESCRIPTION - DESCRIPTORS: DESCRIPTORS: ACHING

## 2022-11-15 NOTE — PROGRESS NOTES
Resting quietly in room with call light in reach. Reviewed discharge instructions and voiced understanding.

## 2022-11-15 NOTE — H&P
[address] [address]  History and Physical Update    Pt Name: Lisa Kraft  MRN: [id number]  YOB: 1940  Date of evaluation: 11/15/2022      I have examined the patient and reviewed the H&P/Consult and there are no changes to the patient or plans.         Electronically signed by Blake Mckeon MD on 11/15/2022 at 9:11 AM

## 2022-11-15 NOTE — ANESTHESIA PRE PROCEDURE
Department of Anesthesiology  Preprocedure Note       Name:  Renate Johnston   Age:  [de-identified] y.o.  :  1940                                          MRN:  [id number]         Date:  11/15/2022      Surgeon: Susie Solomon):  Payal Friedman MD    Procedure: Procedure(s):  Right lumbar  facet Radiofrequency ablation Lumbar 2--3. 3-4    Medications prior to admission:   Prior to Admission medications    Medication Sig Start Date End Date Taking? Authorizing Provider   HYDROcodone-acetaminophen (NORCO) 5-325 MG per tablet Take 1 tablet by mouth every 6 hours as needed for Pain. Historical Provider, MD   hydroxychloroquine (PLAQUENIL) 200 MG tablet TAKE 1 AND 1/2 TABLETS BY MOUTH DAILY 22   JOSE Goodwin CNP   lisinopril-hydroCHLOROthiazide (PRINZIDE;ZESTORETIC) 10-12.5 MG per tablet TAKE 1 TABLET BY MOUTH DAILY 22   Roxanne Grigsby DO   acetaminophen (TYLENOL) 650 MG extended release tablet Take 650 mg by mouth every 8 hours as needed for Pain    Historical Provider, MD   omeprazole (PRILOSEC) 20 MG delayed release capsule TAKE 1 CAPSULE BY MOUTH DAILY 21   JOSE Goodwin CNP   vitamin C (ASCORBIC ACID) 500 MG tablet Take 500 mg by mouth daily    Historical Provider, MD   Handicap Placard MISC by Does not apply route Expires 18   Roxanne Grigsby DO   Multiple Vitamins-Minerals (PRESERVISION AREDS PO) Take by mouth    Historical Provider, MD   Calcium Carbonate-Vit D-Min (CALCIUM 1200 PO) Take by mouth daily    Historical Provider, MD   Cholecalciferol (VITAMIN D-3 PO) Take by mouth daily    Historical Provider, MD       Current medications:    No current outpatient medications on file. No current facility-administered medications for this visit. Allergies:     Allergies   Allergen Reactions    Sulfa Antibiotics Swelling    Other Nausea And Vomiting     oysters       Problem List:    Patient Active Problem List   Diagnosis Code    Ganglion cyst of finger of left hand M67.442    Dyslipidemia E78.5    History of skin cancer Z85.828    Hypertension I10    Osteoarthritis M19.90    Positive WICHO (antinuclear antibody) R76.8    DDD (degenerative disc disease), lumbar M51.36    Chronic low back pain M54.50, G89.29    Hyperkalemia E87.5    CKD (chronic kidney disease) stage 3, GFR 30-59 ml/min (HCC) N18.30    Erosive osteoarthritis of both hands M15.4    Lumbar spondylosis M47.816    Chronic renal disease, stage III ([address] [598172] N18.30       Past Medical History:        Diagnosis Date    Chronic low back pain     CKD (chronic kidney disease) stage 3, GFR 30-59 ml/min (HCC)     DDD (degenerative disc disease), lumbar     Dyslipidemia     History of skin cancer     BCC AND SCC    Hyperlipidemia     Hypertension     Osteoarthritis     PONV (postoperative nausea and vomiting)        Past Surgical History:        Procedure Laterality Date    BACK SURGERY      lower lumbar fusion    CARPAL TUNNEL RELEASE Bilateral      SECTION      CHOLECYSTECTOMY, LAPAROSCOPIC  10/03/2017    COLONOSCOPY      CYST REMOVAL Left 2017    left  5th digit-Dr Bowman    DILATION AND CURETTAGE OF UTERUS      FACET JOINT INJECTION Bilateral 2020    B/L L3-4 medial branch and L5 dorsal rami injection performed by Tommie Cooney MD at [address] [place] [address] Bilateral     HYSTERECTOMY (CERVIX STATUS UNKNOWN)      JOINT REPLACEMENT Bilateral     KNEES    NERVE SURGERY N/A 2019    left L3, L4 medial branch and L5 dorsal rami injection performed by Tommie Cooney MD at [facility] Left 2020    left L3, L4 medial branch and L5 dorsal rami injection performed by Tommie Cooney MD at [facility] Bilateral 2021    bilateral T12, L1,L2,L3,L4,L5 medial branch block performed by John Paul Dominique MD at [facility] Bilateral 12/21/2021    Bilateral L-facet MBB @ L2-3 and L3-4 performed by Jeni Rubinstein, MD at [facility] [address] Bilateral 3/4/2022    left L-facet RFA @ L2-3, and L3-4 right L2-3 performed by John Paul Dominique MD at [address] [facility] FLX DX W/COLLJ [address] WHEN PFRMD N/A 9/7/2018    COLONOSCOPY performed by Braulio Jackson MD at [facility]    SKIN BIOPSY      [address] [facility]       Social History:    Social History     Tobacco Use    Smoking status: Never    Smokeless tobacco: Never   Substance Use Topics    Alcohol use: Yes     Comment: OCCASIONALLY                                Counseling given: Not Answered      Vital Signs (Current): There were no vitals filed for this visit.                                            BP Readings from Last 3 Encounters:   11/15/22 (!) 173/79   11/07/22 130/64   11/07/22 120/78       NPO Status:                                                                                 BMI:   Wt Readings from Last 3 Encounters:   11/15/22 164 lb (74.4 kg)   11/07/22 164 lb 6.4 oz (74.6 kg)   11/07/22 163 lb (73.9 kg)     There is no height or weight on file to calculate BMI.    CBC:   Lab Results   Component Value Date/Time    WBC 5.3 04/25/2022 08:05 AM    RBC 4.21 04/25/2022 08:05 AM    HGB 12.6 04/25/2022 08:05 AM    HCT 39.5 04/25/2022 08:05 AM    MCV 93.8 04/25/2022 08:05 AM    RDW 13.3 02/19/2021 09:25 AM     04/25/2022 08:05 AM       CMP:   Lab Results   Component Value Date/Time     10/20/2022 07:11 AM    K 3.9 10/20/2022 07:11 AM     10/20/2022 07:11 AM    CO2 28 10/20/2022 07:11 AM    BUN 18 10/20/2022 07:11 AM    CREATININE 1.1 10/20/2022 07:11 AM    LABGLOM 51 10/20/2022 07:11 AM    GLUCOSE 91 10/20/2022 07:11 AM    GLUCOSE 99 05/03/2018 11:02 AM    PROT 6.7 04/25/2022 08:05 AM    CALCIUM 9.5 10/20/2022 07:11 AM    BILITOT 0.4 04/25/2022 08:05 AM    ALKPHOS 108 04/25/2022 08:05 AM    AST 25 04/25/2022 08:05 AM    ALT 18 04/25/2022 08:05 AM       POC Tests: No results for input(s): POCGLU, POCNA, POCK, POCCL, POCBUN, POCHEMO, POCHCT in the last 72 hours. Coags:   Lab Results   Component Value Date/Time    INR 1.00 03/05/2017 11:52 AM    APTT 32.7 03/05/2017 11:52 AM       HCG (If Applicable): No results found for: PREGTESTUR, PREGSERUM, HCG, HCGQUANT     ABGs: No results found for: PHART, PO2ART, GYF2XND, TBL7YBT, BEART, C8OKLPWX     Type & Screen (If Applicable):  No results found for: LABABO, LABRH    Drug/Infectious Status (If Applicable):  No results found for: HIV, HEPCAB    COVID-19 Screening (If Applicable):   Lab Results   Component Value Date/Time    COVID19 NOT DETECTED 05/23/2020 10:13 AM           Anesthesia Evaluation  Patient summary reviewed   history of anesthetic complications: PONV. Airway: Mallampati: II  TM distance: >3 FB   Neck ROM: full  Mouth opening: > = 3 FB   Dental: normal exam         Pulmonary:normal exam                               Cardiovascular:    (+) hypertension:, hyperlipidemia                  Neuro/Psych:               GI/Hepatic/Renal:   (+) GERD: well controlled,           Endo/Other:                     Abdominal:             Vascular: Other Findings:             Anesthesia Plan      MAC     ASA 2       Induction: intravenous. Anesthetic plan and risks discussed with patient.                         Jemima Brown DO   11/15/2022

## 2022-11-15 NOTE — ANESTHESIA POSTPROCEDURE EVALUATION
Department of Anesthesiology  Postprocedure Note    Patient: Kami Markham  MRN: [id number]  YOB: 1940  Date of evaluation: 11/15/2022      Procedure Summary     Date: 11/15/22 Room / Location: [facility] / [address] [facility]    Anesthesia Start: 1613 Anesthesia Stop: 2828    Procedure: Right lumbar  facet Radiofrequency ablation Lumbar 2--3. 3-4 (Right) Diagnosis:       Lumbar spondylosis      (Lumbar spondylosis)    Surgeons: Dave López MD Responsible Provider: Janelle Sorensen DO    Anesthesia Type: MAC ASA Status: 2          Anesthesia Type: No value filed.     Melissa Phase I:      Melissa Phase II:        Anesthesia Post Evaluation    Patient location during evaluation: bedside  Patient participation: complete - patient participated  Level of consciousness: awake and alert  Airway patency: patent  Nausea & Vomiting: no vomiting and no nausea  Complications: no  Cardiovascular status: hemodynamically stable  Respiratory status: acceptable  Hydration status: stable

## 2022-11-15 NOTE — OP NOTE
Pre-Procedure Note    Patient Name: Alberto Davis   YOB: 1940  Medical Record Number: [id number]  Date: 11/15/22    Indication:  Lower back pain    Consent: On file. Vital Signs:   Vitals:    11/15/22 0852   BP: (!) 173/79   Pulse: 78   Resp: 16   Temp: 97.1 °F (36.2 °C)   SpO2: 97%       Past Medical History:   has a past medical history of Chronic low back pain, CKD (chronic kidney disease) stage 3, GFR 30-59 ml/min (HCC), DDD (degenerative disc disease), lumbar, Dyslipidemia, History of skin cancer, Hyperlipidemia, Hypertension, Osteoarthritis, and PONV (postoperative nausea and vomiting). Past Surgical History:   has a past surgical history that includes joint replacement (Bilateral); Foot surgery (Bilateral); Hysterectomy;  section; Dilation and curettage of uterus; Carpal tunnel release (Bilateral); Finger surgery; skin biopsy; back surgery (); Colonoscopy; cyst removal (Left, 2017); Cholecystectomy, laparoscopic (10/03/2017); pr colonoscopy flx dx w/collj spec when pfrmd (N/A, 2018); Nerve Surgery (N/A, 2019); Pain management procedure (Left, 2020); Facet joint injection (Bilateral, 2020); Pain management procedure (Bilateral, 2021); Pain management procedure (Bilateral, 2021); and Pain management procedure (Bilateral, 3/4/2022). Pre-Sedation Documentation and Exam:   Vital signs have been reviewed (see flow sheet for vitals). Sedation/ Anesthesia Plan:   MAC    Patient is an appropriate candidate for plan of sedation: yes    Preoperative Diagnosis:  L-spondylosis    Post-Op Dx: as above    Procedure Performed:  :Radiofrequency ablation of median branches at the levels of L2-3 and L3-4 right under fluoroscopic guidance     Indication for the Procedure: The patient has ahistory of chronic low back pain that is not responding well to the conservative treatment. Patient's pain is mostly axial in nature.   Pain is interfering with the activities of daily living. Physical examination revealed facet tenderness and facet loading is positive. Patient had undergone lumbar facet joint injections with pain relief that lasted for only a short period of time and had greater than 70% pain relief with confirmatory median branch blocks. Hence we decided to do radiofrequency abalation of median branches for long term pain releif. The procedure and risks  were discussed with the patient and an informed consent was obtained. Procedure:     A meaningful communication was kept up with the patient throughout the procedure. The patient is placed in prone position and skin over the back was prepped and draped in sterile manner. Then using fluoroscopy the junction of the transverse process of the vertebra with the superior process of the facet joint was observed and the view was optimized. The skin and deep tissues posterior were infiltrated with 6 ml of  1% xylocaine The RF canula with the 15 mm active tip was introduced through the skin wheal under fluoroscopy guidance such that the tip of the needle lies in the groove of the transverse process with the superior processes of the facet joint. Then a lateral view of the lumbar spine was obtained to make sure the tip of needle is not in the neural foramen. Then electric impedence was checked to make sure it is acceptable. Then a sensory stimulus was applied at 50 Hz up to 1 volt and concordant pain symptoms were reproduced. Then a motor stimulus was applied at 2 Hz up to 2 volts and no motor stimulation was seen in lower extremities. Some multifidus stimulus was seen. Then after negative aspiration 0.25%  Marcaine 2 cc was injected through the needle in divided doses. . Then a lesion was done at 80 degrees centigrade for 90 seconds. EBL-0    . Patient's vital signs and neurological status remained stable throughout the procedure and post procedural period.   The patient tolerated the procedure well and was discharged home in stable condition.     Electronically signed by Devon Lin MD on 11/15/22 at 9:44 AM EST

## 2022-11-15 NOTE — PROGRESS NOTES
0957 To recovery via cart. Spont resp. VSS. Report received from surgical rn. Pt denies pain or numbness tingling or nausea. HOB elevated. Snack and drink given. Call bell in reach. 1015 IV discontinued.  Up to dress self  1037 Discharge to  home in stable ambulatory condition with daughter

## 2022-11-15 NOTE — H&P
H&P      Patient continues to have pain in low back- aching pain worse with increased activity. States has been up and down and has been increasing with time as effects from L-facet RFA are waning. Pain is only on right side still doing really well and not having any pain in left lower back      Has pain in left upper arm and shoulder that has continues since fall a few months afgo. Has bruising and swelling states maybe she felt like a tearing buring sensation with movement last week but has full ROM. Pain increases with bending, lifting, twisting , reaching, pushing, pulling, standing, raising arms, getting up and down, and housework or working at job. Continues Norco only prn when pain increases and it helps a lot. Medications reviewed. Patient denies side effects with medications. Patient states she is taking medications as prescribed. Shedenies receiving pain medications from other sources. She denies any ER visits since last visit. Pain scale with out pain medications or at its worst is 5-6/10. Takes Haris Brew only if pain increases to 9-10/10. Pain scale with pain medications or at its best is 2-3/10. Last dose of Norco was 2 days ago   Drug screen reviewed from 8/8/2022 and was appropriate  Pill count completed  today and WNL: Yes        The patientis allergic to sulfa antibiotics and other. Subjective:      Review of Systems   Constitutional: Negative. HENT: Negative. Eyes: Negative. Negative for visual disturbance. Respiratory: Negative. Cardiovascular: Negative. Gastrointestinal: Negative. Endocrine: Negative. Genitourinary: Negative. Musculoskeletal:  Positive for arthralgias, back pain, gait problem, joint swelling and myalgias. Negative for neck pain and neck stiffness. Ambulating with walking stick    Skin:  Positive for color change. Left arm bruising    Neurological:  Positive for weakness. Negative for numbness.    Psychiatric/Behavioral: Negative. Objective:      Vitals       Vitals:     11/07/22 1058   BP: 120/78   Weight: 163 lb (73.9 kg)   Height: 5' 1.5\" (1.562 m)            Physical Exam  Vitals and nursing note reviewed. Constitutional:       General: She is not in acute distress. Appearance: Normal appearance. She is well-developed. She is not diaphoretic. HENT:      Head: Normocephalic and atraumatic. Right Ear: External ear normal.      Left Ear: External ear normal.      Nose: Nose normal.      Mouth/Throat:      Pharynx: No oropharyngeal exudate. Eyes:      General: No scleral icterus. Right eye: No discharge. Left eye: No discharge. Conjunctiva/sclera: Conjunctivae normal.      Pupils: Pupils are equal, round, and reactive to light. Neck:      Thyroid: No thyromegaly. Cardiovascular:      Rate and Rhythm: Normal rate and regular rhythm. Heart sounds: Normal heart sounds. No murmur heard. No friction rub. No gallop. Pulmonary:      Effort: Pulmonary effort is normal. No respiratory distress. Breath sounds: Normal breath sounds. No wheezing or rales. Chest:      Chest wall: No tenderness. Abdominal:      General: Bowel sounds are normal. There is no distension. Palpations: Abdomen is soft. Tenderness: There is no abdominal tenderness. There is no guarding or rebound. Musculoskeletal:         General: Tenderness present. Right shoulder: Tenderness and bony tenderness present. Decreased range of motion. Left shoulder: Swelling, deformity, tenderness and bony tenderness present. Decreased range of motion. Right hand: Deformity and tenderness present. Left hand: Deformity and tenderness present. Arms:     Cervical back: Neck supple. No edema, erythema, rigidity or tenderness. No muscular tenderness. Decreased range of motion. Thoracic back: Tenderness present. Scoliosis present. Lumbar back: Tenderness and bony tenderness present. Decreased range of motion. Negative right straight leg raise test and negative left straight leg raise test. Scoliosis present. Back:  Skin:     General: Skin is warm. Coloration: Skin is not pale. Findings: Bruising present. No erythema or rash. Neurological:      Mental Status: She is alert and oriented to person, place, and time. She is not disoriented. Cranial Nerves: No cranial nerve deficit. Sensory: No sensory deficit. Motor: Weakness present. No atrophy or abnormal muscle tone. Coordination: Coordination normal.      Gait: Gait abnormal.      Deep Tendon Reflexes: Babinski sign absent on the right side. Babinski sign absent on the left side. Reflex Scores:       Achilles reflexes are 1+ on the right side and 1+ on the left side. Comments: Gait-uses cane   Psychiatric:         Attention and Perception: Attention normal. She is attentive. Mood and Affect: Mood normal. Mood is not anxious or depressed. Affect is not labile, blunt, angry or inappropriate. Speech: Speech normal. She is communicative. Speech is not rapid and pressured, delayed, slurred or tangential.         Behavior: Behavior is not agitated, slowed, aggressive, withdrawn, hyperactive or combative. Behavior is cooperative. Thought Content: Thought content normal. Thought content is not paranoid or delusional. Thought content does not include homicidal or suicidal ideation. Thought content does not include homicidal or suicidal plan. Cognition and Memory: Cognition normal. Memory is not impaired. She does not exhibit impaired recent memory or impaired remote memory. Judgment: Judgment normal. Judgment is not impulsive or inappropriate. ALCIDES  Patricks test  negative  Yeoman's  or Gaenslen's negative        Assessment:      1. Lumbar spondylosis    2. Chronic bilateral low back pain without sciatica    3. History of lumbar laminectomy    4. Scoliosis of thoracolumbar spine, unspecified scoliosis type    5. Left arm pain    6. Chronic left shoulder pain    7. Chronic pain syndrome       Plan:      OARRS reviewed. Current MED: 5.00  Patient was offered naloxone for home. Discussed long term side effects of medications, tolerance, dependency and addiction. Previous UDS reviewed  UDS preformed today for compliance. Patient told can not receive any pain medications from any other source. No evidence of abuse, diversion or aberrant behavior. Medications and/or procedures to improve function and quality of life- patient understanding with this and that may not be pain free  Discussed with patient about safe storage of medications at home  Discussed possible weaning of medication dosing dependent on treatment/procedure results. Discussed with patient about risks with procedure including infection, reaction to medication, increased pain, or bleeding. Procedure notes reviewed in detail   Received about a year of relief from previous L-facet RFA. Still receiving relief of left low back pain only having right low back pain at this time. Plan Right L-facet RFA @ L2--3 and L3-4 for longer term pain relief. Procedure discussed in detail   Continue Norco 5/325 mg tabs BID- 1/2 tab to 1 tab BID prn- filled 9/12/2022 and takes prn and has plenty. Will get next UDS at next FU   Continue tylenol prn for mild pain  Discussed getting updated imaging of left shoulder and left humerus if pain does not improve           Return for Right L-facet RFA @ L2--3 and L3-4 . , follow up after procedure.

## 2022-11-29 ENCOUNTER — OFFICE VISIT (OUTPATIENT)
Dept: PHYSICAL MEDICINE AND REHAB | Age: 82
End: 2022-11-29
Payer: MEDICARE

## 2022-11-29 VITALS
HEIGHT: 63 IN | DIASTOLIC BLOOD PRESSURE: 76 MMHG | SYSTOLIC BLOOD PRESSURE: 122 MMHG | BODY MASS INDEX: 29.06 KG/M2 | WEIGHT: 164 LBS

## 2022-11-29 DIAGNOSIS — M47.816 LUMBAR SPONDYLOSIS: Primary | ICD-10-CM

## 2022-11-29 DIAGNOSIS — Z98.890 HISTORY OF LUMBAR LAMINECTOMY: ICD-10-CM

## 2022-11-29 DIAGNOSIS — G89.29 CHRONIC BILATERAL LOW BACK PAIN WITHOUT SCIATICA: ICD-10-CM

## 2022-11-29 DIAGNOSIS — M41.9 SCOLIOSIS OF THORACOLUMBAR SPINE, UNSPECIFIED SCOLIOSIS TYPE: ICD-10-CM

## 2022-11-29 DIAGNOSIS — G89.4 CHRONIC PAIN SYNDROME: ICD-10-CM

## 2022-11-29 DIAGNOSIS — M54.50 CHRONIC BILATERAL LOW BACK PAIN WITHOUT SCIATICA: ICD-10-CM

## 2022-11-29 PROCEDURE — 3078F DIAST BP <80 MM HG: CPT | Performed by: NURSE PRACTITIONER

## 2022-11-29 PROCEDURE — 1123F ACP DISCUSS/DSCN MKR DOCD: CPT | Performed by: NURSE PRACTITIONER

## 2022-11-29 PROCEDURE — G8417 CALC BMI ABV UP PARAM F/U: HCPCS | Performed by: NURSE PRACTITIONER

## 2022-11-29 PROCEDURE — 1090F PRES/ABSN URINE INCON ASSESS: CPT | Performed by: NURSE PRACTITIONER

## 2022-11-29 PROCEDURE — 3074F SYST BP LT 130 MM HG: CPT | Performed by: NURSE PRACTITIONER

## 2022-11-29 PROCEDURE — G8484 FLU IMMUNIZE NO ADMIN: HCPCS | Performed by: NURSE PRACTITIONER

## 2022-11-29 PROCEDURE — G8427 DOCREV CUR MEDS BY ELIG CLIN: HCPCS | Performed by: NURSE PRACTITIONER

## 2022-11-29 PROCEDURE — 1036F TOBACCO NON-USER: CPT | Performed by: NURSE PRACTITIONER

## 2022-11-29 PROCEDURE — G8399 PT W/DXA RESULTS DOCUMENT: HCPCS | Performed by: NURSE PRACTITIONER

## 2022-11-29 PROCEDURE — 99214 OFFICE O/P EST MOD 30 MIN: CPT | Performed by: NURSE PRACTITIONER

## 2022-11-29 ASSESSMENT — ENCOUNTER SYMPTOMS
EYES NEGATIVE: 1
GASTROINTESTINAL NEGATIVE: 1
BACK PAIN: 1
COLOR CHANGE: 0
RESPIRATORY NEGATIVE: 1

## 2022-11-29 NOTE — PROGRESS NOTES
[address] [facility] [address]  Dept: [phone]  Dept Fax: [phone]: [phone]    Visit Date: 11/29/2022    Functionality Assessment/Goals Worksheet     On a scale of 0 (Does not Interfere) to 10 (Completely Interferes)     1. Which number describes how during the past week pain has interfered with       the following:  A. General Activity:  3  B. Mood: 8  C. Walking Ability:  2  D. Normal Work (Includes both work outside the home and housework):  4  E. Relations with Other People:   0  F. Sleep:   6  G. Enjoyment of Life:   4    2. Patient Prefers to Take their Pain Medications:     []  On a regular basis   [x]  Only when necessary    [x]  Does not take pain medications    3. What are the Patient's Goals/Expectations for Visiting Pain Management? []  Learn about my pain    [x]  Receive Medication   []  Physical Therapy     []  Treat Depression   [x]  Receive Injections    []  Treat Sleep   []  Deal with Anxiety and Stress   []  Treat Opoid Dependence/Addiction   []  Other:      HPI:   Lavern Nichole is a [de-identified] y.o. female is here today for    Chief Complaint: Low back pain     HPI   FU from right L-facet RFA from 11/15/2022. At this time is noticing about 50% to 70% relief of right low back pain from procedure. Discussed that full effect and relief could take up to a month to get relief from L-facet RFA. Continues to have pain in right lower back aching pain and sharp pain but again not as intense. Is able to tolerate more with less pain since procedure. Denies any left lower back pain   States that left arm and shoulder pain has improved since last visit.    Pain increases with bending, lifting, walking, standing, getting up and down, and housework or working at job, worse in morning and with weather changes, sharp pain with laying in bed and rolling over    Uses Norco prn and remains effective when pain increases otherwise continues tylenol arthritis. Medications reviewed. Patient denies side effects with medications. Patient states she is taking medications as prescribed. Shedenies receiving pain medications from other sources. She denies any ER visits since last visit. Pain scale with out pain medications or at its worst is 2/10 now can increase with activity has been up to 7/10   Pain scale with pain medications or at its best is 0/10. Last dose of Norco was yesterday 1/2 tab   Drug screen reviewed from 8/8/2022 and was appropriate  Pill count completed  today and WNL: Yes      The patientis allergic to sulfa antibiotics and other. Subjective:      Review of Systems   Constitutional: Negative. HENT: Negative. Eyes: Negative. Negative for visual disturbance. Respiratory: Negative. Cardiovascular: Negative. Gastrointestinal: Negative. Endocrine: Negative. Genitourinary: Negative. Musculoskeletal:  Positive for arthralgias, back pain, gait problem, joint swelling and myalgias. Negative for neck pain and neck stiffness. Ambulating with walking stick    Skin:  Negative for color change. Neurological:  Negative for weakness and numbness. Psychiatric/Behavioral: Negative. Objective:     Vitals:    11/29/22 1246   BP: 122/76   Weight: 164 lb (74.4 kg)   Height: 5' 3\" (1.6 m)       Physical Exam  Vitals and nursing note reviewed. Constitutional:       General: She is not in acute distress. Appearance: Normal appearance. She is well-developed. She is not diaphoretic. HENT:      Head: Normocephalic and atraumatic. Right Ear: External ear normal.      Left Ear: External ear normal.      Nose: Nose normal.      Mouth/Throat:      Pharynx: No oropharyngeal exudate. Eyes:      General: No scleral icterus. Right eye: No discharge. Left eye: No discharge.       Conjunctiva/sclera: Conjunctivae normal. Pupils: Pupils are equal, round, and reactive to light. Neck:      Thyroid: No thyromegaly. Cardiovascular:      Rate and Rhythm: Normal rate and regular rhythm. Heart sounds: Normal heart sounds. No murmur heard. No friction rub. No gallop. Pulmonary:      Effort: Pulmonary effort is normal. No respiratory distress. Breath sounds: Normal breath sounds. No wheezing or rales. Chest:      Chest wall: No tenderness. Abdominal:      General: Bowel sounds are normal. There is no distension. Palpations: Abdomen is soft. Tenderness: There is no abdominal tenderness. There is no guarding or rebound. Musculoskeletal:         General: Tenderness present. Right shoulder: No tenderness or bony tenderness. Normal range of motion. Left shoulder: Tenderness present. No swelling, deformity or bony tenderness. Normal range of motion. Right hand: No deformity or tenderness. Left hand: No deformity or tenderness. Cervical back: Neck supple. No edema, erythema, rigidity or tenderness. No muscular tenderness. Decreased range of motion. Thoracic back: Tenderness present. Scoliosis present. Lumbar back: Tenderness and bony tenderness present. Decreased range of motion. Negative right straight leg raise test and negative left straight leg raise test. Scoliosis present. Back:    Skin:     General: Skin is warm. Coloration: Skin is not pale. Findings: No bruising, erythema or rash. Neurological:      Mental Status: She is alert and oriented to person, place, and time. She is not disoriented. Cranial Nerves: No cranial nerve deficit. Sensory: No sensory deficit. Motor: Weakness present. No atrophy or abnormal muscle tone. Coordination: Coordination normal.      Gait: Gait abnormal.      Deep Tendon Reflexes: Babinski sign absent on the right side. Babinski sign absent on the left side.       Reflex Scores:       Achilles reflexes are 1+ on the right side and 1+ on the left side. Comments: Gait-uses walking stick   Psychiatric:         Attention and Perception: Attention normal. She is attentive. Mood and Affect: Mood normal. Mood is not anxious or depressed. Affect is not labile, blunt, angry or inappropriate. Speech: Speech normal. She is communicative. Speech is not rapid and pressured, delayed, slurred or tangential.         Behavior: Behavior is not agitated, slowed, aggressive, withdrawn, hyperactive or combative. Behavior is cooperative. Thought Content: Thought content normal. Thought content is not paranoid or delusional. Thought content does not include homicidal or suicidal ideation. Thought content does not include homicidal or suicidal plan. Cognition and Memory: Cognition normal. Memory is not impaired. She does not exhibit impaired recent memory or impaired remote memory. Judgment: Judgment normal. Judgment is not impulsive or inappropriate. ALCIDES  Patricks test  positive  Yeoman's  or Gaenslen's positive         Assessment:     1. Lumbar spondylosis    2. Chronic bilateral low back pain without sciatica    3. History of lumbar laminectomy    4. Scoliosis of thoracolumbar spine, unspecified scoliosis type    5. Chronic pain syndrome            Plan:      OARRS reviewed. Current MED: 5.00  Patient was offered naloxone for home. Discussed long term side effects of medications, tolerance, dependency and addiction. Previous UDS reviewed  UDS preformed today for compliance. Patient told can not receive any pain medications from any other source. No evidence of abuse, diversion or aberrant behavior.   Medications and/or procedures to improve function and quality of life- patient understanding with this and that may not be pain free  Discussed with patient about safe storage of medications at home  Discussed possible weaning of medication dosing dependent on treatment/procedure results. Discussed with patient about risks with procedure including infection, reaction to medication, increased pain, or bleeding. Procedure notes reviewed in detail   Receiving about 50% to 70% relief of right low back pain from right L-facet RFA. Discussed that it ca take up to a month to get full effect. Pain has been more tolerable. Continue Norco 5/325 mg tabs BID- 1/2 tab to 1 tab BID prn- filled 9/12/2022 and takes prn and has plenty. Is compliant ordered updated UDS       Meds. Prescribed:   No orders of the defined types were placed in this encounter. Return in about 3 months (around 2/28/2023), or if symptoms worsen or fail to improve, for follow up  for medications.                Electronically signed by JOSE Alcantara CNP on11/29/2022 at 1:08 PM

## 2023-01-04 DIAGNOSIS — G89.4 CHRONIC PAIN SYNDROME: Primary | ICD-10-CM

## 2023-01-04 RX ORDER — HYDROCODONE BITARTRATE AND ACETAMINOPHEN 5; 325 MG/1; MG/1
.5-1 TABLET ORAL 2 TIMES DAILY PRN
Qty: 30 TABLET | Refills: 0 | Status: SHIPPED | OUTPATIENT
Start: 2023-01-04 | End: 2023-01-19

## 2023-01-04 NOTE — TELEPHONE ENCOUNTER
Lawson Pallas called requesting a refill on the following medications:  Requested Prescriptions     Pending Prescriptions Disp Refills    HYDROcodone-acetaminophen (NORCO) 5-325 MG per tablet       Sig: Take 1 tablet by mouth every 6 hours as needed for Pain.  Max Daily Amount: 4 tablets     Pharmacy verified:  [facility]     Date of last visit: 11/29/2022  Date of next visit (if applicable): 6/96/4844

## 2023-01-07 ENCOUNTER — HOSPITAL ENCOUNTER (EMERGENCY)
Age: 83
Discharge: HOME | End: 2023-01-07
Attending: EMERGENCY MEDICINE
Payer: MEDICARE

## 2023-01-07 ENCOUNTER — APPOINTMENT (OUTPATIENT)
Dept: CT IMAGING | Age: 83
End: 2023-01-07
Payer: MEDICARE

## 2023-01-07 VITALS
HEART RATE: 68 BPM | BODY MASS INDEX: 27.46 KG/M2 | TEMPERATURE: 98.9 F | HEIGHT: 63 IN | DIASTOLIC BLOOD PRESSURE: 56 MMHG | SYSTOLIC BLOOD PRESSURE: 113 MMHG | OXYGEN SATURATION: 95 % | WEIGHT: 155 LBS | RESPIRATION RATE: 19 BRPM

## 2023-01-07 DIAGNOSIS — N13.30 HYDRONEPHROSIS OF RIGHT KIDNEY: ICD-10-CM

## 2023-01-07 DIAGNOSIS — N17.9 AKI (ACUTE KIDNEY INJURY) (HCC): Primary | ICD-10-CM

## 2023-01-07 LAB
ALBUMIN SERPL-MCNC: 4 G/DL (ref 3.5–5.1)
ALP BLD-CCNC: 123 U/L (ref 38–126)
ALT SERPL-CCNC: 20 U/L (ref 11–66)
ANION GAP SERPL CALCULATED.3IONS-SCNC: 16 MEQ/L (ref 8–16)
AST SERPL-CCNC: 23 U/L (ref 5–40)
BACTERIA: ABNORMAL
BASOPHILS # BLD: 0.3 %
BASOPHILS ABSOLUTE: 0 THOU/MM3 (ref 0–0.1)
BILIRUB SERPL-MCNC: 0.4 MG/DL (ref 0.3–1.2)
BILIRUBIN DIRECT: < 0.2 MG/DL (ref 0–0.3)
BILIRUBIN URINE: NEGATIVE
BLOOD, URINE: ABNORMAL
BUN BLDV-MCNC: 22 MG/DL (ref 7–22)
CALCIUM SERPL-MCNC: 8.7 MG/DL (ref 8.5–10.5)
CASTS: ABNORMAL /LPF
CASTS: ABNORMAL /LPF
CHARACTER, URINE: ABNORMAL
CHLORIDE BLD-SCNC: 96 MEQ/L (ref 98–111)
CO2: 22 MEQ/L (ref 23–33)
COLOR: YELLOW
CREAT SERPL-MCNC: 1.3 MG/DL (ref 0.4–1.2)
CRYSTALS: ABNORMAL
EOSINOPHIL # BLD: 0.2 %
EOSINOPHILS ABSOLUTE: 0 THOU/MM3 (ref 0–0.4)
EPITHELIAL CELLS, UA: ABNORMAL /HPF
ERYTHROCYTE [DISTWIDTH] IN BLOOD BY AUTOMATED COUNT: 12.3 % (ref 11.5–14.5)
ERYTHROCYTE [DISTWIDTH] IN BLOOD BY AUTOMATED COUNT: 40.6 FL (ref 35–45)
GFR SERPL CREATININE-BSD FRML MDRD: 42 ML/MIN/1.73M2
GLUCOSE BLD-MCNC: 110 MG/DL (ref 70–108)
GLUCOSE, URINE: NEGATIVE MG/DL
HCT VFR BLD CALC: 37.6 % (ref 37–47)
HEMOGLOBIN: 12.7 GM/DL (ref 12–16)
IMMATURE GRANS (ABS): 0.02 THOU/MM3 (ref 0–0.07)
IMMATURE GRANULOCYTES: 0.3 %
INFLUENZA A: NOT DETECTED
INFLUENZA B: NOT DETECTED
KETONES, URINE: NEGATIVE
LEUKOCYTE EST, POC: ABNORMAL
LIPASE: 39.4 U/L (ref 5.6–51.3)
LYMPHOCYTES # BLD: 15.5 %
LYMPHOCYTES ABSOLUTE: 0.9 THOU/MM3 (ref 1–4.8)
MCH RBC QN AUTO: 30.7 PG (ref 26–33)
MCHC RBC AUTO-ENTMCNC: 33.8 GM/DL (ref 32.2–35.5)
MCV RBC AUTO: 90.8 FL (ref 81–99)
MISCELLANEOUS LAB TEST RESULT: ABNORMAL
MONOCYTES # BLD: 19.3 %
MONOCYTES ABSOLUTE: 1.1 THOU/MM3 (ref 0.4–1.3)
NITRITE, URINE: POSITIVE
NUCLEATED RED BLOOD CELLS: 0 /100 WBC
OSMOLALITY CALCULATION: 272.2 MOSMOL/KG (ref 275–300)
PH UA: 5.5 (ref 5–9)
PLATELET # BLD: 219 THOU/MM3 (ref 130–400)
PMV BLD AUTO: 10.9 FL (ref 9.4–12.4)
POTASSIUM SERPL-SCNC: 3.6 MEQ/L (ref 3.5–5.2)
PROTEIN UA: 30 MG/DL
RBC # BLD: 4.14 MILL/MM3 (ref 4.2–5.4)
RBC URINE: ABNORMAL /HPF
RENAL EPITHELIAL, UA: ABNORMAL
SARS-COV-2 RNA, RT PCR: NOT DETECTED
SEG NEUTROPHILS: 64.4 %
SEGMENTED NEUTROPHILS ABSOLUTE COUNT: 3.7 THOU/MM3 (ref 1.8–7.7)
SODIUM BLD-SCNC: 134 MEQ/L (ref 135–145)
SPECIFIC GRAVITY UA: 1.03 (ref 1–1.03)
TOTAL PROTEIN: 7.2 G/DL (ref 6.1–8)
TSH SERPL DL<=0.05 MIU/L-ACNC: 1.55 UIU/ML (ref 0.4–4.2)
UROBILINOGEN, URINE: 1 EU/DL (ref 0–1)
WBC # BLD: 5.8 THOU/MM3 (ref 4.8–10.8)
WBC UA: > 100 /HPF
YEAST: ABNORMAL

## 2023-01-07 PROCEDURE — 83690 ASSAY OF LIPASE: CPT

## 2023-01-07 PROCEDURE — 6370000000 HC RX 637 (ALT 250 FOR IP): Performed by: STUDENT IN AN ORGANIZED HEALTH CARE EDUCATION/TRAINING PROGRAM

## 2023-01-07 PROCEDURE — 99285 EMERGENCY DEPT VISIT HI MDM: CPT

## 2023-01-07 PROCEDURE — 82248 BILIRUBIN DIRECT: CPT

## 2023-01-07 PROCEDURE — 6360000004 HC RX CONTRAST MEDICATION: Performed by: EMERGENCY MEDICINE

## 2023-01-07 PROCEDURE — 93005 ELECTROCARDIOGRAM TRACING: CPT | Performed by: EMERGENCY MEDICINE

## 2023-01-07 PROCEDURE — 80053 COMPREHEN METABOLIC PANEL: CPT

## 2023-01-07 PROCEDURE — 81001 URINALYSIS AUTO W/SCOPE: CPT

## 2023-01-07 PROCEDURE — 96365 THER/PROPH/DIAG IV INF INIT: CPT

## 2023-01-07 PROCEDURE — 74177 CT ABD & PELVIS W/CONTRAST: CPT

## 2023-01-07 PROCEDURE — 36415 COLL VENOUS BLD VENIPUNCTURE: CPT

## 2023-01-07 PROCEDURE — 2580000003 HC RX 258: Performed by: STUDENT IN AN ORGANIZED HEALTH CARE EDUCATION/TRAINING PROGRAM

## 2023-01-07 PROCEDURE — 87636 SARSCOV2 & INF A&B AMP PRB: CPT

## 2023-01-07 PROCEDURE — 84443 ASSAY THYROID STIM HORMONE: CPT

## 2023-01-07 PROCEDURE — 96375 TX/PRO/DX INJ NEW DRUG ADDON: CPT

## 2023-01-07 PROCEDURE — 6360000002 HC RX W HCPCS: Performed by: STUDENT IN AN ORGANIZED HEALTH CARE EDUCATION/TRAINING PROGRAM

## 2023-01-07 PROCEDURE — 85025 COMPLETE CBC W/AUTO DIFF WBC: CPT

## 2023-01-07 RX ORDER — ONDANSETRON 4 MG/1
4 TABLET, ORALLY DISINTEGRATING ORAL ONCE
Status: COMPLETED | OUTPATIENT
Start: 2023-01-07 | End: 2023-01-07

## 2023-01-07 RX ORDER — ONDANSETRON 4 MG/1
4 TABLET, FILM COATED ORAL 3 TIMES DAILY PRN
Qty: 15 TABLET | Refills: 0 | Status: SHIPPED | OUTPATIENT
Start: 2023-01-07

## 2023-01-07 RX ORDER — ONDANSETRON 2 MG/ML
4 INJECTION INTRAMUSCULAR; INTRAVENOUS ONCE
Status: COMPLETED | OUTPATIENT
Start: 2023-01-07 | End: 2023-01-07

## 2023-01-07 RX ORDER — CEPHALEXIN 500 MG/1
500 CAPSULE ORAL 4 TIMES DAILY
Qty: 28 CAPSULE | Refills: 0 | Status: SHIPPED | OUTPATIENT
Start: 2023-01-07 | End: 2023-01-14

## 2023-01-07 RX ORDER — HYDROCODONE BITARTRATE AND ACETAMINOPHEN 5; 325 MG/1; MG/1
1 TABLET ORAL ONCE
Status: DISCONTINUED | OUTPATIENT
Start: 2023-01-07 | End: 2023-01-07

## 2023-01-07 RX ORDER — MORPHINE SULFATE 4 MG/ML
4 INJECTION, SOLUTION INTRAMUSCULAR; INTRAVENOUS ONCE
Status: COMPLETED | OUTPATIENT
Start: 2023-01-07 | End: 2023-01-07

## 2023-01-07 RX ORDER — 0.9 % SODIUM CHLORIDE 0.9 %
1000 INTRAVENOUS SOLUTION INTRAVENOUS ONCE
Status: COMPLETED | OUTPATIENT
Start: 2023-01-07 | End: 2023-01-07

## 2023-01-07 RX ADMIN — IOPAMIDOL 80 ML: 755 INJECTION, SOLUTION INTRAVENOUS at 19:45

## 2023-01-07 RX ADMIN — ONDANSETRON 4 MG: 4 TABLET, ORALLY DISINTEGRATING ORAL at 18:24

## 2023-01-07 RX ADMIN — ONDANSETRON 4 MG: 2 INJECTION INTRAMUSCULAR; INTRAVENOUS at 21:33

## 2023-01-07 RX ADMIN — MORPHINE SULFATE 4 MG: 4 INJECTION, SOLUTION INTRAMUSCULAR; INTRAVENOUS at 21:34

## 2023-01-07 RX ADMIN — SODIUM CHLORIDE 1000 ML: 9 INJECTION, SOLUTION INTRAVENOUS at 18:25

## 2023-01-07 RX ADMIN — CEFTRIAXONE SODIUM 1000 MG: 1 INJECTION, POWDER, FOR SOLUTION INTRAMUSCULAR; INTRAVENOUS at 21:35

## 2023-01-07 ASSESSMENT — PAIN SCALES - GENERAL
PAINLEVEL_OUTOF10: 3
PAINLEVEL_OUTOF10: 3
PAINLEVEL_OUTOF10: 8
PAINLEVEL_OUTOF10: 8
PAINLEVEL_OUTOF10: 3

## 2023-01-07 ASSESSMENT — PAIN DESCRIPTION - ORIENTATION
ORIENTATION: LEFT
ORIENTATION: RIGHT;LOWER
ORIENTATION: RIGHT;LOWER

## 2023-01-07 ASSESSMENT — PAIN - FUNCTIONAL ASSESSMENT
PAIN_FUNCTIONAL_ASSESSMENT: NONE - DENIES PAIN
PAIN_FUNCTIONAL_ASSESSMENT: 0-10
PAIN_FUNCTIONAL_ASSESSMENT: NONE - DENIES PAIN

## 2023-01-07 ASSESSMENT — PAIN DESCRIPTION - DESCRIPTORS: DESCRIPTORS: ACHING;DULL

## 2023-01-07 ASSESSMENT — PAIN DESCRIPTION - LOCATION
LOCATION: ABDOMEN
LOCATION: FLANK

## 2023-01-07 NOTE — ED PROVIDER NOTES
[address] [facility] [address] [address] EMERGENCY DEPT      EMERGENCY MEDICINE     Pt Name: Zeny Cruz  MRN: [id number]  [place] 1940  Date of evaluation: 2023  Provider: Rosemary Acuña MD  Supervising Physician: Dr Scott Tirado   Patient presents with    Nausea     HISTORY OF PRESENT ILLNESS   Zeny Cruz is a pleasant [de-identified] y.o. female with past medical history of hypertension, hyperlipidemia, CKD, hysterectomy, cholecystectomy who presents to the emergency department for fatigue nonbilious nonbloody emesis as well as nausea over the last 4 days that has been gradually worsening as well as intermittent suprapubic and periumbilical abdominal pain that is dull and achy in nature. Denies any diarrhea normal bowel today. Denies any dysuria or hematuria or vaginal bleeding.       PASTMEDICAL HISTORY     Past Medical History:   Diagnosis Date    Chronic low back pain     CKD (chronic kidney disease) stage 3, GFR 30-59 ml/min (HCC)     DDD (degenerative disc disease), lumbar     Dyslipidemia     History of skin cancer     BCC AND SCC    Hyperlipidemia     Hypertension     Osteoarthritis     PONV (postoperative nausea and vomiting)        Patient Active Problem List   Diagnosis Code    Ganglion cyst of finger of left hand M67.442    Dyslipidemia E78.5    History of skin cancer Z85.828    Hypertension I10    Osteoarthritis M19.90    Positive WICHO (antinuclear antibody) R76.8    DDD (degenerative disc disease), lumbar M51.36    Chronic low back pain M54.50, G89.29    Hyperkalemia E87.5    CKD (chronic kidney disease) stage 3, GFR 30-59 ml/min (HCC) N18.30    Erosive osteoarthritis of both hands M15.4    Lumbar spondylosis M47.816    Chronic renal disease, stage III ([address] [146289] N18.30     SURGICAL HISTORY       Past Surgical History:   Procedure Laterality Date    BACK SURGERY      lower lumbar fusion    CARPAL TUNNEL RELEASE Bilateral      SECTION      CHOLECYSTECTOMY, LAPAROSCOPIC  10/03/2017    COLONOSCOPY      CYST REMOVAL Left 01/31/2017    left  5th digit-Dr Bowman    DILATION AND CURETTAGE OF UTERUS      FACET JOINT INJECTION Bilateral 5/27/2020    B/L L3-4 medial branch and L5 dorsal rami injection performed by Dino Shetty MD at [address] [place] [address] Bilateral     HYSTERECTOMY (CERVIX STATUS UNKNOWN)      JOINT REPLACEMENT Bilateral     KNEES    NERVE SURGERY N/A 12/4/2019    left L3, L4 medial branch and L5 dorsal rami injection performed by Dino Shetty MD at [facility] Left 1/29/2020    left L3, L4 medial branch and L5 dorsal rami injection performed by Dino Shetty MD at [facility] Bilateral 1/20/2021    bilateral T12, L1,L2,L3,L4,L5 medial branch block performed by Dino Shetty MD at [facility] Bilateral 12/21/2021    Bilateral L-facet MBB @ L2-3 and L3-4 performed by Jeff Hu MD at [facility] Bilateral 3/4/2022    left L-facet RFA @ L2-3, and L3-4 right L2-3 performed by Dino Shetty MD at [facility] Right 11/15/2022    Right lumbar  facet Radiofrequency ablation Lumbar 2--3. 3-4 performed by Jeff Hu MD at [address] FLX DX W/PELONJ Radha 1978 PFRMD N/A 9/7/2018    COLONOSCOPY performed by Darylene Brazier, MD at . [address]       Previous Medications    ACETAMINOPHEN (TYLENOL) 650 MG EXTENDED RELEASE TABLET    Take 650 mg by mouth every 8 hours as needed for Pain    CALCIUM CARBONATE-VIT D-MIN (CALCIUM 1200 PO)    Take by mouth daily    CHOLECALCIFEROL (VITAMIN D-3 PO)    Take by mouth daily    HANDICAP PLACARD MISC    by Does not apply route Expires 29 June 2023 HYDROCODONE-ACETAMINOPHEN (NORCO) 5-325 MG PER TABLET    Take 0.5-1 tablets by mouth 2 times daily as needed for Pain for up to 15 days. Max Daily Amount: 2 tablets    HYDROXYCHLOROQUINE (PLAQUENIL) 200 MG TABLET    TAKE 1 AND 1/2 TABLETS BY MOUTH DAILY    LISINOPRIL-HYDROCHLOROTHIAZIDE (PRINZIDE;ZESTORETIC) 10-12.5 MG PER TABLET    TAKE 1 TABLET BY MOUTH DAILY    MULTIPLE VITAMINS-MINERALS (PRESERVISION AREDS PO)    Take by mouth    OMEPRAZOLE (PRILOSEC) 20 MG DELAYED RELEASE CAPSULE    TAKE 1 CAPSULE BY MOUTH DAILY    VITAMIN C (ASCORBIC ACID) 500 MG TABLET    Take 500 mg by mouth daily       ALLERGIES     is allergic to sulfa antibiotics and other. FAMILY HISTORY     She indicated that her mother is . She indicated that her father is . She indicated that the status of her neg hx is unknown. SOCIAL HISTORY       Social History     Tobacco Use    Smoking status: Never    Smokeless tobacco: Never   Vaping Use    Vaping Use: Never used   Substance Use Topics    Alcohol use: Yes     Comment: OCCASIONALLY    Drug use: No       PHYSICAL EXAM       ED Triage Vitals [23 1720]   BP Temp Temp Source Heart Rate Resp SpO2 Height Weight   (!) 149/73 98.9 °F (37.2 °C) Oral 71 25 95 % 5' 3\" (1.6 m) 155 lb (70.3 kg)       Physical Exam  Vitals and nursing note reviewed. Constitutional:       General: She is not in acute distress. Appearance: She is not toxic-appearing or diaphoretic. HENT:      Head: Normocephalic and atraumatic. Right Ear: External ear normal.      Left Ear: External ear normal.      Nose: Nose normal.      Mouth/Throat:      Mouth: Mucous membranes are moist.      Pharynx: Oropharynx is clear. Eyes:      General: No scleral icterus. Conjunctiva/sclera: Conjunctivae normal.   Cardiovascular:      Rate and Rhythm: Normal rate and regular rhythm. Pulses: Normal pulses. Heart sounds: Normal heart sounds.    Pulmonary:      Effort: Pulmonary effort is normal. No respiratory distress. Breath sounds: Normal breath sounds. Abdominal:      General: Abdomen is flat. There is no distension. Palpations: Abdomen is soft. Tenderness: There is abdominal tenderness. There is guarding. There is no right CVA tenderness, left CVA tenderness or rebound. Comments: Suprapubic tenderness palpation with voluntary guarding as well as right lower quadrant tenderness to palpation with voluntary guarding   Musculoskeletal:         General: Normal range of motion. Cervical back: Normal range of motion and neck supple. No rigidity. No muscular tenderness. Lymphadenopathy:      Cervical: No cervical adenopathy. Skin:     General: Skin is warm and dry. Capillary Refill: Capillary refill takes less than 2 seconds. Coloration: Skin is not jaundiced. Neurological:      General: No focal deficit present. Mental Status: She is alert and oriented to person, place, and time. Psychiatric:         Mood and Affect: Mood normal.         Behavior: Behavior normal.       FORMAL DIAGNOSTIC RESULTS     RADIOLOGY: Interpretation per the Radiologist below, if available at the time of this note (none if blank):    CT ABDOMEN PELVIS W IV CONTRAST Additional Contrast? None   Final Result      1. There is mild right-sided hydronephrosis. There is no evidence of hydroureter. There is an abrupt transition of caliber which may indicate a UPJ obstruction. There is no stone or mass identified. 2. Small amount of noncomplex free fluid is present in the pelvis. 3. Colonic diverticulosis without evidence of acute diverticulitis. **This report has been created using voice recognition software. It may contain minor errors which are inherent in voice recognition technology. **      Final report electronically signed by Dr Latha Garcia on 1/7/2023 8:24 PM          LABS: (none if blank)  Labs Reviewed   CBC WITH AUTO DIFFERENTIAL - Abnormal; Notable for the following components:       Result Value    RBC 4.14 (*)     Lymphocytes Absolute 0.9 (*)     All other components within normal limits   BASIC METABOLIC PANEL - Abnormal; Notable for the following components:    Sodium 134 (*)     Chloride 96 (*)     CO2 22 (*)     Glucose 110 (*)     Creatinine 1.3 (*)     All other components within normal limits   OSMOLALITY - Abnormal; Notable for the following components:    Osmolality Calc 272.2 (*)     All other components within normal limits   GLOMERULAR FILTRATION RATE, ESTIMATED - Abnormal; Notable for the following components:    Est, Glom Filt Rate 42 (*)     All other components within normal limits   MICROSCOPIC URINALYSIS - Abnormal; Notable for the following components:    Blood, Urine MODERATE (*)     Protein, UA 30 (*)     Nitrite, Urine POSITIVE (*)     Leukocytes, UA MODERATE (*)     Character, Urine CLOUDY (*)     All other components within normal limits   COVID-19 & INFLUENZA COMBO   TSH WITH REFLEX   LIPASE   HEPATIC FUNCTION PANEL   ANION GAP       (Any cultures that may have been sent were not resulted at the time of this patient visit)    [facility] / ED COURSE:     External Documentation Reviewed:         Previous patient encounter documents & history available on EMR was reviewed: She was seen on 11/15/2020.      1)           Differential Diagnosis includes (but not limited to):  Small bowel obstruction, cystitis, UTI        Diagnoses Considered but I have low suspicion of:   Appendicitis, diverticulitis       Decision Rules/Clinical Scores utilized:               See Formal Diagnostic Results above for the lab and radiology tests and orders. 3)  Treatment and Disposition         ED Reassessment:  See ED course         Case discussed with consulting clinician:  Dr Nuvia Lozano [facility].           Shared Decision-Making was performed and disposition discussed with the        Patient/Family and questions answered          Social determinants of health impacting treatment or disposition:  None      Summary of Patient Presentation:      ED Course as of 01/07/23 2122   Sat Jan 07, 2023 1827 WBC: 5.8 [AL]   1827 Hemoglobin Quant: 12.7 [AL]   1827 Platelet Count: 876 [AL]   1832 Creatinine(!): 1.3 [AL]   1833 Sodium(!): 134 [AL]   1833 Chloride(!): 96 [AL]   1833 CO2(!): 22 [AL]   1833 AST: 23 [AL]   1833 ALT: 20 [AL]   1833 Lipase: 39.4 [AL]   1855 TSH: 1.550 [AL]   2050 CT ABDOMEN PELVIS W IV CONTRAST Additional Contrast? None  \"IMPRESSION:     1. There is mild right-sided hydronephrosis. There is no evidence of hydroureter. There is an abrupt transition of caliber which may indicate a UPJ obstruction. There is no stone or mass identified. 2. Small amount of noncomplex free fluid is present in the pelvis. 3. Colonic diverticulosis without evidence of acute diverticulitis. \" [AL]   2110 Blood, Urine(!): MODERATE [AL]   2110 Leukocytes, UA(!): MODERATE [AL]   2110 Bacteria, UA: MANY [AL]   2116 Dr Nicole Villanueva was contacted as a consultation for this patient's care he advised that this can be managed outpatient as long she is not febrile or septic she will be discharged antibiotics and follow-up with the [facility] urology clinic outpatient. [AL]      ED Course User Index  [AL] Jenna Kahn MD         MDM:   This is an 46-year-old female who has come in for some right flank pain with some mild right-sided hydronephrosis to UTI as well. She is not septic at this time and hemodynamically stable. Her pain is well controlled. Discussed the case with the on-call urologist advised outpatient follow-up.   She will be placed on Keflex and advised to follow-up with a outpatient urology clinic        Vitals Reviewed:    Vitals:    01/07/23 1720 01/07/23 1826 01/07/23 1907 01/07/23 2020   BP: (!) 149/73 136/64 (!) 130/57 (!) 124/50   Pulse: 71 70 67 70   Resp: 25 20 20 17   Temp: 98.9 °F (37.2 °C)      TempSrc: Oral      SpO2: 95% 93% 92% 94%   Weight: 155 lb (70.3 kg)      Height: 5' 3\" (1.6 m)          The patient was seen and examined. Appropriate diagnostic testing was performed and results reviewed with the patient. The results of pertinent diagnostic studies and exam findings were discussed. The patients provisional diagnosis and plan of care were discussed with the patient and present family who expressed understanding. Any medications were reviewed and indications and risks of medications were discussed with the patient /family present. Strict verbal and written return precautions, instructions and appropriate follow-up provided to  the patient . ED Medications administered this visit:  (None if blank)  Medications   cefTRIAXone (ROCEPHIN) 1,000 mg in dextrose 5 % 50 mL IVPB mini-bag (has no administration in time range)   morphine injection 4 mg (has no administration in time range)   ondansetron (ZOFRAN) injection 4 mg (has no administration in time range)   ondansetron (ZOFRAN-ODT) disintegrating tablet 4 mg (4 mg Oral Given 1/7/23 1824)   0.9 % sodium chloride bolus (1,000 mLs IntraVENous New Bag 1/7/23 1825)   iopamidol (ISOVUE-370) 76 % injection 80 mL (80 mLs IntraVENous Given 1/7/23 1945)         PROCEDURES: (None if blank)  Procedures:     CRITICAL CARE: (None if blank)      DISCHARGE PRESCRIPTIONS: (None if blank)  New Prescriptions    CEPHALEXIN (KEFLEX) 500 MG CAPSULE    Take 1 capsule by mouth 4 times daily for 7 days    ONDANSETRON (ZOFRAN) 4 MG TABLET    Take 1 tablet by mouth 3 times daily as needed for Nausea or Vomiting       FINAL IMPRESSION      1. ROWAN (acute kidney injury) ([facility] [address]    2. Hydronephrosis of right kidney            DISPOSITION/PLAN   DISPOSITION Decision To Discharge 01/07/2023 09:21:32 PM      OUTPATIENT FOLLOW UP THE PATIENT:  [facility]  [address] [place] [id number]  Schedule an appointment as soon as possible for a visit in 2 days      MD Rashmi Upton Max Nash MD  Resident  01/07/23 0793

## 2023-01-07 NOTE — ED PROVIDER NOTES

## 2023-01-07 NOTE — ED TRIAGE NOTES
Pt presents to the ED via lobby with c/o nausea x5 days. Pt states she has vomited a few times over the past 5 days but not today. Pt reports minimal oral intake. Pt states he has had normal BM and urination.  VSS, respirations even and unlabored

## 2023-01-08 LAB
EKG ATRIAL RATE: 68 BPM
EKG P AXIS: 65 DEGREES
EKG P-R INTERVAL: 170 MS
EKG Q-T INTERVAL: 420 MS
EKG QRS DURATION: 84 MS
EKG QTC CALCULATION (BAZETT): 446 MS
EKG R AXIS: 24 DEGREES
EKG T AXIS: 69 DEGREES
EKG VENTRICULAR RATE: 68 BPM

## 2023-01-08 PROCEDURE — 93010 ELECTROCARDIOGRAM REPORT: CPT | Performed by: NUCLEAR MEDICINE

## 2023-01-08 NOTE — DISCHARGE INSTRUCTIONS
Follow-up with the outpatient neurology clinic in the next 3 days for reevaluation. Take your medications as prescribed. Return to the emergency department for any new or worsening symptoms. Take antibiotic as prescribed.

## 2023-01-13 ENCOUNTER — OFFICE VISIT (OUTPATIENT)
Dept: UROLOGY | Age: 83
End: 2023-01-13
Payer: MEDICARE

## 2023-01-13 DIAGNOSIS — N32.81 OVERACTIVE BLADDER: ICD-10-CM

## 2023-01-13 DIAGNOSIS — N13.30 HYDRONEPHROSIS OF RIGHT KIDNEY: ICD-10-CM

## 2023-01-13 DIAGNOSIS — R39.14 FEELING OF INCOMPLETE BLADDER EMPTYING: ICD-10-CM

## 2023-01-13 DIAGNOSIS — R30.0 DYSURIA: Primary | ICD-10-CM

## 2023-01-13 LAB
BILIRUBIN URINE: NEGATIVE
BLOOD URINE, POC: ABNORMAL
CHARACTER, URINE: CLEAR
COLOR, URINE: YELLOW
GLUCOSE URINE: NEGATIVE MG/DL
KETONES, URINE: NEGATIVE
LEUKOCYTE CLUMPS, URINE: ABNORMAL
NITRITE, URINE: NEGATIVE
PH, URINE: 5.5 (ref 5–9)
POST VOID RESIDUAL (PVR): 37 ML
PROTEIN, URINE: NEGATIVE MG/DL
SPECIFIC GRAVITY, URINE: >= 1.03 (ref 1–1.03)
UROBILINOGEN, URINE: 0.2 EU/DL (ref 0–1)

## 2023-01-13 PROCEDURE — 81003 URINALYSIS AUTO W/O SCOPE: CPT

## 2023-01-13 PROCEDURE — G8428 CUR MEDS NOT DOCUMENT: HCPCS

## 2023-01-13 PROCEDURE — 1090F PRES/ABSN URINE INCON ASSESS: CPT

## 2023-01-13 PROCEDURE — G8484 FLU IMMUNIZE NO ADMIN: HCPCS

## 2023-01-13 PROCEDURE — 51798 US URINE CAPACITY MEASURE: CPT

## 2023-01-13 PROCEDURE — G8417 CALC BMI ABV UP PARAM F/U: HCPCS

## 2023-01-13 PROCEDURE — 99204 OFFICE O/P NEW MOD 45 MIN: CPT

## 2023-01-13 PROCEDURE — G8399 PT W/DXA RESULTS DOCUMENT: HCPCS

## 2023-01-13 PROCEDURE — 1123F ACP DISCUSS/DSCN MKR DOCD: CPT

## 2023-01-13 PROCEDURE — 1036F TOBACCO NON-USER: CPT

## 2023-01-13 NOTE — PROGRESS NOTES
[address] [address]  Dept: [phone]  Loc: [phone]  Visit Date: 1/13/2023    Patient:  Jerel Hargrove  YOB: 1940  Date: 1/13/2023    HISTORY OF PRESENT ILLNESS:   The patient is a [de-identified] y.o. female who presents today as a new patient for evaluation of right hydronephrosis. Patient presented to the [facility] ED on 1/7/23 for fatigue, N/V and suprapubic abdominal pain. Was noted to have a slight elevation in her Creatinine of 1.3, baseline between 0.9 and 1.1. Treated for UTI with Keflex. Today, patient states that he N/V has improved and she does not display any symptoms of UTI, no dysuria, hematuria, or frequency. Has long-standing symptoms of urgency and at times, incontinence. Worse when she drinks coffee. Patient stops fluids around 1 hour before bedtime most nights. Typically has to get up 1-2 times throughout the night to urinate. Overall the problem(s) : Show no change. Associated Symptoms: No dysuria or gross hematuria.     UA:   Lab Results   Component Value Date/Time    COLORU Yellow 01/13/2023 09:07 AM    COLORU YELLOW 01/07/2023 08:30 PM    LABSPEC >= 1.030 01/13/2023 09:07 AM    LABPH 5.50 01/13/2023 09:07 AM    NITRU Negative 01/13/2023 09:07 AM    GLUCOSEU Negative 01/13/2023 09:07 AM    KETUA Negative 01/13/2023 09:07 AM    UROBILINOGEN 0.20 01/13/2023 09:07 AM    BILIRUBINUR Negative 01/13/2023 09:07 AM      PVR: 37 mL  Pain Scale 0/10      Last BUN and creatinine:  Lab Results   Component Value Date    BUN 22 01/07/2023     Lab Results   Component Value Date    CREATININE 1.3 (H) 01/07/2023       Imaging Reviewed during this Office Visit:   (results were independently reviewed by physician and radiology report verified)  I independently reviewed and verified the images and reports from:    CT ABDOMEN PELVIS W IV CONTRAST Additional Contrast? None    Result Date: 1/7/2023  PROCEDURE: CT ABDOMEN PELVIS W IV CONTRAST CLINICAL INFORMATION: 82-year-old female with abdominal pain . COMPARISON: None. TECHNIQUE: 5 mm axial CT images were obtained through the abdomen and pelvis after the administration of intravenous and oral contrast. Coronal and sagittal reconstructions were obtained. All CT scans at this facility use dose modulation, iterative reconstruction, and/or weight-based dosing when appropriate to reduce radiation dose to as low as reasonably achievable. FINDINGS: There is atelectasis at the lung bases. There is a calcified granuloma. The base of the heart is within acceptable limits. Calcified granulomas are scattered throughout the liver and spleen. The pancreas and adrenal glands are within normal limits. The gallbladder is not visualized. There is mild right hydronephrosis. The ureter is not distended. The left kidney does not demonstrate any signs of obstruction. There is no evidence of a small bowel obstruction. There are some diverticula in the colon. There is no colonic wall thickening or edema. There is some noncomplex free fluid in the pelvis with a density of approximately 7 Hounsfield units. The uterus is absent. The appendix appears normal without inflammatory changes. The urinary bladder appears normal. Atherosclerosis is in the aorta. There is no aneurysmal dilatation. There are severe degenerative changes in the spine.     1. There is mild right-sided hydronephrosis. There is no evidence of hydroureter. There is an abrupt transition of caliber which may indicate a UPJ obstruction. There is no stone or mass identified.     2. Small amount of noncomplex free fluid is present in the pelvis.     3. Colonic diverticulosis without evidence of acute diverticulitis.     **This report has been created using voice recognition software. It may contain minor errors which are inherent in voice recognition technology.**     Final report electronically signed by   Lobo Parikh on 2023 8:24 PM        PAST MEDICAL, FAMILY AND SOCIAL HISTORY:  Past Medical History:   Diagnosis Date    Chronic low back pain     CKD (chronic kidney disease) stage 3, GFR 30-59 ml/min (HCC)     DDD (degenerative disc disease), lumbar     Dyslipidemia     History of skin cancer     BCC AND SCC    Hyperlipidemia     Hypertension     Osteoarthritis     PONV (postoperative nausea and vomiting)      Past Surgical History:   Procedure Laterality Date    BACK SURGERY      lower lumbar fusion    CARPAL TUNNEL RELEASE Bilateral      SECTION      CHOLECYSTECTOMY, LAPAROSCOPIC  10/03/2017    COLONOSCOPY      CYST REMOVAL Left 2017    left  5th digit-Dr Bowman    DILATION AND CURETTAGE OF UTERUS      FACET JOINT INJECTION Bilateral 2020    B/L L3-4 medial branch and L5 dorsal rami injection performed by Malaika Smith MD at [address] [place] [address] Bilateral     HYSTERECTOMY (CERVIX STATUS UNKNOWN)      JOINT REPLACEMENT Bilateral     KNEES    NERVE SURGERY N/A 2019    left L3, L4 medial branch and L5 dorsal rami injection performed by Malaika Smith MD at [facility] Left 2020    left L3, L4 medial branch and L5 dorsal rami injection performed by Malaika Smith MD at [facility] Bilateral 2021    bilateral T12, L1,L2,L3,L4,L5 medial branch block performed by Malaika Smith MD at [facility] Bilateral 2021    Bilateral L-facet MBB @ L2-3 and L3-4 performed by Mateusz Sharma MD at [facility] Bilateral 3/4/2022    left L-facet RFA @ L2-3, and L3-4 right L2-3 performed by Malaika Smith MD at [facility] Right 11/15/2022    Right lumbar  facet Radiofrequency ablation Lumbar 2--3.  3-4 performed by Gracie Franklin MD at [address] FLX DX W/COLLJ [address] [facility] [address] WHEN PFRMD N/A 9/7/2018    COLONOSCOPY performed by Antonio Winchester MD at [facility]    SKIN BIOPSY      BCC AND SCC     Family History   Problem Relation Age of Onset    Heart Disease Mother         CHF    Alzheimer's Disease Mother     Dementia Mother     Cancer Father         LUNG    Emphysema Father     Colon Cancer Neg Hx     Breast Cancer Neg Hx      No outpatient medications have been marked as taking for the 1/13/23 encounter (Appointment) with Linda Lind PA-C. Sulfa antibiotics and Other  Social History     Tobacco Use   Smoking Status Never   Smokeless Tobacco Never       Social History     Substance and Sexual Activity   Alcohol Use Yes    Comment: OCCASIONALLY       REVIEW OF SYSTEMS:  Constitutional: negative  Eyes: negative  Respiratory: negative  Cardiovascular: negative  Gastrointestinal: negative  Genitourinary: negative except for what is in HPI  Musculoskeletal: negative  Skin: negative   Neurological: negative  Hematological/Lymphatic: negative  Psychological: negative    Physical Exam:    This a [de-identified] y.o. female    There were no vitals filed for this visit. Constitutional: Patient in no acute distress. Alert and oriented to person, place and time. Psychiatric: Normal mood and affect. Pulmonary: Respiratory effort is normal, no accessory muscle use. Cardiovascular: Normal rate, regular rhythm, and normal peripheral pulses  Abdomen: Soft, non-tender, non-distended. Genitourinary: Bladder non-tender and not distended. No CVA tenderness. Assessment and Plan   Right hydronephrosis  UTI  - PVR: 37 mL  - UA showed resolution in UTI. Trace Leukocytes. No dysuria. No hematuria. Patient denies frequency. - Reviewed CT Abdomen Pelvis results with patient. Showed mild right hydronephrosis. No stone or mass identified.  - Suspected patient had pyelonephritis vs passed stone.  Flank pain resolved. - Reviewed imaging and patient case with Dr. Lee Aguiar.  - US Renal in 1 month to assess for resolution in hydronephrosis. If hydronephrosis is unresolved then will go for cystoscopy and pyelogram to investigate further. 3.   OAB  - Patient has a long history of urgency with urinary incontinence at times. Nocturia x1-2 per night.   - Denies being on medications previously. Discussed possibility of starting medication at next visit with evidence of resolution in hydronephrosis. No previous history of urinary retention. PVR reassuring at today's visit. *Follow-up in 1 month to discuss US Renal results.       AMADOR/ Chris 64, PA-C  1/13/23  Urology

## 2023-01-16 ENCOUNTER — TELEPHONE (OUTPATIENT)
Dept: UROLOGY | Age: 83
End: 2023-01-16

## 2023-01-16 DIAGNOSIS — M19.072 OSTEOARTHRITIS OF BOTH FEET, UNSPECIFIED OSTEOARTHRITIS TYPE: ICD-10-CM

## 2023-01-16 DIAGNOSIS — M19.071 OSTEOARTHRITIS OF BOTH FEET, UNSPECIFIED OSTEOARTHRITIS TYPE: ICD-10-CM

## 2023-01-16 RX ORDER — OMEPRAZOLE 20 MG/1
20 CAPSULE, DELAYED RELEASE ORAL DAILY
Qty: 90 CAPSULE | Refills: 3 | Status: SHIPPED | OUTPATIENT
Start: 2023-01-16

## 2023-01-16 NOTE — TELEPHONE ENCOUNTER
Patient scheduled for US RENAL LIMITED  at [facility] MR on 2/6/23 ARRIVAL OF 945AM FOR A 10AM SCAN. NO CARBONATED BEVERAGES; ARRIVE WELL HYDRATED WITH A FULL BLADDER.     Order mailed with instructions to the patient

## 2023-02-06 ENCOUNTER — HOSPITAL ENCOUNTER (OUTPATIENT)
Dept: ULTRASOUND IMAGING | Age: 83
Discharge: HOME | End: 2023-02-06
Payer: MEDICARE

## 2023-02-06 DIAGNOSIS — N13.30 HYDRONEPHROSIS OF RIGHT KIDNEY: ICD-10-CM

## 2023-02-06 PROCEDURE — 76775 US EXAM ABDO BACK WALL LIM: CPT

## 2023-02-15 ENCOUNTER — OFFICE VISIT (OUTPATIENT)
Dept: UROLOGY | Age: 83
End: 2023-02-15
Payer: MEDICARE

## 2023-02-15 VITALS — BODY MASS INDEX: 28 KG/M2 | WEIGHT: 158 LBS | HEIGHT: 63 IN

## 2023-02-15 DIAGNOSIS — R30.0 DYSURIA: Primary | ICD-10-CM

## 2023-02-15 DIAGNOSIS — N13.30 HYDRONEPHROSIS OF RIGHT KIDNEY: ICD-10-CM

## 2023-02-15 DIAGNOSIS — R39.14 FEELING OF INCOMPLETE BLADDER EMPTYING: ICD-10-CM

## 2023-02-15 LAB
BILIRUBIN URINE: NEGATIVE
BLOOD URINE, POC: ABNORMAL
CHARACTER, URINE: ABNORMAL
COLOR, URINE: YELLOW
GLUCOSE URINE: NEGATIVE MG/DL
KETONES, URINE: NEGATIVE
LEUKOCYTE CLUMPS, URINE: ABNORMAL
NITRITE, URINE: NEGATIVE
PH, URINE: 5 (ref 5–9)
POST VOID RESIDUAL (PVR): 0 ML
PROTEIN, URINE: NEGATIVE MG/DL
SPECIFIC GRAVITY, URINE: 1.01 (ref 1–1.03)
UROBILINOGEN, URINE: 0.2 EU/DL (ref 0–1)

## 2023-02-15 PROCEDURE — G8417 CALC BMI ABV UP PARAM F/U: HCPCS

## 2023-02-15 PROCEDURE — G8484 FLU IMMUNIZE NO ADMIN: HCPCS

## 2023-02-15 PROCEDURE — 1090F PRES/ABSN URINE INCON ASSESS: CPT

## 2023-02-15 PROCEDURE — G8399 PT W/DXA RESULTS DOCUMENT: HCPCS

## 2023-02-15 PROCEDURE — 1036F TOBACCO NON-USER: CPT

## 2023-02-15 PROCEDURE — 51798 US URINE CAPACITY MEASURE: CPT

## 2023-02-15 PROCEDURE — 1123F ACP DISCUSS/DSCN MKR DOCD: CPT

## 2023-02-15 PROCEDURE — 99214 OFFICE O/P EST MOD 30 MIN: CPT

## 2023-02-15 PROCEDURE — G8427 DOCREV CUR MEDS BY ELIG CLIN: HCPCS

## 2023-02-15 PROCEDURE — 81003 URINALYSIS AUTO W/O SCOPE: CPT

## 2023-02-15 RX ORDER — MELATONIN/PYRIDOXINE HCL (B6) 10 MG-10MG
TABLET,IMMED, EXTENDED RELEASE, BIPHASIC ORAL
COMMUNITY

## 2023-02-15 NOTE — PROGRESS NOTES
[address] [address]  Dept: [phone]  Loc: [phone]  Visit Date: 2/15/2023      HPI:     Ajit Tracey is a [de-identified] y.o. female with past medical history as listed below who presents today in follow-up for right hydronephrosis. Patient presented to the [facility] ED on 1/7/23 for fatigue, N/V and suprapubic abdominal pain. Was noted to have a slight elevation in her Creatinine of 1.3, baseline between 0.9 and 1.1. Treated for UTI with Keflex. At last visit, patient was doing much better. No N/V or pain. Given resolution in symptoms, US Renal was repeated to evaluate hydronephrosis. Today, she presents in similarly good health without further complaints of pain, N/V. Mild right pelviectasis and small right renal cyst noted on repeat US, but otherwise unremarkable. Patient denies significant dysuria, increased frequency with urination, fever or chills.     Labs   UA:  Results for POC orders placed in visit on 02/15/23   POCT Urinalysis No Micro (Auto)   Result Value Ref Range    Glucose, Ur Negative NEGATIVE mg/dl    Bilirubin Urine Negative     Ketones, Urine Negative NEGATIVE    Specific Gravity, Urine 1.015 1.002 - 1.030    Blood, UA POC Trace-intact NEGATIVE    pH, Urine 5.00 5.0 - 9.0    Protein, Urine Negative NEGATIVE mg/dl    Urobilinogen, Urine 0.20 0.0 - 1.0 eu/dl    Nitrite, Urine Negative NEGATIVE    Leukocyte Clumps, Urine Moderate (A) NEGATIVE    Color, Urine Yellow [id number]    Character, Urine Slightly Cloudy [id number]   poct post void residual   Result Value Ref Range    post void residual 0 ml         Current Outpatient Medications   Medication Sig Dispense Refill    Melatonin 10-10 MG TBCR Take by mouth      omeprazole (PRILOSEC) 20 MG delayed release capsule Take 1 capsule by mouth daily 90 capsule 3    hydroxychloroquine (PLAQUENIL) 200 MG tablet TAKE 1 AND 1/2 TABLETS BY MOUTH DAILY 135 tablet 1    lisinopril-hydroCHLOROthiazide (PRINZIDE;ZESTORETIC) 10-12.5 MG per tablet TAKE 1 TABLET BY MOUTH DAILY 90 tablet 3    acetaminophen (TYLENOL) 650 MG extended release tablet Take 650 mg by mouth every 8 hours as needed for Pain      vitamin C (ASCORBIC ACID) 500 MG tablet Take 500 mg by mouth daily      Handicap Placard MISC by Does not apply route Expires 2023 1 each 0    Multiple Vitamins-Minerals (PRESERVISION AREDS PO) Take by mouth      Calcium Carbonate-Vit D-Min (CALCIUM 1200 PO) Take by mouth daily      Cholecalciferol (VITAMIN D-3 PO) Take by mouth daily       No current facility-administered medications for this visit. Past Medical History  Michele Vargas  has a past medical history of Chronic low back pain, CKD (chronic kidney disease) stage 3, GFR 30-59 ml/min ([facility]), DDD (degenerative disc disease), lumbar, Dyslipidemia, History of skin cancer, Hyperlipidemia, Hypertension, Osteoarthritis, and PONV (postoperative nausea and vomiting). Past Surgical History  The patient  has a past surgical history that includes joint replacement (Bilateral); Foot surgery (Bilateral); Hysterectomy;  section; Dilation and curettage of uterus; Carpal tunnel release (Bilateral); Finger surgery; skin biopsy; back surgery (); Colonoscopy; cyst removal (Left, 2017); Cholecystectomy, laparoscopic (10/03/2017); pr colonoscopy flx dx w/collj spec when pfrmd (N/A, 2018); Nerve Surgery (N/A, 2019); Pain management procedure (Left, 2020); Facet joint injection (Bilateral, 2020); Pain management procedure (Bilateral, 2021); Pain management procedure (Bilateral, 2021); Pain management procedure (Bilateral, 3/4/2022); and Pain management procedure (Right, 11/15/2022). Family History  This patient's family history includes Alzheimer's Disease in her mother; Cancer in her father; Dementia in her mother; Emphysema in her father; Heart Disease in her mother.     Social History  Julianna  reports that she has never smoked. She has never used smokeless tobacco. She reports current alcohol use. She reports that she does not use drugs. Subjective:   REVIEW OF SYSTEMS:  Constitutional: negative  Eyes: negative  Respiratory: negative  Cardiovascular: negative  Gastrointestinal: negative  Musculoskeletal: negative  Genitourinary: negative except for what is in HPI  Skin: negative   Neurological: negative  Hematological/Lymphatic: negative  Psychological: negative    Objective:     PE:   Vitals:    02/15/23 1051   Weight: 158 lb (71.7 kg)   Height: 5' 3\" (1.6 m)       Constitutional:       No acute distress, cooperative and answering questions appropriately. Cardiovascular:        Normal rate and regular rhythm. Pulmonary/Chest:       Normal respiratory rate and rhthym. No use of accessory muscles. Abdominal:        Soft. No tenderness. Bowel sounds present. Neurological:        Alert and oriented to person, place, time, and situation. Psychiatric:        Normal mood and affect. Genitourinary:       Bladder non-tender and non-distended. Recent BUN/Creatinine:  Lab Results   Component Value Date/Time    BUN 22 01/07/2023 06:10 PM    CREATININE 1.3 01/07/2023 06:10 PM       Radiology  The patient has had a Renal Ultrasound on 2/6/23 which I have reviewed along with its accompanying report. The study demonstrates:    1. Right-sided pelviectasis. 2. Probable right renal cyst.   3. Small amount of postvoid residual urine but otherwise unremarkable urinary bladder. Assessment & Plan:   Right Pelviectasis  - Discussed case with Dr. Demi Bhardwaj and reviewed imaging. Right-sided pelviectasis improving vs CT Abdomen Pelvis during time of acute episode of N/V that brought her to the ED on 1/7/23. UTI vs passed stone as possible causes for marked hydro at UPJ.  - Patient is not symptomatic and is doing well. Creatinine was mildly elevated in ED at 1.3 (Baseline between 0.9 and 1.1). Patient scheduled to have labs done with PCP prior to April appointment. Will keep a close eye on renal function.   - Repeat Renal US in 9 months prior to follow-up. Dysuria  - Patient asymptomatic for UTI. Mild dysuria if anything. Moderate Leukocytes on UA, will send for culture to be proactive given recent history which patient is in agreement with. UTI to be treated if organism cultured.        Chaitanya Chne PA-C  Urology

## 2023-02-17 LAB
BACTERIA UR CULT: ABNORMAL
ORGANISM: ABNORMAL

## 2023-02-28 ENCOUNTER — OFFICE VISIT (OUTPATIENT)
Dept: PHYSICAL MEDICINE AND REHAB | Age: 83
End: 2023-02-28
Payer: MEDICARE

## 2023-02-28 VITALS
SYSTOLIC BLOOD PRESSURE: 122 MMHG | HEIGHT: 63 IN | DIASTOLIC BLOOD PRESSURE: 80 MMHG | WEIGHT: 158 LBS | BODY MASS INDEX: 28 KG/M2

## 2023-02-28 DIAGNOSIS — Z98.890 HISTORY OF LUMBAR LAMINECTOMY: ICD-10-CM

## 2023-02-28 DIAGNOSIS — M47.816 LUMBAR SPONDYLOSIS: Primary | ICD-10-CM

## 2023-02-28 DIAGNOSIS — M41.9 SCOLIOSIS OF THORACOLUMBAR SPINE, UNSPECIFIED SCOLIOSIS TYPE: ICD-10-CM

## 2023-02-28 DIAGNOSIS — M54.50 CHRONIC BILATERAL LOW BACK PAIN WITHOUT SCIATICA: ICD-10-CM

## 2023-02-28 DIAGNOSIS — G89.29 CHRONIC BILATERAL LOW BACK PAIN WITHOUT SCIATICA: ICD-10-CM

## 2023-02-28 DIAGNOSIS — G89.4 CHRONIC PAIN SYNDROME: ICD-10-CM

## 2023-02-28 PROCEDURE — 1123F ACP DISCUSS/DSCN MKR DOCD: CPT | Performed by: NURSE PRACTITIONER

## 2023-02-28 PROCEDURE — G8417 CALC BMI ABV UP PARAM F/U: HCPCS | Performed by: NURSE PRACTITIONER

## 2023-02-28 PROCEDURE — G8427 DOCREV CUR MEDS BY ELIG CLIN: HCPCS | Performed by: NURSE PRACTITIONER

## 2023-02-28 PROCEDURE — 3074F SYST BP LT 130 MM HG: CPT | Performed by: NURSE PRACTITIONER

## 2023-02-28 PROCEDURE — 1090F PRES/ABSN URINE INCON ASSESS: CPT | Performed by: NURSE PRACTITIONER

## 2023-02-28 PROCEDURE — G8484 FLU IMMUNIZE NO ADMIN: HCPCS | Performed by: NURSE PRACTITIONER

## 2023-02-28 PROCEDURE — G8399 PT W/DXA RESULTS DOCUMENT: HCPCS | Performed by: NURSE PRACTITIONER

## 2023-02-28 PROCEDURE — 99214 OFFICE O/P EST MOD 30 MIN: CPT | Performed by: NURSE PRACTITIONER

## 2023-02-28 PROCEDURE — 1036F TOBACCO NON-USER: CPT | Performed by: NURSE PRACTITIONER

## 2023-02-28 PROCEDURE — 3079F DIAST BP 80-89 MM HG: CPT | Performed by: NURSE PRACTITIONER

## 2023-02-28 ASSESSMENT — ENCOUNTER SYMPTOMS
COLOR CHANGE: 0
RESPIRATORY NEGATIVE: 1
EYES NEGATIVE: 1
BACK PAIN: 1
GASTROINTESTINAL NEGATIVE: 1

## 2023-02-28 NOTE — PROGRESS NOTES
[facility] [address]  Dept: [phone]  Dept Fax: [phone]: [phone]    Visit Date: 2/28/2023    Functionality Assessment/Goals Worksheet     On a scale of 0 (Does not Interfere) to 10 (Completely Interferes)     1. Which number describes how during the past week pain has interfered with       the following:  A. General Activity:  3  B. Mood: 3  C. Walking Ability:  4  D. Normal Work (Includes both work outside the home and housework):  4  E. Relations with Other People:   0  F. Sleep:   4  G. Enjoyment of Life:   2    2. Patient Prefers to Take their Pain Medications:     []  On a regular basis   []  Only when necessary    []  Does not take pain medications    3. What are the Patient's Goals/Expectations for Visiting Pain Management? []  Learn about my pain    []  Receive Medication   []  Physical Therapy     []  Treat Depression   []  Receive Injections    []  Treat Sleep   []  Deal with Anxiety and Stress   []  Treat Opoid Dependence/Addiction   []  Other:      HPI:   Swati Elaine is a [de-identified] y.o. female is here today for    Chief Complaint: right low back pain     HPI   3 month FU. In January was in the hospital with UTI, kidney stone. She reports she passed this and sicne she feels that pain in right side of back is not as bad. Still has pain in right low back with activity and walking but not as bad. Denies pain at rest.     Does feel that she is receiving relief from rigth L-facet RFA, taking tylenolprn and Norco prn only when pain increases which helps. Overall doing well and remains active. Pain increases with bending, lifting, twisting , walking, standing, and housework or working at job. Denies pain in left low back and no radicular pain. Prior Injections:  right L-facet RFA from 11/15/2022      Medications reviewed.  Patient denies side effects with medications. Patient states she is taking medications as prescribed. Shedenies receiving pain medications from other sources. She  had 1 ER visist since last visit     Pain scale with out pain medications or at its worst is 6/10. Pain scale with pain medications or at its best is 0-2/10. Last dose of Norco was yesterday   Drug screen reviewed from 11/29/2023 and was appropriate  Pill count completed  today and WNL: Yes      The patientis allergic to sulfa antibiotics and other. Subjective:      Review of Systems   Constitutional: Negative. HENT: Negative. Eyes: Negative. Negative for visual disturbance. Respiratory: Negative. Cardiovascular: Negative. Gastrointestinal: Negative. Endocrine: Negative. Genitourinary: Negative. Musculoskeletal:  Positive for arthralgias, back pain and myalgias. Negative for gait problem, joint swelling, neck pain and neck stiffness. Ambulating with walking stick    Skin:  Negative for color change. Neurological:  Negative for weakness and numbness. Psychiatric/Behavioral: Negative. Objective:     Vitals:    02/28/23 1241   BP: 122/80   Weight: 158 lb (71.7 kg)   Height: 5' 3\" (1.6 m)       Physical Exam  Vitals and nursing note reviewed. Constitutional:       General: She is not in acute distress. Appearance: Normal appearance. She is well-developed. She is not diaphoretic. HENT:      Head: Normocephalic and atraumatic. Right Ear: External ear normal.      Left Ear: External ear normal.      Nose: Nose normal.      Mouth/Throat:      Pharynx: No oropharyngeal exudate. Eyes:      General: No scleral icterus. Right eye: No discharge. Left eye: No discharge. Conjunctiva/sclera: Conjunctivae normal.      Pupils: Pupils are equal, round, and reactive to light. Neck:      Thyroid: No thyromegaly. Cardiovascular:      Rate and Rhythm: Normal rate and regular rhythm.       Heart sounds: Normal heart sounds. No murmur heard. No friction rub. No gallop. Pulmonary:      Effort: Pulmonary effort is normal. No respiratory distress. Breath sounds: Normal breath sounds. No wheezing or rales. Chest:      Chest wall: No tenderness. Abdominal:      General: Bowel sounds are normal. There is no distension. Palpations: Abdomen is soft. Tenderness: There is no abdominal tenderness. There is no guarding or rebound. Musculoskeletal:         General: Tenderness present. Right shoulder: No tenderness or bony tenderness. Normal range of motion. Left shoulder: Tenderness present. No swelling, deformity or bony tenderness. Normal range of motion. Right hand: No deformity or tenderness. Left hand: No deformity or tenderness. Cervical back: Neck supple. No edema, erythema, rigidity or tenderness. No muscular tenderness. Normal range of motion. Thoracic back: No tenderness. Scoliosis present. Lumbar back: Tenderness and bony tenderness present. Decreased range of motion. Negative right straight leg raise test and negative left straight leg raise test. Scoliosis present. Back:    Skin:     General: Skin is warm. Coloration: Skin is not pale. Findings: No bruising, erythema or rash. Neurological:      Mental Status: She is alert and oriented to person, place, and time. She is not disoriented. Cranial Nerves: No cranial nerve deficit. Sensory: No sensory deficit. Motor: Weakness present. No atrophy or abnormal muscle tone. Coordination: Coordination normal.      Gait: Gait abnormal.      Deep Tendon Reflexes: Babinski sign absent on the right side. Babinski sign absent on the left side. Reflex Scores:       Achilles reflexes are 1+ on the right side and 1+ on the left side. Comments: Gait-uses walking stick   Psychiatric:         Attention and Perception: Attention normal. She is attentive.          Mood and Affect: Mood normal. Mood is not anxious or depressed. Affect is not labile, blunt, angry or inappropriate. Speech: Speech normal. She is communicative. Speech is not rapid and pressured, delayed, slurred or tangential.         Behavior: Behavior is not agitated, slowed, aggressive, withdrawn, hyperactive or combative. Behavior is cooperative. Thought Content: Thought content normal. Thought content is not paranoid or delusional. Thought content does not include homicidal or suicidal ideation. Thought content does not include homicidal or suicidal plan. Cognition and Memory: Cognition normal. Memory is not impaired. She does not exhibit impaired recent memory or impaired remote memory. Judgment: Judgment normal. Judgment is not impulsive or inappropriate. ALCIDES  Patricks test  positive  Yeoman's  or Gaenslen's positive         Assessment:     1. Lumbar spondylosis    2. Chronic bilateral low back pain without sciatica    3. History of lumbar laminectomy    4. Scoliosis of thoracolumbar spine, unspecified scoliosis type    5. Chronic pain syndrome            Plan:      OARRS reviewed. Current MED: 5.00  Patient was offered naloxone for home. Discussed long term side effects of medications, tolerance, dependency and addiction. Previous UDS reviewed  UDS preformed today for compliance. Patient told can not receive any pain medications from any other source. No evidence of abuse, diversion or aberrant behavior. Medications and/or procedures to improve function and quality of life- patient understanding with this and that may not be pain free  Discussed with patient about safe storage of medications at home  Discussed possible weaning of medication dosing dependent on treatment/procedure results. Discussed with patient about risks with procedure including infection, reaction to medication, increased pain, or bleeding.   Procedure notes reviewed in detail   Is receiving 80% relief of right low back pain from right L-facet RFA   Pain has improved overall and tolerable with procedures and medications. Continue Norco 5/325 mg tabs BID- 1/2 tab to 1 tab BID prn- filled 1/4/23 has plenty takes only prn   Is compliant     Meds. Prescribed:   No orders of the defined types were placed in this encounter. Return in about 3 months (around 5/28/2023), or if symptoms worsen or fail to improve, for follow up  for medications.                Electronically signed by JOSE Goodman CNP on2/28/2023 at 1:14 PM

## 2023-03-13 NOTE — PROGRESS NOTES
Chief Complaint   Patient presents with    Cough       History obtained from the patient. SUBJECTIVE:  Vinod Tabares is a [de-identified] y.o. female that presents today for     -URI type sxs:   Going on just about a wk  Nasal congestion  Drainage  Cough  Sore throat  No fever  No SOB  Covid negative    Fever - No  Runny nose or congestion -  Yes   Cough -  Yes  Sore throat -  Yes  Headache, fatigue, joint pains, muscle aches -  No  Double Sickening - Yes  Shortness of breath/Wheezing? -  No  Nausea/Vomiting/Diarrhea? No  Sick contacts - Yes  Maxillary Tooth Pain -  Yes  Treatment tried and response - otc meds, no better      Age/Gender Health Maintenance    Lipid -   Lab Results   Component Value Date    CHOL 211 (H) 04/25/2022    CHOL 263 (H) 10/28/2021    CHOL 266 (H) 04/12/2021     Lab Results   Component Value Date    TRIG 174 04/25/2022    TRIG 191 10/28/2021    TRIG 174 04/12/2021     Lab Results   Component Value Date    HDL 56 04/25/2022    HDL 60 10/28/2021    HDL 60 04/12/2021     Lab Results   Component Value Date    LDLCALC 120 04/25/2022    LDLCALC 165 10/28/2021    LDLCALC 171 04/12/2021       ; LDL 82; HDL 74;  (MARCH 2018)    DM Screen -   Lab Results   Component Value Date/Time    GLUCOSE 110 01/07/2023 06:10 PM    GLUCOSE 99 05/03/2018 11:02 AM       Colon Cancer Screening - NEG SEPT 2018, no repeat ([facility] Race)  Lung Cancer Screening - never smoker    Tetanus - UTD July 2018  Influenza Vaccine - Candidate FALL 2023  Pneumonia Vaccine - UTD PCV 13 OCT 2015.  UTD PPV 23 APR 2018  Zostavax - Zostavax competed APR 2013   Shingrix - UTD x 2    Breast Cancer Screening - NEG OCT 2022  Cervical Cancer Screening - Aged out  Osteoporosis Screening - osteopenia AUG 2018 and AUG 2020      Current Outpatient Medications   Medication Sig Dispense Refill    doxycycline hyclate (VIBRA-TABS) 100 MG tablet Take 1 tablet by mouth 2 times daily for 10 days 20 tablet 0    fluticasone (FLONASE) 50 MCG/ACT nasal spray 1 spray by Each Nostril route daily for 14 days 16 g 0    benzonatate (TESSALON PERLES) 100 MG capsule Take 1 to 2 tablets by mouth every 8 hours as needed for cough. 60 capsule 0    Melatonin 10-10 MG TBCR Take by mouth      omeprazole (PRILOSEC) 20 MG delayed release capsule Take 1 capsule by mouth daily 90 capsule 3    hydroxychloroquine (PLAQUENIL) 200 MG tablet TAKE 1 AND 1/2 TABLETS BY MOUTH DAILY 135 tablet 1    lisinopril-hydroCHLOROthiazide (PRINZIDE;ZESTORETIC) 10-12.5 MG per tablet TAKE 1 TABLET BY MOUTH DAILY 90 tablet 3    acetaminophen (TYLENOL) 650 MG extended release tablet Take 650 mg by mouth every 8 hours as needed for Pain      vitamin C (ASCORBIC ACID) 500 MG tablet Take 500 mg by mouth daily      Multiple Vitamins-Minerals (PRESERVISION AREDS PO) Take by mouth      Calcium Carbonate-Vit D-Min (CALCIUM 1200 PO) Take by mouth daily      Cholecalciferol (VITAMIN D-3 PO) Take by mouth daily       No current facility-administered medications for this visit. Orders Placed This Encounter   Medications    doxycycline hyclate (VIBRA-TABS) 100 MG tablet     Sig: Take 1 tablet by mouth 2 times daily for 10 days     Dispense:  20 tablet     Refill:  0    fluticasone (FLONASE) 50 MCG/ACT nasal spray     Si spray by Each Nostril route daily for 14 days     Dispense:  16 g     Refill:  0    benzonatate (TESSALON PERLES) 100 MG capsule     Sig: Take 1 to 2 tablets by mouth every 8 hours as needed for cough. Dispense:  60 capsule     Refill:  0           All medications reviewed and reconciled, including OTC and herbal medications. Updated list given to patient.        Patient Active Problem List    Diagnosis Date Noted    Ganglion cyst of finger of left hand 2017     Priority: High     Class: Chronic    Chronic renal disease, stage III [facility]) [[id number]] 2022     Priority: Medium    Lumbar spondylosis     Erosive osteoarthritis of both hands     Positive WICHO (antinuclear antibody) 2018    Dyslipidemia     History of skin cancer      BCC AND SCC      Hypertension     Osteoarthritis     DDD (degenerative disc disease), lumbar     Chronic low back pain     Hyperkalemia     CKD (chronic kidney disease) stage 3, GFR 30-59 ml/min (HCC)        Past Medical History:   Diagnosis Date    Chronic low back pain     CKD (chronic kidney disease) stage 3, GFR 30-59 ml/min (HCC)     DDD (degenerative disc disease), lumbar     Dyslipidemia     History of skin cancer     BCC AND SCC    Hyperlipidemia     Hypertension     Osteoarthritis     PONV (postoperative nausea and vomiting)        Past Surgical History:   Procedure Laterality Date    BACK SURGERY      lower lumbar fusion    CARPAL TUNNEL RELEASE Bilateral      SECTION      CHOLECYSTECTOMY, LAPAROSCOPIC  10/03/2017    COLONOSCOPY      CYST REMOVAL Left 2017    left  5th digit-Dr Bowman    DILATION AND CURETTAGE OF UTERUS      FACET JOINT INJECTION Bilateral 2020    B/L L3-4 medial branch and L5 dorsal rami injection performed by Kun Oneal MD at [address] [place] [address] Bilateral     HYSTERECTOMY (CERVIX STATUS UNKNOWN)      JOINT REPLACEMENT Bilateral     KNEES    NERVE SURGERY N/A 2019    left L3, L4 medial branch and L5 dorsal rami injection performed by Kun Oneal MD at [facility] Left 2020    left L3, L4 medial branch and L5 dorsal rami injection performed by Kun Oneal MD at [facility] Bilateral 2021    bilateral T12, L1,L2,L3,L4,L5 medial branch block performed by Kun Oneal MD at [facility] Bilateral 2021    Bilateral L-facet MBB @ L2-3 and L3-4 performed by Christina Mane MD at [facility] Bilateral 3/4/2022    left L-facet RFA @ L2-3, and L3-4 right L2-3 performed by John Paul Dominique MD at [facility] Right 11/15/2022    Right lumbar  facet Radiofrequency ablation Lumbar 2--3. 3-4 performed by Jeni Rubinstein, MD at [address] FLX DX W/MARTHA Radha 1976 PFRMD N/A 9/7/2018    COLONOSCOPY performed by Braulio Jackson MD at [address] [facility]    SKIN BIOPSY      BCC AND SCC       Allergies   Allergen Reactions    Sulfa Antibiotics Swelling    Other Nausea And Vomiting     oysters       Social History     Tobacco Use    Smoking status: Never    Smokeless tobacco: Never   Substance Use Topics    Alcohol use: Yes     Comment: OCCASIONALLY       Family History   Problem Relation Age of Onset    Heart Disease Mother         CHF    Alzheimer's Disease Mother     Dementia Mother     Cancer Father         LUNG    Emphysema Father     Colon Cancer Neg Hx     Breast Cancer Neg Hx          I have reviewed the patient's past medical history, past surgical history, allergies, medications, social and family history and I have made updates where appropriate.       Review of Systems  Positive responses are highlighted in bold    Constitutional:  Fever, Chills, Night Sweats, Fatigue, Unexpected changes in weight  HENT:  Ear pain, Tinnitus, Nosebleeds, Trouble swallowing, Hearing loss, Sore throat  Cardiovascular:  Chest Pain, Palpitations, Orthopnea, Paroxysmal Nocturnal Dyspnea  Respiratory:  Cough, Wheezing, Shortness of breath, Chest tightness, Apnea  Gastrointestinal:  Nausea, Vomiting, Diarrhea, Constipation, Heartburn, Blood in stool  Genitourinary:  Difficulty or painful urination, Flank pain, Change in frequency, Urgency  Skin:  Color change, Rash, Itching, Wound  Musculoskeletal:  Joint pain, Back pain, Gait problems, Joint swelling, Myalgias  Neurological:  Dizziness, Headaches, Presyncope, Numbness, Seizures, Tremors  Endocrine:  Heat Intolerance, Cold Intolerance, Polydipsia, Polyphagia, Polyuria      PHYSICAL EXAM:  Vitals:    03/14/23 1052   BP: 128/72   Pulse: 74   Resp: 16   Temp: 97.8 °F (36.6 °C)   SpO2: 98%   Weight: 163 lb 12.8 oz (74.3 kg)   Height: 5' 3\" (1.6 m)     Body mass index is 29.02 kg/m². VS Reviewed  General Appearance: A&O x 3, No acute distress,well developed and well- nourished  Eyes: pupils equal, round, and reactive to light, extraocular eye movements intact, conjunctivae and eye lids without erythema  ENT: bilateral TM normal without fluid or infection, neck without nodes, throat normal without erythema or exudate, sinuses nontender, post nasal drip noted, nasal mucosa congested, and Oropharynx clear, without erythema, exudate, or thrush. Neck: supple and non-tender without mass, no thyromegaly or thyroid nodules, no cervical lymphadenopathy  Pulmonary/Chest: clear to auscultation bilaterally- no wheezes, rales or rhonchi, normal air movement, no respiratory distress or retractions  Cardiovascular: S1 and S2 auscultated w/ RRR. No murmurs, rubs, clicks, or gallops, distal pulses intact. Abdomen: soft, non-tender, non-distended, bowel sounds physiologic,  no rebound or guarding, no masses or hernias noted. Liver and spleen without enlargement. Extremities: no cyanosis, clubbing or edema of the lower extremities. Skin: warm and dry, no rash or erythema      Office Visit on 03/14/2023   Component Date Value Ref Range Status    SARS-COV-2, RdRp gene 03/14/2023 Negative  Negative Final    Lot Number 03/14/2023 0   Final    QC Pass/Fail 03/14/2023 0   Final       ASSESSMENT & PLAN  1. Acute rhinosinusitis    VSS  Exam reassuring  Doxy, flonase, tessalon  F/u if no better  Reviewed ER precautions, pt understands. - POCT COVID-19 Rapid, NAAT  - doxycycline hyclate (VIBRA-TABS) 100 MG tablet; Take 1 tablet by mouth 2 times daily for 10 days  Dispense: 20 tablet;  Refill: 0  - fluticasone (FLONASE) 50 MCG/ACT nasal spray; 1 spray by Each Nostril route daily for 14 days Dispense: 16 g; Refill: 0  - benzonatate (TESSALON PERLES) 100 MG capsule; Take 1 to 2 tablets by mouth every 8 hours as needed for cough. Dispense: 60 capsule; Refill: 0      DISPOSITION    Return if symptoms worsen or fail to improve. Valentina Amber released without restrictions. Future Appointments   Date Time Provider Radha Lorene   5/3/2023  9:40 AM Burton Eisenmenger, DO Fam Med [facility]   5/8/2023  8:00 AM Magdalene Chavez DO N [facility] Rheum [facility]   5/30/2023 12:45 PM Bambi Dakin, APRN - CNP N [facility] Pain [facility]   11/14/2023  1:00 PM VAL Ocampo [facility]     PATIENT COUNSELING    Julianna received counseling on the following healthy behaviors: nutrition, exercise and medication adherence    Barriers to learning and self management: none    Discussed use, benefit, and side effects of prescribed medications. Barriers to medication compliance addressed. All patient questions answered. Pt voiced understanding.        Electronically signed by Burton Eisenmenger, DO on 3/14/2023 at 11:17 AM

## 2023-03-14 ENCOUNTER — OFFICE VISIT (OUTPATIENT)
Dept: FAMILY MEDICINE CLINIC | Age: 83
End: 2023-03-14
Payer: MEDICARE

## 2023-03-14 VITALS
HEART RATE: 74 BPM | BODY MASS INDEX: 29.02 KG/M2 | WEIGHT: 163.8 LBS | SYSTOLIC BLOOD PRESSURE: 128 MMHG | RESPIRATION RATE: 16 BRPM | OXYGEN SATURATION: 98 % | TEMPERATURE: 97.8 F | DIASTOLIC BLOOD PRESSURE: 72 MMHG | HEIGHT: 63 IN

## 2023-03-14 DIAGNOSIS — J01.90 ACUTE RHINOSINUSITIS: Primary | ICD-10-CM

## 2023-03-14 LAB
Lab: 0
QC PASS/FAIL: 0
SARS-COV-2 RDRP RESP QL NAA+PROBE: NEGATIVE

## 2023-03-14 PROCEDURE — 99213 OFFICE O/P EST LOW 20 MIN: CPT | Performed by: FAMILY MEDICINE

## 2023-03-14 PROCEDURE — G8417 CALC BMI ABV UP PARAM F/U: HCPCS | Performed by: FAMILY MEDICINE

## 2023-03-14 PROCEDURE — G8484 FLU IMMUNIZE NO ADMIN: HCPCS | Performed by: FAMILY MEDICINE

## 2023-03-14 PROCEDURE — 1090F PRES/ABSN URINE INCON ASSESS: CPT | Performed by: FAMILY MEDICINE

## 2023-03-14 PROCEDURE — G8399 PT W/DXA RESULTS DOCUMENT: HCPCS | Performed by: FAMILY MEDICINE

## 2023-03-14 PROCEDURE — 1123F ACP DISCUSS/DSCN MKR DOCD: CPT | Performed by: FAMILY MEDICINE

## 2023-03-14 PROCEDURE — 3078F DIAST BP <80 MM HG: CPT | Performed by: FAMILY MEDICINE

## 2023-03-14 PROCEDURE — 1036F TOBACCO NON-USER: CPT | Performed by: FAMILY MEDICINE

## 2023-03-14 PROCEDURE — 3074F SYST BP LT 130 MM HG: CPT | Performed by: FAMILY MEDICINE

## 2023-03-14 PROCEDURE — 87635 SARS-COV-2 COVID-19 AMP PRB: CPT | Performed by: FAMILY MEDICINE

## 2023-03-14 PROCEDURE — G8427 DOCREV CUR MEDS BY ELIG CLIN: HCPCS | Performed by: FAMILY MEDICINE

## 2023-03-14 RX ORDER — BENZONATATE 100 MG/1
CAPSULE ORAL
Qty: 60 CAPSULE | Refills: 0 | Status: SHIPPED | OUTPATIENT
Start: 2023-03-14 | End: 2023-03-24

## 2023-03-14 RX ORDER — DOXYCYCLINE HYCLATE 100 MG
100 TABLET ORAL 2 TIMES DAILY
Qty: 20 TABLET | Refills: 0 | Status: SHIPPED | OUTPATIENT
Start: 2023-03-14 | End: 2023-03-24

## 2023-03-14 RX ORDER — FLUTICASONE PROPIONATE 50 MCG
1 SPRAY, SUSPENSION (ML) NASAL DAILY
Qty: 16 G | Refills: 0 | Status: SHIPPED | OUTPATIENT
Start: 2023-03-14 | End: 2023-03-28

## 2023-03-14 SDOH — ECONOMIC STABILITY: HOUSING INSECURITY
IN THE LAST 12 MONTHS, WAS THERE A TIME WHEN YOU DID NOT HAVE A STEADY PLACE TO SLEEP OR SLEPT IN A SHELTER (INCLUDING NOW)?: NO

## 2023-03-14 SDOH — ECONOMIC STABILITY: INCOME INSECURITY: HOW HARD IS IT FOR YOU TO PAY FOR THE VERY BASICS LIKE FOOD, HOUSING, MEDICAL CARE, AND HEATING?: NOT HARD AT ALL

## 2023-03-14 SDOH — ECONOMIC STABILITY: FOOD INSECURITY: WITHIN THE PAST 12 MONTHS, YOU WORRIED THAT YOUR FOOD WOULD RUN OUT BEFORE YOU GOT MONEY TO BUY MORE.: NEVER TRUE

## 2023-03-14 SDOH — ECONOMIC STABILITY: FOOD INSECURITY: WITHIN THE PAST 12 MONTHS, THE FOOD YOU BOUGHT JUST DIDN'T LAST AND YOU DIDN'T HAVE MONEY TO GET MORE.: NEVER TRUE

## 2023-03-14 ASSESSMENT — PATIENT HEALTH QUESTIONNAIRE - PHQ9
SUM OF ALL RESPONSES TO PHQ9 QUESTIONS 1 & 2: 0
SUM OF ALL RESPONSES TO PHQ QUESTIONS 1-9: 0
2. FEELING DOWN, DEPRESSED OR HOPELESS: 0
1. LITTLE INTEREST OR PLEASURE IN DOING THINGS: 0

## 2023-03-23 ENCOUNTER — TELEPHONE (OUTPATIENT)
Dept: FAMILY MEDICINE CLINIC | Age: 83
End: 2023-03-23

## 2023-03-23 DIAGNOSIS — I10 PRIMARY HYPERTENSION: Primary | Chronic | ICD-10-CM

## 2023-03-23 PROBLEM — N18.30 CHRONIC RENAL DISEASE, STAGE III (HCC): Status: RESOLVED | Noted: 2022-06-06 | Resolved: 2023-03-23

## 2023-03-23 NOTE — TELEPHONE ENCOUNTER
Left detailed message. Okay per HIPAA.  Requested call back if any further questions    Lab slip mailed to pt

## 2023-03-23 NOTE — TELEPHONE ENCOUNTER
Pt due for fasting labs prior to next apt on 5/3/2023. Please call to have pt complete this. Thanks! ASSESSMENT & PLAN   Diagnosis Orders   1.  Primary hypertension  CBC with Auto Differential    Comprehensive Metabolic Panel    Lipid Panel    TSH with Reflex    Microalbumin / Creatinine Urine Ratio        Future Appointments   Date Time Provider Radha Paulson   5/3/2023  9:40 AM Sobeida Mills DO [facility]   5/8/2023  8:00 AM Gregory Holbrook DO N [facility] Rheum [address]   5/30/2023 12:45 PM JOSE Bill CNP N SRPX Pain [address]   11/14/2023  1:00 PM [employer], VAL [address]

## 2023-03-24 ENCOUNTER — TELEPHONE (OUTPATIENT)
Dept: PHYSICAL MEDICINE AND REHAB | Age: 83
End: 2023-03-24

## 2023-03-24 NOTE — TELEPHONE ENCOUNTER
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 She states she is unable to sit in the car to drive to [place], and she is unable to sit long enough to get through the trial.  She has a form to fill out, if you will.

## 2023-04-28 ENCOUNTER — NURSE ONLY (OUTPATIENT)
Dept: LAB | Age: 83
End: 2023-04-28

## 2023-04-28 DIAGNOSIS — I10 PRIMARY HYPERTENSION: Chronic | ICD-10-CM

## 2023-04-28 LAB
ALBUMIN SERPL BCG-MCNC: 4.2 G/DL (ref 3.5–5.1)
ALP SERPL-CCNC: 124 U/L (ref 38–126)
ALT SERPL W/O P-5'-P-CCNC: 16 U/L (ref 11–66)
ANION GAP SERPL CALC-SCNC: 10 MEQ/L (ref 8–16)
AST SERPL-CCNC: 24 U/L (ref 5–40)
BASOPHILS ABSOLUTE: 0 THOU/MM3 (ref 0–0.1)
BASOPHILS NFR BLD AUTO: 0.8 %
BILIRUB SERPL-MCNC: 0.4 MG/DL (ref 0.3–1.2)
BUN SERPL-MCNC: 24 MG/DL (ref 7–22)
CALCIUM SERPL-MCNC: 9.5 MG/DL (ref 8.5–10.5)
CHLORIDE SERPL-SCNC: 106 MEQ/L (ref 98–111)
CHOLEST SERPL-MCNC: 228 MG/DL (ref 100–199)
CO2 SERPL-SCNC: 26 MEQ/L (ref 23–33)
CREAT SERPL-MCNC: 1.1 MG/DL (ref 0.4–1.2)
CREAT UR-MCNC: 223.4 MG/DL
DEPRECATED RDW RBC AUTO: 45.1 FL (ref 35–45)
EOSINOPHIL NFR BLD AUTO: 3.2 %
EOSINOPHILS ABSOLUTE: 0.2 THOU/MM3 (ref 0–0.4)
ERYTHROCYTE [DISTWIDTH] IN BLOOD BY AUTOMATED COUNT: 13.2 % (ref 11.5–14.5)
GFR SERPL CREATININE-BSD FRML MDRD: 51 ML/MIN/1.73M2
GLUCOSE SERPL-MCNC: 94 MG/DL (ref 70–108)
HCT VFR BLD AUTO: 38.1 % (ref 37–47)
HDLC SERPL-MCNC: 55 MG/DL
HGB BLD-MCNC: 12.2 GM/DL (ref 12–16)
IMM GRANULOCYTES # BLD AUTO: 0.01 THOU/MM3 (ref 0–0.07)
IMM GRANULOCYTES NFR BLD AUTO: 0.2 %
LDLC SERPL CALC-MCNC: 139 MG/DL
LYMPHOCYTES ABSOLUTE: 1.3 THOU/MM3 (ref 1–4.8)
LYMPHOCYTES NFR BLD AUTO: 26.2 %
MCH RBC QN AUTO: 30 PG (ref 26–33)
MCHC RBC AUTO-ENTMCNC: 32 GM/DL (ref 32.2–35.5)
MCV RBC AUTO: 93.6 FL (ref 81–99)
MICROALBUMIN UR-MCNC: 5.65 MG/DL
MICROALBUMIN/CREAT RATIO PNL UR: 25 MG/G (ref 0–30)
MONOCYTES ABSOLUTE: 0.7 THOU/MM3 (ref 0.4–1.3)
MONOCYTES NFR BLD AUTO: 13.9 %
NEUTROPHILS NFR BLD AUTO: 55.7 %
NRBC BLD AUTO-RTO: 0 /100 WBC
PLATELET # BLD AUTO: 223 THOU/MM3 (ref 130–400)
PMV BLD AUTO: 10.6 FL (ref 9.4–12.4)
POTASSIUM SERPL-SCNC: 4.6 MEQ/L (ref 3.5–5.2)
PROT SERPL-MCNC: 6.9 G/DL (ref 6.1–8)
RBC # BLD AUTO: 4.07 MILL/MM3 (ref 4.2–5.4)
SEGMENTED NEUTROPHILS ABSOLUTE COUNT: 2.8 THOU/MM3 (ref 1.8–7.7)
SODIUM SERPL-SCNC: 142 MEQ/L (ref 135–145)
TRIGL SERPL-MCNC: 168 MG/DL (ref 0–199)
TSH SERPL DL<=0.005 MIU/L-ACNC: 1.7 UIU/ML (ref 0.4–4.2)
WBC # BLD AUTO: 5 THOU/MM3 (ref 4.8–10.8)

## 2023-05-01 ENCOUNTER — TELEPHONE (OUTPATIENT)
Dept: FAMILY MEDICINE CLINIC | Age: 83
End: 2023-05-01

## 2023-05-01 NOTE — TELEPHONE ENCOUNTER
----- Message from Aguila George, DO sent at 4/30/2023  8:16 AM EDT -----  Please let pt know that labs are stable and appropriate. Let me know if questions, thanks!

## 2023-05-01 NOTE — TELEPHONE ENCOUNTER
I called and spoke with Alee Baum and she verbalized understanding and had no questions at this time.

## 2023-05-02 NOTE — PROGRESS NOTES
and/or referrals ordered. Positive Risk Factor Screenings with Interventions:                     Safety:  Do you always fasten your seatbelt when you are in a car?: (!) No  Interventions:  See AVS for additional education material            Objective   Vitals:    05/03/23 0940   BP: 120/62   Pulse: 74   Resp: 16   Temp: 97.8 °F (36.6 °C)   SpO2: 98%   Weight: 165 lb 9.6 oz (75.1 kg)   Height: 5' 3\" (1.6 m)      Body mass index is 29.33 kg/m². Allergies   Allergen Reactions    Sulfa Antibiotics Swelling    Other Nausea And Vomiting     oysters     Prior to Visit Medications    Medication Sig Taking?  Authorizing Provider   Handicap Placard MISC by Does not apply route Expires 03 MAY 2028 Yes Higinio Lai DO   Melatonin 10-10 MG TBCR Take by mouth Yes Historical Provider, MD   omeprazole (PRILOSEC) 20 MG delayed release capsule Take 1 capsule by mouth daily Yes JOSE Robins CNP   hydroxychloroquine (PLAQUENIL) 200 MG tablet TAKE 1 AND 1/2 TABLETS BY MOUTH DAILY Yes JOSE Robins CNP   lisinopril-hydroCHLOROthiazide (PRINZIDE;ZESTORETIC) 10-12.5 MG per tablet TAKE 1 TABLET BY MOUTH DAILY Yes Higinio Lai DO   acetaminophen (TYLENOL) 650 MG extended release tablet Take 1 tablet by mouth every 8 hours as needed for Pain Yes Historical Provider, MD   vitamin C (ASCORBIC ACID) 500 MG tablet Take 1 tablet by mouth daily Yes Historical Provider, MD   Multiple Vitamins-Minerals (PRESERVISION AREDS PO) Take by mouth Yes Historical Provider, MD   Calcium Carbonate-Vit D-Min (CALCIUM 1200 PO) Take by mouth daily Yes Historical Provider, MD   Cholecalciferol (VITAMIN D-3 PO) Take by mouth daily Yes Historical Provider, MD Kohli (Including outside providers/suppliers regularly involved in providing care):   Patient Care Team:  Higinio Lai DO as PCP - General (Family Medicine)  Higinio Lai DO as PCP - Empaneled Provider     Reviewed and updated

## 2023-05-03 ENCOUNTER — OFFICE VISIT (OUTPATIENT)
Dept: FAMILY MEDICINE CLINIC | Age: 83
End: 2023-05-03
Payer: MEDICARE

## 2023-05-03 VITALS
SYSTOLIC BLOOD PRESSURE: 120 MMHG | OXYGEN SATURATION: 98 % | RESPIRATION RATE: 16 BRPM | HEART RATE: 74 BPM | BODY MASS INDEX: 29.34 KG/M2 | DIASTOLIC BLOOD PRESSURE: 62 MMHG | TEMPERATURE: 97.8 F | WEIGHT: 165.6 LBS | HEIGHT: 63 IN

## 2023-05-03 DIAGNOSIS — G89.29 CHRONIC LEFT-SIDED LOW BACK PAIN WITHOUT SCIATICA: ICD-10-CM

## 2023-05-03 DIAGNOSIS — M54.50 CHRONIC LEFT-SIDED LOW BACK PAIN WITHOUT SCIATICA: ICD-10-CM

## 2023-05-03 DIAGNOSIS — E87.5 HYPERKALEMIA: ICD-10-CM

## 2023-05-03 DIAGNOSIS — M53.3 CHRONIC LEFT SI JOINT PAIN: ICD-10-CM

## 2023-05-03 DIAGNOSIS — M25.552 LEFT HIP PAIN: ICD-10-CM

## 2023-05-03 DIAGNOSIS — G89.29 CHRONIC LEFT SI JOINT PAIN: ICD-10-CM

## 2023-05-03 DIAGNOSIS — M51.36 DDD (DEGENERATIVE DISC DISEASE), LUMBAR: ICD-10-CM

## 2023-05-03 DIAGNOSIS — N18.31 STAGE 3A CHRONIC KIDNEY DISEASE (HCC): ICD-10-CM

## 2023-05-03 DIAGNOSIS — Z78.0 POST-MENOPAUSE: ICD-10-CM

## 2023-05-03 DIAGNOSIS — R76.8 POSITIVE ANA (ANTINUCLEAR ANTIBODY): ICD-10-CM

## 2023-05-03 DIAGNOSIS — Z00.00 MEDICARE ANNUAL WELLNESS VISIT, SUBSEQUENT: Primary | ICD-10-CM

## 2023-05-03 DIAGNOSIS — I10 ESSENTIAL HYPERTENSION: ICD-10-CM

## 2023-05-03 DIAGNOSIS — M15.4 EROSIVE OSTEOARTHRITIS OF BOTH HANDS: ICD-10-CM

## 2023-05-03 DIAGNOSIS — M15.9 PRIMARY OSTEOARTHRITIS INVOLVING MULTIPLE JOINTS: ICD-10-CM

## 2023-05-03 DIAGNOSIS — E78.5 DYSLIPIDEMIA: ICD-10-CM

## 2023-05-03 DIAGNOSIS — R80.9 MICROALBUMINURIA: ICD-10-CM

## 2023-05-03 PROCEDURE — G0439 PPPS, SUBSEQ VISIT: HCPCS | Performed by: FAMILY MEDICINE

## 2023-05-03 PROCEDURE — 1123F ACP DISCUSS/DSCN MKR DOCD: CPT | Performed by: FAMILY MEDICINE

## 2023-05-03 PROCEDURE — 3074F SYST BP LT 130 MM HG: CPT | Performed by: FAMILY MEDICINE

## 2023-05-03 PROCEDURE — 3078F DIAST BP <80 MM HG: CPT | Performed by: FAMILY MEDICINE

## 2023-05-03 ASSESSMENT — PATIENT HEALTH QUESTIONNAIRE - PHQ9
1. LITTLE INTEREST OR PLEASURE IN DOING THINGS: 0
SUM OF ALL RESPONSES TO PHQ9 QUESTIONS 1 & 2: 0
SUM OF ALL RESPONSES TO PHQ QUESTIONS 1-9: 0
2. FEELING DOWN, DEPRESSED OR HOPELESS: 0
SUM OF ALL RESPONSES TO PHQ QUESTIONS 1-9: 0

## 2023-05-03 ASSESSMENT — LIFESTYLE VARIABLES
HOW OFTEN DO YOU HAVE A DRINK CONTAINING ALCOHOL: NEVER
HOW MANY STANDARD DRINKS CONTAINING ALCOHOL DO YOU HAVE ON A TYPICAL DAY: PATIENT DOES NOT DRINK

## 2023-05-08 ENCOUNTER — OFFICE VISIT (OUTPATIENT)
Dept: RHEUMATOLOGY | Age: 83
End: 2023-05-08
Payer: MEDICARE

## 2023-05-08 VITALS
SYSTOLIC BLOOD PRESSURE: 132 MMHG | OXYGEN SATURATION: 98 % | DIASTOLIC BLOOD PRESSURE: 78 MMHG | WEIGHT: 168 LBS | HEIGHT: 63 IN | HEART RATE: 62 BPM | BODY MASS INDEX: 29.77 KG/M2

## 2023-05-08 DIAGNOSIS — M47.816 LUMBAR SPONDYLOSIS: ICD-10-CM

## 2023-05-08 DIAGNOSIS — M15.4 EROSIVE OSTEOARTHRITIS OF BOTH HANDS: Primary | ICD-10-CM

## 2023-05-08 DIAGNOSIS — M65.4 TENOSYNOVITIS, DE QUERVAIN: ICD-10-CM

## 2023-05-08 DIAGNOSIS — M51.36 DDD (DEGENERATIVE DISC DISEASE), LUMBAR: Chronic | ICD-10-CM

## 2023-05-08 DIAGNOSIS — M15.9 PRIMARY OSTEOARTHRITIS INVOLVING MULTIPLE JOINTS: Chronic | ICD-10-CM

## 2023-05-08 PROCEDURE — 3078F DIAST BP <80 MM HG: CPT | Performed by: INTERNAL MEDICINE

## 2023-05-08 PROCEDURE — G8427 DOCREV CUR MEDS BY ELIG CLIN: HCPCS | Performed by: INTERNAL MEDICINE

## 2023-05-08 PROCEDURE — 1090F PRES/ABSN URINE INCON ASSESS: CPT | Performed by: INTERNAL MEDICINE

## 2023-05-08 PROCEDURE — 1123F ACP DISCUSS/DSCN MKR DOCD: CPT | Performed by: INTERNAL MEDICINE

## 2023-05-08 PROCEDURE — 1036F TOBACCO NON-USER: CPT | Performed by: INTERNAL MEDICINE

## 2023-05-08 PROCEDURE — 99214 OFFICE O/P EST MOD 30 MIN: CPT | Performed by: INTERNAL MEDICINE

## 2023-05-08 PROCEDURE — G8399 PT W/DXA RESULTS DOCUMENT: HCPCS | Performed by: INTERNAL MEDICINE

## 2023-05-08 PROCEDURE — 3075F SYST BP GE 130 - 139MM HG: CPT | Performed by: INTERNAL MEDICINE

## 2023-05-08 PROCEDURE — G8417 CALC BMI ABV UP PARAM F/U: HCPCS | Performed by: INTERNAL MEDICINE

## 2023-05-08 RX ORDER — HYDROCODONE BITARTRATE AND ACETAMINOPHEN 5; 325 MG/1; MG/1
1 TABLET ORAL EVERY 6 HOURS PRN
COMMUNITY

## 2023-05-08 ASSESSMENT — ENCOUNTER SYMPTOMS
CHEST TIGHTNESS: 0
CONSTIPATION: 0
BACK PAIN: 1
RESPIRATORY NEGATIVE: 1
VOMITING: 0
GASTROINTESTINAL NEGATIVE: 1
NAUSEA: 0
COUGH: 0
WHEEZING: 0
SHORTNESS OF BREATH: 0
ABDOMINAL PAIN: 0
EYES NEGATIVE: 1

## 2023-05-08 NOTE — PROGRESS NOTES
[facility] RHEUMATOLOGY FOLLOW UP NOTE     Date Of Service: 5/8/2023  Provider: Dewayne Ventura DO, DO  PCP: Karen Olivas DO   Name: Nevelyn Hodgkin   MRN: [id number]            Subjective   CHIEF COMPLAINT:    Chief Complaint   Patient presents with    Follow-up     7 month f/u Erosive osteoarthritis of hands, bilateral  Lower back, bilateral under buttocks, RT thumb      Nevelyn Hodgkin  is a(n)82 y.o. female with a past medical history of hx of chronic low back pain, CKD stage 3, DDD lumbar spine, dyslipidemia, h/o skin cancer, hyperlipidemia, hypertension, osteoarthritis here for the f/u evaluation of inflammatory osteoarthritis, osteoarthritis of multiple joints, troch bursitis. Interval hx: She has been doing well. She has chronic low back pain that hurts 95% of the time. She has occasional injections which helps for several weeks to months. Her main complaint today is right thumb pain due to crocheting daily. She is on Plaquenil 300mg daily, Tylenol, and Hydrocodone 5-325 half a tablet as needed for her pain. Her aggravating factors of her low back pain are standing, walking, and bending rated 5/10 today. REVIEW OF SYSTEMS: (ROS)    Review of Systems   Constitutional:  Negative for chills, fatigue and fever. Respiratory:  Negative for cough, chest tightness, shortness of breath and wheezing. Cardiovascular:  Negative for chest pain, palpitations and leg swelling. Gastrointestinal:  Negative for abdominal pain, constipation, nausea and vomiting. Genitourinary:  Negative for dysuria, frequency and hematuria. Musculoskeletal:  Positive for arthralgias and back pain. Skin:  Negative for pallor, rash and wound. Neurological:  Negative for dizziness, weakness, light-headedness, numbness and headaches.        Past Medical History:     has a past medical history of Chronic low back pain, CKD (chronic kidney disease) stage 3, GFR 30-59 ml/min ([facility]), DDD (degenerative disc disease), lumbar,
08:52 AM    MCH 30.0 04/28/2023 08:52 AM    MCHC 32.0 04/28/2023 08:52 AM    RDW 13.3 02/19/2021 09:25 AM    NRBC 0 04/28/2023 08:52 AM    SEGSPCT 55.7 04/28/2023 08:52 AM    LYMPHOPCT 35.8 05/03/2018 11:02 AM    MONOPCT 13.9 04/28/2023 08:52 AM    MONOPCT 12.1 05/03/2018 11:02 AM    EOSPCT 2.0 05/03/2018 11:02 AM    BASOPCT 0.5 05/03/2018 11:02 AM    MONOSABS 0.7 04/28/2023 08:52 AM    LYMPHSABS 1.3 04/28/2023 08:52 AM    LYMPHSABS 0.9 01/07/2023 06:10 PM    LYMPHSABS 1.4 04/25/2022 08:05 AM    EOSABS 0.2 04/28/2023 08:52 AM    BASOSABS 0.0 04/28/2023 08:52 AM         Chemistry        Component Value Date/Time     04/28/2023 0852    K 4.6 04/28/2023 0852     04/28/2023 0852    CO2 26 04/28/2023 0852    BUN 24 (H) 04/28/2023 0852    CREATININE 1.1 04/28/2023 0852        Component Value Date/Time    CALCIUM 9.5 04/28/2023 0852    ALKPHOS 124 04/28/2023 0852    AST 24 04/28/2023 0852    ALT 16 04/28/2023 0852    BILITOT 0.4 04/28/2023 0852            Lab Results   Component Value Date    SEDRATE 12 06/08/2018     Lab Results   Component Value Date    CRP 0.5 06/08/2018             Assessment      Plan     Erosive osteoarthritis bilateral hands  osteoarthritis bilateral shoulders  Osteoarthritis bilateral feet: -      - prior tx: diclofenac gel (helped but didn't last), mobic - stopped b/c age and  CKD   - Chondrocalcinosis noted in left hand, mild fullness/swelling of MCPs previously appreciated. Positive WICHO but negative suck serologies. ?  CPPD arthropathy versus erosive osteoarthritis               - Plaquenil 300 mg daily (February 2019)               - Arnika daily    -  Plaquenil eye examination last feb 2023. Lumbar spinal stenosis  & foraminal stenosis --   stable. - pseudo-claudication symptoms improved with sitting worse with standing and walking.  ' Prior tx: Failed physical therapy 2019. Lumbar epidural providing moderate relief (2019)) .  Prior eval by orthopedics  October 2019 - no

## 2023-05-19 ENCOUNTER — HOSPITAL ENCOUNTER (OUTPATIENT)
Dept: WOMENS IMAGING | Age: 83
Discharge: HOME | End: 2023-05-19
Payer: MEDICARE

## 2023-05-19 DIAGNOSIS — Z78.0 POST-MENOPAUSE: ICD-10-CM

## 2023-05-19 PROCEDURE — 77080 DXA BONE DENSITY AXIAL: CPT

## 2023-05-21 ENCOUNTER — PATIENT MESSAGE (OUTPATIENT)
Dept: FAMILY MEDICINE CLINIC | Age: 83
End: 2023-05-21

## 2023-05-21 PROBLEM — M67.442 GANGLION CYST OF FINGER OF LEFT HAND: Status: RESOLVED | Noted: 2017-01-31 | Resolved: 2023-05-21

## 2023-05-22 ENCOUNTER — OFFICE VISIT (OUTPATIENT)
Dept: FAMILY MEDICINE CLINIC | Age: 83
End: 2023-05-22
Payer: MEDICARE

## 2023-05-22 VITALS
DIASTOLIC BLOOD PRESSURE: 72 MMHG | TEMPERATURE: 97.8 F | OXYGEN SATURATION: 98 % | BODY MASS INDEX: 30.59 KG/M2 | WEIGHT: 166.2 LBS | HEIGHT: 62 IN | RESPIRATION RATE: 16 BRPM | HEART RATE: 68 BPM | SYSTOLIC BLOOD PRESSURE: 130 MMHG

## 2023-05-22 DIAGNOSIS — M81.0 OSTEOPOROSIS, POST-MENOPAUSAL: ICD-10-CM

## 2023-05-22 DIAGNOSIS — J01.90 ACUTE RHINOSINUSITIS: Primary | ICD-10-CM

## 2023-05-22 PROCEDURE — 1123F ACP DISCUSS/DSCN MKR DOCD: CPT | Performed by: FAMILY MEDICINE

## 2023-05-22 PROCEDURE — 3075F SYST BP GE 130 - 139MM HG: CPT | Performed by: FAMILY MEDICINE

## 2023-05-22 PROCEDURE — 99214 OFFICE O/P EST MOD 30 MIN: CPT | Performed by: FAMILY MEDICINE

## 2023-05-22 PROCEDURE — G8399 PT W/DXA RESULTS DOCUMENT: HCPCS | Performed by: FAMILY MEDICINE

## 2023-05-22 PROCEDURE — 3078F DIAST BP <80 MM HG: CPT | Performed by: FAMILY MEDICINE

## 2023-05-22 PROCEDURE — G8427 DOCREV CUR MEDS BY ELIG CLIN: HCPCS | Performed by: FAMILY MEDICINE

## 2023-05-22 PROCEDURE — 1036F TOBACCO NON-USER: CPT | Performed by: FAMILY MEDICINE

## 2023-05-22 PROCEDURE — 1090F PRES/ABSN URINE INCON ASSESS: CPT | Performed by: FAMILY MEDICINE

## 2023-05-22 PROCEDURE — G8417 CALC BMI ABV UP PARAM F/U: HCPCS | Performed by: FAMILY MEDICINE

## 2023-05-22 RX ORDER — CEFDINIR 300 MG/1
300 CAPSULE ORAL 2 TIMES DAILY
Qty: 20 CAPSULE | Refills: 0 | Status: CANCELLED | OUTPATIENT
Start: 2023-05-22 | End: 2023-06-01

## 2023-05-22 RX ORDER — DOXYCYCLINE HYCLATE 100 MG
100 TABLET ORAL 2 TIMES DAILY
Qty: 20 TABLET | Refills: 0 | Status: SHIPPED | OUTPATIENT
Start: 2023-05-22 | End: 2023-06-01

## 2023-05-22 RX ORDER — ZOLEDRONIC ACID 5 MG/100ML
5 INJECTION, SOLUTION INTRAVENOUS ONCE
Qty: 100 ML | Refills: 0 | Status: SHIPPED | OUTPATIENT
Start: 2023-05-22 | End: 2023-05-22

## 2023-05-22 NOTE — TELEPHONE ENCOUNTER
Future Appointments   Date Time Provider Radha Lorene   5/22/2023 11:20 AM DO Caren Morrison [facility]   5/30/2023 12:45 PM JOSE Wilkinson - CNP N [facility] Pain [facility]   11/14/2023  1:00 PM [employer], VAL Tran [facility]   5/8/2024  8:00 AM DO FELICIANO Baron SRPX Rheum [facility]   5/15/2024  9:40 AM Simeon Hoff DO [facility] [address] [facility] [facility]

## 2023-05-22 NOTE — PROGRESS NOTES
Chief Complaint   Patient presents with    Cough    Osteoporosis     New dx     History obtained from the patient. SUBJECTIVE:  Chinyere Wolfe is a [de-identified] y.o. female that presents today for     -URI type sxs:   7-10 days  Nasal congestion  Drainage   Cough    Fever - No  Runny nose or congestion -  Yes   Cough -  Yes  Sore throat -  Yes  Headache, fatigue, joint pains, muscle aches -  No  Double Sickening - Yes  Shortness of breath/Wheezing? -  No  Nausea/Vomiting/Diarrhea? No  Sick contacts - Yes  Maxillary Tooth Pain -  Yes  Treatment tried and response - otc meds, no better      -Osteoporosis:  Noted on DEXA done Friday  Osteopenia prior  No fxr hx  Not treated prior, low fRAX score prior  Would like to do reclast      Age/Gender Health Maintenance    Lipid -   Lab Results   Component Value Date    CHOL 228 (H) 04/28/2023    CHOL 211 (H) 04/25/2022    CHOL 263 (H) 10/28/2021     Lab Results   Component Value Date    TRIG 168 04/28/2023    TRIG 174 04/25/2022    TRIG 191 10/28/2021     Lab Results   Component Value Date    HDL 55 04/28/2023    HDL 56 04/25/2022    HDL 60 10/28/2021     Lab Results   Component Value Date    LDLCALC 139 04/28/2023    LDLCALC 120 04/25/2022    [address] 165 10/28/2021       DM Screen -   Lab Results   Component Value Date/Time    GLUCOSE 94 04/28/2023 08:52 AM    GLUCOSE 99 05/03/2018 11:02 AM     No results found for: LABA1C      Colon Cancer Screening -NEG SEPT 2018, no repeat (Katerin Castro)  Lung Cancer Screening - never smoker    Tetanus - UTD July 2018  Influenza Vaccine - Candidate FALL 2023  Pneumonia Vaccine - UTD PCV 13 OCT 2015. UTD PPV 23 APR 2018  Zostavax - Zostavax competed APR 2013   Shingrix - UTD x 2    Breast Cancer Screening - NEG OCT 2022  Cervical Cancer Screening - Aged out  Osteoporosis Screening - osteopenia AUG 2018/AUG 2020.  Osteoporosis MAY 2023      Current Outpatient Medications   Medication Sig Dispense Refill    doxycycline hyclate (VIBRA-TABS) 100 MG

## 2023-05-22 NOTE — PATIENT INSTRUCTIONS
LAB INSTRUCTIONS:    Please complete labs within 2 week(s). Please fast for 8 hours prior to lab collection. The clinic will call you within 1 week of collection. If you have not heard from us within that amount of time, please call us at [phone].

## 2023-05-23 ENCOUNTER — TELEPHONE (OUTPATIENT)
Dept: FAMILY MEDICINE CLINIC | Age: 83
End: 2023-05-23

## 2023-05-23 NOTE — TELEPHONE ENCOUNTER
----- Message from Chikis Soto DO sent at 5/21/2023  9:03 AM EDT -----  Please let pt know that DEXA scan shows significant thinning of the bones called Osteoporosis. This puts her at sig inc risk of hip/spine fracture. Would recommend starting a medication to improve bone strength and dec risk of fxr. It comes in other oral or IV form. Oral form, typically Fosamax, is once weekly, where as the IV version, Reclast, is once a year. Let me know what she would like to do. Let me know if questions, thanks!

## 2023-05-23 NOTE — TELEPHONE ENCOUNTER
I called and spoke to [facility] [place] and she verbalized understanding and apparently was in the office yesterday and already discussed the results. Pt had no questions at this time.

## 2023-05-26 ENCOUNTER — NURSE ONLY (OUTPATIENT)
Dept: LAB | Age: 83
End: 2023-05-26

## 2023-05-26 DIAGNOSIS — M81.0 OSTEOPOROSIS, POST-MENOPAUSAL: ICD-10-CM

## 2023-05-26 LAB
ANION GAP SERPL CALC-SCNC: 12 MEQ/L (ref 8–16)
BUN SERPL-MCNC: 31 MG/DL (ref 7–22)
CALCIUM SERPL-MCNC: 9.9 MG/DL (ref 8.5–10.5)
CHLORIDE SERPL-SCNC: 106 MEQ/L (ref 98–111)
CO2 SERPL-SCNC: 24 MEQ/L (ref 23–33)
CREAT SERPL-MCNC: 1.2 MG/DL (ref 0.4–1.2)
GFR SERPL CREATININE-BSD FRML MDRD: 46 ML/MIN/1.73M2
GLUCOSE SERPL-MCNC: 99 MG/DL (ref 70–108)
POTASSIUM SERPL-SCNC: 5 MEQ/L (ref 3.5–5.2)
SODIUM SERPL-SCNC: 142 MEQ/L (ref 135–145)

## 2023-05-30 ENCOUNTER — TELEPHONE (OUTPATIENT)
Dept: FAMILY MEDICINE CLINIC | Age: 83
End: 2023-05-30

## 2023-05-30 ENCOUNTER — OFFICE VISIT (OUTPATIENT)
Dept: PHYSICAL MEDICINE AND REHAB | Age: 83
End: 2023-05-30
Payer: MEDICARE

## 2023-05-30 VITALS
SYSTOLIC BLOOD PRESSURE: 128 MMHG | BODY MASS INDEX: 30.55 KG/M2 | HEIGHT: 62 IN | WEIGHT: 166 LBS | DIASTOLIC BLOOD PRESSURE: 72 MMHG

## 2023-05-30 DIAGNOSIS — M54.50 CHRONIC BILATERAL LOW BACK PAIN WITHOUT SCIATICA: ICD-10-CM

## 2023-05-30 DIAGNOSIS — M47.816 LUMBAR SPONDYLOSIS: Primary | ICD-10-CM

## 2023-05-30 DIAGNOSIS — M41.9 SCOLIOSIS OF THORACOLUMBAR SPINE, UNSPECIFIED SCOLIOSIS TYPE: ICD-10-CM

## 2023-05-30 DIAGNOSIS — Z98.890 HISTORY OF LUMBAR LAMINECTOMY: ICD-10-CM

## 2023-05-30 DIAGNOSIS — M81.0 SENILE OSTEOPOROSIS: ICD-10-CM

## 2023-05-30 DIAGNOSIS — G89.4 CHRONIC PAIN SYNDROME: ICD-10-CM

## 2023-05-30 DIAGNOSIS — G89.29 CHRONIC BILATERAL LOW BACK PAIN WITHOUT SCIATICA: ICD-10-CM

## 2023-05-30 PROCEDURE — 3074F SYST BP LT 130 MM HG: CPT | Performed by: NURSE PRACTITIONER

## 2023-05-30 PROCEDURE — 99214 OFFICE O/P EST MOD 30 MIN: CPT | Performed by: NURSE PRACTITIONER

## 2023-05-30 PROCEDURE — 1123F ACP DISCUSS/DSCN MKR DOCD: CPT | Performed by: NURSE PRACTITIONER

## 2023-05-30 PROCEDURE — G8417 CALC BMI ABV UP PARAM F/U: HCPCS | Performed by: NURSE PRACTITIONER

## 2023-05-30 PROCEDURE — G8427 DOCREV CUR MEDS BY ELIG CLIN: HCPCS | Performed by: NURSE PRACTITIONER

## 2023-05-30 PROCEDURE — 1036F TOBACCO NON-USER: CPT | Performed by: NURSE PRACTITIONER

## 2023-05-30 PROCEDURE — 3078F DIAST BP <80 MM HG: CPT | Performed by: NURSE PRACTITIONER

## 2023-05-30 PROCEDURE — 1090F PRES/ABSN URINE INCON ASSESS: CPT | Performed by: NURSE PRACTITIONER

## 2023-05-30 PROCEDURE — G8399 PT W/DXA RESULTS DOCUMENT: HCPCS | Performed by: NURSE PRACTITIONER

## 2023-05-30 RX ORDER — SODIUM CHLORIDE 9 MG/ML
5-250 INJECTION, SOLUTION INTRAVENOUS PRN
OUTPATIENT
Start: 2023-05-30

## 2023-05-30 RX ORDER — ACETAMINOPHEN 325 MG/1
650 TABLET ORAL
OUTPATIENT
Start: 2023-05-30

## 2023-05-30 RX ORDER — SODIUM CHLORIDE 0.9 % (FLUSH) 0.9 %
5-40 SYRINGE (ML) INJECTION PRN
OUTPATIENT
Start: 2023-05-30

## 2023-05-30 RX ORDER — DIPHENHYDRAMINE HYDROCHLORIDE 50 MG/ML
50 INJECTION INTRAMUSCULAR; INTRAVENOUS
OUTPATIENT
Start: 2023-05-30

## 2023-05-30 RX ORDER — ZOLEDRONIC ACID 5 MG/100ML
5 INJECTION, SOLUTION INTRAVENOUS ONCE
Status: CANCELLED | OUTPATIENT
Start: 2023-05-30 | End: 2023-05-30

## 2023-05-30 RX ORDER — EPINEPHRINE 1 MG/ML
0.3 INJECTION, SOLUTION, CONCENTRATE INTRAVENOUS PRN
OUTPATIENT
Start: 2023-05-30

## 2023-05-30 RX ORDER — ALBUTEROL SULFATE 90 UG/1
4 AEROSOL, METERED RESPIRATORY (INHALATION) PRN
OUTPATIENT
Start: 2023-05-30

## 2023-05-30 RX ORDER — SODIUM CHLORIDE 9 MG/ML
INJECTION, SOLUTION INTRAVENOUS CONTINUOUS
OUTPATIENT
Start: 2023-05-30

## 2023-05-30 RX ORDER — ONDANSETRON 2 MG/ML
8 INJECTION INTRAMUSCULAR; INTRAVENOUS
OUTPATIENT
Start: 2023-05-30

## 2023-05-30 RX ORDER — HEPARIN SODIUM (PORCINE) LOCK FLUSH IV SOLN 100 UNIT/ML 100 UNIT/ML
500 SOLUTION INTRAVENOUS PRN
Status: CANCELLED | OUTPATIENT
Start: 2023-05-30

## 2023-05-30 ASSESSMENT — ENCOUNTER SYMPTOMS
EYES NEGATIVE: 1
RESPIRATORY NEGATIVE: 1
BACK PAIN: 1
COLOR CHANGE: 0
GASTROINTESTINAL NEGATIVE: 1

## 2023-05-30 NOTE — TELEPHONE ENCOUNTER
----- Message from Soto Osorio DO sent at 5/26/2023  1:56 PM EDT -----  Please let pt know that BMP is stable  Ok to proceed with Reclast infusion. Rx in tray by printer, signed  Let me know if questions, thanks!

## 2023-05-30 NOTE — PROGRESS NOTES
[facility] [address]  Dept: [phone]  Dept Fax: [phone]: [phone]    Visit Date: 5/30/2023    Functionality Assessment/Goals Worksheet     On a scale of 0 (Does not Interfere) to 10 (Completely Interferes)     1. Which number describes how during the past week pain has interfered with       the following:  A. General Activity:  6  B. Mood: 6  C. Walking Ability:  3  D. Normal Work (Includes both work outside the home and housework):  6  E. Relations with Other People:   2  F. Sleep:   4  G. Enjoyment of Life:   0    2. Patient Prefers to Take their Pain Medications:     []  On a regular basis   []  Only when necessary    []  Does not take pain medications    3. What are the Patient's Goals/Expectations for Visiting Pain Management? []  Learn about my pain    [x]  Receive Medication   []  Physical Therapy     []  Treat Depression   [x]  Receive Injections    []  Treat Sleep   []  Deal with Anxiety and Stress   []  Treat Opoid Dependence/Addiction   []  Other:      HPI:   Sierra Thronton is a [de-identified] y.o. female is here today for    Chief Complaint: Right low back pain     HPI   3 month FU. Continues to have pain in low back mainly right side. Pain is up and down. Usually a dull ache but gets periods of sharp pain depending on activity. If she sits for long periods on something hard has pain in left buttock   She feels she is still getting relief from L-facet RFA   Denies any radicular pain. She feels that Norco prn remains effective in making pain tolerable. Able to tolerate daily activities and remains very involved in her [employer]   Pain increases with bending, lifting, twisting , walking, standing, getting up and down, and housework or working at job. Damp wet weather also aggravates pain.          Prior Injections:  right L-facet RFA from

## 2023-05-30 NOTE — TELEPHONE ENCOUNTER
Left detailed message. Okay per HIPAA.  Requested call back if any further questions      Placed on Inder's desk to sign and faxed to outpatient nursing

## 2023-06-13 ENCOUNTER — HOSPITAL ENCOUNTER (OUTPATIENT)
Dept: NURSING | Age: 83
Discharge: HOME | End: 2023-06-13
Payer: MEDICARE

## 2023-06-13 VITALS
RESPIRATION RATE: 18 BRPM | WEIGHT: 165 LBS | DIASTOLIC BLOOD PRESSURE: 77 MMHG | SYSTOLIC BLOOD PRESSURE: 162 MMHG | TEMPERATURE: 98 F | HEART RATE: 62 BPM | BODY MASS INDEX: 30.18 KG/M2 | OXYGEN SATURATION: 98 %

## 2023-06-13 DIAGNOSIS — M81.0 SENILE OSTEOPOROSIS: Primary | ICD-10-CM

## 2023-06-13 PROCEDURE — 6360000002 HC RX W HCPCS: Performed by: FAMILY MEDICINE

## 2023-06-13 PROCEDURE — 96365 THER/PROPH/DIAG IV INF INIT: CPT

## 2023-06-13 RX ORDER — ZOLEDRONIC ACID 5 MG/100ML
5 INJECTION, SOLUTION INTRAVENOUS ONCE
OUTPATIENT
Start: 2024-06-11 | End: 2024-06-11

## 2023-06-13 RX ORDER — ACETAMINOPHEN 325 MG/1
650 TABLET ORAL
OUTPATIENT
Start: 2024-06-11

## 2023-06-13 RX ORDER — EPINEPHRINE 1 MG/ML
0.3 INJECTION, SOLUTION INTRAMUSCULAR; SUBCUTANEOUS PRN
OUTPATIENT
Start: 2024-06-11

## 2023-06-13 RX ORDER — ONDANSETRON 2 MG/ML
8 INJECTION INTRAMUSCULAR; INTRAVENOUS
OUTPATIENT
Start: 2024-06-11

## 2023-06-13 RX ORDER — SODIUM CHLORIDE 9 MG/ML
5-250 INJECTION, SOLUTION INTRAVENOUS PRN
OUTPATIENT
Start: 2024-06-11

## 2023-06-13 RX ORDER — DIPHENHYDRAMINE HYDROCHLORIDE 50 MG/ML
50 INJECTION INTRAMUSCULAR; INTRAVENOUS
OUTPATIENT
Start: 2024-06-11

## 2023-06-13 RX ORDER — ALBUTEROL SULFATE 90 UG/1
4 AEROSOL, METERED RESPIRATORY (INHALATION) PRN
OUTPATIENT
Start: 2024-06-11

## 2023-06-13 RX ORDER — HEPARIN SODIUM 100 [USP'U]/ML
500 INJECTION, SOLUTION INTRAVENOUS PRN
OUTPATIENT
Start: 2024-06-11

## 2023-06-13 RX ORDER — SODIUM CHLORIDE 0.9 % (FLUSH) 0.9 %
5-40 SYRINGE (ML) INJECTION PRN
OUTPATIENT
Start: 2024-06-11

## 2023-06-13 RX ORDER — SODIUM CHLORIDE 9 MG/ML
INJECTION, SOLUTION INTRAVENOUS CONTINUOUS
OUTPATIENT
Start: 2024-06-11

## 2023-06-13 RX ORDER — ZOLEDRONIC ACID 5 MG/100ML
5 INJECTION, SOLUTION INTRAVENOUS ONCE
Status: COMPLETED | OUTPATIENT
Start: 2023-06-13 | End: 2023-06-13

## 2023-06-13 RX ADMIN — ZOLEDRONIC ACID 5 MG: 5 INJECTION, SOLUTION INTRAVENOUS at 12:48

## 2023-06-13 ASSESSMENT — PAIN SCALES - GENERAL: PAINLEVEL_OUTOF10: 0

## 2023-06-13 NOTE — PROGRESS NOTES
[address] ambulated into room with cane for reclast infusion. Pt rights and responsibilities offered to pt. Infusion explained and questions answered. Reclast started and Mallory Garcia has no other needs at this time. Mallory Garcia has call light within reach. 1308  Infusion complete and Julianna tolerated well. 1329    D/c instructions explained and Julianna verbalized understanding.  Mallory Garcia ambulated out for d/c.           _m___ Safety:       (Environmental)  [place] to environment  Ensure ID band is correct and in place/ allergy band as needed  Assess for fall risk  Initiate fall precautions as applicable (fall band, side rails, etc.)  Call light within reach  Bed in low position/ wheels locked    __m__ Pain:       Assess pain level and characteristics  Administer analgesics as ordered  Assess effectiveness of pain management and report to MD as needed    _m___ Knowledge Deficit:  Assess baseline knowledge  Provide teaching at level of understanding  Provide teaching via preferred learning method  Evaluate teaching effectiveness    _m___ Hemodynamic/Respiratory Status:       (Pre and Post Procedure Monitoring)  Assess/Monitor vital signs and LOC  Assess Baseline SpO2 prior to any sedation  Obtain weight/height  Assess vital signs/ LOC until patient meets discharge criteria  Monitor procedure site and notify MD of any issues

## 2023-06-20 ENCOUNTER — TELEPHONE (OUTPATIENT)
Dept: UROLOGY | Age: 83
End: 2023-06-20

## 2023-06-20 NOTE — TELEPHONE ENCOUNTER
Patient scheduled for US RENAL LIMITED  at [facility] MR on 11/7/2023 ARRIVAL OF 945AM FOR A 10AM SCAN. NO CARBONATED BEVERAGES; ARRIVE WELL HYDRATED WITH A FULL BLADDER.     Order mailed with instructions to the patient

## 2023-07-16 DIAGNOSIS — M19.071 OSTEOARTHRITIS OF BOTH FEET, UNSPECIFIED OSTEOARTHRITIS TYPE: ICD-10-CM

## 2023-07-16 DIAGNOSIS — M19.072 OSTEOARTHRITIS OF BOTH FEET, UNSPECIFIED OSTEOARTHRITIS TYPE: ICD-10-CM

## 2023-07-17 RX ORDER — HYDROXYCHLOROQUINE SULFATE 200 MG/1
TABLET, FILM COATED ORAL
Qty: 135 TABLET | Refills: 1 | Status: SHIPPED | OUTPATIENT
Start: 2023-07-17

## 2023-08-30 ENCOUNTER — OFFICE VISIT (OUTPATIENT)
Dept: PHYSICAL MEDICINE AND REHAB | Age: 83
End: 2023-08-30
Payer: MEDICARE

## 2023-08-30 VITALS
SYSTOLIC BLOOD PRESSURE: 134 MMHG | BODY MASS INDEX: 30.36 KG/M2 | WEIGHT: 165 LBS | HEIGHT: 62 IN | DIASTOLIC BLOOD PRESSURE: 64 MMHG

## 2023-08-30 DIAGNOSIS — M47.816 LUMBAR SPONDYLOSIS: Primary | ICD-10-CM

## 2023-08-30 DIAGNOSIS — M54.50 CHRONIC BILATERAL LOW BACK PAIN WITHOUT SCIATICA: ICD-10-CM

## 2023-08-30 DIAGNOSIS — G89.4 CHRONIC PAIN SYNDROME: ICD-10-CM

## 2023-08-30 DIAGNOSIS — G89.29 CHRONIC BILATERAL LOW BACK PAIN WITHOUT SCIATICA: ICD-10-CM

## 2023-08-30 DIAGNOSIS — Z98.890 HISTORY OF LUMBAR LAMINECTOMY: ICD-10-CM

## 2023-08-30 DIAGNOSIS — M41.9 SCOLIOSIS OF THORACOLUMBAR SPINE, UNSPECIFIED SCOLIOSIS TYPE: ICD-10-CM

## 2023-08-30 PROCEDURE — 1090F PRES/ABSN URINE INCON ASSESS: CPT | Performed by: NURSE PRACTITIONER

## 2023-08-30 PROCEDURE — 1036F TOBACCO NON-USER: CPT | Performed by: NURSE PRACTITIONER

## 2023-08-30 PROCEDURE — 3078F DIAST BP <80 MM HG: CPT | Performed by: NURSE PRACTITIONER

## 2023-08-30 PROCEDURE — 99214 OFFICE O/P EST MOD 30 MIN: CPT | Performed by: NURSE PRACTITIONER

## 2023-08-30 PROCEDURE — G8427 DOCREV CUR MEDS BY ELIG CLIN: HCPCS | Performed by: NURSE PRACTITIONER

## 2023-08-30 PROCEDURE — G8399 PT W/DXA RESULTS DOCUMENT: HCPCS | Performed by: NURSE PRACTITIONER

## 2023-08-30 PROCEDURE — 3075F SYST BP GE 130 - 139MM HG: CPT | Performed by: NURSE PRACTITIONER

## 2023-08-30 PROCEDURE — G8417 CALC BMI ABV UP PARAM F/U: HCPCS | Performed by: NURSE PRACTITIONER

## 2023-08-30 PROCEDURE — 1123F ACP DISCUSS/DSCN MKR DOCD: CPT | Performed by: NURSE PRACTITIONER

## 2023-08-30 ASSESSMENT — ENCOUNTER SYMPTOMS
BACK PAIN: 1
GASTROINTESTINAL NEGATIVE: 1
EYES NEGATIVE: 1
RESPIRATORY NEGATIVE: 1
COLOR CHANGE: 0

## 2023-08-30 NOTE — PROGRESS NOTES
[address]  [facility]  [address]"[facility]  Dept: [phone]  Dept Fax: [phone]: [phone]    Visit Date: 8/30/2023    Functionality Assessment/Goals Worksheet     On a scale of 0 (Does not Interfere) to 10 (Completely Interferes)     1. Which number describes how during the past week pain has interfered with       the following:  A. General Activity:  5  B. Mood: 5  C. Walking Ability:  4  D. Normal Work (Includes both work outside the home and housework):  7  E. Relations with Other People:   2  F. Sleep:   6  G. Enjoyment of Life:   0    2. Patient Prefers to Take their Pain Medications:     []  On a regular basis   [x]  Only when necessary    []  Does not take pain medications    3. What are the Patient's Goals/Expectations for Visiting Pain Management? []  Learn about my pain    [x]  Receive Medication   []  Physical Therapy     []  Treat Depression   [x]  Receive Injections    []  Treat Sleep   []  Deal with Anxiety and Stress   []  Treat Opoid Dependence/Addiction   []  Other:      HPI:   Renea Bedoya is a 82 y.o. female is here today for    Chief Complaint: right low back pain, SI pain     HPI   3 month FU. Continues to have pain in low back mainly right side- constant dull aching pain \"just a hurt\" worse in the morning and pain in bilateral buttocks/ SI pain- only bothersome here if sitting more than 15 minutes- aching. Still states she feels relief from right L-facet RFA but feels maybe it is slowly wearing off as she has ups and downs with activity. Pain minimal with resting. Denies any radicular pain. Only taking Norco prn very rarely and it downs help.  Still has 8.5 tabs from prescription of 30 tabs from refill in January   Pain increases with bending, lifting, twisting , walking, standing, getting up and down, and housework or working at job,, worse in the

## 2023-09-23 ENCOUNTER — PATIENT MESSAGE (OUTPATIENT)
Dept: FAMILY MEDICINE CLINIC | Age: 83
End: 2023-09-23

## 2023-09-25 ENCOUNTER — TELEPHONE (OUTPATIENT)
Dept: PHYSICAL MEDICINE AND REHAB | Age: 83
End: 2023-09-25

## 2023-09-25 ENCOUNTER — OFFICE VISIT (OUTPATIENT)
Dept: PHYSICAL MEDICINE AND REHAB | Age: 83
End: 2023-09-25
Payer: MEDICARE

## 2023-09-25 VITALS
SYSTOLIC BLOOD PRESSURE: 124 MMHG | BODY MASS INDEX: 30.36 KG/M2 | DIASTOLIC BLOOD PRESSURE: 68 MMHG | HEIGHT: 62 IN | WEIGHT: 165 LBS

## 2023-09-25 DIAGNOSIS — G89.4 CHRONIC PAIN SYNDROME: ICD-10-CM

## 2023-09-25 DIAGNOSIS — M54.50 CHRONIC BILATERAL LOW BACK PAIN WITHOUT SCIATICA: ICD-10-CM

## 2023-09-25 DIAGNOSIS — G89.29 CHRONIC BILATERAL LOW BACK PAIN WITHOUT SCIATICA: ICD-10-CM

## 2023-09-25 DIAGNOSIS — M41.9 SCOLIOSIS OF THORACOLUMBAR SPINE, UNSPECIFIED SCOLIOSIS TYPE: ICD-10-CM

## 2023-09-25 DIAGNOSIS — Z98.890 HISTORY OF LUMBAR LAMINECTOMY: ICD-10-CM

## 2023-09-25 DIAGNOSIS — M47.816 LUMBAR SPONDYLOSIS: Primary | ICD-10-CM

## 2023-09-25 PROCEDURE — G8427 DOCREV CUR MEDS BY ELIG CLIN: HCPCS | Performed by: NURSE PRACTITIONER

## 2023-09-25 PROCEDURE — G8417 CALC BMI ABV UP PARAM F/U: HCPCS | Performed by: NURSE PRACTITIONER

## 2023-09-25 PROCEDURE — 1090F PRES/ABSN URINE INCON ASSESS: CPT | Performed by: NURSE PRACTITIONER

## 2023-09-25 PROCEDURE — 3074F SYST BP LT 130 MM HG: CPT | Performed by: NURSE PRACTITIONER

## 2023-09-25 PROCEDURE — 1036F TOBACCO NON-USER: CPT | Performed by: NURSE PRACTITIONER

## 2023-09-25 PROCEDURE — 99214 OFFICE O/P EST MOD 30 MIN: CPT | Performed by: NURSE PRACTITIONER

## 2023-09-25 PROCEDURE — 3078F DIAST BP <80 MM HG: CPT | Performed by: NURSE PRACTITIONER

## 2023-09-25 PROCEDURE — G8399 PT W/DXA RESULTS DOCUMENT: HCPCS | Performed by: NURSE PRACTITIONER

## 2023-09-25 PROCEDURE — 1123F ACP DISCUSS/DSCN MKR DOCD: CPT | Performed by: NURSE PRACTITIONER

## 2023-09-25 ASSESSMENT — ENCOUNTER SYMPTOMS
GASTROINTESTINAL NEGATIVE: 1
BACK PAIN: 1
EYES NEGATIVE: 1
RESPIRATORY NEGATIVE: 1
COLOR CHANGE: 0

## 2023-09-25 NOTE — PROGRESS NOTES
[address] [facility]  Dept: [phone]  Dept Fax: [phone]: [phone]    Visit Date: 9/25/2023    Functionality Assessment/Goals Worksheet     On a scale of 0 (Does not Interfere) to 10 (Completely Interferes)     1. Which number describes how during the past week pain has interfered with       the following:  A. General Activity:  6  B. Mood: 5  C. Walking Ability:  8  D. Normal Work (Includes both work outside the home and housework):  5  E. Relations with Other People:   1  F. Sleep:   4  G. Enjoyment of Life:   2    2. Patient Prefers to Take their Pain Medications:     []  On a regular basis   [x]  Only when necessary    []  Does not take pain medications    3. What are the Patient's Goals/Expectations for Visiting Pain Management? []  Learn about my pain    [x]  Receive Medication   []  Physical Therapy     []  Treat Depression   [x]  Receive Injections    []  Treat Sleep   []  Deal with Anxiety and Stress   []  Treat Opoid Dependence/Addiction   []  Other:        HPI:   Boni Browne is a 82 y.o. female is here today for    Chief Complaint: Low back pain, SI pain     HPI   4 week FU patient changed appointment to earlier. Patient here with complaints of pain in low back starts in center and radiates across. Aching dull pain mainly but gets sharp pain as well. Mainly bothersome with activity and position changes and even sitting on hard seat. The patients pain is moderate to severe that causes functional deficit measured on a pain and disability scale. The patients Oswestry Disability Index is currently 58%. The patient reports worsening quality of life. Denies any radicular pain. Does get some spasms and cramping at times in legs mainly at night     Continues Norco prn rarely and helps when pain is increased.    Pain increases with bending,

## 2023-10-13 ENCOUNTER — HOSPITAL ENCOUNTER (EMERGENCY)
Age: 83
Discharge: HOME | End: 2023-10-13
Payer: MEDICARE

## 2023-10-13 VITALS
RESPIRATION RATE: 20 BRPM | SYSTOLIC BLOOD PRESSURE: 134 MMHG | OXYGEN SATURATION: 100 % | DIASTOLIC BLOOD PRESSURE: 70 MMHG | HEART RATE: 75 BPM | TEMPERATURE: 98.2 F

## 2023-10-13 DIAGNOSIS — S76.311A HAMSTRING STRAIN, RIGHT, INITIAL ENCOUNTER: Primary | ICD-10-CM

## 2023-10-13 PROCEDURE — 99213 OFFICE O/P EST LOW 20 MIN: CPT | Performed by: EMERGENCY MEDICINE

## 2023-10-13 PROCEDURE — 99213 OFFICE O/P EST LOW 20 MIN: CPT

## 2023-10-13 RX ORDER — TIZANIDINE 2 MG/1
2 TABLET ORAL 3 TIMES DAILY PRN
Qty: 30 TABLET | Refills: 0 | Status: SHIPPED | OUTPATIENT
Start: 2023-10-13 | End: 2023-10-16 | Stop reason: DRUGHIGH

## 2023-10-13 ASSESSMENT — ENCOUNTER SYMPTOMS
SHORTNESS OF BREATH: 0
COUGH: 0
BACK PAIN: 0

## 2023-10-13 ASSESSMENT — PAIN SCALES - GENERAL: PAINLEVEL_OUTOF10: 8

## 2023-10-13 NOTE — ED PROVIDER NOTES
[address]  Urgent Care Encounter       CHIEF COMPLAINT       Chief Complaint   Patient presents with    Muscle Pain     Right thigh        Nurses Notes reviewed and I agree except as noted in the HPI. HISTORY OF PRESENT ILLNESS   Gigi Cope is a 82 y.o. female who presents for complaints of right hamstring pain. Patient states she bent over to  something and felt a sudden pull in her right hamstring. She states she has taken Tylenol, applied heat and ice to the area with no benefit. She states her pain is not severe when she is relaxed but if she is standing or ambulating her pain is an 8 on a 10 scale. The patient did not fall. She does not have any posterior knee or calf pain. No back or hip pain. HPI    REVIEW OF SYSTEMS     Review of Systems   Constitutional:  Negative for activity change, fatigue and fever. Respiratory:  Negative for cough and shortness of breath. Musculoskeletal:  Positive for myalgias (right hamstring). Negative for arthralgias and back pain. PAST MEDICAL HISTORY         Diagnosis Date    Chronic low back pain     CKD (chronic kidney disease) stage 3, GFR 30-59 ml/min ([facility])     DDD (degenerative disc disease), lumbar     Dyslipidemia     History of skin cancer     BCC AND SCC    Hyperlipidemia     Hypertension     Macular degeneration, dry     Follow with ophthalmology     Osteoarthritis     Osteoporosis, post-menopausal     PONV (postoperative nausea and vomiting)        SURGICALHISTORY     Patient  has a past surgical history that includes joint replacement (Bilateral); Foot surgery (Bilateral); Hysterectomy;  section; Dilation and curettage of uterus; Carpal tunnel release (Bilateral); Finger surgery; skin biopsy; back surgery (); Colonoscopy; cyst removal (Left, 2017); Cholecystectomy, laparoscopic (10/03/2017); pr colonoscopy flx dx w/collj spec when pfrmd (N/A, 2018); Nerve Surgery (N/A, 2019);  Pain

## 2023-10-13 NOTE — DISCHARGE INSTRUCTIONS
Alternate ice and heat to the hamstring area to help with your discomfort    Take Tylenol 2 tablets every 8 hours as needed for pain    Take the tizanidine as directed as needed for muscle tightness/pain. This may make you mildly sleepy. Do not drive after taking this medication. Change positions slowly so that you do not become dizzy. If you are going to be driving, skip that dose    Follow-up with your family physician if there is no improvement of your symptoms in 3 days.   Return sooner for new or worsening symptoms

## 2023-10-16 ENCOUNTER — OFFICE VISIT (OUTPATIENT)
Dept: FAMILY MEDICINE CLINIC | Age: 83
End: 2023-10-16
Payer: MEDICARE

## 2023-10-16 VITALS
SYSTOLIC BLOOD PRESSURE: 132 MMHG | TEMPERATURE: 97.7 F | RESPIRATION RATE: 18 BRPM | OXYGEN SATURATION: 98 % | BODY MASS INDEX: 31.06 KG/M2 | HEIGHT: 62 IN | WEIGHT: 168.8 LBS | DIASTOLIC BLOOD PRESSURE: 72 MMHG | HEART RATE: 64 BPM

## 2023-10-16 DIAGNOSIS — S76.311A HAMSTRING STRAIN, RIGHT, INITIAL ENCOUNTER: Primary | ICD-10-CM

## 2023-10-16 PROCEDURE — 1123F ACP DISCUSS/DSCN MKR DOCD: CPT | Performed by: FAMILY MEDICINE

## 2023-10-16 PROCEDURE — G8427 DOCREV CUR MEDS BY ELIG CLIN: HCPCS | Performed by: FAMILY MEDICINE

## 2023-10-16 PROCEDURE — 3078F DIAST BP <80 MM HG: CPT | Performed by: FAMILY MEDICINE

## 2023-10-16 PROCEDURE — G8417 CALC BMI ABV UP PARAM F/U: HCPCS | Performed by: FAMILY MEDICINE

## 2023-10-16 PROCEDURE — 96372 THER/PROPH/DIAG INJ SC/IM: CPT | Performed by: FAMILY MEDICINE

## 2023-10-16 PROCEDURE — 1036F TOBACCO NON-USER: CPT | Performed by: FAMILY MEDICINE

## 2023-10-16 PROCEDURE — 3075F SYST BP GE 130 - 139MM HG: CPT | Performed by: FAMILY MEDICINE

## 2023-10-16 PROCEDURE — G8484 FLU IMMUNIZE NO ADMIN: HCPCS | Performed by: FAMILY MEDICINE

## 2023-10-16 PROCEDURE — G8399 PT W/DXA RESULTS DOCUMENT: HCPCS | Performed by: FAMILY MEDICINE

## 2023-10-16 PROCEDURE — 99213 OFFICE O/P EST LOW 20 MIN: CPT | Performed by: FAMILY MEDICINE

## 2023-10-16 PROCEDURE — 1090F PRES/ABSN URINE INCON ASSESS: CPT | Performed by: FAMILY MEDICINE

## 2023-10-16 RX ORDER — PREDNISONE 20 MG/1
40 TABLET ORAL DAILY
Qty: 10 TABLET | Refills: 0 | Status: SHIPPED | OUTPATIENT
Start: 2023-10-16 | End: 2023-10-21

## 2023-10-16 RX ORDER — KETOROLAC TROMETHAMINE 15 MG/ML
15 INJECTION, SOLUTION INTRAMUSCULAR; INTRAVENOUS ONCE
Status: DISCONTINUED | OUTPATIENT
Start: 2023-10-16 | End: 2023-10-16

## 2023-10-16 RX ORDER — KETOROLAC TROMETHAMINE 30 MG/ML
15 INJECTION, SOLUTION INTRAMUSCULAR; INTRAVENOUS ONCE
Status: COMPLETED | OUTPATIENT
Start: 2023-10-16 | End: 2023-10-16

## 2023-10-16 RX ORDER — TIZANIDINE 4 MG/1
4 TABLET ORAL EVERY 8 HOURS PRN
Qty: 30 TABLET | Refills: 0 | Status: SHIPPED | OUTPATIENT
Start: 2023-10-16

## 2023-10-16 RX ADMIN — KETOROLAC TROMETHAMINE 15 MG: 30 INJECTION, SOLUTION INTRAMUSCULAR; INTRAVENOUS at 11:41

## 2023-10-16 NOTE — PROGRESS NOTES
Chief Complaint   Patient presents with    Follow-up     Follow up from ED, pulled muscle on right back of groin      History obtained from the patient. SUBJECTIVE:  Pricila Davis is a 82 y.o. female that presents today for     -UC f/u:  Seen at the [facility] 10/13  Was bending over that day and pulled the muscle in her R posterior leg  In UC dx hamstring strain  Given 2mg zanaflex  Here for f/u  No better  No worse  No sig pain at rest  Worse with walking/bending  Heat helps  No leg weakness      Age/Gender Health Maintenance    Lipid -   Lab Results   Component Value Date    CHOL 228 (H) 04/28/2023    CHOL 211 (H) 04/25/2022    CHOL 263 (H) 10/28/2021     Lab Results   Component Value Date    TRIG 168 04/28/2023    TRIG 174 04/25/2022    TRIG 191 10/28/2021     Lab Results   Component Value Date    HDL 55 04/28/2023    HDL 56 04/25/2022    HDL 60 10/28/2021     Lab Results   Component Value Date    LDLCALC 139 04/28/2023    [facility] 120 04/25/2022    100 [facility] 165 10/28/2021       DM Screen -   Lab Results   Component Value Date/Time    GLUCOSE 99 05/26/2023 07:13 AM    GLUCOSE 99 05/03/2018 11:02 AM     No results found for: \"LABA1C\"      Colon Cancer Screening -NEG SEPT 2018, no repeat ([facility])  Lung Cancer Screening - never smoker    Tetanus - UTD July 2018  Influenza Vaccine - UTD FALL 2023  Pneumonia Vaccine - UTD PCV 13 OCT 2015. UTD PPV 23 APR 2018  Zostavax - Zostavax competed APR 2013   Shingrix - UTD x 2    Breast Cancer Screening - NEG OCT 2022  Cervical Cancer Screening - Aged out  Osteoporosis Screening - osteopenia AUG 2018/AUG 2020.  Osteoporosis MAY 2023  Reclast: dose # 1 6/13/2023      Current Outpatient Medications   Medication Sig Dispense Refill    tiZANidine (ZANAFLEX) 4 MG tablet Take 1 tablet by mouth every 8 hours as needed (hamstring pain) 30 tablet 0    predniSONE (DELTASONE) 20 MG tablet Take 2 tablets by mouth daily for 5 days 10 tablet 0    hydroxychloroquine (PLAQUENIL) 200 MG

## 2023-10-17 NOTE — DISCHARGE INSTRUCTIONS
ANESTHESIA INSTRUCTIONS FOLLOWING SURGERY        Since you may experience some intermittent light-headedness for the next several hours, we suggest you plan on bed rest or quiet relaxation this evening. You must have a friend or relative stay with you tonight. Because of the sedation you have received, it is recommended that you do not drive a motor vehicle, operate any kind of machinery, or sign any contractual agreement for 24 hours following the procedure. You should not take alcoholic beverages tonight and only take sleeping medication that has been specifically prescribed for you by your physician. Call office [phone] if you have:  Temperature greater than 100.4  Persistent nausea and vomiting  Severe uncontrolled pain  Redness, tenderness, or signs of infection (pain, swelling, redness, odor or green/yellow discharge around the site)  Difficulty breathing, headache or visual disturbances  Hives  Persistent dizziness or light-headedness  Extreme fatigue  Any other questions or concerns you may have after discharge    In an emergency, call 911 or go to an Emergency Department at a nearby hospital    It is important to bring a complete, current list of your medications to any medical appointments or hospitalizations. REMINDER:   Carry a list of your medications and allergies with you at all times  Call your pharmacy at least 1 week in advance to refill prescriptions    Diet: Resume your usual diet. Good nutrition promotes healing. Increase fluid intake. Activity: Rest for 24 hrs then resume normal activity. HOME MEDICATIONS:      If on blood thinning products such as; Aspirin, NSAIDS, Plavix, Coumadin, Xarelto, Fish Oil, Multi-Vitamins or Herbal Supplements restart in 24 hours      Restart Metformin in 48 hours if you had procedure with dye. Restart Metformin in 24 hours if no dye used during procedure.         Education Materials Received: {yes/no:604261}  Belongings Returned:

## 2023-10-23 ENCOUNTER — APPOINTMENT (OUTPATIENT)
Dept: GENERAL RADIOLOGY | Age: 83
End: 2023-10-23
Attending: PAIN MEDICINE
Payer: MEDICARE

## 2023-10-23 ENCOUNTER — HOSPITAL ENCOUNTER (OUTPATIENT)
Age: 83
Setting detail: OUTPATIENT SURGERY
Discharge: HOME | End: 2023-10-23
Attending: PAIN MEDICINE | Admitting: PAIN MEDICINE
Payer: MEDICARE

## 2023-10-23 ENCOUNTER — ANESTHESIA (OUTPATIENT)
Dept: OPERATING ROOM | Age: 83
End: 2023-10-23
Payer: MEDICARE

## 2023-10-23 ENCOUNTER — ANESTHESIA EVENT (OUTPATIENT)
Dept: OPERATING ROOM | Age: 83
End: 2023-10-23
Payer: MEDICARE

## 2023-10-23 VITALS
RESPIRATION RATE: 16 BRPM | OXYGEN SATURATION: 94 % | SYSTOLIC BLOOD PRESSURE: 145 MMHG | DIASTOLIC BLOOD PRESSURE: 67 MMHG | HEIGHT: 64 IN | BODY MASS INDEX: 27.83 KG/M2 | WEIGHT: 163 LBS | TEMPERATURE: 97.4 F | HEART RATE: 54 BPM

## 2023-10-23 PROCEDURE — 7100000010 HC PHASE II RECOVERY - FIRST 15 MIN: Performed by: PAIN MEDICINE

## 2023-10-23 PROCEDURE — 6360000002 HC RX W HCPCS: Performed by: PAIN MEDICINE

## 2023-10-23 PROCEDURE — 64635 DESTROY LUMB/SAC FACET JNT: CPT | Performed by: PAIN MEDICINE

## 2023-10-23 PROCEDURE — 3600000057 HC PAIN LEVEL 4 ADDL 15 MIN: Performed by: PAIN MEDICINE

## 2023-10-23 PROCEDURE — 2500000003 HC RX 250 WO HCPCS: Performed by: PAIN MEDICINE

## 2023-10-23 PROCEDURE — 3700000001 HC ADD 15 MINUTES (ANESTHESIA): Performed by: PAIN MEDICINE

## 2023-10-23 PROCEDURE — 6360000002 HC RX W HCPCS: Performed by: NURSE ANESTHETIST, CERTIFIED REGISTERED

## 2023-10-23 PROCEDURE — 2500000003 HC RX 250 WO HCPCS: Performed by: NURSE ANESTHETIST, CERTIFIED REGISTERED

## 2023-10-23 PROCEDURE — 7100000011 HC PHASE II RECOVERY - ADDTL 15 MIN: Performed by: PAIN MEDICINE

## 2023-10-23 PROCEDURE — 3600000056 HC PAIN LEVEL 4 BASE: Performed by: PAIN MEDICINE

## 2023-10-23 PROCEDURE — 3700000000 HC ANESTHESIA ATTENDED CARE: Performed by: PAIN MEDICINE

## 2023-10-23 PROCEDURE — 2709999900 HC NON-CHARGEABLE SUPPLY: Performed by: PAIN MEDICINE

## 2023-10-23 PROCEDURE — 64636 DESTROY L/S FACET JNT ADDL: CPT | Performed by: PAIN MEDICINE

## 2023-10-23 RX ORDER — BUPIVACAINE HYDROCHLORIDE 2.5 MG/ML
INJECTION, SOLUTION EPIDURAL; INFILTRATION; INTRACAUDAL PRN
Status: DISCONTINUED | OUTPATIENT
Start: 2023-10-23 | End: 2023-10-23 | Stop reason: ALTCHOICE

## 2023-10-23 RX ORDER — LIDOCAINE HYDROCHLORIDE 20 MG/ML
INJECTION, SOLUTION EPIDURAL; INFILTRATION; INTRACAUDAL; PERINEURAL PRN
Status: DISCONTINUED | OUTPATIENT
Start: 2023-10-23 | End: 2023-10-23 | Stop reason: SDUPTHER

## 2023-10-23 RX ORDER — PROPOFOL 10 MG/ML
INJECTION, EMULSION INTRAVENOUS PRN
Status: DISCONTINUED | OUTPATIENT
Start: 2023-10-23 | End: 2023-10-23 | Stop reason: SDUPTHER

## 2023-10-23 RX ORDER — LIDOCAINE HYDROCHLORIDE 10 MG/ML
INJECTION, SOLUTION EPIDURAL; INFILTRATION; INTRACAUDAL; PERINEURAL PRN
Status: DISCONTINUED | OUTPATIENT
Start: 2023-10-23 | End: 2023-10-23 | Stop reason: ALTCHOICE

## 2023-10-23 RX ORDER — FENTANYL CITRATE 50 UG/ML
INJECTION, SOLUTION INTRAMUSCULAR; INTRAVENOUS PRN
Status: DISCONTINUED | OUTPATIENT
Start: 2023-10-23 | End: 2023-10-23 | Stop reason: SDUPTHER

## 2023-10-23 RX ADMIN — FENTANYL CITRATE 50 MCG: 50 INJECTION, SOLUTION INTRAMUSCULAR; INTRAVENOUS at 08:33

## 2023-10-23 RX ADMIN — FENTANYL CITRATE 50 MCG: 50 INJECTION, SOLUTION INTRAMUSCULAR; INTRAVENOUS at 08:32

## 2023-10-23 RX ADMIN — LIDOCAINE HYDROCHLORIDE 50 MG: 20 INJECTION, SOLUTION EPIDURAL; INFILTRATION; INTRACAUDAL; PERINEURAL at 08:43

## 2023-10-23 RX ADMIN — PROPOFOL 50 MG: 10 INJECTION, EMULSION INTRAVENOUS at 08:46

## 2023-10-23 RX ADMIN — PROPOFOL 20 MG: 10 INJECTION, EMULSION INTRAVENOUS at 08:48

## 2023-10-23 ASSESSMENT — PAIN - FUNCTIONAL ASSESSMENT: PAIN_FUNCTIONAL_ASSESSMENT: 0-10

## 2023-10-23 NOTE — H&P
Lumbar back: Tenderness and bony tenderness present. Decreased range of motion. Negative right straight leg raise test and negative left straight leg raise test. Scoliosis present. Back:    Skin:     General: Skin is warm. Coloration: Skin is not pale. Findings: No bruising, erythema or rash. Neurological:      Mental Status: She is alert and oriented to person, place, and time. She is not disoriented. Cranial Nerves: No cranial nerve deficit. Sensory: No sensory deficit. Motor: Weakness present. No atrophy or abnormal muscle tone. Coordination: Coordination normal.      Gait: Gait abnormal.      Deep Tendon Reflexes: Babinski sign absent on the right side. Babinski sign absent on the left side. Reflex Scores:       Achilles reflexes are 1+ on the right side and 1+ on the left side. Comments: Gait-uses walking stick   Psychiatric:         Attention and Perception: Attention normal. She is attentive. Mood and Affect: Mood normal. Mood is not anxious or depressed. Affect is not labile, blunt, angry or inappropriate. Speech: Speech normal. She is communicative. Speech is not rapid and pressured, delayed, slurred or tangential.         Behavior: Behavior is not agitated, slowed, aggressive, withdrawn, hyperactive or combative. Behavior is cooperative. Thought Content: Thought content normal. Thought content is not paranoid or delusional. Thought content does not include homicidal or suicidal ideation. Thought content does not include homicidal or suicidal plan. Cognition and Memory: Cognition normal. Memory is not impaired. She does not exhibit impaired recent memory or impaired remote memory. Judgment: Judgment normal. Judgment is not impulsive or inappropriate. ALCIDES  Patricks test  positive  Yeoman's  or Gaenslen's positive     Assessment:      1. Lumbar spondylosis    2.  Chronic bilateral low back pain without

## 2023-10-23 NOTE — PATIENT INSTRUCTIONS
A Healthy Heart: Care Instructions  Your Care Instructions     Coronary artery disease, also called heart disease, occurs when a substance called plaque builds up in the vessels that supply oxygen-rich blood to your heart muscle. This can narrow the blood vessels and reduce blood flow. A heart attack happens when blood flow is completely blocked. A high-fat diet, smoking, and other factors increase the risk of heart disease. Your doctor has found that you have a chance of having heart disease. You can do lots of things to keep your heart healthy. It may not be easy, but you can change your diet, exercise more, and quit smoking. These steps really work to lower your chance of heart disease. Follow-up care is a key part of your treatment and safety. Be sure to make and go to all appointments, and call your doctor if you are having problems. It's also a good idea to know your test results and keep a list of the medicines you take. How can you care for yourself at home? Diet    Use less salt when you cook and eat. This helps lower your blood pressure. Taste food before salting. Add only a little salt when you think you need it. With time, your taste buds will adjust to less salt.     Eat fewer snack items, fast foods, canned soups, and other high-salt, high-fat, processed foods.     Read food labels and try to avoid saturated and trans fats. They increase your risk of heart disease by raising cholesterol levels.     Limit the amount of solid fat-butter, margarine, and shortening-you eat. Use olive, peanut, or canola oil when you cook. Bake, broil, and steam foods instead of frying them.     Eat a variety of fruit and vegetables every day. Dark green, deep orange, red, or yellow fruits and vegetables are especially good for you. Examples include spinach, carrots, peaches, and berries.     Foods high in fiber can reduce your cholesterol and provide important vitamins and minerals.  High-fiber foods include temporal

## 2023-10-23 NOTE — PROGRESS NOTES
2780 Patient arrives to recovery room via cart. Spontaneous respirations, vss, report received from surgical RN. Patient denies pain, numbness, tingling , nausea. Injection site clean, dry, intact. HOB elevated. IV capped. Snack and drink given. Call light within reach. 0915 IV discontinued.  Up to dress self  0930 Discharge to home in stable ambulatory condition with [facility]

## 2023-10-26 ENCOUNTER — TELEPHONE (OUTPATIENT)
Dept: FAMILY MEDICINE CLINIC | Age: 83
End: 2023-10-26

## 2023-10-26 DIAGNOSIS — N18.31 STAGE 3A CHRONIC KIDNEY DISEASE (HCC): Primary | Chronic | ICD-10-CM

## 2023-10-26 NOTE — TELEPHONE ENCOUNTER
Please let pt know that she is due for 6 month labs to monitor renal fxn. Next few wks is fine. Fasting    Let me know if questions, thanks! Diagnosis Orders   1.  Stage 3a chronic kidney disease ([address])  Basic Metabolic Panel    Microalbumin / Creatinine Urine Ratio        Future Appointments   Date Time Provider [address]   11/7/2023 10:00 AM STR ULTRASOUND RM 2 [facility] US STR Rad/Card   11/14/2023 12:15 PM Edith Lorenzo, APRN - CNP N SRPX Pain [facility] - [place] Mu[facility]oz   11/14/2023  1:00 PM CopusNayely Uro [facility] - [place] Mu[facility]oz   5/8/2024  8:00 AM Yessenia Sotomayor Rheum [facility] - [place] [facility]oz   5/15/2024  9:40 AM Akira Hicks DO Fam Med [address]

## 2023-11-07 ENCOUNTER — HOSPITAL ENCOUNTER (OUTPATIENT)
Dept: ULTRASOUND IMAGING | Age: 83
Discharge: HOME | End: 2023-11-07
Payer: MEDICARE

## 2023-11-07 DIAGNOSIS — N13.30 HYDRONEPHROSIS OF RIGHT KIDNEY: ICD-10-CM

## 2023-11-07 PROCEDURE — 76770 US EXAM ABDO BACK WALL COMP: CPT

## 2023-11-08 ENCOUNTER — TELEPHONE (OUTPATIENT)
Dept: FAMILY MEDICINE CLINIC | Age: 83
End: 2023-11-08

## 2023-11-08 DIAGNOSIS — S76.311A HAMSTRING STRAIN, RIGHT, INITIAL ENCOUNTER: Primary | ICD-10-CM

## 2023-11-08 NOTE — TELEPHONE ENCOUNTER
Per HIPAA, left detailed message with the recommendations of Dr. Chele Carrington.   Pt was asked to call the office with her preference for PT.

## 2023-11-08 NOTE — TELEPHONE ENCOUNTER
Pt called stating her leg pain is not getting better with the medication and exercises. Pt is unable to sit for more than 5 to 10 minutes. Pt is asking for recommendations.

## 2023-11-08 NOTE — TELEPHONE ENCOUNTER
Recommend PT    They can do some different modalities that would be helpful  Preference on where she'd go? Let me know, thanks!

## 2023-11-14 ENCOUNTER — OFFICE VISIT (OUTPATIENT)
Dept: PHYSICAL MEDICINE AND REHAB | Age: 83
End: 2023-11-14
Payer: MEDICARE

## 2023-11-14 ENCOUNTER — OFFICE VISIT (OUTPATIENT)
Dept: UROLOGY | Age: 83
End: 2023-11-14
Payer: MEDICARE

## 2023-11-14 VITALS
HEIGHT: 64 IN | SYSTOLIC BLOOD PRESSURE: 116 MMHG | DIASTOLIC BLOOD PRESSURE: 74 MMHG | BODY MASS INDEX: 27.81 KG/M2 | WEIGHT: 162.92 LBS

## 2023-11-14 VITALS
SYSTOLIC BLOOD PRESSURE: 134 MMHG | BODY MASS INDEX: 27.66 KG/M2 | WEIGHT: 162 LBS | HEIGHT: 64 IN | DIASTOLIC BLOOD PRESSURE: 78 MMHG

## 2023-11-14 DIAGNOSIS — G89.29 CHRONIC BILATERAL LOW BACK PAIN WITHOUT SCIATICA: ICD-10-CM

## 2023-11-14 DIAGNOSIS — G89.4 CHRONIC PAIN SYNDROME: ICD-10-CM

## 2023-11-14 DIAGNOSIS — M47.816 LUMBAR SPONDYLOSIS: Primary | ICD-10-CM

## 2023-11-14 DIAGNOSIS — Z98.890 HISTORY OF LUMBAR LAMINECTOMY: ICD-10-CM

## 2023-11-14 DIAGNOSIS — M41.9 SCOLIOSIS OF THORACOLUMBAR SPINE, UNSPECIFIED SCOLIOSIS TYPE: ICD-10-CM

## 2023-11-14 DIAGNOSIS — N13.30 HYDRONEPHROSIS OF RIGHT KIDNEY: Primary | ICD-10-CM

## 2023-11-14 DIAGNOSIS — M53.3 SI (SACROILIAC) PAIN: ICD-10-CM

## 2023-11-14 DIAGNOSIS — M54.50 CHRONIC BILATERAL LOW BACK PAIN WITHOUT SCIATICA: ICD-10-CM

## 2023-11-14 DIAGNOSIS — N28.1 RENAL CYST, ACQUIRED, RIGHT: ICD-10-CM

## 2023-11-14 PROCEDURE — G8427 DOCREV CUR MEDS BY ELIG CLIN: HCPCS | Performed by: NURSE PRACTITIONER

## 2023-11-14 PROCEDURE — 1123F ACP DISCUSS/DSCN MKR DOCD: CPT

## 2023-11-14 PROCEDURE — 3074F SYST BP LT 130 MM HG: CPT

## 2023-11-14 PROCEDURE — G8399 PT W/DXA RESULTS DOCUMENT: HCPCS

## 2023-11-14 PROCEDURE — 1123F ACP DISCUSS/DSCN MKR DOCD: CPT | Performed by: NURSE PRACTITIONER

## 2023-11-14 PROCEDURE — 3078F DIAST BP <80 MM HG: CPT

## 2023-11-14 PROCEDURE — G8399 PT W/DXA RESULTS DOCUMENT: HCPCS | Performed by: NURSE PRACTITIONER

## 2023-11-14 PROCEDURE — G8484 FLU IMMUNIZE NO ADMIN: HCPCS | Performed by: NURSE PRACTITIONER

## 2023-11-14 PROCEDURE — G8417 CALC BMI ABV UP PARAM F/U: HCPCS | Performed by: NURSE PRACTITIONER

## 2023-11-14 PROCEDURE — 3074F SYST BP LT 130 MM HG: CPT | Performed by: NURSE PRACTITIONER

## 2023-11-14 PROCEDURE — 99214 OFFICE O/P EST MOD 30 MIN: CPT | Performed by: NURSE PRACTITIONER

## 2023-11-14 PROCEDURE — 3078F DIAST BP <80 MM HG: CPT | Performed by: NURSE PRACTITIONER

## 2023-11-14 PROCEDURE — 1036F TOBACCO NON-USER: CPT

## 2023-11-14 PROCEDURE — 1036F TOBACCO NON-USER: CPT | Performed by: NURSE PRACTITIONER

## 2023-11-14 PROCEDURE — 1090F PRES/ABSN URINE INCON ASSESS: CPT

## 2023-11-14 PROCEDURE — 99212 OFFICE O/P EST SF 10 MIN: CPT

## 2023-11-14 PROCEDURE — G8428 CUR MEDS NOT DOCUMENT: HCPCS

## 2023-11-14 PROCEDURE — 1090F PRES/ABSN URINE INCON ASSESS: CPT | Performed by: NURSE PRACTITIONER

## 2023-11-14 PROCEDURE — G8417 CALC BMI ABV UP PARAM F/U: HCPCS

## 2023-11-14 PROCEDURE — G8484 FLU IMMUNIZE NO ADMIN: HCPCS

## 2023-11-14 RX ORDER — HYDROCODONE BITARTRATE AND ACETAMINOPHEN 5; 325 MG/1; MG/1
.5-1 TABLET ORAL DAILY PRN
Qty: 30 TABLET | Refills: 0 | Status: SHIPPED | OUTPATIENT
Start: 2023-11-14 | End: 2023-12-14

## 2023-11-14 ASSESSMENT — ENCOUNTER SYMPTOMS
RESPIRATORY NEGATIVE: 1
GASTROINTESTINAL NEGATIVE: 1
BACK PAIN: 1
EYES NEGATIVE: 1
COLOR CHANGE: 0

## 2023-11-14 NOTE — PROGRESS NOTES
[address] [facility]  [address] [place] [id number]  Dept: [phone]  Loc: [phone]  Visit Date: 11/14/2023      HPI:   Juancarlos Ahmadi is a 82 y.o. female with past medical history as listed below who presents today in follow-up for right hydronephrosis. Right-sided pelviectasis RESOLVED. Stable right renal cyst measuring 0.8 cm. Cheryle Record denies flank pain or difficulty with urination. Patient without symptoms of UTI. Denies urinary urgency, frequency, or NELSON. Overall, patient is doing very well. No ongoing URO complaints. History of kidney stones in the past. Patient adhering to preventative measures. Drinking > 80 oz of water. Current Outpatient Medications   Medication Sig Dispense Refill    HYDROcodone-acetaminophen (NORCO) 5-325 MG per tablet Take 0.5-1 tablets by mouth daily as needed for Pain for up to 30 days. Take lowest dose possible to manage pain Max Daily Amount: 1 tablet 30 tablet 0    tiZANidine (ZANAFLEX) 4 MG tablet Take 1 tablet by mouth every 8 hours as needed (hamstring pain) 30 tablet 0    hydroxychloroquine (PLAQUENIL) 200 MG tablet TAKE 1 AND 1/2 TABLETS BY MOUTH DAILY 135 tablet 1    zoledronic acid (RECLAST) 5 MG/100ML SOLN Infuse 100 mLs intravenously once for 1 dose 100 mL 0    HYDROcodone-acetaminophen (NORCO) 5-325 MG per tablet Take 1 tablet by mouth every 6 hours as needed for Pain.       Handicap Placard MISC by Does not apply route Expires 03 MAY 2028 1 each 0    Melatonin 10-10 MG TBCR Take by mouth      omeprazole (PRILOSEC) 20 MG delayed release capsule Take 1 capsule by mouth daily 90 capsule 3    lisinopril-hydroCHLOROthiazide (PRINZIDE;ZESTORETIC) 10-12.5 MG per tablet TAKE 1 TABLET BY MOUTH DAILY 90 tablet 3    acetaminophen (TYLENOL) 650 MG extended release tablet Take 1 tablet by mouth every 8 hours as needed for Pain      vitamin C (ASCORBIC ACID) 500 MG tablet Take 1 tablet by mouth daily

## 2023-11-14 NOTE — PROGRESS NOTES
abnormal.      Deep Tendon Reflexes: Babinski sign absent on the right side. Babinski sign absent on the left side. Reflex Scores:       Achilles reflexes are 1+ on the right side and 1+ on the left side. Comments: Gait-uses walking stick   Psychiatric:         Attention and Perception: Attention normal. She is attentive. Mood and Affect: Mood normal. Mood is not anxious or depressed. Affect is not labile, blunt, angry or inappropriate. Speech: Speech normal. She is communicative. Speech is not rapid and pressured, delayed, slurred or tangential.         Behavior: Behavior is not agitated, slowed, aggressive, withdrawn, hyperactive or combative. Behavior is cooperative. Thought Content: Thought content normal. Thought content is not paranoid or delusional. Thought content does not include homicidal or suicidal ideation. Thought content does not include homicidal or suicidal plan. Cognition and Memory: Cognition normal. Memory is not impaired. She does not exhibit impaired recent memory or impaired remote memory. Judgment: Judgment normal. Judgment is not impulsive or inappropriate. ALCIDES  Patricks test  positive  Yeoman's  or Gaenslen's positive         Assessment:     1. Lumbar spondylosis    2. Chronic bilateral low back pain without sciatica    3. History of lumbar laminectomy    4. Chronic pain syndrome    5. SI (sacroiliac) pain    6. Scoliosis of thoracolumbar spine, unspecified scoliosis type            Plan:      OARRS reviewed. Current MED: 5.00  Patient was offered naloxone for home. Discussed long term side effects of medications, tolerance, dependency and addiction. Previous UDS reviewed  UDS preformed today for compliance. Patient told can not receive any pain medications from any other source. No evidence of abuse, diversion or aberrant behavior.   Medications and/or procedures to improve function and quality of life- patient understanding with
Nursing

## 2023-11-27 ENCOUNTER — HOSPITAL ENCOUNTER (OUTPATIENT)
Dept: PHYSICAL THERAPY | Age: 83
Setting detail: THERAPIES SERIES
Discharge: HOME | End: 2023-11-27
Payer: MEDICARE

## 2023-11-27 PROCEDURE — 97110 THERAPEUTIC EXERCISES: CPT

## 2023-11-27 PROCEDURE — 97162 PT EVAL MOD COMPLEX 30 MIN: CPT

## 2023-11-27 NOTE — PROGRESS NOTES
** PLEASE SIGN, DATE AND TIME CERTIFICATION BELOW AND RETURN TO [facility] OUTPATIENT REHABILITATION (FAX #: [phone]). ATTEST/CO-SIGN IF ACCESSING VIA [employer]. THANK YOU.**    I certify that I have examined the patient below and determined that Physical Medicine and Rehabilitation service is necessary and that I approve the established plan of care for up to 90 days or as specifically noted. Attestation, signature or co-signature of physician indicates approval of certification requirements.    ________________________ ____________ __________  Physician Signature   Date   Time      [facility]  PHYSICAL THERAPY  [x] EVALUATION  [] DAILY NOTE (LAND) [] DAILY NOTE (AQUATIC ) [] PROGRESS NOTE [] DISCHARGE NOTE    [x] [address] [facility] [address] [place]   [] [address] [facility]    [] [address] [facility]   [] David Cartwright    Date: 2023  Patient Name:  Nieves Mondragon  : 1940  MRN: [id number]  CSN: [id number]    Referring Practitioner Camila Sierra DO   Diagnosis Strain of muscle, fascia and tendon of the posterior muscle group at thigh level, right thigh, initial encounter [S92.950P]    Treatment Diagnosis Right hamstring muscle strain. Deconditioning. Date of Evaluation 23    Additional Pertinent History HTN.  DDD.  LBP.  CKD. Osteoporosis. Functional Outcome Measure Used LEFS   Functional Outcome Score 45 (23)       Insurance: Primary: Payor: MEDICARE /  /  / ,   Secondary: [facility]   Authorization Information: PRECERTIFICATION REQUIRED:  No  INSURANCE THERAPY BENEFIT:  Patient has unlimited visits based on medical necessity  Benefit will not cover maintenance or preventative treatment.   AQUATIC THERAPY COVERED:   Yes  MODALITIES COVERED:  Yes, with the exception of iontophoresis and hot packs/cold packs  TELEHEALTH COVERED: Yes  REFERENCE #: NA   Approved Procedure Codes: 31076 - Therapeutic Exercise  , 41619 -

## 2023-12-01 ENCOUNTER — HOSPITAL ENCOUNTER (OUTPATIENT)
Dept: PHYSICAL THERAPY | Age: 83
Setting detail: THERAPIES SERIES
Discharge: HOME | End: 2023-12-01
Payer: MEDICARE

## 2023-12-01 PROCEDURE — 97110 THERAPEUTIC EXERCISES: CPT

## 2023-12-01 NOTE — PROGRESS NOTES
[address] [facility]  PHYSICAL THERAPY  [] EVALUATION  [x] DAILY NOTE (LAND) [] DAILY NOTE (AQUATIC ) [] PROGRESS NOTE [] DISCHARGE NOTE    [x] [facility] [place]   [] [address] [place] [address]    [] [facility]   [] [facility] Duty    Date: 2023  Patient Name:  Wendy Bosch  : 1940  MRN: [id number]  CSN: [id number]    Referring Practitioner Evelyn Moreno DO   Diagnosis Strain of muscle, fascia and tendon of the posterior muscle group at thigh level, right thigh, initial encounter [P01.459C]    Treatment Diagnosis Right hamstring muscle strain. Deconditioning. Date of Evaluation 23    Additional Pertinent History HTN.  DDD.  LBP.  CKD. Osteoporosis. Functional Outcome Measure Used LEFS   Functional Outcome Score 45 (23)       Insurance: Primary: Payor: MEDICARE /  /  / ,   Secondary: [facility]   Authorization Information: PRECERTIFICATION REQUIRED:  No  INSURANCE THERAPY BENEFIT:  Patient has unlimited visits based on medical necessity  Benefit will not cover maintenance or preventative treatment. AQUATIC THERAPY COVERED:   Yes  MODALITIES COVERED:  Yes, with the exception of iontophoresis and hot packs/cold packs  TELEHEALTH COVERED: Yes  REFERENCE #: NA   Approved Procedure Codes: 93724 - Therapeutic Exercise  , 93748 - Manual Therapy , 07911 - Aquatic Therapeutic Exercise, 87232 - Neuromuscular Re-Education , 41016 - Gait Training, 63144 - Therapeutic Activities, 68188 - Canalith Repositioning Procedure, and [id number] - Massage Therapeutic   Visit # 2, 2/10 for progress note   Visits Allowed: Unlimited per medical necessity   Recertification Date:    Physician Follow-Up: Per patient   Physician Orders:    History of Present Illness: Zahra Christianson presents using a stand-alone cane (virtually a single point cane). Denies falls in the past year. Current right thigh is 4/10.   State she is having this pain in right

## 2023-12-04 ENCOUNTER — HOSPITAL ENCOUNTER (OUTPATIENT)
Dept: PHYSICAL THERAPY | Age: 83
Setting detail: THERAPIES SERIES
Discharge: HOME | End: 2023-12-04
Payer: MEDICARE

## 2023-12-04 PROCEDURE — 97110 THERAPEUTIC EXERCISES: CPT

## 2023-12-06 ENCOUNTER — HOSPITAL ENCOUNTER (OUTPATIENT)
Dept: PHYSICAL THERAPY | Age: 83
Setting detail: THERAPIES SERIES
Discharge: HOME | End: 2023-12-06
Payer: MEDICARE

## 2023-12-06 PROCEDURE — 97110 THERAPEUTIC EXERCISES: CPT

## 2023-12-06 NOTE — PROGRESS NOTES
[facility]  PHYSICAL THERAPY  [] EVALUATION  [x] DAILY NOTE (LAND) [] DAILY NOTE (AQUATIC ) [] PROGRESS NOTE [] DISCHARGE NOTE    [x] [facility] [place]   [] [address] [place] [address]    [] [facility]   [] [facility]    Date: 2023  Patient Name:  Julienne Yates  : 1940  MRN: [id number]  CSN: [id number]    Referring Practitioner Maurilio Xiao DO   Diagnosis Strain of muscle, fascia and tendon of the posterior muscle group at thigh level, right thigh, initial encounter [Y20.510H]    Treatment Diagnosis Right hamstring muscle strain. Deconditioning. Date of Evaluation 23    Additional Pertinent History HTN.  DDD.  LBP.  CKD. Osteoporosis. Functional Outcome Measure Used LEFS   Functional Outcome Score 45 (23)       Insurance: Primary: Payor: MEDICARE /  /  / ,   Secondary: [facility]   Authorization Information: PRECERTIFICATION REQUIRED:  No  INSURANCE THERAPY BENEFIT:  Patient has unlimited visits based on medical necessity  Benefit will not cover maintenance or preventative treatment. AQUATIC THERAPY COVERED:   Yes  MODALITIES COVERED:  Yes, with the exception of iontophoresis and hot packs/cold packs  TELEHEALTH COVERED: Yes  REFERENCE #: NA   Approved Procedure Codes: 23516 - Therapeutic Exercise  , 23990 - Manual Therapy , 84648 - Aquatic Therapeutic Exercise, 13573 - Neuromuscular Re-Education , 32835 - Gait Training, 82524 - Therapeutic Activities, 99015 - Canalith Repositioning Procedure, and [id number] - Massage Therapeutic   Visit # 4, 4/10 for progress note   Visits Allowed: Unlimited per medical necessity   Recertification Date:    Physician Follow-Up: Per patient   Physician Orders:    History of Present Illness: Tad Milner presents using a stand-alone cane (virtually a single point cane). Denies falls in the past year. Current right thigh pain is 4/10.   State she is having this pain in

## 2023-12-12 ENCOUNTER — HOSPITAL ENCOUNTER (OUTPATIENT)
Dept: PHYSICAL THERAPY | Age: 83
Setting detail: THERAPIES SERIES
Discharge: HOME | End: 2023-12-12
Payer: MEDICARE

## 2023-12-12 PROCEDURE — 97110 THERAPEUTIC EXERCISES: CPT

## 2023-12-12 PROCEDURE — 97140 MANUAL THERAPY 1/> REGIONS: CPT

## 2023-12-12 NOTE — PROGRESS NOTES
[facility]  PHYSICAL THERAPY  [] EVALUATION  [x] DAILY NOTE (LAND) [] DAILY NOTE (AQUATIC ) [] PROGRESS NOTE [] DISCHARGE NOTE    [x] [facility] [place]   [] [address] [place] [address]    [] [facility]   [] [facility]    Date: 2023  Patient Name:  Abhijit Ch  : 1940  MRN: [id number]  CSN: [id number]    Referring Practitioner Miguelito [place] DO   Diagnosis Strain of muscle, fascia and tendon of the posterior muscle group at thigh level, right thigh, initial encounter [T00.711M]    Treatment Diagnosis Right hamstring muscle strain. Deconditioning. Date of Evaluation 23    Additional Pertinent History HTN.  DDD.  LBP.  CKD. Osteoporosis. Functional Outcome Measure Used LEFS   Functional Outcome Score 45 (23)       Insurance: Primary: Payor: MEDICARE /  /  / ,   Secondary: [facility]   Authorization Information: PRECERTIFICATION REQUIRED:  No  INSURANCE THERAPY BENEFIT:  Patient has unlimited visits based on medical necessity  Benefit will not cover maintenance or preventative treatment. AQUATIC THERAPY COVERED:   Yes  MODALITIES COVERED:  Yes, with the exception of iontophoresis and hot packs/cold packs  TELEHEALTH COVERED: Yes  REFERENCE #: NA   Approved Procedure Codes: 92229 - Therapeutic Exercise  , 44761 - Manual Therapy , 52824 - Aquatic Therapeutic Exercise, 71622 - Neuromuscular Re-Education , 18089 - Gait Training, 94663 - Therapeutic Activities, 75037 - Canalith Repositioning Procedure, and [id number] - Massage Therapeutic   Visit # 5, 5/10 for progress note   Visits Allowed: Unlimited per medical necessity   Recertification Date:    Physician Follow-Up: Per patient   Physician Orders:    History of Present Illness: Rosemarie Valdes presents using a stand-alone cane (virtually a single point cane). Denies falls in the past year. Current right thigh pain is 4/10.   State she is having this pain in

## 2023-12-13 DIAGNOSIS — M19.072 OSTEOARTHRITIS OF BOTH FEET, UNSPECIFIED OSTEOARTHRITIS TYPE: ICD-10-CM

## 2023-12-13 DIAGNOSIS — I10 ESSENTIAL HYPERTENSION: Chronic | ICD-10-CM

## 2023-12-13 DIAGNOSIS — M19.071 OSTEOARTHRITIS OF BOTH FEET, UNSPECIFIED OSTEOARTHRITIS TYPE: ICD-10-CM

## 2023-12-13 RX ORDER — OMEPRAZOLE 20 MG/1
20 CAPSULE, DELAYED RELEASE ORAL DAILY
Qty: 90 CAPSULE | Refills: 3 | OUTPATIENT
Start: 2023-12-13

## 2023-12-13 RX ORDER — LISINOPRIL AND HYDROCHLOROTHIAZIDE 12.5; 1 MG/1; MG/1
TABLET ORAL
Qty: 90 TABLET | Refills: 3 | Status: SHIPPED | OUTPATIENT
Start: 2023-12-13

## 2023-12-13 RX ORDER — OMEPRAZOLE 20 MG/1
20 CAPSULE, DELAYED RELEASE ORAL DAILY
Qty: 90 CAPSULE | Refills: 3 | Status: SHIPPED | OUTPATIENT
Start: 2023-12-13

## 2023-12-13 NOTE — TELEPHONE ENCOUNTER
Future Appointments   Date Time Provider [address]  [facility]   12/14/2023  1:00 PM STR MAMMOGRAPHY [address] [facility] STR Rad/Card   12/15/2023  9:15 AM Carmenza Mo, PTA STR[facility] PT [facility]   12/18/2023  9:15 AM Carmenza Mo, PTA [facility] PT [facility]   12/21/2023  9:15 AM Carmenza Mo, PTA [facility] PT [facility]   12/28/2023 10:00 AM Carmenza Mo, PTA [facility] PT [facility]   12/29/2023  9:30 AM Breanna Charles, PT STR[facility] PT [facility] HOD   5/8/2024  8:00 AM Aaron Stone Rheum [facility]   5/15/2024  9:40 AM Stas Valentin DO [facility] Med [facility]

## 2023-12-14 ENCOUNTER — HOSPITAL ENCOUNTER (OUTPATIENT)
Dept: WOMENS IMAGING | Age: 83
Discharge: HOME | End: 2023-12-14
Payer: MEDICARE

## 2023-12-14 VITALS — HEIGHT: 63 IN | BODY MASS INDEX: 28.35 KG/M2 | WEIGHT: 160 LBS

## 2023-12-14 DIAGNOSIS — Z12.31 VISIT FOR SCREENING MAMMOGRAM: ICD-10-CM

## 2023-12-14 PROCEDURE — 77063 BREAST TOMOSYNTHESIS BI: CPT

## 2023-12-15 ENCOUNTER — HOSPITAL ENCOUNTER (OUTPATIENT)
Dept: PHYSICAL THERAPY | Age: 83
Setting detail: THERAPIES SERIES
Discharge: HOME | End: 2023-12-15
Payer: MEDICARE

## 2023-12-15 PROCEDURE — 97110 THERAPEUTIC EXERCISES: CPT

## 2023-12-15 PROCEDURE — 97140 MANUAL THERAPY 1/> REGIONS: CPT

## 2023-12-15 NOTE — PROGRESS NOTES
[address] [facility]  PHYSICAL THERAPY  [] EVALUATION  [x] DAILY NOTE (LAND) [] DAILY NOTE (AQUATIC ) [] PROGRESS NOTE [] DISCHARGE NOTE    [x] [facility] [place]   [] [address] [place] [address]    [] [facility]   [] Rowena Wraya    Date: 12/15/2023  Patient Name:  Rancho Daniel  : 1940  MRN: [id number]  CSN: [id number]    Referring Practitioner Miguel Friend, DO   Diagnosis Strain of muscle, fascia and tendon of the posterior muscle group at thigh level, right thigh, initial encounter [U38.373L]    Treatment Diagnosis Right hamstring muscle strain. Deconditioning. Date of Evaluation 23    Additional Pertinent History HTN.  DDD.  LBP.  CKD. Osteoporosis. Functional Outcome Measure Used LEFS   Functional Outcome Score 45 (23)       Insurance: Primary: Payor: MEDICARE /  /  / ,   Secondary: [facility]   Authorization Information: PRECERTIFICATION REQUIRED:  No  INSURANCE THERAPY BENEFIT:  Patient has unlimited visits based on medical necessity  Benefit will not cover maintenance or preventative treatment. AQUATIC THERAPY COVERED:   Yes  MODALITIES COVERED:  Yes, with the exception of iontophoresis and hot packs/cold packs  TELEHEALTH COVERED: Yes  REFERENCE #: NA   Approved Procedure Codes: 74333 - Therapeutic Exercise  , 02216 - Manual Therapy , 23903 - Aquatic Therapeutic Exercise, 28024 - Neuromuscular Re-Education , 07050 - Gait Training, 75625 - Therapeutic Activities, 44536 - Canalith Repositioning Procedure, and [id number] - Massage Therapeutic   Visit # 6, 6/10 for progress note   Visits Allowed: Unlimited per medical necessity   Recertification Date:    Physician Follow-Up: Per patient   Physician Orders:    History of Present Illness: Giuseppe Mccarthy presents using a stand-alone cane (virtually a single point cane). Denies falls in the past year. Current right thigh pain is 4/10.   State she is having this pain in

## 2023-12-29 ENCOUNTER — APPOINTMENT (OUTPATIENT)
Dept: PHYSICAL THERAPY | Age: 83
End: 2023-12-29
Payer: MEDICARE

## 2024-01-12 DIAGNOSIS — M19.071 OSTEOARTHRITIS OF BOTH FEET, UNSPECIFIED OSTEOARTHRITIS TYPE: ICD-10-CM

## 2024-01-12 DIAGNOSIS — M19.072 OSTEOARTHRITIS OF BOTH FEET, UNSPECIFIED OSTEOARTHRITIS TYPE: ICD-10-CM

## 2024-01-12 RX ORDER — HYDROXYCHLOROQUINE SULFATE 200 MG/1
TABLET, FILM COATED ORAL
Qty: 135 TABLET | Refills: 1 | Status: SHIPPED | OUTPATIENT
Start: 2024-01-12

## 2024-02-09 ENCOUNTER — APPOINTMENT (OUTPATIENT)
Dept: GENERAL RADIOLOGY | Age: 84
End: 2024-02-09
Payer: MEDICARE

## 2024-02-09 ENCOUNTER — HOSPITAL ENCOUNTER (EMERGENCY)
Age: 84
Discharge: HOME | End: 2024-02-09
Payer: MEDICARE

## 2024-02-09 VITALS
DIASTOLIC BLOOD PRESSURE: 81 MMHG | WEIGHT: 165 LBS | OXYGEN SATURATION: 97 % | BODY MASS INDEX: 30.36 KG/M2 | HEIGHT: 62 IN | SYSTOLIC BLOOD PRESSURE: 159 MMHG | RESPIRATION RATE: 16 BRPM | TEMPERATURE: 98.2 F | HEART RATE: 68 BPM

## 2024-02-09 DIAGNOSIS — M79.672 LEFT FOOT PAIN: Primary | ICD-10-CM

## 2024-02-09 PROCEDURE — 99213 OFFICE O/P EST LOW 20 MIN: CPT

## 2024-02-09 PROCEDURE — 73630 X-RAY EXAM OF FOOT: CPT

## 2024-02-09 PROCEDURE — 99213 OFFICE O/P EST LOW 20 MIN: CPT | Performed by: NURSE PRACTITIONER

## 2024-02-09 RX ORDER — PREDNISONE 10 MG/1
TABLET ORAL
Qty: 21 TABLET | Refills: 0 | Status: SHIPPED | OUTPATIENT
Start: 2024-02-09 | End: 2024-02-15

## 2024-02-09 ASSESSMENT — PAIN - FUNCTIONAL ASSESSMENT: PAIN_FUNCTIONAL_ASSESSMENT: 0-10

## 2024-02-09 ASSESSMENT — PAIN DESCRIPTION - LOCATION: LOCATION: FOOT

## 2024-02-09 ASSESSMENT — PAIN SCALES - GENERAL: PAINLEVEL_OUTOF10: 2

## 2024-02-09 ASSESSMENT — PAIN DESCRIPTION - ORIENTATION: ORIENTATION: LEFT

## 2024-02-09 NOTE — ED PROVIDER NOTES
[facility] URGENT CARE  UrgentCare Encounter      CHIEFCOMPLAINT       Chief Complaint   Patient presents with    Foot Pain     Left ball of foot into arch. \" Denies injury \" I know I have plantar fascitis\"       Nurses Notes reviewed and I agree except as noted in the HPI.  HISTORY OF PRESENT ILLNESS   Nelda J Brunner is a 83 y.o. female who presents ***    REVIEW OF SYSTEMS     Review of Systems    PAST MEDICAL HISTORY         Diagnosis Date    Chronic low back pain     CKD (chronic kidney disease) stage 3, GFR 30-59 ml/min ([facility])     DDD (degenerative disc disease), lumbar     Dyslipidemia     History of skin cancer     BCC AND SCC    Hyperlipidemia     Hypertension     Macular degeneration, dry     Follow with ophthalmology     Osteoarthritis     Osteoporosis, post-menopausal     PONV (postoperative nausea and vomiting)        SURGICAL HISTORY     Patient  has a past surgical history that includes joint replacement (Bilateral); Foot surgery (Bilateral); Hysterectomy;  section; Dilation and curettage of uterus; Carpal tunnel release (Bilateral); Finger surgery; skin biopsy; back surgery (); Colonoscopy; cyst removal (Left, 2017); Cholecystectomy, laparoscopic (10/03/2017); pr colonoscopy flx dx w/collj spec when pfrmd (N/A, 2018); Nerve Surgery (N/A, 2019); Pain management procedure (Left, 2020); Facet joint injection (Bilateral, 2020); Pain management procedure (Bilateral, 2021); Pain management procedure (Bilateral, 2021); Pain management procedure (Bilateral, 3/4/2022); Pain management procedure (Right, 11/15/2022); and Pain management procedure (Bilateral, 10/23/2023).    CURRENT MEDICATIONS       Previous Medications    ACETAMINOPHEN (TYLENOL) 650 MG EXTENDED RELEASE TABLET    Take 1 tablet by mouth every 8 hours as needed for Pain    CALCIUM CARBONATE-VIT D-MIN (CALCIUM 1200 PO)    Take by mouth daily    CHOLECALCIFEROL (VITAMIN D-3 PO)    Take by  16, SpO2: 97 %  Physical Exam  Vitals and nursing note reviewed.   Constitutional:       General: She is not in acute distress.     Appearance: Normal appearance. She is not ill-appearing or toxic-appearing.   HENT:      Head: Normocephalic and atraumatic.      Nose: No congestion or rhinorrhea.      Mouth/Throat:      Mouth: Mucous membranes are moist.      Pharynx: Oropharynx is clear.   Eyes:      Extraocular Movements: Extraocular movements intact.      Conjunctiva/sclera: Conjunctivae normal.      Pupils: Pupils are equal, round, and reactive to light.   Cardiovascular:      Rate and Rhythm: Normal rate and regular rhythm.      Pulses: Normal pulses.      Heart sounds: Normal heart sounds. No murmur heard.  Pulmonary:      Effort: Pulmonary effort is normal. No respiratory distress.      Breath sounds: Normal breath sounds. No wheezing.   Abdominal:      General: Abdomen is flat.      Palpations: Abdomen is soft.      Tenderness: There is no abdominal tenderness.   Musculoskeletal:         General: No swelling or deformity. Normal range of motion.      Cervical back: Normal range of motion and neck supple.        Feet:    Feet:      Comments: Mild pain and swelling to the area illustrated above.  No warmth  Slight erythema.    Skin:     General: Skin is warm and dry.      Capillary Refill: Capillary refill takes less than 2 seconds.      Findings: No rash.   Neurological:      General: No focal deficit present.      Mental Status: She is alert and oriented to person, place, and time. Mental status is at baseline.   Psychiatric:         Mood and Affect: Mood normal.         Behavior: Behavior normal.         Thought Content: Thought content normal.         Judgment: Judgment normal.         DIAGNOSTIC RESULTS   Labs:No results found for this visit on 02/09/24.    IMAGING:  XR FOOT LEFT (MIN 3 VIEWS)   Final Result       1. Degenerative change especially at the level of the first metatarsophalangeal joint.   2.

## 2024-02-09 NOTE — DISCHARGE INSTRUCTIONS
Medications as prescribed.  Follow-up with podiatry as needed.  Take Tylenol arthritis as needed for pain      Recommend rest, ice, compression, elevation.  Follow-up with [place] [facility] as needed for any persistent pain.  Take acetaminophen and/or ibuprofen as needed for pain.

## 2024-03-14 ENCOUNTER — APPOINTMENT (OUTPATIENT)
Dept: CT IMAGING | Age: 84
End: 2024-03-14
Payer: MEDICARE

## 2024-03-14 ENCOUNTER — APPOINTMENT (OUTPATIENT)
Dept: GENERAL RADIOLOGY | Age: 84
End: 2024-03-14
Payer: MEDICARE

## 2024-03-14 ENCOUNTER — HOSPITAL ENCOUNTER (EMERGENCY)
Age: 84
Discharge: HOME | End: 2024-03-14
Attending: EMERGENCY MEDICINE
Payer: MEDICARE

## 2024-03-14 VITALS
SYSTOLIC BLOOD PRESSURE: 171 MMHG | HEIGHT: 62 IN | OXYGEN SATURATION: 99 % | DIASTOLIC BLOOD PRESSURE: 72 MMHG | WEIGHT: 165 LBS | HEART RATE: 72 BPM | RESPIRATION RATE: 21 BRPM | TEMPERATURE: 97.6 F | BODY MASS INDEX: 30.36 KG/M2
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
## 2024-03-14 DIAGNOSIS — S00.03XA HEMATOMA OF FRONTAL SCALP, INITIAL ENCOUNTER: ICD-10-CM

## 2024-03-14 LAB
ALBUMIN SERPL BCG-MCNC: 4.1 G/DL (ref 3.5–5.1)
ALP SERPL-CCNC: 82 U/L (ref 38–126)
ALT SERPL W/O P-5'-P-CCNC: 12 U/L (ref 11–66)
ANION GAP SERPL CALC-SCNC: 13 MEQ/L (ref 8–16)
AST SERPL-CCNC: 20 U/L (ref 5–40)
BASOPHILS ABSOLUTE: 0 THOU/MM3 (ref 0–0.1)
BASOPHILS NFR BLD AUTO: 0.7 %
BILIRUB SERPL-MCNC: 0.2 MG/DL (ref 0.3–1.2)
BUN SERPL-MCNC: 25 MG/DL (ref 7–22)
CALCIUM SERPL-MCNC: 9.2 MG/DL (ref 8.5–10.5)
CHLORIDE SERPL-SCNC: 104 MEQ/L (ref 98–111)
CO2 SERPL-SCNC: 24 MEQ/L (ref 23–33)
CREAT SERPL-MCNC: 1.2 MG/DL (ref 0.4–1.2)
DEPRECATED RDW RBC AUTO: 45 FL (ref 35–45)
EOSINOPHIL NFR BLD AUTO: 3.4 %
EOSINOPHILS ABSOLUTE: 0.1 THOU/MM3 (ref 0–0.4)
ERYTHROCYTE [DISTWIDTH] IN BLOOD BY AUTOMATED COUNT: 13.2 % (ref 11.5–14.5)
GFR SERPL CREATININE-BSD FRML MDRD: 45 ML/MIN/1.73M2
GLUCOSE SERPL-MCNC: 109 MG/DL (ref 70–108)
HCT VFR BLD AUTO: 35.8 % (ref 37–47)
HGB BLD-MCNC: 12 GM/DL (ref 12–16)
IMM GRANULOCYTES # BLD AUTO: 0.01 THOU/MM3 (ref 0–0.07)
IMM GRANULOCYTES NFR BLD AUTO: 0.2 %
LYMPHOCYTES ABSOLUTE: 1.3 THOU/MM3 (ref 1–4.8)
LYMPHOCYTES NFR BLD AUTO: 29.3 %
MCH RBC QN AUTO: 31.3 PG (ref 26–33)
MCHC RBC AUTO-ENTMCNC: 33.5 GM/DL (ref 32.2–35.5)
MCV RBC AUTO: 93.2 FL (ref 81–99)
MONOCYTES ABSOLUTE: 0.6 THOU/MM3 (ref 0.4–1.3)
MONOCYTES NFR BLD AUTO: 13.7 %
NEUTROPHILS NFR BLD AUTO: 52.7 %
NRBC BLD AUTO-RTO: 0 /100 WBC
OSMOLALITY SERPL CALC.SUM OF ELEC: 286.2 MOSMOL/KG (ref 275–300)
PLATELET # BLD AUTO: 214 THOU/MM3 (ref 130–400)
PMV BLD AUTO: 10.3 FL (ref 9.4–12.4)
POTASSIUM SERPL-SCNC: 4.1 MEQ/L (ref 3.5–5.2)
PROT SERPL-MCNC: 6.8 G/DL (ref 6.1–8)
RBC # BLD AUTO: 3.84 MILL/MM3 (ref 4.2–5.4)
SEGMENTED NEUTROPHILS ABSOLUTE COUNT: 2.3 THOU/MM3 (ref 1.8–7.7)
SODIUM SERPL-SCNC: 141 MEQ/L (ref 135–145)
WBC # BLD AUTO: 4.4 THOU/MM3 (ref 4.8–10.8)

## 2024-03-14 PROCEDURE — 72125 CT NECK SPINE W/O DYE: CPT

## 2024-03-14 PROCEDURE — 96374 THER/PROPH/DIAG INJ IV PUSH: CPT

## 2024-03-14 PROCEDURE — 70486 CT MAXILLOFACIAL W/O DYE: CPT

## 2024-03-14 PROCEDURE — 72170 X-RAY EXAM OF PELVIS: CPT

## 2024-03-14 PROCEDURE — 99285 EMERGENCY DEPT VISIT HI MDM: CPT

## 2024-03-14 PROCEDURE — 6360000002 HC RX W HCPCS

## 2024-03-14 PROCEDURE — 93005 ELECTROCARDIOGRAM TRACING: CPT

## 2024-03-14 PROCEDURE — 70450 CT HEAD/BRAIN W/O DYE: CPT

## 2024-03-14 PROCEDURE — 6370000000 HC RX 637 (ALT 250 FOR IP)

## 2024-03-14 PROCEDURE — 71045 X-RAY EXAM CHEST 1 VIEW: CPT

## 2024-03-14 PROCEDURE — 85025 COMPLETE CBC W/AUTO DIFF WBC: CPT

## 2024-03-14 PROCEDURE — 96375 TX/PRO/DX INJ NEW DRUG ADDON: CPT

## 2024-03-14 PROCEDURE — 36415 COLL VENOUS BLD VENIPUNCTURE: CPT

## 2024-03-14 PROCEDURE — 80053 COMPREHEN METABOLIC PANEL: CPT

## 2024-03-14 RX ORDER — ACETAMINOPHEN 325 MG/1
650 TABLET ORAL ONCE
Status: DISCONTINUED | OUTPATIENT
Start: 2024-03-14 | End: 2024-03-14

## 2024-03-14 RX ORDER — HYDROCODONE BITARTRATE AND ACETAMINOPHEN 5; 325 MG/1; MG/1
1 TABLET ORAL ONCE
Status: COMPLETED | OUTPATIENT
Start: 2024-03-14 | End: 2024-03-14

## 2024-03-14 RX ORDER — MORPHINE SULFATE 4 MG/ML
4 INJECTION, SOLUTION INTRAMUSCULAR; INTRAVENOUS ONCE
Status: COMPLETED | OUTPATIENT
Start: 2024-03-14 | End: 2024-03-14

## 2024-03-14 RX ORDER — ONDANSETRON 2 MG/ML
4 INJECTION INTRAMUSCULAR; INTRAVENOUS ONCE
Status: COMPLETED | OUTPATIENT
Start: 2024-03-14 | End: 2024-03-14

## 2024-03-14 RX ORDER — LORAZEPAM 2 MG/ML
0.5 INJECTION INTRAMUSCULAR ONCE
Status: COMPLETED | OUTPATIENT
Start: 2024-03-14 | End: 2024-03-14

## 2024-03-14 RX ADMIN — HYDROCODONE BITARTRATE AND ACETAMINOPHEN 1 TABLET: 5; 325 TABLET ORAL at 17:58

## 2024-03-14 RX ADMIN — MORPHINE SULFATE 4 MG: 4 INJECTION, SOLUTION INTRAMUSCULAR; INTRAVENOUS at 17:28

## 2024-03-14 RX ADMIN — ONDANSETRON 4 MG: 2 INJECTION INTRAMUSCULAR; INTRAVENOUS at 17:28

## 2024-03-14 RX ADMIN — LORAZEPAM 0.5 MG: 2 INJECTION INTRAMUSCULAR; INTRAVENOUS at 18:43

## 2024-03-14 ASSESSMENT — PAIN SCALES - GENERAL
PAINLEVEL_OUTOF10: 7
PAINLEVEL_OUTOF10: 10

## 2024-03-14 ASSESSMENT — PAIN - FUNCTIONAL ASSESSMENT: PAIN_FUNCTIONAL_ASSESSMENT: 0-10

## 2024-03-14 ASSESSMENT — PAIN DESCRIPTION - LOCATION: LOCATION: FACE

## 2024-03-14 NOTE — ED NOTES
Pt to ED c/o fall from standing. Pt states the garage floor was uneven and her feet got caught up. Pt states she didn't have enough time to stick her arms out and fell face first onto the concrete. Pt has large hematoma on right side of face above the eye. Pt reporting neck pain, pt placed in cervical collar. Pt has abrasion to lip, pt states she believes she bit through her lip when she fell. Monitor applied. EKG complete. IV inserted and lab work sent.

## 2024-03-14 NOTE — DISCHARGE INSTRUCTIONS
You were seen and evaluated in the emergency department today after having a fall.  CT scan of your head, face, neck did not reveal any new fractures or bleeding in your brain.  Your chest x-ray and pelvic x-ray did not show any fractures either.  You have significant swelling above your right eye.  This will take several days to resolve and may turn several different colors as the bruise heals.  You may use ice over this area 20 minutes on 20 minutes off as well as take Tylenol for pain control.  Follow-up with your primary care physician for further evaluation in the next few days return to emergency department anytime for acute or worrisome changes.

## 2024-03-14 NOTE — ED PROVIDER NOTES
[facility] EMERGENCY DEPARTMENT  EMERGENCY DEPARTMENT ENCOUNTER          Pt Name: Nelda J Brunner  MRN: [id number]  Birthdate 1940  Date of evaluation: 3/14/2024  Resident Physician: Mark Lim MD EM Resident PGY-2  Attending Physician: Mateo Calero DO      CHIEF COMPLAINT       Chief Complaint   Patient presents with    Fall    Facial Injury         HISTORY OF PRESENT ILLNESS    HPI  Nelda J Brunner is a 83 y.o. female who presents to the emergency department from home, by private vehicle for evaluation of fall, facial injury.  Patient states that she fell approximately 40 minutes prior to arrival.  States that she had a mechanical fall in her garage and struck her face without loss of consciousness.  Patient was able to ambulate after accident.  Noted to have significant ecchymosis to right frontal scalp superior to right eye.  Patient denies any vision changes.  Patient states that she feels that she bit through the upper portion of her lip.  On exam patient has significant laceration to right sided superior labia intraoral surface, does not go through and through to surface skin.  Patient denies any pain to any teeth or her jaw.  Patient was placed in cervical collar due to also having left-sided neck pain after the fall.  She denies any numbness or tingling anywhere else.  Patient noted to have ankle brace to left ankle which she states was from previous injury unrelated to today.  Patient denies being on any blood thinners.      The patient has no other acute complaints at this time.    ROS negative except as stated above.    PAST MEDICAL AND SURGICAL HISTORY     Past Medical History:   Diagnosis Date    Chronic low back pain     CKD (chronic kidney disease) stage 3, GFR 30-59 ml/min ([facility])     DDD (degenerative disc disease), lumbar     Dyslipidemia     History of skin cancer     BCC AND SCC    Hyperlipidemia     Hypertension     Macular degeneration, dry     Follow with ophthalmology  
the pelvis.   2. Osteopenia.      This document has been electronically signed by: Martir Ventura MD on    03/14/2024 06:03 PM      XR CHEST PORTABLE   Final Result   1. No acute cardiopulmonary abnormality.      This document has been electronically signed by: Martir Ventura MD on    03/14/2024 06:02 PM        Labs Reviewed   CBC WITH AUTO DIFFERENTIAL - Abnormal; Notable for the following components:       Result Value    WBC 4.4 (*)     RBC 3.84 (*)     Hematocrit 35.8 (*)     All other components within normal limits   COMPREHENSIVE METABOLIC PANEL - Abnormal; Notable for the following components:    Glucose 109 (*)     BUN 25 (*)     Total Bilirubin 0.2 (*)     All other components within normal limits   GLOMERULAR FILTRATION RATE, ESTIMATED - Abnormal; Notable for the following components:    Est, Glom Filt Rate 45 (*)     All other components within normal limits   ANION GAP   OSMOLALITY         Final diagnoses:   Fall, initial encounter   Hematoma of frontal scalp, initial encounter   .  Have seen this patient with the resident Dr. Lim and agree with his assessment and plan.     Mateo Calero, DO  03/14/24 2153       Mateo Calero,   03/14/24 2202

## 2024-03-15 LAB
EKG ATRIAL RATE: 69 BPM
EKG P AXIS: 55 DEGREES
EKG P-R INTERVAL: 170 MS
EKG Q-T INTERVAL: 428 MS
EKG QRS DURATION: 90 MS
EKG QTC CALCULATION (BAZETT): 458 MS
EKG R AXIS: 19 DEGREES
EKG T AXIS: 78 DEGREES
EKG VENTRICULAR RATE: 69 BPM

## 2024-03-15 PROCEDURE — 93010 ELECTROCARDIOGRAM REPORT: CPT | Performed by: INTERNAL MEDICINE

## 2024-03-27 ENCOUNTER — OFFICE VISIT (OUTPATIENT)
Dept: FAMILY MEDICINE CLINIC | Age: 84
End: 2024-03-27
Payer: MEDICARE

## 2024-03-27 VITALS
HEART RATE: 72 BPM | SYSTOLIC BLOOD PRESSURE: 130 MMHG | TEMPERATURE: 97 F | DIASTOLIC BLOOD PRESSURE: 70 MMHG | BODY MASS INDEX: 31.1 KG/M2 | OXYGEN SATURATION: 98 % | RESPIRATION RATE: 18 BRPM | HEIGHT: 62 IN | WEIGHT: 169 LBS

## 2024-03-27 DIAGNOSIS — S00.83XA CONTUSION OF FACE, INITIAL ENCOUNTER: ICD-10-CM
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
## 2024-03-27 DIAGNOSIS — S00.03XA HEMATOMA OF SCALP, INITIAL ENCOUNTER: Primary | ICD-10-CM

## 2024-03-27 PROCEDURE — 1036F TOBACCO NON-USER: CPT | Performed by: FAMILY MEDICINE

## 2024-03-27 PROCEDURE — G8399 PT W/DXA RESULTS DOCUMENT: HCPCS | Performed by: FAMILY MEDICINE

## 2024-03-27 PROCEDURE — 99214 OFFICE O/P EST MOD 30 MIN: CPT | Performed by: FAMILY MEDICINE

## 2024-03-27 PROCEDURE — G8484 FLU IMMUNIZE NO ADMIN: HCPCS | Performed by: FAMILY MEDICINE

## 2024-03-27 PROCEDURE — G8427 DOCREV CUR MEDS BY ELIG CLIN: HCPCS | Performed by: FAMILY MEDICINE

## 2024-03-27 PROCEDURE — G8417 CALC BMI ABV UP PARAM F/U: HCPCS | Performed by: FAMILY MEDICINE

## 2024-03-27 PROCEDURE — 1090F PRES/ABSN URINE INCON ASSESS: CPT | Performed by: FAMILY MEDICINE

## 2024-03-27 PROCEDURE — 3078F DIAST BP <80 MM HG: CPT | Performed by: FAMILY MEDICINE

## 2024-03-27 PROCEDURE — 3075F SYST BP GE 130 - 139MM HG: CPT | Performed by: FAMILY MEDICINE

## 2024-03-27 PROCEDURE — 1123F ACP DISCUSS/DSCN MKR DOCD: CPT | Performed by: FAMILY MEDICINE

## 2024-03-27 SDOH — ECONOMIC STABILITY: FOOD INSECURITY: WITHIN THE PAST 12 MONTHS, THE FOOD YOU BOUGHT JUST DIDN'T LAST AND YOU DIDN'T HAVE MONEY TO GET MORE.: NEVER TRUE

## 2024-03-27 SDOH — ECONOMIC STABILITY: FOOD INSECURITY: WITHIN THE PAST 12 MONTHS, YOU WORRIED THAT YOUR FOOD WOULD RUN OUT BEFORE YOU GOT MONEY TO BUY MORE.: NEVER TRUE

## 2024-03-27 SDOH — ECONOMIC STABILITY: INCOME INSECURITY: HOW HARD IS IT FOR YOU TO PAY FOR THE VERY BASICS LIKE FOOD, HOUSING, MEDICAL CARE, AND HEATING?: NOT HARD AT ALL

## 2024-03-27 ASSESSMENT — PATIENT HEALTH QUESTIONNAIRE - PHQ9
SUM OF ALL RESPONSES TO PHQ QUESTIONS 1-9: 0
2. FEELING DOWN, DEPRESSED OR HOPELESS: NOT AT ALL
SUM OF ALL RESPONSES TO PHQ9 QUESTIONS 1 & 2: 0
1. LITTLE INTEREST OR PLEASURE IN DOING THINGS: NOT AT ALL

## 2024-03-27 NOTE — PROGRESS NOTES
9/7/2018    COLONOSCOPY performed by Jerson Ferrera MD at [facility]    SKIN BIOPSY      BCC AND SCC       Allergies   Allergen Reactions    Sulfa Antibiotics Swelling    Other Nausea And Vomiting     oysters       Social History     Tobacco Use    Smoking status: Never    Smokeless tobacco: Never   Substance Use Topics    Alcohol use: Yes     Comment: OCCASIONALLY       Family History   Problem Relation Age of Onset    Heart Disease Mother         CHF    Alzheimer's Disease Mother     Dementia Mother     Cancer Father         LUNG    Emphysema Father     Colon Cancer Neg Hx     Breast Cancer Neg Hx        I have reviewed the patient's past medical history, past surgical history, allergies, medications, social and family history and I have made updates where appropriate.      Review of Systems  Positive responses are highlighted in bold    Constitutional:  Fever, Chills, Night Sweats, Fatigue, Unexpected changes in weight  HENT:  Ear pain, Tinnitus, Nosebleeds, Trouble swallowing, Hearing loss, Sore throat  Cardiovascular:  Chest Pain, Palpitations, Orthopnea, Paroxysmal Nocturnal Dyspnea  Respiratory:  Cough, Wheezing, Shortness of breath, Chest tightness, Apnea  Gastrointestinal:  Nausea, Vomiting, Diarrhea, Constipation, Heartburn, Blood in stool  Genitourinary:  Difficulty or painful urination, Flank pain, Change in frequency, Urgency  Skin:  Color change, Rash, Itching, Wound  Musculoskeletal:  Joint pain, Back pain, Gait problems, Joint swelling, Myalgias  Neurological:  Dizziness, Headaches, Presyncope, Numbness, Seizures, Tremors  Endocrine:  Heat Intolerance, Cold Intolerance, Polydipsia, Polyphagia, Polyuria      PHYSICAL EXAM:  Vitals:    03/27/24 1034   BP: 130/70   Pulse: 72   Resp: 18   Temp: 97 °F (36.1 °C)   TempSrc: Temporal   SpO2: 98%   Weight: 76.7 kg (169 lb)   Height: 1.575 m (5' 2.01\")     Body mass index is 30.9 kg/m².     VS Reviewed  General Appearance: A&O x 3, No acute distress,well

## 2024-03-28 ENCOUNTER — NURSE ONLY (OUTPATIENT)
Dept: LAB | Age: 84
End: 2024-03-28

## 2024-03-28 ENCOUNTER — TELEPHONE (OUTPATIENT)
Dept: FAMILY MEDICINE CLINIC | Age: 84
End: 2024-03-28

## 2024-03-28 DIAGNOSIS — N18.31 STAGE 3A CHRONIC KIDNEY DISEASE (HCC): Chronic | ICD-10-CM

## 2024-03-28 DIAGNOSIS — N17.9 AKI (ACUTE KIDNEY INJURY) (HCC): Primary | ICD-10-CM

## 2024-03-28 LAB
ANION GAP SERPL CALC-SCNC: 14 MEQ/L (ref 8–16)
BUN SERPL-MCNC: 30 MG/DL (ref 7–22)
CALCIUM SERPL-MCNC: 9.4 MG/DL (ref 8.5–10.5)
CHLORIDE SERPL-SCNC: 103 MEQ/L (ref 98–111)
CO2 SERPL-SCNC: 23 MEQ/L (ref 23–33)
CREAT SERPL-MCNC: 1.3 MG/DL (ref 0.4–1.2)
CREAT UR-MCNC: 123.4 MG/DL
GFR SERPL CREATININE-BSD FRML MDRD: 41 ML/MIN/1.73M2
GLUCOSE SERPL-MCNC: 93 MG/DL (ref 70–108)
MICROALBUMIN UR-MCNC: 2.03 MG/DL
MICROALBUMIN/CREAT RATIO PNL UR: 16 MG/G (ref 0–30)
POTASSIUM SERPL-SCNC: 4.4 MEQ/L (ref 3.5–5.2)
SODIUM SERPL-SCNC: 140 MEQ/L (ref 135–145)

## 2024-03-28 NOTE — TELEPHONE ENCOUNTER
----- Message from Kenn Galvan,  sent at 3/28/2024  4:30 PM EDT -----  Please let pt know that lab overall stable  Does show may be a little dehydrated  Would like her to inc her fluid intake  Repeat BMP 1 wk  Non-fasting ok  Let me know if questions, thanks!

## 2024-04-05 ENCOUNTER — NURSE ONLY (OUTPATIENT)
Dept: LAB | Age: 84
End: 2024-04-05

## 2024-04-05 DIAGNOSIS — N17.9 AKI (ACUTE KIDNEY INJURY) (HCC): ICD-10-CM

## 2024-04-05 LAB
ANION GAP SERPL CALC-SCNC: 13 MEQ/L (ref 8–16)
BUN SERPL-MCNC: 22 MG/DL (ref 7–22)
CALCIUM SERPL-MCNC: 9.1 MG/DL (ref 8.5–10.5)
CHLORIDE SERPL-SCNC: 103 MEQ/L (ref 98–111)
CO2 SERPL-SCNC: 25 MEQ/L (ref 23–33)
CREAT SERPL-MCNC: 1.3 MG/DL (ref 0.4–1.2)
GFR SERPL CREATININE-BSD FRML MDRD: 41 ML/MIN/1.73M2
POTASSIUM SERPL-SCNC: 4.6 MEQ/L (ref 3.5–5.2)
SODIUM SERPL-SCNC: 141 MEQ/L (ref 135–145)

## 2024-04-07 DIAGNOSIS — N18.31 STAGE 3A CHRONIC KIDNEY DISEASE (HCC): ICD-10-CM

## 2024-04-07 DIAGNOSIS — I10 ESSENTIAL HYPERTENSION: Primary | ICD-10-CM

## 2024-04-08 ENCOUNTER — TELEPHONE (OUTPATIENT)
Dept: FAMILY MEDICINE CLINIC | Age: 84
End: 2024-04-08

## 2024-04-08 NOTE — TELEPHONE ENCOUNTER
----- Message from Kenn Galvan, DO sent at 4/7/2024  8:33 AM EDT -----  Please let pt know that BMP stable  Con't good hydration  Will have her repeat labs a few days before her next apt with me in May, is due for routine labs then anyway. Fasting  Let me know if questions, thanks!

## 2024-05-08 ENCOUNTER — HOSPITAL ENCOUNTER (OUTPATIENT)
Age: 84
Setting detail: SPECIMEN
Discharge: HOME | End: 2024-05-08
Payer: MEDICARE

## 2024-05-08 ENCOUNTER — HOSPITAL ENCOUNTER (OUTPATIENT)
Dept: GENERAL RADIOLOGY | Age: 84
Discharge: HOME | End: 2024-05-08
Payer: MEDICARE

## 2024-05-08 ENCOUNTER — OFFICE VISIT (OUTPATIENT)
Dept: RHEUMATOLOGY | Age: 84
End: 2024-05-08
Payer: MEDICARE

## 2024-05-08 VITALS
SYSTOLIC BLOOD PRESSURE: 156 MMHG | BODY MASS INDEX: 30.11 KG/M2 | WEIGHT: 163.6 LBS | OXYGEN SATURATION: 96 % | HEART RATE: 69 BPM | DIASTOLIC BLOOD PRESSURE: 80 MMHG | HEIGHT: 62 IN

## 2024-05-08 DIAGNOSIS — M65.4 TENOSYNOVITIS, DE QUERVAIN: ICD-10-CM

## 2024-05-08 DIAGNOSIS — M19.072 OSTEOARTHRITIS OF BOTH FEET, UNSPECIFIED OSTEOARTHRITIS TYPE: Primary | ICD-10-CM

## 2024-05-08 DIAGNOSIS — M15.4 EROSIVE OSTEOARTHRITIS OF BOTH HANDS: ICD-10-CM

## 2024-05-08 DIAGNOSIS — M19.071 OSTEOARTHRITIS OF BOTH FEET, UNSPECIFIED OSTEOARTHRITIS TYPE: ICD-10-CM

## 2024-05-08 DIAGNOSIS — M19.072 OSTEOARTHRITIS OF BOTH FEET, UNSPECIFIED OSTEOARTHRITIS TYPE: ICD-10-CM

## 2024-05-08 DIAGNOSIS — M71.349 SYNOVIAL CYST OF HAND: ICD-10-CM

## 2024-05-08 DIAGNOSIS — M19.071 OSTEOARTHRITIS OF BOTH FEET, UNSPECIFIED OSTEOARTHRITIS TYPE: Primary | ICD-10-CM

## 2024-05-08 DIAGNOSIS — M51.36 DDD (DEGENERATIVE DISC DISEASE), LUMBAR: ICD-10-CM

## 2024-05-08 DIAGNOSIS — Z51.81 MEDICATION MONITORING ENCOUNTER: ICD-10-CM

## 2024-05-08 PROCEDURE — 73130 X-RAY EXAM OF HAND: CPT

## 2024-05-08 PROCEDURE — 1036F TOBACCO NON-USER: CPT | Performed by: INTERNAL MEDICINE

## 2024-05-08 PROCEDURE — 1090F PRES/ABSN URINE INCON ASSESS: CPT | Performed by: INTERNAL MEDICINE

## 2024-05-08 PROCEDURE — G8417 CALC BMI ABV UP PARAM F/U: HCPCS | Performed by: INTERNAL MEDICINE

## 2024-05-08 PROCEDURE — 3077F SYST BP >= 140 MM HG: CPT | Performed by: INTERNAL MEDICINE

## 2024-05-08 PROCEDURE — G8427 DOCREV CUR MEDS BY ELIG CLIN: HCPCS | Performed by: INTERNAL MEDICINE

## 2024-05-08 PROCEDURE — G8399 PT W/DXA RESULTS DOCUMENT: HCPCS | Performed by: INTERNAL MEDICINE

## 2024-05-08 PROCEDURE — 20550 NJX 1 TENDON SHEATH/LIGAMENT: CPT | Performed by: INTERNAL MEDICINE

## 2024-05-08 PROCEDURE — 1123F ACP DISCUSS/DSCN MKR DOCD: CPT | Performed by: INTERNAL MEDICINE

## 2024-05-08 PROCEDURE — 3079F DIAST BP 80-89 MM HG: CPT | Performed by: INTERNAL MEDICINE

## 2024-05-08 PROCEDURE — 99214 OFFICE O/P EST MOD 30 MIN: CPT | Performed by: INTERNAL MEDICINE

## 2024-05-08 RX ORDER — METHYLPREDNISOLONE ACETATE 40 MG/ML
10 INJECTION, SUSPENSION INTRA-ARTICULAR; INTRALESIONAL; INTRAMUSCULAR; SOFT TISSUE ONCE
Status: COMPLETED | OUTPATIENT
Start: 2024-05-08 | End: 2024-05-08

## 2024-05-08 RX ADMIN — METHYLPREDNISOLONE ACETATE 10 MG: 40 INJECTION, SUSPENSION INTRA-ARTICULAR; INTRALESIONAL; INTRAMUSCULAR; SOFT TISSUE at 09:08

## 2024-05-08 ASSESSMENT — ENCOUNTER SYMPTOMS
GASTROINTESTINAL NEGATIVE: 1
RESPIRATORY NEGATIVE: 1
EYES NEGATIVE: 1

## 2024-05-09 ENCOUNTER — OFFICE VISIT (OUTPATIENT)
Dept: FAMILY MEDICINE CLINIC | Age: 84
End: 2024-05-09
Payer: MEDICARE

## 2024-05-09 VITALS
OXYGEN SATURATION: 99 % | HEART RATE: 67 BPM | SYSTOLIC BLOOD PRESSURE: 132 MMHG | RESPIRATION RATE: 18 BRPM | HEIGHT: 62 IN | DIASTOLIC BLOOD PRESSURE: 80 MMHG | TEMPERATURE: 97.8 F | BODY MASS INDEX: 30.18 KG/M2 | WEIGHT: 164 LBS

## 2024-05-09 DIAGNOSIS — J40 SINOBRONCHITIS: Primary | ICD-10-CM

## 2024-05-09 DIAGNOSIS — J32.9 SINOBRONCHITIS: Primary | ICD-10-CM

## 2024-05-09 LAB
Lab: NORMAL
QC PASS/FAIL: NORMAL
SARS-COV-2 RDRP RESP QL NAA+PROBE: NEGATIVE

## 2024-05-09 PROCEDURE — G8399 PT W/DXA RESULTS DOCUMENT: HCPCS | Performed by: FAMILY MEDICINE

## 2024-05-09 PROCEDURE — 1036F TOBACCO NON-USER: CPT | Performed by: FAMILY MEDICINE

## 2024-05-09 PROCEDURE — 1123F ACP DISCUSS/DSCN MKR DOCD: CPT | Performed by: FAMILY MEDICINE

## 2024-05-09 PROCEDURE — G8417 CALC BMI ABV UP PARAM F/U: HCPCS | Performed by: FAMILY MEDICINE

## 2024-05-09 PROCEDURE — G8427 DOCREV CUR MEDS BY ELIG CLIN: HCPCS | Performed by: FAMILY MEDICINE

## 2024-05-09 PROCEDURE — 3079F DIAST BP 80-89 MM HG: CPT | Performed by: FAMILY MEDICINE

## 2024-05-09 PROCEDURE — 87635 SARS-COV-2 COVID-19 AMP PRB: CPT | Performed by: FAMILY MEDICINE

## 2024-05-09 PROCEDURE — 3075F SYST BP GE 130 - 139MM HG: CPT | Performed by: FAMILY MEDICINE

## 2024-05-09 PROCEDURE — 99213 OFFICE O/P EST LOW 20 MIN: CPT | Performed by: FAMILY MEDICINE

## 2024-05-09 PROCEDURE — 1090F PRES/ABSN URINE INCON ASSESS: CPT | Performed by: FAMILY MEDICINE

## 2024-05-09 RX ORDER — DOXYCYCLINE HYCLATE 100 MG
100 TABLET ORAL 2 TIMES DAILY
Qty: 20 TABLET | Refills: 0 | Status: SHIPPED | OUTPATIENT
Start: 2024-05-09 | End: 2024-05-19

## 2024-05-09 RX ORDER — BENZONATATE 100 MG/1
CAPSULE ORAL
Qty: 60 CAPSULE | Refills: 0 | Status: SHIPPED | OUTPATIENT
Start: 2024-05-09 | End: 2024-05-19

## 2024-05-09 NOTE — PROGRESS NOTES
Chief Complaint   Patient presents with    Cough     History obtained from the patient.    SUBJECTIVE:  Nelda J Brunner is a 83 y.o. female that presents today for     -URI type sxs:   Nasal congestion  Drainage  Cough  Sore throat  Fatigue  No fever  No SOB    Fever - No  Runny nose or congestion -  Yes   Cough -  Yes  Sore throat -  Yes  Headache, fatigue, joint pains, muscle aches -  No  Double Sickening - Yes  Shortness of breath/Wheezing? -  No  Nausea/Vomiting/Diarrhea?  No  Sick contacts - Yes  Maxillary Tooth Pain -  Yes  Treatment tried and response - otc meds, no better      Age/Gender Health Maintenance    Lipid -   Lab Results   Component Value Date    CHOL 228 (H) 04/28/2023    CHOL 211 (H) 04/25/2022    CHOL 263 (H) 10/28/2021     Lab Results   Component Value Date    TRIG 168 04/28/2023    TRIG 174 04/25/2022    TRIG 191 10/28/2021     Lab Results   Component Value Date    HDL 55 04/28/2023    HDL 56 04/25/2022    HDL 60 10/28/2021     Lab Results   Component Value Date     04/28/2023     04/25/2022     10/28/2021       DM Screen -   Lab Results   Component Value Date/Time    GLUCOSE 93 03/28/2024 08:18 AM    GLUCOSE 99 05/03/2018 11:02 AM     No results found for: \"LABA1C\"      Colon Cancer Screening -NEG SEPT 2018, no repeat ([place])  Lung Cancer Screening - never smoker    Tetanus - UTD July 2018  Influenza Vaccine - UTD FALL 2023  Pneumonia Vaccine - UTD PCV 13 OCT 2015. UTD PPV 23 APR 2018  Zostavax - Zostavax competed APR 2013   Shingrix - UTD x 2    Breast Cancer Screening - NEG OCT 2022  Cervical Cancer Screening - Aged out  Osteoporosis Screening - osteopenia AUG 2018/AUG 2020. Osteoporosis MAY 2023  Reclast: dose # 1 6/13/2023      Current Outpatient Medications   Medication Sig Dispense Refill    doxycycline hyclate (VIBRA-TABS) 100 MG tablet Take 1 tablet by mouth 2 times daily for 10 days 20 tablet 0    benzonatate (TESSALON PERLES) 100 MG capsule Take 1 to 2

## 2024-05-10 ENCOUNTER — NURSE ONLY (OUTPATIENT)
Dept: LAB | Age: 84
End: 2024-05-10

## 2024-05-10 DIAGNOSIS — I10 ESSENTIAL HYPERTENSION: ICD-10-CM

## 2024-05-10 DIAGNOSIS — N18.31 STAGE 3A CHRONIC KIDNEY DISEASE (HCC): ICD-10-CM

## 2024-05-10 LAB
ALBUMIN SERPL BCG-MCNC: 4.3 G/DL (ref 3.5–5.1)
ALP SERPL-CCNC: 91 U/L (ref 38–126)
ALT SERPL W/O P-5'-P-CCNC: 14 U/L (ref 11–66)
ANION GAP SERPL CALC-SCNC: 12 MEQ/L (ref 8–16)
AST SERPL-CCNC: 19 U/L (ref 5–40)
BASOPHILS ABSOLUTE: 0 THOU/MM3 (ref 0–0.1)
BASOPHILS NFR BLD AUTO: 0.9 %
BILIRUB SERPL-MCNC: 0.3 MG/DL (ref 0.3–1.2)
BUN SERPL-MCNC: 25 MG/DL (ref 7–22)
CALCIUM SERPL-MCNC: 9.5 MG/DL (ref 8.5–10.5)
CHLORIDE SERPL-SCNC: 99 MEQ/L (ref 98–111)
CHOLEST SERPL-MCNC: 195 MG/DL (ref 100–199)
CO2 SERPL-SCNC: 27 MEQ/L (ref 23–33)
CREAT SERPL-MCNC: 1.2 MG/DL (ref 0.4–1.2)
CREAT UR-MCNC: 193.5 MG/DL
DEPRECATED RDW RBC AUTO: 42.5 FL (ref 35–45)
EOSINOPHIL NFR BLD AUTO: 4.1 %
EOSINOPHILS ABSOLUTE: 0.2 THOU/MM3 (ref 0–0.4)
ERYTHROCYTE [DISTWIDTH] IN BLOOD BY AUTOMATED COUNT: 12.3 % (ref 11.5–14.5)
GFR SERPL CREATININE-BSD FRML MDRD: 45 ML/MIN/1.73M2
GLUCOSE SERPL-MCNC: 97 MG/DL (ref 70–108)
HCT VFR BLD AUTO: 40.5 % (ref 37–47)
HDLC SERPL-MCNC: 44 MG/DL
HGB BLD-MCNC: 13.3 GM/DL (ref 12–16)
IMM GRANULOCYTES # BLD AUTO: 0.01 THOU/MM3 (ref 0–0.07)
IMM GRANULOCYTES NFR BLD AUTO: 0.2 %
LDLC SERPL CALC-MCNC: 106 MG/DL
LYMPHOCYTES ABSOLUTE: 1.4 THOU/MM3 (ref 1–4.8)
LYMPHOCYTES NFR BLD AUTO: 30.8 %
MCH RBC QN AUTO: 30.6 PG (ref 26–33)
MCHC RBC AUTO-ENTMCNC: 32.8 GM/DL (ref 32.2–35.5)
MCV RBC AUTO: 93.3 FL (ref 81–99)
MICROALBUMIN UR-MCNC: 7.48 MG/DL
MICROALBUMIN/CREAT RATIO PNL UR: 39 MG/G (ref 0–30)
MONOCYTES ABSOLUTE: 1 THOU/MM3 (ref 0.4–1.3)
MONOCYTES NFR BLD AUTO: 22.7 %
NEUTROPHILS ABSOLUTE: 1.8 THOU/MM3 (ref 1.8–7.7)
NEUTROPHILS NFR BLD AUTO: 41.3 %
NRBC BLD AUTO-RTO: 0 /100 WBC
PLATELET # BLD AUTO: 205 THOU/MM3 (ref 130–400)
PMV BLD AUTO: 10.2 FL (ref 9.4–12.4)
POTASSIUM SERPL-SCNC: 4.5 MEQ/L (ref 3.5–5.2)
PROT SERPL-MCNC: 7.5 G/DL (ref 6.1–8)
RBC # BLD AUTO: 4.34 MILL/MM3 (ref 4.2–5.4)
SODIUM SERPL-SCNC: 138 MEQ/L (ref 135–145)
TRIGL SERPL-MCNC: 225 MG/DL (ref 0–199)
TSH SERPL DL<=0.005 MIU/L-ACNC: 1.78 UIU/ML (ref 0.4–4.2)
WBC # BLD AUTO: 4.4 THOU/MM3 (ref 4.8–10.8)

## 2024-05-13 ENCOUNTER — TELEPHONE (OUTPATIENT)
Dept: FAMILY MEDICINE CLINIC | Age: 84
End: 2024-05-13

## 2024-05-13 SDOH — HEALTH STABILITY: PHYSICAL HEALTH: ON AVERAGE, HOW MANY MINUTES DO YOU ENGAGE IN EXERCISE AT THIS LEVEL?: 30 MIN

## 2024-05-13 SDOH — HEALTH STABILITY: PHYSICAL HEALTH: ON AVERAGE, HOW MANY DAYS PER WEEK DO YOU ENGAGE IN MODERATE TO STRENUOUS EXERCISE (LIKE A BRISK WALK)?: 5 DAYS

## 2024-05-13 ASSESSMENT — PATIENT HEALTH QUESTIONNAIRE - PHQ9
SUM OF ALL RESPONSES TO PHQ9 QUESTIONS 1 & 2: 2
2. FEELING DOWN, DEPRESSED OR HOPELESS: SEVERAL DAYS
SUM OF ALL RESPONSES TO PHQ QUESTIONS 1-9: 2
1. LITTLE INTEREST OR PLEASURE IN DOING THINGS: SEVERAL DAYS
SUM OF ALL RESPONSES TO PHQ QUESTIONS 1-9: 2

## 2024-05-13 ASSESSMENT — LIFESTYLE VARIABLES
HOW OFTEN DO YOU HAVE SIX OR MORE DRINKS ON ONE OCCASION: 1
HOW MANY STANDARD DRINKS CONTAINING ALCOHOL DO YOU HAVE ON A TYPICAL DAY: 1 OR 2
HOW MANY STANDARD DRINKS CONTAINING ALCOHOL DO YOU HAVE ON A TYPICAL DAY: 1
HOW OFTEN DO YOU HAVE A DRINK CONTAINING ALCOHOL: 2-4 TIMES A MONTH
HOW OFTEN DO YOU HAVE A DRINK CONTAINING ALCOHOL: 3

## 2024-05-13 NOTE — TELEPHONE ENCOUNTER
----- Message from Kenn Galvan,  sent at 5/12/2024  8:43 AM EDT -----  Please let pt know that labs are stable and appropriate. Let me know if questions, thanks!

## 2024-05-14 NOTE — PROGRESS NOTES
Provider, MD Hedy   Calcium Carbonate-Vit D-Min (CALCIUM 1200 PO) Take by mouth daily  ProviderHedy MD   Cholecalciferol (VITAMIN D-3 PO) Take by mouth daily  Provider, Historical, MD       CareTeam (Including outside providers/suppliers regularly involved in providing care):   Patient Care Team:  Kenn Galvan DO as PCP - General (Family Medicine)  Kenn Galvan DO as PCP - Empaneled Provider     Reviewed and updated this visit:  Tobacco  Allergies  Meds  Problems  Med Hx  Surg Hx  Soc Hx  Fam Hx           Care plan reviewed with and given to patient.       Electronically signed by eKnn Galvan DO on 5/15/2024 at 10:05 AM

## 2024-05-15 ENCOUNTER — OFFICE VISIT (OUTPATIENT)
Dept: FAMILY MEDICINE CLINIC | Age: 84
End: 2024-05-15
Payer: MEDICARE

## 2024-05-15 VITALS
DIASTOLIC BLOOD PRESSURE: 84 MMHG | BODY MASS INDEX: 29.92 KG/M2 | OXYGEN SATURATION: 99 % | RESPIRATION RATE: 18 BRPM | HEIGHT: 62 IN | SYSTOLIC BLOOD PRESSURE: 128 MMHG | HEART RATE: 77 BPM | TEMPERATURE: 97.7 F | WEIGHT: 162.6 LBS

## 2024-05-15 DIAGNOSIS — M53.3 CHRONIC LEFT SI JOINT PAIN: ICD-10-CM

## 2024-05-15 DIAGNOSIS — M25.552 LEFT HIP PAIN: ICD-10-CM

## 2024-05-15 DIAGNOSIS — M51.36 DDD (DEGENERATIVE DISC DISEASE), LUMBAR: Chronic | ICD-10-CM

## 2024-05-15 DIAGNOSIS — M54.50 CHRONIC LEFT-SIDED LOW BACK PAIN WITHOUT SCIATICA: ICD-10-CM

## 2024-05-15 DIAGNOSIS — E78.5 DYSLIPIDEMIA: Chronic | ICD-10-CM

## 2024-05-15 DIAGNOSIS — M81.0 OSTEOPOROSIS, POST-MENOPAUSAL: Chronic | ICD-10-CM

## 2024-05-15 DIAGNOSIS — N18.31 STAGE 3A CHRONIC KIDNEY DISEASE (HCC): ICD-10-CM

## 2024-05-15 DIAGNOSIS — G89.29 CHRONIC LEFT SI JOINT PAIN: ICD-10-CM

## 2024-05-15 DIAGNOSIS — Z00.00 MEDICARE ANNUAL WELLNESS VISIT, SUBSEQUENT: Primary | ICD-10-CM

## 2024-05-15 DIAGNOSIS — G89.29 CHRONIC LEFT-SIDED LOW BACK PAIN WITHOUT SCIATICA: ICD-10-CM

## 2024-05-15 DIAGNOSIS — R76.8 POSITIVE ANA (ANTINUCLEAR ANTIBODY): ICD-10-CM

## 2024-05-15 DIAGNOSIS — I10 ESSENTIAL HYPERTENSION: ICD-10-CM

## 2024-05-15 DIAGNOSIS — R80.9 MICROALBUMINURIA: ICD-10-CM

## 2024-05-15 DIAGNOSIS — M15.4 EROSIVE OSTEOARTHRITIS OF BOTH HANDS: ICD-10-CM

## 2024-05-15 DIAGNOSIS — M15.9 PRIMARY OSTEOARTHRITIS INVOLVING MULTIPLE JOINTS: ICD-10-CM

## 2024-05-15 DIAGNOSIS — E87.5 HYPERKALEMIA: ICD-10-CM

## 2024-05-15 PROCEDURE — G0439 PPPS, SUBSEQ VISIT: HCPCS | Performed by: FAMILY MEDICINE

## 2024-05-15 PROCEDURE — 1123F ACP DISCUSS/DSCN MKR DOCD: CPT | Performed by: FAMILY MEDICINE

## 2024-05-15 PROCEDURE — 3079F DIAST BP 80-89 MM HG: CPT | Performed by: FAMILY MEDICINE

## 2024-05-15 PROCEDURE — 3074F SYST BP LT 130 MM HG: CPT | Performed by: FAMILY MEDICINE

## 2024-05-15 RX ORDER — ZOLEDRONIC ACID 5 MG/100ML
5 INJECTION, SOLUTION INTRAVENOUS ONCE
Qty: 100 ML | Refills: 0 | Status: SHIPPED | OUTPATIENT
Start: 2024-05-15 | End: 2024-05-15

## 2024-05-16 RX ORDER — ACETAMINOPHEN 325 MG/1
650 TABLET ORAL
OUTPATIENT
Start: 2024-05-16

## 2024-05-16 RX ORDER — HEPARIN 100 UNIT/ML
500 SYRINGE INTRAVENOUS PRN
OUTPATIENT
Start: 2024-05-16

## 2024-05-16 RX ORDER — ALBUTEROL SULFATE 90 UG/1
4 AEROSOL, METERED RESPIRATORY (INHALATION) PRN
OUTPATIENT
Start: 2024-05-16

## 2024-05-16 RX ORDER — ZOLEDRONIC ACID 5 MG/100ML
5 INJECTION, SOLUTION INTRAVENOUS ONCE
OUTPATIENT
Start: 2024-05-16 | End: 2024-05-16

## 2024-05-16 RX ORDER — SODIUM CHLORIDE 0.9 % (FLUSH) 0.9 %
5-40 SYRINGE (ML) INJECTION PRN
OUTPATIENT
Start: 2024-05-16

## 2024-05-16 RX ORDER — DIPHENHYDRAMINE HYDROCHLORIDE 50 MG/ML
50 INJECTION INTRAMUSCULAR; INTRAVENOUS
OUTPATIENT
Start: 2024-05-16

## 2024-05-16 RX ORDER — ONDANSETRON 2 MG/ML
8 INJECTION INTRAMUSCULAR; INTRAVENOUS
OUTPATIENT
Start: 2024-05-16

## 2024-05-16 RX ORDER — EPINEPHRINE 1 MG/ML
0.3 INJECTION, SOLUTION, CONCENTRATE INTRAVENOUS PRN
OUTPATIENT
Start: 2024-05-16

## 2024-05-16 RX ORDER — SODIUM CHLORIDE 9 MG/ML
5-250 INJECTION, SOLUTION INTRAVENOUS PRN
OUTPATIENT
Start: 2024-05-16

## 2024-05-16 RX ORDER — SODIUM CHLORIDE 9 MG/ML
INJECTION, SOLUTION INTRAVENOUS CONTINUOUS
OUTPATIENT
Start: 2024-05-16

## 2024-06-04 ENCOUNTER — TELEPHONE (OUTPATIENT)
Dept: PHYSICAL MEDICINE AND REHAB | Age: 84
End: 2024-06-04

## 2024-06-04 ENCOUNTER — OFFICE VISIT (OUTPATIENT)
Dept: PHYSICAL MEDICINE AND REHAB | Age: 84
End: 2024-06-04
Payer: MEDICARE

## 2024-06-04 VITALS
SYSTOLIC BLOOD PRESSURE: 136 MMHG | WEIGHT: 162 LBS | DIASTOLIC BLOOD PRESSURE: 62 MMHG | HEIGHT: 62 IN | BODY MASS INDEX: 29.81 KG/M2

## 2024-06-04 DIAGNOSIS — Z98.890 HISTORY OF LUMBAR LAMINECTOMY: ICD-10-CM

## 2024-06-04 DIAGNOSIS — M54.50 CHRONIC BILATERAL LOW BACK PAIN WITHOUT SCIATICA: ICD-10-CM

## 2024-06-04 DIAGNOSIS — G89.4 CHRONIC PAIN SYNDROME: ICD-10-CM

## 2024-06-04 DIAGNOSIS — M47.816 LUMBAR SPONDYLOSIS: Primary | ICD-10-CM

## 2024-06-04 DIAGNOSIS — M53.3 SI (SACROILIAC) PAIN: ICD-10-CM

## 2024-06-04 DIAGNOSIS — M41.9 SCOLIOSIS OF THORACOLUMBAR SPINE, UNSPECIFIED SCOLIOSIS TYPE: ICD-10-CM

## 2024-06-04 DIAGNOSIS — G89.29 CHRONIC BILATERAL LOW BACK PAIN WITHOUT SCIATICA: ICD-10-CM

## 2024-06-04 DIAGNOSIS — M47.816 LUMBAR FACET ARTHROPATHY: ICD-10-CM

## 2024-06-04 PROCEDURE — 99214 OFFICE O/P EST MOD 30 MIN: CPT | Performed by: NURSE PRACTITIONER

## 2024-06-04 PROCEDURE — 3075F SYST BP GE 130 - 139MM HG: CPT | Performed by: NURSE PRACTITIONER

## 2024-06-04 PROCEDURE — 1036F TOBACCO NON-USER: CPT | Performed by: NURSE PRACTITIONER

## 2024-06-04 PROCEDURE — G8427 DOCREV CUR MEDS BY ELIG CLIN: HCPCS | Performed by: NURSE PRACTITIONER

## 2024-06-04 PROCEDURE — 1090F PRES/ABSN URINE INCON ASSESS: CPT | Performed by: NURSE PRACTITIONER

## 2024-06-04 PROCEDURE — G8417 CALC BMI ABV UP PARAM F/U: HCPCS | Performed by: NURSE PRACTITIONER

## 2024-06-04 PROCEDURE — G8399 PT W/DXA RESULTS DOCUMENT: HCPCS | Performed by: NURSE PRACTITIONER

## 2024-06-04 PROCEDURE — 1123F ACP DISCUSS/DSCN MKR DOCD: CPT | Performed by: NURSE PRACTITIONER

## 2024-06-04 PROCEDURE — 3078F DIAST BP <80 MM HG: CPT | Performed by: NURSE PRACTITIONER

## 2024-06-04 RX ORDER — HYDROCODONE BITARTRATE AND ACETAMINOPHEN 5; 325 MG/1; MG/1
.5-1 TABLET ORAL DAILY PRN
Qty: 30 TABLET | Refills: 0 | Status: SHIPPED | OUTPATIENT
Start: 2024-06-04 | End: 2024-07-04

## 2024-06-04 ASSESSMENT — ENCOUNTER SYMPTOMS
RESPIRATORY NEGATIVE: 1
EYES NEGATIVE: 1
GASTROINTESTINAL NEGATIVE: 1
COLOR CHANGE: 0
BACK PAIN: 1

## 2024-06-04 NOTE — PROGRESS NOTES
Thyroid: No thyromegaly.   Cardiovascular:      Rate and Rhythm: Normal rate and regular rhythm.      Heart sounds: Normal heart sounds. No murmur heard.     No friction rub. No gallop.   Pulmonary:      Effort: Pulmonary effort is normal. No respiratory distress.      Breath sounds: Normal breath sounds. No wheezing or rales.   Chest:      Chest wall: No tenderness.   Abdominal:      General: Bowel sounds are normal. There is no distension.      Palpations: Abdomen is soft.      Tenderness: There is no abdominal tenderness. There is no guarding or rebound.   Musculoskeletal:         General: Tenderness present.      Right shoulder: No tenderness or bony tenderness. Normal range of motion.      Left shoulder: No swelling, deformity, tenderness or bony tenderness. Normal range of motion.      Right hand: No deformity or tenderness.      Left hand: No deformity or tenderness.      Cervical back: Normal, normal range of motion and neck supple. No edema, erythema, rigidity or tenderness. No muscular tenderness. Normal range of motion.      Thoracic back: No tenderness. Scoliosis present.      Lumbar back: Tenderness and bony tenderness present. Decreased range of motion. Negative right straight leg raise test and negative left straight leg raise test. No scoliosis.        Back:       Right upper leg: Tenderness present.        Legs:    Skin:     General: Skin is warm.      Coloration: Skin is not pale.      Findings: No bruising, erythema or rash.          Neurological:      Mental Status: She is alert and oriented to person, place, and time. She is not disoriented.      Cranial Nerves: No cranial nerve deficit.      Sensory: No sensory deficit.      Motor: No weakness, atrophy or abnormal muscle tone.      Coordination: Coordination normal.      Gait: Gait abnormal.      Deep Tendon Reflexes: Babinski sign absent on the right side. Babinski sign absent on the left side.      Reflex Scores:       Achilles reflexes are

## 2024-06-18 ENCOUNTER — HOSPITAL ENCOUNTER (OUTPATIENT)
Dept: NURSING | Age: 84
Discharge: HOME | End: 2024-06-18
Payer: MEDICARE

## 2024-06-18 VITALS
RESPIRATION RATE: 18 BRPM | DIASTOLIC BLOOD PRESSURE: 65 MMHG | HEART RATE: 69 BPM | TEMPERATURE: 98.7 F | OXYGEN SATURATION: 97 % | SYSTOLIC BLOOD PRESSURE: 145 MMHG

## 2024-06-18 DIAGNOSIS — M81.0 SENILE OSTEOPOROSIS: Primary | ICD-10-CM

## 2024-06-18 PROCEDURE — 96365 THER/PROPH/DIAG IV INF INIT: CPT

## 2024-06-18 PROCEDURE — 6360000002 HC RX W HCPCS: Performed by: FAMILY MEDICINE

## 2024-06-18 RX ORDER — ALBUTEROL SULFATE 90 UG/1
4 AEROSOL, METERED RESPIRATORY (INHALATION) PRN
OUTPATIENT
Start: 2025-06-17

## 2024-06-18 RX ORDER — HEPARIN 100 UNIT/ML
500 SYRINGE INTRAVENOUS PRN
OUTPATIENT
Start: 2025-06-17

## 2024-06-18 RX ORDER — EPINEPHRINE 1 MG/ML
0.3 INJECTION, SOLUTION INTRAMUSCULAR; SUBCUTANEOUS PRN
OUTPATIENT
Start: 2025-06-17

## 2024-06-18 RX ORDER — SODIUM CHLORIDE 9 MG/ML
5-250 INJECTION, SOLUTION INTRAVENOUS PRN
OUTPATIENT
Start: 2025-06-17

## 2024-06-18 RX ORDER — ONDANSETRON 2 MG/ML
8 INJECTION INTRAMUSCULAR; INTRAVENOUS
OUTPATIENT
Start: 2025-06-17

## 2024-06-18 RX ORDER — SODIUM CHLORIDE 9 MG/ML
INJECTION, SOLUTION INTRAVENOUS CONTINUOUS
OUTPATIENT
Start: 2025-06-17

## 2024-06-18 RX ORDER — ZOLEDRONIC ACID 5 MG/100ML
5 INJECTION, SOLUTION INTRAVENOUS ONCE
Status: COMPLETED | OUTPATIENT
Start: 2024-06-18 | End: 2024-06-18

## 2024-06-18 RX ORDER — SODIUM CHLORIDE 0.9 % (FLUSH) 0.9 %
5-40 SYRINGE (ML) INJECTION PRN
OUTPATIENT
Start: 2025-06-17

## 2024-06-18 RX ORDER — ZOLEDRONIC ACID 5 MG/100ML
5 INJECTION, SOLUTION INTRAVENOUS ONCE
OUTPATIENT
Start: 2025-06-17 | End: 2025-06-17

## 2024-06-18 RX ORDER — ACETAMINOPHEN 325 MG/1
650 TABLET ORAL
OUTPATIENT
Start: 2025-06-17

## 2024-06-18 RX ORDER — DIPHENHYDRAMINE HYDROCHLORIDE 50 MG/ML
50 INJECTION INTRAMUSCULAR; INTRAVENOUS
OUTPATIENT
Start: 2025-06-17

## 2024-06-18 RX ADMIN — ZOLEDRONIC ACID 5 MG: 5 INJECTION, SOLUTION INTRAVENOUS at 13:14

## 2024-06-18 NOTE — PROGRESS NOTES
1305 pt arrives ambulatory with cane for reclast infusion. Infusion explained and questions answered. PT RIGHTS AND RESPONSIBILITIES OFFERED TO PT. Pt meets criteria for reclast infusion.   1330 infusion complete. Pt tolerated it well with no complaints. Pt discharged ambulatory with instructions with no complaints.                 __m__ Safety:       (Environmental)  [place] to environment  Ensure ID band is correct and in place/ allergy band as needed  Assess for fall risk  Initiate fall precautions as applicable (fall band, side rails, etc.)  Call light within reach  Bed in low position/ wheels locked    _m___ Pain:       Assess pain level and characteristics  Administer analgesics as ordered  Assess effectiveness of pain management and report to MD as needed    _m___ Knowledge Deficit:  Assess baseline knowledge  Provide teaching at level of understanding  Provide teaching via preferred learning method  Evaluate teaching effectiveness    __m__ Hemodynamic/Respiratory Status:       (Pre and Post Procedure Monitoring)  Assess/Monitor vital signs and LOC  Assess Baseline SpO2 prior to any sedation  Obtain weight/height  Assess vital signs/ LOC until patient meets discharge criteria  Monitor procedure site and notify MD of any issues

## 2024-07-01 NOTE — DISCHARGE INSTRUCTIONS
ANESTHESIA INSTRUCTIONS FOLLOWING SURGERY        Since you may experience some intermittent light-headedness for the next several hours, we suggest you plan on bed rest or quiet relaxation this evening. You must have a friend or relative stay with you tonight.    Because of the sedation you have received, it is recommended that you do not drive a motor vehicle, operate any kind of machinery, or sign any contractual agreement for 24 hours following the procedure.    You should not take alcoholic beverages tonight and only take sleeping medication that has been specifically prescribed for you by your physician.  Call office [phone] if you have:  Temperature greater than 100.4  Persistent nausea and vomiting  Severe uncontrolled pain  Redness, tenderness, or signs of infection (pain, swelling, redness, odor or green/yellow discharge around the site)  Difficulty breathing, headache or visual disturbances  Hives  Persistent dizziness or light-headedness  Extreme fatigue  Any other questions or concerns you may have after discharge    In an emergency, call 911 or go to an Emergency Department at a nearby hospital    It is important to bring a complete, current list of your medications to any medical appointments or hospitalizations.    REMINDER:   Carry a list of your medications and allergies with you at all times  Call your pharmacy at least 1 week in advance to refill prescriptions    Diet: Resume your usual diet. Good nutrition promotes healing. Increase fluid intake.     Activity: Rest for 24 hrs then resume normal activity.    HOME MEDICATIONS:      If on blood thinning products such as; Aspirin, NSAIDS, Plavix, Coumadin, Xarelto, Fish Oil, Multi-Vitamins or Herbal Supplements restart in 24 hours      Restart Metformin in 48 hours if you had procedure with dye.      Restart Metformin in 24 hours if no dye used during procedure.        Education Materials Received: {yes/no:580357}  Belongings Returned:

## 2024-07-02 ENCOUNTER — TELEPHONE (OUTPATIENT)
Dept: PHYSICAL MEDICINE AND REHAB | Age: 84
End: 2024-07-02

## 2024-07-03 ENCOUNTER — APPOINTMENT (OUTPATIENT)
Dept: GENERAL RADIOLOGY | Age: 84
End: 2024-07-03
Attending: PAIN MEDICINE
Payer: MEDICARE

## 2024-07-03 ENCOUNTER — HOSPITAL ENCOUNTER (OUTPATIENT)
Age: 84
Setting detail: OUTPATIENT SURGERY
Discharge: HOME | End: 2024-07-03
Attending: PAIN MEDICINE | Admitting: PAIN MEDICINE
Payer: MEDICARE

## 2024-07-03 VITALS
TEMPERATURE: 97.6 F | WEIGHT: 163.8 LBS | HEART RATE: 62 BPM | HEIGHT: 62 IN | OXYGEN SATURATION: 95 % | RESPIRATION RATE: 14 BRPM | SYSTOLIC BLOOD PRESSURE: 160 MMHG | DIASTOLIC BLOOD PRESSURE: 68 MMHG | BODY MASS INDEX: 30.14 KG/M2

## 2024-07-03 PROCEDURE — 99152 MOD SED SAME PHYS/QHP 5/>YRS: CPT | Performed by: PAIN MEDICINE

## 2024-07-03 PROCEDURE — 64635 DESTROY LUMB/SAC FACET JNT: CPT | Performed by: PAIN MEDICINE

## 2024-07-03 PROCEDURE — 3600000057 HC PAIN LEVEL 4 ADDL 15 MIN: Performed by: PAIN MEDICINE

## 2024-07-03 PROCEDURE — 2500000003 HC RX 250 WO HCPCS: Performed by: PAIN MEDICINE

## 2024-07-03 PROCEDURE — 7100000011 HC PHASE II RECOVERY - ADDTL 15 MIN: Performed by: PAIN MEDICINE

## 2024-07-03 PROCEDURE — 2709999900 HC NON-CHARGEABLE SUPPLY: Performed by: PAIN MEDICINE

## 2024-07-03 PROCEDURE — 6360000002 HC RX W HCPCS: Performed by: PAIN MEDICINE

## 2024-07-03 PROCEDURE — 3600000056 HC PAIN LEVEL 4 BASE: Performed by: PAIN MEDICINE

## 2024-07-03 PROCEDURE — 7100000010 HC PHASE II RECOVERY - FIRST 15 MIN: Performed by: PAIN MEDICINE

## 2024-07-03 PROCEDURE — 64636 DESTROY L/S FACET JNT ADDL: CPT | Performed by: PAIN MEDICINE

## 2024-07-03 RX ORDER — LIDOCAINE HYDROCHLORIDE 20 MG/ML
INJECTION, SOLUTION INFILTRATION; PERINEURAL PRN
Status: DISCONTINUED | OUTPATIENT
Start: 2024-07-03 | End: 2024-07-03 | Stop reason: ALTCHOICE

## 2024-07-03 RX ORDER — FENTANYL CITRATE 50 UG/ML
INJECTION, SOLUTION INTRAMUSCULAR; INTRAVENOUS PRN
Status: DISCONTINUED | OUTPATIENT
Start: 2024-07-03 | End: 2024-07-03 | Stop reason: ALTCHOICE

## 2024-07-03 RX ORDER — BUPIVACAINE HYDROCHLORIDE 5 MG/ML
INJECTION, SOLUTION PERINEURAL PRN
Status: DISCONTINUED | OUTPATIENT
Start: 2024-07-03 | End: 2024-07-03 | Stop reason: ALTCHOICE

## 2024-07-03 RX ORDER — MIDAZOLAM HYDROCHLORIDE 1 MG/ML
INJECTION INTRAMUSCULAR; INTRAVENOUS PRN
Status: DISCONTINUED | OUTPATIENT
Start: 2024-07-03 | End: 2024-07-03 | Stop reason: ALTCHOICE

## 2024-07-03 ASSESSMENT — PAIN - FUNCTIONAL ASSESSMENT: PAIN_FUNCTIONAL_ASSESSMENT: 0-10

## 2024-07-03 NOTE — PRE SEDATION
under fluoroscopy.             MEDICAL HISTORY       has a past medical history of Chronic low back pain, CKD (chronic kidney disease) stage 3, GFR 30-59 ml/min (HCC), DDD (degenerative disc disease), lumbar, Dyslipidemia, History of skin cancer, Hyperlipidemia, Hypertension, Macular degeneration, dry, Osteoarthritis, Osteoporosis, post-menopausal, and PONV (postoperative nausea and vomiting).  SURGICAL HISTORY   has a past surgical history that includes joint replacement (Bilateral); Foot surgery (Bilateral); Hysterectomy;  section; Dilation and curettage of uterus; Carpal tunnel release (Bilateral); Finger surgery; skin biopsy; back surgery (); Colonoscopy; cyst removal (Left, 2017); Cholecystectomy, laparoscopic (10/03/2017); pr colonoscopy flx dx w/collj spec when pfrmd (N/A, 2018); Nerve Surgery (N/A, 2019); Pain management procedure (Left, 2020); Facet joint injection (Bilateral, 2020); Pain management procedure (Bilateral, 2021); Pain management procedure (Bilateral, 2021); Pain management procedure (Bilateral, 3/4/2022); Pain management procedure (Right, 11/15/2022); and Pain management procedure (Bilateral, 10/23/2023).    ALLERGIES   Allergies as of 2024 - Fully Reviewed 2024   Allergen Reaction Noted    Sulfa antibiotics Swelling 2012    Other Nausea And Vomiting 2017       MEDICATIONS   Coumadin Use Last 7 Days No  Antiplatelet drug therapy use last 7 days  No  Other anticoagulant use last 7 days  No  Prior to Admission medications    Medication Sig Start Date End Date Taking? Authorizing Provider   HYDROcodone-acetaminophen (NORCO) 5-325 MG per tablet Take 0.5-1 tablets by mouth daily as needed for Pain for up to 30 days. Take lowest dose possible to manage pain Max Daily Amount: 1 tablet 24  Onofre Lorenzo, APRN - CNP   zoledronic acid (RECLAST) 5 MG/100ML SOLN Infuse 100 mLs intravenously once for 1 dose 5/15/24

## 2024-07-03 NOTE — POST SEDATION
IV sedation was used during the procedure:  - Moderate intravenous conscious sedation was supervised by Dr. Nelson  - The patient was independently monitored by a Registered Nurse assigned to the procedure room  - Monitoring included automated blood pressure, continuous EKG, and continuous pulse oximetry  - The detailed conscious record is permanently stored in the Hospital Information System  - The following is the conscious sedation record:  Start Time: 0827  End Time : 0838  Duration: 15 minutes   Medications Administered: 2 mg Versed, 100 mcg Fentanyl  Complications:none  EBL-0

## 2024-07-03 NOTE — OP NOTE
Pre-Procedure Note    Patient Name: Nelda J Brunner   YOB: 1940  Medical Record Number: [id number]  Date: 7/3/24    Indication:  Lower back pain    Consent: On file.    Vital Signs:   Vitals:    24 0720   BP: (!) 165/72   Pulse: 66   Resp: 16   Temp: 97.5 °F (36.4 °C)   SpO2: 95%       Past Medical History:   has a past medical history of Chronic low back pain, CKD (chronic kidney disease) stage 3, GFR 30-59 ml/min (HCC), DDD (degenerative disc disease), lumbar, Dyslipidemia, History of skin cancer, Hyperlipidemia, Hypertension, Macular degeneration, dry, Osteoarthritis, Osteoporosis, post-menopausal, and PONV (postoperative nausea and vomiting).    Past Surgical History:   has a past surgical history that includes joint replacement (Bilateral); Foot surgery (Bilateral); Hysterectomy;  section; Dilation and curettage of uterus; Carpal tunnel release (Bilateral); Finger surgery; skin biopsy; back surgery (); Colonoscopy; cyst removal (Left, 2017); Cholecystectomy, laparoscopic (10/03/2017); pr colonoscopy flx dx w/collj spec when pfrmd (N/A, 2018); Nerve Surgery (N/A, 2019); Pain management procedure (Left, 2020); Facet joint injection (Bilateral, 2020); Pain management procedure (Bilateral, 2021); Pain management procedure (Bilateral, 2021); Pain management procedure (Bilateral, 3/4/2022); Pain management procedure (Right, 11/15/2022); and Pain management procedure (Bilateral, 10/23/2023).    Pre-Sedation Documentation and Exam:   Vital signs have been reviewed (see flow sheet for vitals).     Sedation/ Anesthesia Plan:   IV sedation    Patient is an appropriate candidate for plan of sedation: yes      Preoperative Diagnosis:  L-spondylosis     Post-Op Dx: as above     Procedure Performed:  :Radiofrequency ablation of median branches at the levels of L2-3 and L3-4 right under fluoroscopic guidance      Indication for the Procedure:  The patient has

## 2024-07-03 NOTE — PROGRESS NOTES
0840- Patient arrived to Phase II via bed, report from Annmarie BURGESS. Patient awake and alert without complaints. VSS. Site WNL. Drink and snack provided. Call light in reach.  0903 - Patient sat edge of bed, tolerated well. IV removed. Patient dressed.  0908 - Patient discharged ambulatory to private vehicle with daughter.

## 2024-07-03 NOTE — H&P
H&P    Patient complaints of pain in low back that have recently increased over the past 1 month as she feels relief from previous L-facet RFA has worn off. Pain in low back starts in center and radiates across. Aching dull pain mainly.      Denies any radicular pain   Pain increases with bending, lifting, twisting , walking, standing, getting up and down, and housework or working at job, worse in the morning     Continues Norco only prn when pain increases and she has 1 tab left from refill from November. Takes tylenol prn      Prior Injections:  ight L-facet RFA from 11/15/2022 80% relief.      bilateral L-facet RFA @ L2-4 completed by Dr. Nelson on 10/23/2023 over 90% relief for over 7 months      Radiology:        Medications reviewed. Patient denies side effects with medications. Patient states she is taking medications as prescribed. Shedenies receiving pain medications from other sources. She  had 1 ER visit since last visit after a fall     Pain scale with out pain medications or at its worst is 5-10/10, currently 7/10      Last dose of Norco was 1/2 tab was 2 days ago   Drug screen reviewed from 11/14/2024 and was appropriate Norco not in screen as she only takes prn   Pill count completed  today and WNL: Yes     Pre procedure Oswestry Disability index: 58%, pain level 7-8/10      Post procedure Oswestry Disability index: Following L-facet RFA, 16%, 8/50, pain level 0-2/10     The patientis allergic to sulfa antibiotics and other.        Subjective:      Review of Systems   Constitutional: Negative.    HENT: Negative.     Eyes: Negative.  Negative for visual disturbance.   Respiratory: Negative.     Cardiovascular: Negative.    Gastrointestinal: Negative.    Endocrine: Negative.    Genitourinary: Negative.    Musculoskeletal:  Positive for arthralgias, back pain, gait problem and myalgias. Negative for joint swelling, neck pain and neck stiffness.        Ambulating with walking stick    Skin:  Negative for

## 2024-07-10 DIAGNOSIS — M19.071 OSTEOARTHRITIS OF BOTH FEET, UNSPECIFIED OSTEOARTHRITIS TYPE: ICD-10-CM

## 2024-07-10 DIAGNOSIS — M19.072 OSTEOARTHRITIS OF BOTH FEET, UNSPECIFIED OSTEOARTHRITIS TYPE: ICD-10-CM

## 2024-07-10 RX ORDER — HYDROXYCHLOROQUINE SULFATE 200 MG/1
TABLET, FILM COATED ORAL
Qty: 135 TABLET | Refills: 1 | Status: SHIPPED | OUTPATIENT
Start: 2024-07-10

## 2024-07-24 ENCOUNTER — OFFICE VISIT (OUTPATIENT)
Dept: PHYSICAL MEDICINE AND REHAB | Age: 84
End: 2024-07-24
Payer: MEDICARE

## 2024-07-24 VITALS
DIASTOLIC BLOOD PRESSURE: 64 MMHG | SYSTOLIC BLOOD PRESSURE: 122 MMHG | WEIGHT: 163.8 LBS | BODY MASS INDEX: 30.14 KG/M2 | HEIGHT: 62 IN

## 2024-07-24 DIAGNOSIS — G89.29 CHRONIC BILATERAL LOW BACK PAIN WITHOUT SCIATICA: ICD-10-CM

## 2024-07-24 DIAGNOSIS — M53.3 SI (SACROILIAC) PAIN: ICD-10-CM

## 2024-07-24 DIAGNOSIS — Z98.890 HISTORY OF LUMBAR LAMINECTOMY: ICD-10-CM

## 2024-07-24 DIAGNOSIS — M54.50 CHRONIC BILATERAL LOW BACK PAIN WITHOUT SCIATICA: ICD-10-CM

## 2024-07-24 DIAGNOSIS — M41.9 SCOLIOSIS OF THORACOLUMBAR SPINE, UNSPECIFIED SCOLIOSIS TYPE: ICD-10-CM

## 2024-07-24 DIAGNOSIS — G89.4 CHRONIC PAIN SYNDROME: ICD-10-CM

## 2024-07-24 DIAGNOSIS — M47.816 LUMBAR FACET ARTHROPATHY: ICD-10-CM

## 2024-07-24 DIAGNOSIS — M47.816 LUMBAR SPONDYLOSIS: Primary | ICD-10-CM

## 2024-07-24 PROCEDURE — G8399 PT W/DXA RESULTS DOCUMENT: HCPCS | Performed by: NURSE PRACTITIONER

## 2024-07-24 PROCEDURE — G8427 DOCREV CUR MEDS BY ELIG CLIN: HCPCS | Performed by: NURSE PRACTITIONER

## 2024-07-24 PROCEDURE — 1036F TOBACCO NON-USER: CPT | Performed by: NURSE PRACTITIONER

## 2024-07-24 PROCEDURE — G8417 CALC BMI ABV UP PARAM F/U: HCPCS | Performed by: NURSE PRACTITIONER

## 2024-07-24 PROCEDURE — 3074F SYST BP LT 130 MM HG: CPT | Performed by: NURSE PRACTITIONER

## 2024-07-24 PROCEDURE — 1123F ACP DISCUSS/DSCN MKR DOCD: CPT | Performed by: NURSE PRACTITIONER

## 2024-07-24 PROCEDURE — 3078F DIAST BP <80 MM HG: CPT | Performed by: NURSE PRACTITIONER

## 2024-07-24 PROCEDURE — 1090F PRES/ABSN URINE INCON ASSESS: CPT | Performed by: NURSE PRACTITIONER

## 2024-07-24 PROCEDURE — 99214 OFFICE O/P EST MOD 30 MIN: CPT | Performed by: NURSE PRACTITIONER

## 2024-07-24 ASSESSMENT — ENCOUNTER SYMPTOMS
BACK PAIN: 1
EYES NEGATIVE: 1
COLOR CHANGE: 0
GASTROINTESTINAL NEGATIVE: 1
RESPIRATORY NEGATIVE: 1

## 2024-07-24 NOTE — PROGRESS NOTES
Thought content normal. Thought content is not paranoid or delusional. Thought content does not include homicidal or suicidal ideation. Thought content does not include homicidal or suicidal plan.         Cognition and Memory: Cognition normal. Memory is not impaired. She does not exhibit impaired recent memory or impaired remote memory.         Judgment: Judgment normal. Judgment is not impulsive or inappropriate.     ALCIDES  Patricks test  positive  Yeoman's  or Gaenslen's positive       Assessment:     1. Lumbar spondylosis    2. Lumbar facet arthropathy    3. Chronic bilateral low back pain without sciatica    4. History of lumbar laminectomy    5. SI (sacroiliac) pain    6. Scoliosis of thoracolumbar spine, unspecified scoliosis type    7. Chronic pain syndrome         Assessment & Plan   Plan:      OARRS reviewed. Current MED: 5.00  Patient was offered naloxone for home.   Discussed long term side effects of medications, tolerance, dependency and addiction.  Previous UDS reviewed  UDS preformed today for compliance.  Patient told can not receive any pain medications from any other source.  No evidence of abuse, diversion or aberrant behavior.  Medications and/or procedures to improve function and quality of life- patient understanding with this and that may not be pain free  Discussed with patient about safe storage of medications at home  Discussed possible weaning of medication dosing dependent on treatment/procedure results.   Discussed with patient about risks with procedure including infection, reaction to medication, increased pain, or bleeding.  Procedure notes reviewed in detail   Is receiving 80% relief of low back pain from bilateral L-facet RFA. Pain is better and intermittent.   Continue Norco 5/325 mg tabs- 1/2 tab to 1 tab daily prn-  filled 30 tabs on 6/15/2024 and has only used 1 full tab. Takes only prn and seldom. Continue tylenol prn for mild pain   Continues to follow with Rheumatology   Is

## 2024-09-16 ENCOUNTER — HOSPITAL ENCOUNTER (EMERGENCY)
Age: 84
Discharge: HOME | End: 2024-09-16
Payer: MEDICARE

## 2024-09-16 VITALS
SYSTOLIC BLOOD PRESSURE: 147 MMHG | RESPIRATION RATE: 18 BRPM | OXYGEN SATURATION: 95 % | TEMPERATURE: 98.4 F | HEART RATE: 57 BPM | DIASTOLIC BLOOD PRESSURE: 74 MMHG

## 2024-09-16 DIAGNOSIS — U07.1 COVID-19: Primary | ICD-10-CM

## 2024-09-16 LAB — SARS-COV-2 RDRP RESP QL NAA+PROBE: DETECTED

## 2024-09-16 PROCEDURE — 99213 OFFICE O/P EST LOW 20 MIN: CPT

## 2024-09-16 PROCEDURE — 87635 SARS-COV-2 COVID-19 AMP PRB: CPT

## 2024-09-16 ASSESSMENT — ENCOUNTER SYMPTOMS
VOMITING: 0
ABDOMINAL PAIN: 0
NAUSEA: 0
DIARRHEA: 0
SORE THROAT: 0

## 2024-09-16 ASSESSMENT — PAIN - FUNCTIONAL ASSESSMENT: PAIN_FUNCTIONAL_ASSESSMENT: NONE - DENIES PAIN

## 2024-09-20 ENCOUNTER — TELEPHONE (OUTPATIENT)
Dept: FAMILY MEDICINE CLINIC | Age: 84
End: 2024-09-20

## 2024-09-20 DIAGNOSIS — U07.1 COVID-19: Primary | ICD-10-CM

## 2024-09-20 RX ORDER — FLUTICASONE PROPIONATE 50 MCG
1 SPRAY, SUSPENSION (ML) NASAL DAILY
Qty: 16 G | Refills: 0 | Status: SHIPPED | OUTPATIENT
Start: 2024-09-20 | End: 2024-10-04

## 2024-09-20 RX ORDER — DEXTROMETHORPHAN HYDROBROMIDE AND PROMETHAZINE HYDROCHLORIDE 15; 6.25 MG/5ML; MG/5ML
5 SYRUP ORAL EVERY 6 HOURS PRN
Qty: 140 ML | Refills: 0 | Status: SHIPPED | OUTPATIENT
Start: 2024-09-20 | End: 2024-09-27

## 2024-10-24 ENCOUNTER — OFFICE VISIT (OUTPATIENT)
Dept: PHYSICAL MEDICINE AND REHAB | Age: 84
End: 2024-10-24
Payer: MEDICARE

## 2024-10-24 VITALS
SYSTOLIC BLOOD PRESSURE: 124 MMHG | DIASTOLIC BLOOD PRESSURE: 80 MMHG | WEIGHT: 163.8 LBS | BODY MASS INDEX: 30.14 KG/M2 | HEIGHT: 62 IN

## 2024-10-24 DIAGNOSIS — G89.4 CHRONIC PAIN SYNDROME: ICD-10-CM

## 2024-10-24 DIAGNOSIS — Z98.890 HISTORY OF LUMBAR LAMINECTOMY: ICD-10-CM

## 2024-10-24 DIAGNOSIS — M54.50 CHRONIC BILATERAL LOW BACK PAIN WITHOUT SCIATICA: ICD-10-CM

## 2024-10-24 DIAGNOSIS — M47.816 LUMBAR SPONDYLOSIS: Primary | ICD-10-CM

## 2024-10-24 DIAGNOSIS — M41.9 SCOLIOSIS OF THORACOLUMBAR SPINE, UNSPECIFIED SCOLIOSIS TYPE: ICD-10-CM

## 2024-10-24 DIAGNOSIS — M47.816 LUMBAR FACET ARTHROPATHY: ICD-10-CM

## 2024-10-24 DIAGNOSIS — G89.29 CHRONIC BILATERAL LOW BACK PAIN WITHOUT SCIATICA: ICD-10-CM

## 2024-10-24 PROCEDURE — 99214 OFFICE O/P EST MOD 30 MIN: CPT | Performed by: NURSE PRACTITIONER

## 2024-10-24 PROCEDURE — G8399 PT W/DXA RESULTS DOCUMENT: HCPCS | Performed by: NURSE PRACTITIONER

## 2024-10-24 PROCEDURE — 1123F ACP DISCUSS/DSCN MKR DOCD: CPT | Performed by: NURSE PRACTITIONER

## 2024-10-24 PROCEDURE — 3079F DIAST BP 80-89 MM HG: CPT | Performed by: NURSE PRACTITIONER

## 2024-10-24 PROCEDURE — 3074F SYST BP LT 130 MM HG: CPT | Performed by: NURSE PRACTITIONER

## 2024-10-24 PROCEDURE — G8427 DOCREV CUR MEDS BY ELIG CLIN: HCPCS | Performed by: NURSE PRACTITIONER

## 2024-10-24 PROCEDURE — 1036F TOBACCO NON-USER: CPT | Performed by: NURSE PRACTITIONER

## 2024-10-24 PROCEDURE — G8417 CALC BMI ABV UP PARAM F/U: HCPCS | Performed by: NURSE PRACTITIONER

## 2024-10-24 PROCEDURE — 1125F AMNT PAIN NOTED PAIN PRSNT: CPT | Performed by: NURSE PRACTITIONER

## 2024-10-24 PROCEDURE — 1159F MED LIST DOCD IN RCRD: CPT | Performed by: NURSE PRACTITIONER

## 2024-10-24 PROCEDURE — 1090F PRES/ABSN URINE INCON ASSESS: CPT | Performed by: NURSE PRACTITIONER

## 2024-10-24 PROCEDURE — G8484 FLU IMMUNIZE NO ADMIN: HCPCS | Performed by: NURSE PRACTITIONER

## 2024-10-24 ASSESSMENT — ENCOUNTER SYMPTOMS
RESPIRATORY NEGATIVE: 1
BACK PAIN: 1
GASTROINTESTINAL NEGATIVE: 1
EYES NEGATIVE: 1
COLOR CHANGE: 0

## 2024-10-24 NOTE — PROGRESS NOTES
[facility] [place] [facility]  [facility]  [address]  [place] [address]  Dept: [phone]  Dept Fax: [phone]  Loc: [phone]    Visit Date: 10/24/2024    Functionality Assessment/Goals Worksheet     On a scale of 0 (Does not Interfere) to 10 (Completely Interferes)     1.  Which number describes how during the past week pain has interfered with       the following:  A.  General Activity:  4  B.  Mood: 0  C.  Walking Ability:  4  D.  Normal Work (Includes both work outside the home and housework):  4  E.  Relations with Other People:   2  F.  Sleep:   3  G.  Enjoyment of Life:   2    2.  Patient Prefers to Take their Pain Medications:     []  On a regular basis   [x]  Only when necessary    []  Does not take pain medications    3.  What are the Patient's Goals/Expectations for Visiting Pain Management?     []  Learn about my pain    []  Receive Medication   []  Physical Therapy     []  Treat Depression   []  Receive Injections    []  Treat Sleep   []  Deal with Anxiety and Stress   []  Treat Opoid Dependence/Addiction   [x]  Other:  Just to be able to kep moving.      HPI:   Nelda J Brunner is a 83 y.o. female is here today for    Chief Complaint: Low back pain, right thumb pain     HPI   3 month follow up. Julianna reports that pain \"is still really good and controlled\". States that she continues good relief from bilateral L-facet RFA. She states she continues to get pain in her low back that is intermittent with a lot of walking and activity and also most noticeable in the morning. She states she remains very active.     She continues to have arthritic pain in right hand thumb- aching stiff and constant worse with using hand. This is main complaint. She continues to follow with Rheumatology.   She continues Norco only prn when pain is increased and this remains effective especially for tolerance of a lot of activity. Only takes a

## 2024-10-31 ENCOUNTER — TELEPHONE (OUTPATIENT)
Dept: FAMILY MEDICINE CLINIC | Age: 84
End: 2024-10-31

## 2024-10-31 DIAGNOSIS — I10 ESSENTIAL HYPERTENSION: Primary | ICD-10-CM

## 2024-10-31 NOTE — TELEPHONE ENCOUNTER
Pt informed of lab order. Pt voiced understanding with no further questions at this time.     Lab slip in mail.

## 2024-10-31 NOTE — TELEPHONE ENCOUNTER
Please let pt know that she is due for 6 month f/u labs to monitor renal fxn and urine for protein.     Next few wks is fine, fasting    Let me know if questions, thanks!     Diagnosis Orders   1. Essential hypertension  Basic Metabolic Panel    Microalbumin / Creatinine Urine Ratio

## 2024-11-13 ENCOUNTER — OFFICE VISIT (OUTPATIENT)
Dept: RHEUMATOLOGY | Age: 84
End: 2024-11-13
Payer: MEDICARE

## 2024-11-13 VITALS
DIASTOLIC BLOOD PRESSURE: 74 MMHG | BODY MASS INDEX: 30.25 KG/M2 | SYSTOLIC BLOOD PRESSURE: 136 MMHG | HEIGHT: 62 IN | OXYGEN SATURATION: 96 % | WEIGHT: 164.4 LBS | HEART RATE: 68 BPM

## 2024-11-13 DIAGNOSIS — M19.072 OSTEOARTHRITIS OF BOTH FEET, UNSPECIFIED OSTEOARTHRITIS TYPE: ICD-10-CM

## 2024-11-13 DIAGNOSIS — Z51.81 MEDICATION MONITORING ENCOUNTER: ICD-10-CM

## 2024-11-13 DIAGNOSIS — M51.369 DEGENERATION OF INTERVERTEBRAL DISC OF LUMBAR REGION, UNSPECIFIED WHETHER PAIN PRESENT: ICD-10-CM

## 2024-11-13 DIAGNOSIS — M19.071 OSTEOARTHRITIS OF BOTH FEET, UNSPECIFIED OSTEOARTHRITIS TYPE: ICD-10-CM

## 2024-11-13 DIAGNOSIS — M15.4 EROSIVE OSTEOARTHRITIS OF BOTH HANDS: Primary | ICD-10-CM

## 2024-11-13 DIAGNOSIS — M51.362 DEGENERATION OF INTERVERTEBRAL DISC OF LUMBAR REGION WITH DISCOGENIC BACK PAIN AND LOWER EXTREMITY PAIN: ICD-10-CM

## 2024-11-13 DIAGNOSIS — M65.4 TENOSYNOVITIS, DE QUERVAIN: ICD-10-CM

## 2024-11-13 PROCEDURE — 3078F DIAST BP <80 MM HG: CPT | Performed by: INTERNAL MEDICINE

## 2024-11-13 PROCEDURE — G8399 PT W/DXA RESULTS DOCUMENT: HCPCS | Performed by: INTERNAL MEDICINE

## 2024-11-13 PROCEDURE — 1125F AMNT PAIN NOTED PAIN PRSNT: CPT | Performed by: INTERNAL MEDICINE

## 2024-11-13 PROCEDURE — G8427 DOCREV CUR MEDS BY ELIG CLIN: HCPCS | Performed by: INTERNAL MEDICINE

## 2024-11-13 PROCEDURE — 1123F ACP DISCUSS/DSCN MKR DOCD: CPT | Performed by: INTERNAL MEDICINE

## 2024-11-13 PROCEDURE — G8484 FLU IMMUNIZE NO ADMIN: HCPCS | Performed by: INTERNAL MEDICINE

## 2024-11-13 PROCEDURE — 99214 OFFICE O/P EST MOD 30 MIN: CPT | Performed by: INTERNAL MEDICINE

## 2024-11-13 PROCEDURE — G8417 CALC BMI ABV UP PARAM F/U: HCPCS | Performed by: INTERNAL MEDICINE

## 2024-11-13 PROCEDURE — 1036F TOBACCO NON-USER: CPT | Performed by: INTERNAL MEDICINE

## 2024-11-13 PROCEDURE — 3075F SYST BP GE 130 - 139MM HG: CPT | Performed by: INTERNAL MEDICINE

## 2024-11-13 PROCEDURE — 1090F PRES/ABSN URINE INCON ASSESS: CPT | Performed by: INTERNAL MEDICINE

## 2024-11-13 PROCEDURE — 1159F MED LIST DOCD IN RCRD: CPT | Performed by: INTERNAL MEDICINE

## 2024-11-13 RX ORDER — MOMETASONE FUROATE 1 MG/G
CREAM TOPICAL
COMMUNITY
Start: 2024-09-10

## 2024-11-13 ASSESSMENT — ENCOUNTER SYMPTOMS
EYES NEGATIVE: 1
RESPIRATORY NEGATIVE: 1
GASTROINTESTINAL NEGATIVE: 1

## 2024-11-13 NOTE — PROGRESS NOTES
Disp: , Rfl:     Melatonin 10-10 MG TBCR, Take by mouth, Disp: , Rfl:     acetaminophen (TYLENOL) 650 MG extended release tablet, Take 1 tablet by mouth every 8 hours as needed for Pain, Disp: , Rfl:     vitamin C (ASCORBIC ACID) 500 MG tablet, Take 1 tablet by mouth daily, Disp: , Rfl:     Multiple Vitamins-Minerals (PRESERVISION AREDS PO), Take by mouth, Disp: , Rfl:     Calcium Carbonate-Vit D-Min (CALCIUM 1200 PO), Take by mouth daily, Disp: , Rfl:     Cholecalciferol (VITAMIN D-3 PO), Take by mouth daily, Disp: , Rfl:     Objective     There were no vitals filed for this visit.      Physical Exam  Vitals reviewed.   Constitutional:       Appearance: Normal appearance.   HENT:      Head: Normocephalic.      Nose: Nose normal.   Eyes:      Pupils: Pupils are equal, round, and reactive to light.   Skin:     General: Skin is warm and dry.   Neurological:      Mental Status: She is alert.       Upper extremities:    Hands + DIP and pip nodules.  cmc squaring. , + finkelstein test Right.    Nodules - dorsal left 5th pip - mobile , firm     Lower extremities:  HIPS, Knees/ankles/feeto non-tender.      Spine: tender - mid lumbar spine tenderness. + scoliosis        LABS  Lab Results   Component Value Date/Time    WBC 4.4 05/10/2024 08:40 AM    WBC 4.4 03/14/2024 05:22 PM    WBC 5.0 04/28/2023 08:52 AM    RBC 4.34 05/10/2024 08:40 AM    HGB 13.3 05/10/2024 08:40 AM    HGB 12.0 03/14/2024 05:22 PM    HGB 12.2 04/28/2023 08:52 AM    HCT 40.5 05/10/2024 08:40 AM     05/10/2024 08:40 AM     03/14/2024 05:22 PM     04/28/2023 08:52 AM    MCV 93.3 05/10/2024 08:40 AM    MCH 30.6 05/10/2024 08:40 AM    MCHC 32.8 05/10/2024 08:40 AM    RDW 13.3 02/19/2021 09:25 AM    NRBC 0 05/10/2024 08:40 AM    LYMPHOPCT 35.8 05/03/2018 11:02 AM    MONOPCT 22.7 05/10/2024 08:40 AM    EOSPCT 2.0 05/03/2018 11:02 AM    BASOPCT 0.5 05/03/2018 11:02 AM    MONOSABS 1.0 05/10/2024 08:40 AM    LYMPHSABS 1.4 05/10/2024 08:40 AM

## 2024-11-20 ENCOUNTER — LAB (OUTPATIENT)
Dept: LAB | Age: 84
End: 2024-11-20

## 2024-11-20 ENCOUNTER — TELEPHONE (OUTPATIENT)
Dept: FAMILY MEDICINE CLINIC | Age: 84
End: 2024-11-20

## 2024-11-20 DIAGNOSIS — I10 ESSENTIAL HYPERTENSION: ICD-10-CM

## 2024-11-20 DIAGNOSIS — Z51.81 MEDICATION MONITORING ENCOUNTER: ICD-10-CM

## 2024-11-20 LAB
ALBUMIN SERPL BCG-MCNC: 4.3 G/DL (ref 3.5–5.1)
ALP SERPL-CCNC: 71 U/L (ref 38–126)
ALT SERPL W/O P-5'-P-CCNC: 15 U/L (ref 11–66)
ANION GAP SERPL CALC-SCNC: 13 MEQ/L (ref 8–16)
AST SERPL-CCNC: 21 U/L (ref 5–40)
BASOPHILS ABSOLUTE: 0 THOU/MM3 (ref 0–0.1)
BASOPHILS NFR BLD AUTO: 0.5 %
BILIRUB SERPL-MCNC: 0.4 MG/DL (ref 0.3–1.2)
BUN SERPL-MCNC: 23 MG/DL (ref 7–22)
CALCIUM SERPL-MCNC: 9.4 MG/DL (ref 8.5–10.5)
CHLORIDE SERPL-SCNC: 104 MEQ/L (ref 98–111)
CO2 SERPL-SCNC: 24 MEQ/L (ref 23–33)
CREAT SERPL-MCNC: 1 MG/DL (ref 0.4–1.2)
CREAT UR-MCNC: 136.9 MG/DL
CRP SERPL-MCNC: < 0.3 MG/DL (ref 0–1)
DEPRECATED RDW RBC AUTO: 44.1 FL (ref 35–45)
EOSINOPHIL NFR BLD AUTO: 3.2 %
EOSINOPHILS ABSOLUTE: 0.1 THOU/MM3 (ref 0–0.4)
ERYTHROCYTE [DISTWIDTH] IN BLOOD BY AUTOMATED COUNT: 12.7 % (ref 11.5–14.5)
ERYTHROCYTE [SEDIMENTATION RATE] IN BLOOD BY WESTERGREN METHOD: 12 MM/HR (ref 0–20)
GFR SERPL CREATININE-BSD FRML MDRD: 56 ML/MIN/1.73M2
GLUCOSE SERPL-MCNC: 98 MG/DL (ref 70–108)
HCT VFR BLD AUTO: 38.9 % (ref 37–47)
HGB BLD-MCNC: 12.5 GM/DL (ref 12–16)
IMM GRANULOCYTES # BLD AUTO: 0.01 THOU/MM3 (ref 0–0.07)
IMM GRANULOCYTES NFR BLD AUTO: 0.2 %
LYMPHOCYTES ABSOLUTE: 1.2 THOU/MM3 (ref 1–4.8)
LYMPHOCYTES NFR BLD AUTO: 26.8 %
MCH RBC QN AUTO: 30.2 PG (ref 26–33)
MCHC RBC AUTO-ENTMCNC: 32.1 GM/DL (ref 32.2–35.5)
MCV RBC AUTO: 94 FL (ref 81–99)
MICROALBUMIN UR-MCNC: 2.58 MG/DL
MICROALBUMIN/CREAT RATIO PNL UR: 19 MG/G (ref 0–30)
MONOCYTES ABSOLUTE: 0.6 THOU/MM3 (ref 0.4–1.3)
MONOCYTES NFR BLD AUTO: 12.5 %
NEUTROPHILS ABSOLUTE: 2.5 THOU/MM3 (ref 1.8–7.7)
NEUTROPHILS NFR BLD AUTO: 56.8 %
NRBC BLD AUTO-RTO: 0 /100 WBC
PLATELET # BLD AUTO: 225 THOU/MM3 (ref 130–400)
PMV BLD AUTO: 10.4 FL (ref 9.4–12.4)
POTASSIUM SERPL-SCNC: 4.1 MEQ/L (ref 3.5–5.2)
PROT SERPL-MCNC: 7 G/DL (ref 6.1–8)
RBC # BLD AUTO: 4.14 MILL/MM3 (ref 4.2–5.4)
SODIUM SERPL-SCNC: 141 MEQ/L (ref 135–145)
WBC # BLD AUTO: 4.4 THOU/MM3 (ref 4.8–10.8)

## 2024-11-20 NOTE — TELEPHONE ENCOUNTER
----- Message from Dr. Kenn Galvan, DO sent at 11/20/2024 12:24 PM EST -----  Please let pt know that labs look good. No concerning findings. Let me know if questions, thanks!

## 2024-12-05 DIAGNOSIS — I10 ESSENTIAL HYPERTENSION: Chronic | ICD-10-CM

## 2024-12-05 DIAGNOSIS — M19.071 OSTEOARTHRITIS OF BOTH FEET, UNSPECIFIED OSTEOARTHRITIS TYPE: ICD-10-CM

## 2024-12-05 DIAGNOSIS — M19.072 OSTEOARTHRITIS OF BOTH FEET, UNSPECIFIED OSTEOARTHRITIS TYPE: ICD-10-CM

## 2024-12-05 RX ORDER — LISINOPRIL AND HYDROCHLOROTHIAZIDE 10; 12.5 MG/1; MG/1
TABLET ORAL
Qty: 90 TABLET | Refills: 3 | Status: SHIPPED | OUTPATIENT
Start: 2024-12-05

## 2024-12-05 NOTE — TELEPHONE ENCOUNTER
Recent Visits  Date Type Provider Dept   05/15/24 Office Visit Kenn Galvan, DO [facility]   05/09/24 Office Visit Kenn Galvan, DO [facility]   03/27/24 Office Visit Kenn Galvan, DO [facility]   10/16/23 Office Visit Kenn Galvan, DO [facility]   Showing recent visits within past 540 days with a meds authorizing provider and meeting all other requirements  Future Appointments  No visits were found meeting these conditions.  Showing future appointments within next 150 days with a meds authorizing provider and meeting all other requirements

## 2024-12-23 ENCOUNTER — TELEPHONE (OUTPATIENT)
Dept: FAMILY MEDICINE CLINIC | Age: 84
End: 2024-12-23

## 2024-12-23 ENCOUNTER — LAB (OUTPATIENT)
Dept: FAMILY MEDICINE CLINIC | Age: 84
End: 2024-12-23
Payer: MEDICARE

## 2024-12-23 DIAGNOSIS — Z23 NEEDS FLU SHOT: Primary | ICD-10-CM

## 2024-12-23 PROCEDURE — 90653 IIV ADJUVANT VACCINE IM: CPT | Performed by: FAMILY MEDICINE

## 2024-12-23 PROCEDURE — G0008 ADMIN INFLUENZA VIRUS VAC: HCPCS | Performed by: FAMILY MEDICINE

## 2024-12-23 NOTE — PROGRESS NOTES
Vaccine Information Sheet, \"Influenza - Inactivated\"  given to Nelda J Brunner, or parent/legal guardian of  Nelda J Brunner and verbalized understanding.    Patient responses:    Have you ever had a reaction to a flu vaccine? No  Do you have an allergy to eggs, neomycin or polymixin?  No  Do you have an allergy to Thimerosal, contact lens solution, or Merthiolate? No  Have you ever had Guillian [place] Syndrome?  No  Do you have any current illness?  No  Do you have a temperature above 100 degrees? No  Are you pregnant? No  If pregnant, permission obtained from physician? No  Do you have an active neurological disorder? No      Flu vaccine given per order. Please see immunization tab.        Immunization(s) given during visit:    Immunizations Administered       Name Date Dose Route    Influenza, FLUAD, (age 65 y+), IM, Trivalent PF, 0.5mL 12/23/2024 0.5 mL Intramuscular    Site: Deltoid- Left    Lot: [id number]    NDC: 27725-994-88            Most recent Vaccine Information Sheet dated 8.1.2021 given to pt

## 2025-01-07 ENCOUNTER — PATIENT MESSAGE (OUTPATIENT)
Dept: FAMILY MEDICINE CLINIC | Age: 85
End: 2025-01-07

## 2025-01-07 ENCOUNTER — HOSPITAL ENCOUNTER (OUTPATIENT)
Dept: WOMENS IMAGING | Age: 85
Discharge: HOME | End: 2025-01-07
Payer: MEDICARE

## 2025-01-07 VITALS — WEIGHT: 164 LBS | BODY MASS INDEX: 29.99 KG/M2

## 2025-01-07 DIAGNOSIS — Z12.31 VISIT FOR SCREENING MAMMOGRAM: ICD-10-CM

## 2025-01-07 PROCEDURE — 77063 BREAST TOMOSYNTHESIS BI: CPT

## 2025-01-08 ENCOUNTER — TELEPHONE (OUTPATIENT)
Dept: FAMILY MEDICINE CLINIC | Age: 85
End: 2025-01-08

## 2025-01-08 ENCOUNTER — PATIENT MESSAGE (OUTPATIENT)
Dept: FAMILY MEDICINE CLINIC | Age: 85
End: 2025-01-08

## 2025-01-08 ENCOUNTER — OFFICE VISIT (OUTPATIENT)
Dept: FAMILY MEDICINE CLINIC | Age: 85
End: 2025-01-08
Payer: MEDICARE

## 2025-01-08 VITALS
RESPIRATION RATE: 16 BRPM | HEIGHT: 62 IN | WEIGHT: 167.8 LBS | HEART RATE: 79 BPM | TEMPERATURE: 97.7 F | SYSTOLIC BLOOD PRESSURE: 132 MMHG | BODY MASS INDEX: 30.88 KG/M2 | OXYGEN SATURATION: 98 % | DIASTOLIC BLOOD PRESSURE: 72 MMHG

## 2025-01-08 DIAGNOSIS — M54.9 ACUTE UPPER BACK PAIN: Primary | ICD-10-CM

## 2025-01-08 DIAGNOSIS — I10 ESSENTIAL HYPERTENSION: ICD-10-CM

## 2025-01-08 DIAGNOSIS — E87.5 HYPERKALEMIA: ICD-10-CM

## 2025-01-08 DIAGNOSIS — R80.9 MICROALBUMINURIA: ICD-10-CM

## 2025-01-08 DIAGNOSIS — N18.31 STAGE 3A CHRONIC KIDNEY DISEASE (HCC): ICD-10-CM

## 2025-01-08 PROCEDURE — 1090F PRES/ABSN URINE INCON ASSESS: CPT | Performed by: FAMILY MEDICINE

## 2025-01-08 PROCEDURE — 1160F RVW MEDS BY RX/DR IN RCRD: CPT | Performed by: FAMILY MEDICINE

## 2025-01-08 PROCEDURE — G8399 PT W/DXA RESULTS DOCUMENT: HCPCS | Performed by: FAMILY MEDICINE

## 2025-01-08 PROCEDURE — M1299 PR FLU IMMUNIZE ORDER/ADMIN: HCPCS | Performed by: FAMILY MEDICINE

## 2025-01-08 PROCEDURE — 1159F MED LIST DOCD IN RCRD: CPT | Performed by: FAMILY MEDICINE

## 2025-01-08 PROCEDURE — G8417 CALC BMI ABV UP PARAM F/U: HCPCS | Performed by: FAMILY MEDICINE

## 2025-01-08 PROCEDURE — 99214 OFFICE O/P EST MOD 30 MIN: CPT | Performed by: FAMILY MEDICINE

## 2025-01-08 PROCEDURE — 3075F SYST BP GE 130 - 139MM HG: CPT | Performed by: FAMILY MEDICINE

## 2025-01-08 PROCEDURE — 1123F ACP DISCUSS/DSCN MKR DOCD: CPT | Performed by: FAMILY MEDICINE

## 2025-01-08 PROCEDURE — 1036F TOBACCO NON-USER: CPT | Performed by: FAMILY MEDICINE

## 2025-01-08 PROCEDURE — 3078F DIAST BP <80 MM HG: CPT | Performed by: FAMILY MEDICINE

## 2025-01-08 PROCEDURE — G8427 DOCREV CUR MEDS BY ELIG CLIN: HCPCS | Performed by: FAMILY MEDICINE

## 2025-01-08 SDOH — ECONOMIC STABILITY: FOOD INSECURITY: WITHIN THE PAST 12 MONTHS, YOU WORRIED THAT YOUR FOOD WOULD RUN OUT BEFORE YOU GOT MONEY TO BUY MORE.: NEVER TRUE

## 2025-01-08 SDOH — ECONOMIC STABILITY: FOOD INSECURITY: WITHIN THE PAST 12 MONTHS, THE FOOD YOU BOUGHT JUST DIDN'T LAST AND YOU DIDN'T HAVE MONEY TO GET MORE.: NEVER TRUE

## 2025-01-08 ASSESSMENT — PATIENT HEALTH QUESTIONNAIRE - PHQ9
SUM OF ALL RESPONSES TO PHQ QUESTIONS 1-9: 0
SUM OF ALL RESPONSES TO PHQ9 QUESTIONS 1 & 2: 0
1. LITTLE INTEREST OR PLEASURE IN DOING THINGS: NOT AT ALL
2. FEELING DOWN, DEPRESSED OR HOPELESS: NOT AT ALL

## 2025-01-08 NOTE — TELEPHONE ENCOUNTER
Future Appointments   Date Time Provider Department Center   1/8/2025 11:00 AM Kenn Galvan,  [facility]   1/27/2025 10:40 AM Onofre Lorenzo APRN - CNP [facility] [place]   5/15/2025  8:20 AM Lorena Munoz APRN - CNP [facility] [place]   5/21/2025  9:40 AM Kenn Galvan,  [facility]

## 2025-01-08 NOTE — TELEPHONE ENCOUNTER
Left message on answering machine requesting pt to call back at earliest convenience.     Per patient [employer] message on 1.7.25, I was calling to offer patient an appointment.     [employer] message sent    Double book at 11 if patient calls back for 1.8.2025, ok per Dr. Galvan

## 2025-01-08 NOTE — TELEPHONE ENCOUNTER
Left message on answering machine. Requested pt to call back at [phone], at their earliest convenience.

## 2025-01-08 NOTE — PROGRESS NOTES
respiratory distress or retractions  Cardiovascular: S1 and S2 auscultated w/ RRR. No murmurs, rubs, clicks, or gallops, distal pulses intact.  Abdomen: soft, non-tender, non-distended, bowel sounds physiologic,  no rebound or guarding, no masses or hernias noted. Liver and spleen without enlargement.   Extremities: no cyanosis, clubbing or edema of the lower extremities.   Skin: warm and dry, no rash or erythema  THORACIC SPINE EXAM:  Examination of the Thoracic Spine shows:  Deformity is not noted.  Soft Tissue Swelling is not noted.  Erythema is not noted.  Paraspinal Spasm is  noted.  Tenderness to palpation is  noted.  Sensation is  intact.  Flexion does produce pain.  Extension does produce pain.      Lab Results   Component Value Date/Time     11/20/2024 07:14 AM    K 4.1 11/20/2024 07:14 AM     11/20/2024 07:14 AM    CO2 24 11/20/2024 07:14 AM    BUN 23 11/20/2024 07:14 AM    CREATININE 1.0 11/20/2024 07:14 AM    GLUCOSE 98 11/20/2024 07:14 AM    GLUCOSE 99 05/03/2018 11:02 AM    CALCIUM 9.4 11/20/2024 07:14 AM      No results found for: \"GFRESTIMATE\"  Lab Results   Component Value Date/Time    LABGLOM 56 11/20/2024 07:14 AM    LABGLOM 41 04/05/2024 09:10 AM     Lab Results   Component Value Date    ALBUMINUR 2.58 11/20/2024    LABCREAU 136.9 11/20/2024    ALBCREAT 19 11/20/2024     Lab Results   Component Value Date    MICROALBUR 7.48 05/10/2024    MALBCR 39 (H) 05/10/2024     Lab Results   Component Value Date    PROTUR Negative 02/15/2023       ASSESSMENT & PLAN  1. Acute upper back pain    Reassuring exam  Heat  HEP  Short course Zanaflex  F/u if no better  Reviewed ER precautions, pt understands.     - tiZANidine (ZANAFLEX) 4 MG tablet; Take 1 tablet by mouth every 8 hours as needed (Upper back pain)  Dispense: 30 tablet; Refill: 0    2. Essential hypertension    At goal  con't Zestoretic  Labs UTD  BMP in 6 months, in call back cue    3. Stage 3a chronic kidney disease (HCC)    Labs

## 2025-01-08 NOTE — TELEPHONE ENCOUNTER
----- Message from Dr. Kenn Galvan, DO sent at 1/8/2025  7:09 AM EST -----  Please let pt know that mammogram is normal.   She does have dense breast tissue, which is a common finding, and not abnormal.    This can lower the sensitivity of mammograms.   Given the finding of dense breast tissue, this patient is a candidate for a new imaging test called automated whole breast screening ultrasound (ABUS) to aid in breast cancer detection.  If would like to pursue this, I can order. Let me know.      Let me know if questions, thanks!

## 2025-01-08 NOTE — TELEPHONE ENCOUNTER
Future Appointments   Date Time Provider Department Center   1/8/2025 11:00 AM Kenn Galvan,  [facility]   1/27/2025 10:40 AM Onofre Lorenzo APRN - CNP N [facility] [place]   5/15/2025  8:20 AM Lorena Munoz APRN - CNP [facility] [place]   5/21/2025  9:40 AM Kenn Galvan,  [facility]

## 2025-01-27 ENCOUNTER — OFFICE VISIT (OUTPATIENT)
Dept: PHYSICAL MEDICINE AND REHAB | Age: 85
End: 2025-01-27
Payer: MEDICARE

## 2025-01-27 VITALS
WEIGHT: 167.77 LBS | HEIGHT: 62 IN | DIASTOLIC BLOOD PRESSURE: 78 MMHG | SYSTOLIC BLOOD PRESSURE: 134 MMHG | BODY MASS INDEX: 30.87 KG/M2

## 2025-01-27 DIAGNOSIS — G89.4 CHRONIC PAIN SYNDROME: ICD-10-CM

## 2025-01-27 DIAGNOSIS — M47.816 LUMBAR SPONDYLOSIS: Primary | ICD-10-CM

## 2025-01-27 DIAGNOSIS — G89.29 CHRONIC BILATERAL LOW BACK PAIN WITHOUT SCIATICA: ICD-10-CM

## 2025-01-27 DIAGNOSIS — Z98.890 HISTORY OF LUMBAR LAMINECTOMY: ICD-10-CM

## 2025-01-27 DIAGNOSIS — M47.816 LUMBAR FACET ARTHROPATHY: ICD-10-CM

## 2025-01-27 DIAGNOSIS — M41.9 SCOLIOSIS OF THORACOLUMBAR SPINE, UNSPECIFIED SCOLIOSIS TYPE: ICD-10-CM

## 2025-01-27 DIAGNOSIS — M54.50 CHRONIC BILATERAL LOW BACK PAIN WITHOUT SCIATICA: ICD-10-CM

## 2025-01-27 PROCEDURE — 1036F TOBACCO NON-USER: CPT | Performed by: NURSE PRACTITIONER

## 2025-01-27 PROCEDURE — G8417 CALC BMI ABV UP PARAM F/U: HCPCS | Performed by: NURSE PRACTITIONER

## 2025-01-27 PROCEDURE — 3078F DIAST BP <80 MM HG: CPT | Performed by: NURSE PRACTITIONER

## 2025-01-27 PROCEDURE — 1159F MED LIST DOCD IN RCRD: CPT | Performed by: NURSE PRACTITIONER

## 2025-01-27 PROCEDURE — G8427 DOCREV CUR MEDS BY ELIG CLIN: HCPCS | Performed by: NURSE PRACTITIONER

## 2025-01-27 PROCEDURE — 1090F PRES/ABSN URINE INCON ASSESS: CPT | Performed by: NURSE PRACTITIONER

## 2025-01-27 PROCEDURE — 1125F AMNT PAIN NOTED PAIN PRSNT: CPT | Performed by: NURSE PRACTITIONER

## 2025-01-27 PROCEDURE — G8399 PT W/DXA RESULTS DOCUMENT: HCPCS | Performed by: NURSE PRACTITIONER

## 2025-01-27 PROCEDURE — 3075F SYST BP GE 130 - 139MM HG: CPT | Performed by: NURSE PRACTITIONER

## 2025-01-27 PROCEDURE — 99214 OFFICE O/P EST MOD 30 MIN: CPT | Performed by: NURSE PRACTITIONER

## 2025-01-27 PROCEDURE — 1123F ACP DISCUSS/DSCN MKR DOCD: CPT | Performed by: NURSE PRACTITIONER

## 2025-01-27 ASSESSMENT — ENCOUNTER SYMPTOMS
BACK PAIN: 1
COLOR CHANGE: 0
RESPIRATORY NEGATIVE: 1
GASTROINTESTINAL NEGATIVE: 1
EYES NEGATIVE: 1

## 2025-01-27 NOTE — PROGRESS NOTES
Continue tylenol prn for mild pain   Encouraged to continue home exercises and daily walking   Continues to follow with Rheumatology   Is compliant. Will follow up in 3 months or sooner if needed as patient rarely takes medications.     Meds. Prescribed:   No orders of the defined types were placed in this encounter.      Return in about 3 months (around 4/27/2025), or if symptoms worsen or fail to improve, for follow up  for medications.               Electronically signed by JOSE Quevedo CNP on1/27/2025 at 11:05 AM

## 2025-03-20 NOTE — PROGRESS NOTES
Emeterio Verdugo  MRN:  [id number]  71 y.o. male      Patient Care Team:  Petar Faust MD (Ophthalmology)  Edenilson Saez NP as Nurse Practitioner (Family Medicine)      SUBJECTIVE:     CHIEF COMPLAINT:  - Follow-up visit for chronic medical conditions    HPI:  Mr. Verdugo is on medication for blood pressure, pre-diabetes, and thyroid issues. He takes thyroid medication (88 mcg daily) in the morning on an empty stomach. He takes metformin daily with his largest meal, which causes some GI side effects and bathroom-related discomfort.    His home blood pressure readings are around 120/80. He mentions taking his blood pressure medication.    Mr. Verdugo reports insufficient sleep, getting only 4-5 hours per night. He wakes up at 4:30 AM for work but goes to sleep around 10:30-11:00 PM. He reports feeling fatigued and having periods of low energy. He sleeps well once asleep but does not get enough hours of sleep.    Regarding lifestyle, he tries to stay active, watch his weight, and eat healthily.        Review of Systems   Constitutional:  Positive for malaise/fatigue (avg ~ 4 to 5 hrs sleep per night, but does sleep well when in bed). Negative for chills, fever and weight loss (but essentially stable).   Respiratory: Negative.     Cardiovascular: Negative.    Skin: Negative.    Neurological:  Negative for dizziness, tingling, tremors, loss of consciousness, weakness and headaches.       Review of patient's allergies indicates:  No Known Allergies      Problem List[1]    Current Outpatient Medications   Medication Instructions    acetaminophen (TYLENOL) 500 mg, Oral, As needed (PRN)    amlodipine-valsartan (EXFORGE) 5-320 mg per tablet 1 tablet, Oral    atorvastatin (LIPITOR) 10 mg, Oral, Daily    azelastine (ASTELIN) 137 mcg, Nasal, 2 times daily    COMBIGAN 0.2-0.5 % Drop INSTILL ONE DROP INTO BOTH EYES TWICE A DAY    latanoprost 0.005 % ophthalmic solution 1 drop, Both Eyes, Daily    levothyroxine  (SYNTHROID) 88 mcg, Oral    LUMIGAN 0.01 % Drop 1 drop, Both Eyes, Nightly    metFORMIN (GLUCOPHAGE-XR) 500 MG ER 24hr tablet TAKE 1 TABLET(500 MG) BY MOUTH DAILY WITH LARGEST MEAL OF THE DAY    tadalafiL (CIALIS) 20 mg, Oral, Every 24 hours as needed    timolol maleate 0.5% (TIMOPTIC-XE) 0.5 % SolG No dose, route, or frequency recorded.    zolpidem (AMBIEN) 5 mg, Oral, Nightly PRN         Past medical, surgical, family and social histories have been reviewed today.      OBJECTIVE:     Vitals:    03/21/25 1144   BP: 128/86   Pulse: 97   Temp: 96.8 °F (36 °C)   TempSrc: Temporal   SpO2: 99%   Weight: 75.7 kg (166 lb 12.5 oz)   Height: 6' (1.829 m)     Vitals:    03/21/25 1144   PainSc: 0-No pain       Wt Readings from Last 4 Encounters:   03/21/25 75.7 kg (166 lb 12.5 oz)   11/12/24 76.9 kg (169 lb 6.8 oz)   09/20/24 74.2 kg (163 lb 9.3 oz)   08/20/24 76 kg (167 lb 8.8 oz)       Physical Exam  Vitals reviewed.   Constitutional:       General: He is not in acute distress.     Appearance: He is not ill-appearing or diaphoretic.   HENT:      Head: Normocephalic and atraumatic.   Cardiovascular:      Rate and Rhythm: Normal rate and regular rhythm.   Pulmonary:      Effort: Pulmonary effort is normal.      Breath sounds: Normal breath sounds.   Musculoskeletal:      Right lower leg: No edema.      Left lower leg: No edema.   Skin:     Capillary Refill: Capillary refill takes less than 2 seconds.   Neurological:      Mental Status: He is alert and oriented to person, place, and time.      Sensory: No sensory deficit.      Motor: No weakness.      Coordination: Coordination normal.      Gait: Gait normal.   Psychiatric:         Mood and Affect: Mood normal.         Behavior: Behavior normal.         Thought Content: Thought content normal.         Judgment: Judgment normal.         ASSESSMENT:     1. Hypertension, essential ----- chronic issue, on med as ordered with adequate bp control.  DASH diet, stay active, ensure  file     Emotionally abused: Not on file     Physically abused: Not on file     Forced sexual activity: Not on file   Other Topics Concern    Not on file   Social History Narrative    Not on file       Past Medical History:   Diagnosis Date    Chronic low back pain     CKD (chronic kidney disease) stage 3, GFR 30-59 ml/min ([facility])     DDD (degenerative disc disease), lumbar     Dyslipidemia     History of skin cancer     BCC AND SCC    Hyperlipidemia     Hypertension     Osteoarthritis     PONV (postoperative nausea and vomiting)        Past Surgical History:   Procedure Laterality Date    BACK SURGERY      lower lumbar fusion    CARPAL TUNNEL RELEASE Bilateral      SECTION      CHOLECYSTECTOMY, LAPAROSCOPIC  10/03/2017    COLONOSCOPY      CYST REMOVAL Left 2017    left  5th digit-Dr Bowman    DILATION AND CURETTAGE OF UTERUS      FINGER SURGERY      SEVERAL    FOOT SURGERY Bilateral     HYSTERECTOMY      JOINT REPLACEMENT Bilateral     KNEES    NERVE SURGERY N/A 2019    left L3, L4 medial branch and L5 dorsal rami injection performed by Anca Warren MD at [place] [address] Left 2020    left L3, L4 medial branch and L5 dorsal rami injection performed by Anca Warren MD at [address] [place] [address] DX W/COLLDANE Garcia  PFRMD N/A 2018    COLONOSCOPY performed by [facility]mi [facility]al [facility] at [facility]    SKIN BIOPSY      BCC AND SCC       Family History   Problem Relation Age of Onset    Heart Disease Mother         CHF    Alzheimer's Disease Mother     Dementia Mother     Cancer Father         LUNG    Emphysema Father     Colon Cancer Neg Hx     Breast Cancer Neg Hx         Prior to Visit Medications    Medication Sig Taking?  Authorizing Provider   meloxicam (MOBIC) 7.5 MG tablet Take 1 tablet by mouth daily as needed for Pain  Ashwini Jones, DO   hydroxychloroquine (PLAQUENIL) 200 MG tablet TAKE 1 TABLET BY MOUTH IN THE MORNING  [place], APRN - CNP   omeprazole (PRILOSEC) 20 MG delayed release capsule TAKE 1 CAPSULE BY MOUTH DAILY. Luz, APRN - CNP   lisinopril-hydrochlorothiazide (PRINZIDE;ZESTORETIC) 10-12.5 MG per tablet TAKE 1 TABLET BY MOUTH DAILY  Arelis Goyal, DO   Multiple Vitamins-Minerals (MULTIVITAMIN PO) Take by mouth daily  Historical Provider, MD   Handicap Placard MISC by Does not apply route Expires 29 June 2023  Arelis Goyal, DO   Multiple Vitamins-Minerals (PRESERVISION AREDS PO) Take by mouth  Historical Provider, MD   Probiotic Product (PROBIOTIC DAILY PO) Take by mouth daily   Historical Provider, MD   Calcium Carbonate-Vit D-Min (CALCIUM 1200 PO) Take by mouth daily  Historical Provider, MD   Cholecalciferol (VITAMIN D-3 PO) Take by mouth daily  Historical Provider, MD         Review of Systems : All systems reviewed, all unremarkable other than HPI/subjective. No change since last visit. Denies  fever, chills, infection or non healing wound. Physical Exam  Constitutional:       General: She is not in acute distress. Appearance: Normal appearance. HENT:      Head: Atraumatic. Pulmonary:      Effort: Pulmonary effort is normal.   Neurological:      Mental Status: She is alert and oriented to person, place, and time. Psychiatric:         Mood and Affect: Mood normal.         Behavior: Behavior normal.         Assessment and Plan:      Diagnosis Orders   1. Status post lumbar laminectomy     2. Lumbar spondylosis         Good relief for at least 80% from prior injections. Schedule for bilateral L3-4 medial branch and L5 dorsal rami injection     Schedule injection in 2 weeks     Schedule FF up  Office in 4 weeks     In the interim - trial of tramadol for 1 week\\Julianna No is a 70 y.o. female being evaluated by a Virtual Visit (video visit) encounter to address concerns as mentioned above. adequate sleep, reduce stress (if an issue), healthy lifestyle changes.  -     Basic Metabolic Panel; Future; Expected date: 03/21/2025    Lab Results   Component Value Date     09/20/2024    K 3.9 09/20/2024     09/20/2024    CO2 26 09/20/2024    BUN 14 09/20/2024    CREATININE 1.1 09/20/2024    CALCIUM 9.0 09/20/2024    ANIONGAP 7 (L) 09/20/2024    EGFRNORACEVR >60.0 09/20/2024       2. Pre-diabetes ------ chronic issue, on metformin as ordered with some GI s/e reported.  Check lab.  May consider stopping altogether, take 1/2 tab daily with largest meal or maybe qod dosing.  Low-carb/starch intake, limit or cut out refined sugars totally, exercise.  -     Basic Metabolic Panel; Future; Expected date: 03/21/2025  -     Hemoglobin A1C; Future; Expected date: 03/21/2025    Lab Results   Component Value Date    HGBA1C 5.8 (H) 07/16/2024       3. Hypothyroidism (acquired) ------ chronic issue, stable & well-controlled.  On thyroid med as ordered, check lab.  -     TSH; Future; Expected date: 03/21/2025    Lab Results   Component Value Date    TSH 0.825 03/15/2024    FREET4 0.99 03/15/2024       PLAN:     Lab today, pending.  RTC as directed and/or prn.        SADE Harvey  [facility]              [1]   Patient Active Problem List  Diagnosis    Hypothyroidism (acquired)    Tension type headache    Hypertension, essential    Pre-diabetes

## 2025-04-16 ENCOUNTER — TELEPHONE (OUTPATIENT)
Dept: FAMILY MEDICINE CLINIC | Age: 85
End: 2025-04-16

## 2025-04-16 DIAGNOSIS — I10 ESSENTIAL HYPERTENSION: Primary | ICD-10-CM

## 2025-04-17 NOTE — TELEPHONE ENCOUNTER
Pt due for fasting labs prior to next apt on 5/21/2025. Please call to have pt complete this. Thanks!    ASSESSMENT & PLAN   Diagnosis Orders   1. Essential hypertension  CBC with Auto Differential    Comprehensive Metabolic Panel    Lipid Panel    TSH reflex to FT4    Albumin/Creatinine Ratio, Urine        Future Appointments   Date Time Provider Department Center   4/28/2025 10:40 AM Onofre Lorenzo APRN - CNP N [facility] [place]   5/19/2025  9:00 AM Lorena Munoz APRN - CNP [facility] [place]   5/21/2025  9:40 AM Kenn Galvan, DO [facility] DE[facility]

## 2025-04-17 NOTE — TELEPHONE ENCOUNTER
Pt informed of labs needing done. Pt voiced understanding with no further questions at this time.       Labs mailed to pt.

## 2025-04-28 ENCOUNTER — OFFICE VISIT (OUTPATIENT)
Dept: PHYSICAL MEDICINE AND REHAB | Age: 85
End: 2025-04-28
Payer: MEDICARE

## 2025-04-28 VITALS
SYSTOLIC BLOOD PRESSURE: 132 MMHG | BODY MASS INDEX: 30.87 KG/M2 | HEIGHT: 62 IN | WEIGHT: 167.77 LBS | DIASTOLIC BLOOD PRESSURE: 76 MMHG

## 2025-04-28 DIAGNOSIS — M47.816 LUMBAR SPONDYLOSIS: Primary | ICD-10-CM

## 2025-04-28 DIAGNOSIS — G89.4 CHRONIC PAIN SYNDROME: ICD-10-CM

## 2025-04-28 DIAGNOSIS — M47.816 LUMBAR FACET ARTHROPATHY: ICD-10-CM

## 2025-04-28 DIAGNOSIS — Z98.890 HISTORY OF LUMBAR LAMINECTOMY: ICD-10-CM

## 2025-04-28 DIAGNOSIS — M41.9 SCOLIOSIS OF THORACOLUMBAR SPINE, UNSPECIFIED SCOLIOSIS TYPE: ICD-10-CM

## 2025-04-28 DIAGNOSIS — G89.29 CHRONIC BILATERAL LOW BACK PAIN WITHOUT SCIATICA: ICD-10-CM

## 2025-04-28 DIAGNOSIS — M54.50 CHRONIC BILATERAL LOW BACK PAIN WITHOUT SCIATICA: ICD-10-CM

## 2025-04-28 PROCEDURE — G8427 DOCREV CUR MEDS BY ELIG CLIN: HCPCS | Performed by: NURSE PRACTITIONER

## 2025-04-28 PROCEDURE — 3075F SYST BP GE 130 - 139MM HG: CPT | Performed by: NURSE PRACTITIONER

## 2025-04-28 PROCEDURE — G8399 PT W/DXA RESULTS DOCUMENT: HCPCS | Performed by: NURSE PRACTITIONER

## 2025-04-28 PROCEDURE — 99214 OFFICE O/P EST MOD 30 MIN: CPT | Performed by: NURSE PRACTITIONER

## 2025-04-28 PROCEDURE — 1125F AMNT PAIN NOTED PAIN PRSNT: CPT | Performed by: NURSE PRACTITIONER

## 2025-04-28 PROCEDURE — G8417 CALC BMI ABV UP PARAM F/U: HCPCS | Performed by: NURSE PRACTITIONER

## 2025-04-28 PROCEDURE — 1123F ACP DISCUSS/DSCN MKR DOCD: CPT | Performed by: NURSE PRACTITIONER

## 2025-04-28 PROCEDURE — 1036F TOBACCO NON-USER: CPT | Performed by: NURSE PRACTITIONER

## 2025-04-28 PROCEDURE — 1159F MED LIST DOCD IN RCRD: CPT | Performed by: NURSE PRACTITIONER

## 2025-04-28 PROCEDURE — 1090F PRES/ABSN URINE INCON ASSESS: CPT | Performed by: NURSE PRACTITIONER

## 2025-04-28 PROCEDURE — 3078F DIAST BP <80 MM HG: CPT | Performed by: NURSE PRACTITIONER

## 2025-04-28 ASSESSMENT — ENCOUNTER SYMPTOMS
GASTROINTESTINAL NEGATIVE: 1
BACK PAIN: 1
RESPIRATORY NEGATIVE: 1
EYES NEGATIVE: 1
COLOR CHANGE: 0

## 2025-04-28 NOTE — PROGRESS NOTES
[facility] [place] [facility]  [facility]  [address]  [place] [address]  Dept: [phone]  Dept Fax: [phone]  Loc: [phone]    Visit Date: 4/28/2025    Functionality Assessment/Goals Worksheet     On a scale of 0 (Does not Interfere) to 10 (Completely Interferes)     1.  Which number describes how during the past week pain has interfered with       the following:  A.  General Activity:  7  B.  Mood: 0  C.  Walking Ability:  7  D.  Normal Work (Includes both work outside the home and housework):  7  E.  Relations with Other People:   10  F.  Sleep:   7  G.  Enjoyment of Life:   10    2.  Patient Prefers to Take their Pain Medications:     []  On a regular basis   [x]  Only when necessary    []  Does not take pain medications    3.  What are the Patient's Goals/Expectations for Visiting Pain Management?     [x]  Learn about my pain    [x]  Receive Medication   [x]  Physical Therapy     []  Treat Depression   [x]  Receive Injections    []  Treat Sleep   []  Deal with Anxiety and Stress   []  Treat Opoid Dependence/Addiction   []  Other:        HPI:   Nelda J Brunner is a 84 y.o. female is here today for    Chief Complaint: Low back pain, right thumb pain     HPI   3 month follow up. Julianna continues to get intermittent low back pain with over activity- dull ache and sometimes sharp. Not having any low back pain at this time. She feels that she continues to receive good relief from her bilateral L-facet RFA. She feels that pain remains tolerable.     She continues to have arthritic pain in right hand thumb- sore and aching in her joint.= and sometimes sharp. She continues to follow with Rheumatology for this. Discussed that we can set her up for a joint injection in this area but she states she is seeing Rheumatology next week so she will wait    Continues arthritis tylenol for pain mainly, only uses Norco prn and rarely. She

## 2025-05-15 ENCOUNTER — LAB (OUTPATIENT)
Dept: LAB | Age: 85
End: 2025-05-15

## 2025-05-15 DIAGNOSIS — I10 ESSENTIAL HYPERTENSION: ICD-10-CM

## 2025-05-15 LAB
ALBUMIN SERPL BCG-MCNC: 4.2 G/DL (ref 3.4–4.9)
ALP SERPL-CCNC: 84 U/L (ref 38–126)
ALT SERPL W/O P-5'-P-CCNC: 15 U/L (ref 10–35)
ANION GAP SERPL CALC-SCNC: 11 MEQ/L (ref 8–16)
AST SERPL-CCNC: 24 U/L (ref 10–35)
BASOPHILS ABSOLUTE: 0 THOU/MM3 (ref 0–0.1)
BASOPHILS NFR BLD AUTO: 0.7 %
BILIRUB SERPL-MCNC: 0.3 MG/DL (ref 0.3–1.2)
BUN SERPL-MCNC: 24 MG/DL (ref 8–23)
CALCIUM SERPL-MCNC: 9.7 MG/DL (ref 8.8–10.2)
CHLORIDE SERPL-SCNC: 104 MEQ/L (ref 98–111)
CHOLEST SERPL-MCNC: 260 MG/DL (ref 100–199)
CO2 SERPL-SCNC: 25 MEQ/L (ref 22–29)
CREAT SERPL-MCNC: 1.3 MG/DL (ref 0.5–0.9)
CREAT UR-MCNC: 104 MG/DL
DEPRECATED RDW RBC AUTO: 43.3 FL (ref 35–45)
EOSINOPHIL NFR BLD AUTO: 3.1 %
EOSINOPHILS ABSOLUTE: 0.1 THOU/MM3 (ref 0–0.4)
ERYTHROCYTE [DISTWIDTH] IN BLOOD BY AUTOMATED COUNT: 13.1 % (ref 11.5–14.5)
GFR SERPL CREATININE-BSD FRML MDRD: 41 ML/MIN/1.73M2
GLUCOSE SERPL-MCNC: 97 MG/DL (ref 74–109)
HCT VFR BLD AUTO: 37.8 % (ref 37–47)
HDLC SERPL-MCNC: 44 MG/DL
HGB BLD-MCNC: 12.4 GM/DL (ref 12–16)
IMM GRANULOCYTES # BLD AUTO: 0.01 THOU/MM3 (ref 0–0.07)
IMM GRANULOCYTES NFR BLD AUTO: 0.2 %
LDLC SERPL CALC-MCNC: 159 MG/DL
LYMPHOCYTES ABSOLUTE: 1.4 THOU/MM3 (ref 1–4.8)
LYMPHOCYTES NFR BLD AUTO: 31.5 %
MCH RBC QN AUTO: 29.6 PG (ref 26–33)
MCHC RBC AUTO-ENTMCNC: 32.8 GM/DL (ref 32.2–35.5)
MCV RBC AUTO: 90.2 FL (ref 81–99)
MICROALBUMIN UR-MCNC: 8.48 MG/DL
MICROALBUMIN/CREAT RATIO PNL UR: 82 MG/G (ref 0–30)
MONOCYTES ABSOLUTE: 0.5 THOU/MM3 (ref 0.4–1.3)
MONOCYTES NFR BLD AUTO: 11.5 %
NEUTROPHILS ABSOLUTE: 2.4 THOU/MM3 (ref 1.8–7.7)
NEUTROPHILS NFR BLD AUTO: 53 %
NRBC BLD AUTO-RTO: 0 /100 WBC
PLATELET # BLD AUTO: 213 THOU/MM3 (ref 130–400)
PMV BLD AUTO: 10.2 FL (ref 9.4–12.4)
POTASSIUM SERPL-SCNC: 4.6 MEQ/L (ref 3.5–5.2)
PROT SERPL-MCNC: 6.8 G/DL (ref 6.4–8.3)
RBC # BLD AUTO: 4.19 MILL/MM3 (ref 4.2–5.4)
SODIUM SERPL-SCNC: 140 MEQ/L (ref 135–145)
TRIGL SERPL-MCNC: 284 MG/DL (ref 0–199)
TSH SERPL DL<=0.05 MIU/L-ACNC: 2.16 UIU/ML (ref 0.27–4.2)
WBC # BLD AUTO: 4.5 THOU/MM3 (ref 4.8–10.8)

## 2025-05-15 SDOH — HEALTH STABILITY: PHYSICAL HEALTH: ON AVERAGE, HOW MANY DAYS PER WEEK DO YOU ENGAGE IN MODERATE TO STRENUOUS EXERCISE (LIKE A BRISK WALK)?: 7 DAYS

## 2025-05-15 SDOH — HEALTH STABILITY: PHYSICAL HEALTH: ON AVERAGE, HOW MANY MINUTES DO YOU ENGAGE IN EXERCISE AT THIS LEVEL?: 30 MIN

## 2025-05-15 ASSESSMENT — PATIENT HEALTH QUESTIONNAIRE - PHQ9
SUM OF ALL RESPONSES TO PHQ QUESTIONS 1-9: 0
2. FEELING DOWN, DEPRESSED OR HOPELESS: NOT AT ALL
1. LITTLE INTEREST OR PLEASURE IN DOING THINGS: NOT AT ALL
SUM OF ALL RESPONSES TO PHQ QUESTIONS 1-9: 0

## 2025-05-15 ASSESSMENT — LIFESTYLE VARIABLES
HOW OFTEN DO YOU HAVE A DRINK CONTAINING ALCOHOL: 2-4 TIMES A MONTH
HOW OFTEN DO YOU HAVE A DRINK CONTAINING ALCOHOL: 3
HOW MANY STANDARD DRINKS CONTAINING ALCOHOL DO YOU HAVE ON A TYPICAL DAY: 1
HOW MANY STANDARD DRINKS CONTAINING ALCOHOL DO YOU HAVE ON A TYPICAL DAY: 1 OR 2
HOW OFTEN DO YOU HAVE SIX OR MORE DRINKS ON ONE OCCASION: 1

## 2025-05-16 ENCOUNTER — RESULTS FOLLOW-UP (OUTPATIENT)
Dept: FAMILY MEDICINE CLINIC | Age: 85
End: 2025-05-16

## 2025-05-19 ENCOUNTER — OFFICE VISIT (OUTPATIENT)
Age: 85
End: 2025-05-19
Payer: MEDICARE

## 2025-05-19 VITALS
HEIGHT: 62 IN | SYSTOLIC BLOOD PRESSURE: 138 MMHG | OXYGEN SATURATION: 98 % | HEART RATE: 65 BPM | DIASTOLIC BLOOD PRESSURE: 84 MMHG | WEIGHT: 168.1 LBS | BODY MASS INDEX: 30.93 KG/M2

## 2025-05-19 DIAGNOSIS — M19.072 OSTEOARTHRITIS OF BOTH FEET, UNSPECIFIED OSTEOARTHRITIS TYPE: ICD-10-CM

## 2025-05-19 DIAGNOSIS — M19.071 OSTEOARTHRITIS OF BOTH FEET, UNSPECIFIED OSTEOARTHRITIS TYPE: ICD-10-CM

## 2025-05-19 DIAGNOSIS — M15.4 EROSIVE OSTEOARTHRITIS OF BOTH HANDS: Primary | ICD-10-CM

## 2025-05-19 DIAGNOSIS — M65.4 TENOSYNOVITIS, DE QUERVAIN: ICD-10-CM

## 2025-05-19 DIAGNOSIS — M51.369 DEGENERATION OF INTERVERTEBRAL DISC OF LUMBAR REGION, UNSPECIFIED WHETHER PAIN PRESENT: ICD-10-CM

## 2025-05-19 DIAGNOSIS — Z51.81 MEDICATION MONITORING ENCOUNTER: ICD-10-CM

## 2025-05-19 PROCEDURE — G8427 DOCREV CUR MEDS BY ELIG CLIN: HCPCS | Performed by: NURSE PRACTITIONER

## 2025-05-19 PROCEDURE — 1123F ACP DISCUSS/DSCN MKR DOCD: CPT | Performed by: NURSE PRACTITIONER

## 2025-05-19 PROCEDURE — 99213 OFFICE O/P EST LOW 20 MIN: CPT | Performed by: NURSE PRACTITIONER

## 2025-05-19 PROCEDURE — 3075F SYST BP GE 130 - 139MM HG: CPT | Performed by: NURSE PRACTITIONER

## 2025-05-19 PROCEDURE — G8417 CALC BMI ABV UP PARAM F/U: HCPCS | Performed by: NURSE PRACTITIONER

## 2025-05-19 PROCEDURE — 1036F TOBACCO NON-USER: CPT | Performed by: NURSE PRACTITIONER

## 2025-05-19 PROCEDURE — G8399 PT W/DXA RESULTS DOCUMENT: HCPCS | Performed by: NURSE PRACTITIONER

## 2025-05-19 PROCEDURE — 1160F RVW MEDS BY RX/DR IN RCRD: CPT | Performed by: NURSE PRACTITIONER

## 2025-05-19 PROCEDURE — 99214 OFFICE O/P EST MOD 30 MIN: CPT | Performed by: NURSE PRACTITIONER

## 2025-05-19 PROCEDURE — 1090F PRES/ABSN URINE INCON ASSESS: CPT | Performed by: NURSE PRACTITIONER

## 2025-05-19 PROCEDURE — G2211 COMPLEX E/M VISIT ADD ON: HCPCS | Performed by: NURSE PRACTITIONER

## 2025-05-19 PROCEDURE — 1125F AMNT PAIN NOTED PAIN PRSNT: CPT | Performed by: NURSE PRACTITIONER

## 2025-05-19 PROCEDURE — 1159F MED LIST DOCD IN RCRD: CPT | Performed by: NURSE PRACTITIONER

## 2025-05-19 PROCEDURE — 3079F DIAST BP 80-89 MM HG: CPT | Performed by: NURSE PRACTITIONER

## 2025-05-19 ASSESSMENT — ENCOUNTER SYMPTOMS
GASTROINTESTINAL NEGATIVE: 1
RESPIRATORY NEGATIVE: 1
BACK PAIN: 1
EYES NEGATIVE: 1

## 2025-05-19 NOTE — PROGRESS NOTES
(BP Site: Left Upper Arm, Patient Position: Sitting, BP Cuff Size: Medium Adult)   Pulse 65   Ht 1.575 m (5' 2.01\")   Wt 76.2 kg (168 lb 1.6 oz)   SpO2 98%   BMI 30.74 kg/m²     Physical Exam  Vitals reviewed.   Constitutional:       Appearance: She is well-developed.   Cardiovascular:      Rate and Rhythm: Normal rate and regular rhythm.   Pulmonary:      Effort: Pulmonary effort is normal.      Breath sounds: Normal breath sounds.   Musculoskeletal:      Cervical back: Normal range of motion and neck supple.   Skin:     General: Skin is warm and dry.      Findings: No rash.   Neurological:      Mental Status: She is alert and oriented to person, place, and time.   Psychiatric:         Thought Content: Thought content normal.         Upper extremities:    SHOULDERS   ELBOWS nontender,   WRISTS nontender,   HANDS/FINGERS  + DIP and PIP nodules, CMC squaring. + finkelstein testing right     Lower extremities:  HIPS nontender  KNEES nontender  ANKLES nonteder   FEET : nontender        LABS    CBC  Lab Results   Component Value Date/Time    WBC 4.5 05/15/2025 07:20 AM    RBC 4.19 05/15/2025 07:20 AM    HGB 12.4 05/15/2025 07:20 AM    HCT 37.8 05/15/2025 07:20 AM    MCV 90.2 05/15/2025 07:20 AM    MCH 29.6 05/15/2025 07:20 AM    MCHC 32.8 05/15/2025 07:20 AM    RDW 13.3 02/19/2021 09:25 AM     05/15/2025 07:20 AM    MPV 10.2 05/15/2025 07:20 AM       CMP  Lab Results   Component Value Date/Time    CALCIUM 9.7 05/15/2025 07:20 AM     05/15/2025 07:20 AM    K 4.6 05/15/2025 07:20 AM    CO2 25 05/15/2025 07:20 AM     05/15/2025 07:20 AM    BUN 24 05/15/2025 07:20 AM    CREATININE 1.3 05/15/2025 07:20 AM    ALKPHOS 84 05/15/2025 07:20 AM    ALT 15 05/15/2025 07:20 AM    AST 24 05/15/2025 07:20 AM    BILIDIR <0.2 01/07/2023 06:10 PM       HgBA1c: No components found for: \"HGBA1C\"    No results found for: \"VITD25\"      No results found for: \"ANASCRN\"  No results found for: \"SSA\"  No results found for:

## 2025-05-20 NOTE — PROGRESS NOTES
Chief Complaint   Patient presents with    Medicare AW     History obtained from the patient.    SUBJECTIVE:  Nelda J Brunner is a 84 y.o. female that presents today for     -Low Back Pain PRIOR VISIT:     HPI:   Started 2 to 3 wks ago  Started after doing yard work  Persistent on L lower side, will radiate to the R side  Heat and ice help some  On tramadol chronically for back and joint pain, not helping.   mobic helps some  No radicular sxs  No bowel/bladder issues since back pain  Pain at it's worse a 6/10  Currently 3  Denies LUTS/dysuria     She describes the pain as dull, aching  in the lumbar region.     Inciting injury or history of trauma? No  Pain is relieved by - as above  Pain is aggravated by - as above  Radiation of the pain? No  Paresthesias of the extremities?  No  Saddle anesthesia? No  Bowel or bladder incontinence? No  Treatments tried - as above.      UPDATE PRIOR VISIT:   Saw in July for this  Treated with tramadol, zanaflex and pred   Got mostly better, but once done with meds, pain came back  Has been dealing with this since then  Pain in L sided low back, will radiate down to L SI joint and front of L hip  Better with rest, worse when walking a distance, stairs and laying flat  Seems to be doing worse  Has been doing HEP, not helping  No fevers, chills, night sweats or wt loss  No groin pain/numbness or bowel/bladder dysfunction.   No dysuria/luts  Asking what else to do.      UPDATE PRIOR VISIT:   Completed 3 wks of PT, but wasn't getting better, thought it was getting a bit worse  So PT stopped  Referred to [facility] d/t worsening sxs  They recommended surgery, she wants to hold off  Referred to pain management at [facility]  As for hip pain, that is better, got hip injection at [facility], and those sxs are better.   Back pain limiting her on certain days  Tramadol used in the past, not helping  Asking what she can do while waiting for pain management apt  Has used vicodin in the past, and

## 2025-05-21 ENCOUNTER — OFFICE VISIT (OUTPATIENT)
Dept: FAMILY MEDICINE CLINIC | Age: 85
End: 2025-05-21
Payer: MEDICARE

## 2025-05-21 VITALS
HEIGHT: 62 IN | TEMPERATURE: 97 F | WEIGHT: 166.4 LBS | BODY MASS INDEX: 30.62 KG/M2 | OXYGEN SATURATION: 98 % | RESPIRATION RATE: 16 BRPM | DIASTOLIC BLOOD PRESSURE: 78 MMHG | HEART RATE: 67 BPM | SYSTOLIC BLOOD PRESSURE: 130 MMHG

## 2025-05-21 DIAGNOSIS — R76.8 POSITIVE ANA (ANTINUCLEAR ANTIBODY): ICD-10-CM

## 2025-05-21 DIAGNOSIS — N18.32 STAGE 3B CHRONIC KIDNEY DISEASE (HCC): Chronic | ICD-10-CM

## 2025-05-21 DIAGNOSIS — M51.369 DEGENERATION OF INTERVERTEBRAL DISC OF LUMBAR REGION, UNSPECIFIED WHETHER PAIN PRESENT: ICD-10-CM

## 2025-05-21 DIAGNOSIS — G89.29 CHRONIC LEFT-SIDED LOW BACK PAIN WITHOUT SCIATICA: ICD-10-CM

## 2025-05-21 DIAGNOSIS — M81.0 OSTEOPOROSIS, POST-MENOPAUSAL: Chronic | ICD-10-CM

## 2025-05-21 DIAGNOSIS — M54.50 CHRONIC LEFT-SIDED LOW BACK PAIN WITHOUT SCIATICA: ICD-10-CM

## 2025-05-21 DIAGNOSIS — M53.3 CHRONIC LEFT SI JOINT PAIN: ICD-10-CM

## 2025-05-21 DIAGNOSIS — M15.0 PRIMARY OSTEOARTHRITIS INVOLVING MULTIPLE JOINTS: ICD-10-CM

## 2025-05-21 DIAGNOSIS — M15.4 EROSIVE OSTEOARTHRITIS OF BOTH HANDS: ICD-10-CM

## 2025-05-21 DIAGNOSIS — E87.5 HYPERKALEMIA: ICD-10-CM

## 2025-05-21 DIAGNOSIS — Z00.00 MEDICARE ANNUAL WELLNESS VISIT, SUBSEQUENT: Primary | ICD-10-CM

## 2025-05-21 DIAGNOSIS — E78.5 DYSLIPIDEMIA: Chronic | ICD-10-CM

## 2025-05-21 DIAGNOSIS — M25.552 LEFT HIP PAIN: ICD-10-CM

## 2025-05-21 DIAGNOSIS — R80.9 MICROALBUMINURIA: ICD-10-CM

## 2025-05-21 DIAGNOSIS — G89.29 CHRONIC LEFT SI JOINT PAIN: ICD-10-CM

## 2025-05-21 DIAGNOSIS — I10 ESSENTIAL HYPERTENSION: ICD-10-CM

## 2025-05-21 PROCEDURE — 3078F DIAST BP <80 MM HG: CPT | Performed by: FAMILY MEDICINE

## 2025-05-21 PROCEDURE — 1159F MED LIST DOCD IN RCRD: CPT | Performed by: FAMILY MEDICINE

## 2025-05-21 PROCEDURE — G0439 PPPS, SUBSEQ VISIT: HCPCS | Performed by: FAMILY MEDICINE

## 2025-05-21 PROCEDURE — 3075F SYST BP GE 130 - 139MM HG: CPT | Performed by: FAMILY MEDICINE

## 2025-05-21 PROCEDURE — 1160F RVW MEDS BY RX/DR IN RCRD: CPT | Performed by: FAMILY MEDICINE

## 2025-05-21 PROCEDURE — 1123F ACP DISCUSS/DSCN MKR DOCD: CPT | Performed by: FAMILY MEDICINE

## 2025-05-21 RX ORDER — ZOLEDRONIC ACID 0.05 MG/ML
5 INJECTION, SOLUTION INTRAVENOUS ONCE
Qty: 100 ML | Refills: 0 | Status: SHIPPED | OUTPATIENT
Start: 2025-05-21 | End: 2025-05-21

## 2025-05-21 NOTE — PATIENT INSTRUCTIONS
help you achieve your weight-loss goals.  A dietitian can help you make healthy changes in your diet.  An exercise specialist or  can help you develop a safe and effective exercise program.  A counselor or psychiatrist can help you cope with issues such as depression, anxiety, or family problems that can make it hard to focus on weight loss.  Consider joining a support group for people who are trying to lose weight. Your doctor can suggest groups in your area.  Where can you learn more?  Go to https://www.QBE.net/patientEd and enter U357 to learn more about \"Starting a Weight-Loss Plan: Care Instructions.\"  Current as of: April 30, 2024  Content Version: 14.4  © 9647-8861 [employer].   Care instructions adapted under license by [employer]. If you have questions about a medical condition or this instruction, always ask your healthcare professional. [employer], disclaims any warranty or liability for your use of this information.         A Healthy Heart: Care Instructions  Overview     Coronary artery disease, also called heart disease, occurs when a substance called plaque builds up in the vessels that supply oxygen-rich blood to your heart muscle. This can narrow the blood vessels and reduce blood flow. A heart attack happens when blood flow is completely blocked. A high-fat diet, smoking, and other factors increase the risk of heart disease.  Your doctor has found that you have a chance of having heart disease. A heart-healthy lifestyle can help keep your heart healthy and prevent heart disease. This lifestyle includes eating healthy, being active, staying at a weight that's healthy for you, and not smoking or using tobacco. It also includes taking medicines as directed, managing other health conditions, and trying to get a healthy amount of sleep.  Follow-up care is a key part of your treatment and safety. Be sure to make and go to all appointments, and call your

## 2025-05-22 ENCOUNTER — OFFICE VISIT (OUTPATIENT)
Age: 85
End: 2025-05-22
Payer: MEDICARE

## 2025-05-22 VITALS
DIASTOLIC BLOOD PRESSURE: 64 MMHG | SYSTOLIC BLOOD PRESSURE: 138 MMHG | BODY MASS INDEX: 31.17 KG/M2 | OXYGEN SATURATION: 96 % | WEIGHT: 169.4 LBS | HEART RATE: 69 BPM | HEIGHT: 62 IN

## 2025-05-22 DIAGNOSIS — M65.4 DE QUERVAIN'S TENOSYNOVITIS: Primary | ICD-10-CM

## 2025-05-22 PROCEDURE — 20550 NJX 1 TENDON SHEATH/LIGAMENT: CPT | Performed by: INTERNAL MEDICINE

## 2025-05-22 PROCEDURE — PBSHW PBB SHADOW CHARGE: Performed by: INTERNAL MEDICINE

## 2025-05-22 PROCEDURE — NBSRV NON-BILLABLE SERVICE: Performed by: INTERNAL MEDICINE

## 2025-05-22 RX ORDER — ZOLEDRONIC ACID 0.05 MG/ML
5 INJECTION, SOLUTION INTRAVENOUS ONCE
OUTPATIENT
Start: 2025-06-19 | End: 2025-06-19

## 2025-05-22 RX ORDER — EPINEPHRINE 1 MG/ML
0.3 INJECTION, SOLUTION, CONCENTRATE INTRAVENOUS PRN
OUTPATIENT
Start: 2025-06-19

## 2025-05-22 RX ORDER — SODIUM CHLORIDE 0.9 % (FLUSH) 0.9 %
5-40 SYRINGE (ML) INJECTION PRN
OUTPATIENT
Start: 2025-06-19

## 2025-05-22 RX ORDER — METHYLPREDNISOLONE ACETATE 40 MG/ML
10 INJECTION, SUSPENSION INTRA-ARTICULAR; INTRALESIONAL; INTRAMUSCULAR; SOFT TISSUE ONCE
Status: COMPLETED | OUTPATIENT
Start: 2025-05-22 | End: 2025-05-22

## 2025-05-22 RX ORDER — SODIUM CHLORIDE 9 MG/ML
5-250 INJECTION, SOLUTION INTRAVENOUS PRN
OUTPATIENT
Start: 2025-06-19

## 2025-05-22 RX ORDER — HYDROCORTISONE SODIUM SUCCINATE 100 MG/2ML
100 INJECTION INTRAMUSCULAR; INTRAVENOUS
OUTPATIENT
Start: 2025-06-19

## 2025-05-22 RX ORDER — HEPARIN 100 UNIT/ML
500 SYRINGE INTRAVENOUS PRN
OUTPATIENT
Start: 2025-06-19

## 2025-05-22 RX ORDER — DIPHENHYDRAMINE HYDROCHLORIDE 50 MG/ML
50 INJECTION, SOLUTION INTRAMUSCULAR; INTRAVENOUS
OUTPATIENT
Start: 2025-06-19

## 2025-05-22 RX ORDER — SODIUM CHLORIDE 9 MG/ML
INJECTION, SOLUTION INTRAVENOUS CONTINUOUS
OUTPATIENT
Start: 2025-06-19

## 2025-05-22 RX ADMIN — METHYLPREDNISOLONE ACETATE 10 MG: 40 INJECTION, SUSPENSION INTRA-ARTICULAR; INTRALESIONAL; INTRAMUSCULAR; INTRASYNOVIAL; SOFT TISSUE at 10:10

## 2025-05-22 ASSESSMENT — ENCOUNTER SYMPTOMS
BACK PAIN: 1
GASTROINTESTINAL NEGATIVE: 1
EYES NEGATIVE: 1
RESPIRATORY NEGATIVE: 1

## 2025-05-22 NOTE — PROGRESS NOTES
[facility] RHEUMATOLOGY FOLLOW UP NOTE       Date Of Service: 2025  Provider: LEYDA SINGH DO    Name: Nelda J Brunner   MRN: [id number]    Chief Complaint(s)     Chief Complaint   Patient presents with    Injections     Right thumb        History of Present Illness (HPI)     Nelda J Brunner  is a(n)84 y.o. 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       REVIEW OF SYSTEMS: (ROS)    Review of Systems   Constitutional: Negative.    HENT: Negative.     Eyes: Negative.    Respiratory: Negative.     Cardiovascular: Negative.    Gastrointestinal: Negative.    Endocrine: Negative.    Genitourinary: Negative.    Musculoskeletal:  Positive for arthralgias and back pain.   Skin: Negative.    Neurological: Negative.    Psychiatric/Behavioral: Negative.         PAST MEDICAL HISTORY      Past Medical History:   Diagnosis Date    Chronic low back pain     CKD (chronic kidney disease) stage 3, GFR 30-59 ml/min ([facility])     COVID     DDD (degenerative disc disease), lumbar     Dyslipidemia     History of skin cancer     BCC AND SCC    Hyperlipidemia     Hypertension     Macular degeneration, dry     Follow with ophthalmology     Osteoarthritis     Osteoporosis, post-menopausal     PONV (postoperative nausea and vomiting)        PAST SURGICAL HISTORY    .  Past Surgical History:   Procedure Laterality Date    BACK SURGERY      lower lumbar fusion    CARPAL TUNNEL RELEASE Bilateral      SECTION      CHOLECYSTECTOMY, LAPAROSCOPIC  10/03/2017    COLONOSCOPY      CYST REMOVAL Left 2017    left  5th digit-Dr Bowman    DILATION AND

## 2025-06-02 ENCOUNTER — TELEPHONE (OUTPATIENT)
Age: 85
End: 2025-06-02

## 2025-06-02 NOTE — TELEPHONE ENCOUNTER
Pt called and stated that she had a injection in right thumb on 5/22 but still having the pain and is now having pain in right wrist    Where is pain located?  Right wrist    When did the pain start? Saturday    Rate the pain 1-10. Ten being the worst  5-6    Describe the pain.  (Dull, Aching, Stabbing, radiating, etc)  sharp    Has this pain occurred in the past?  No    What have you used to alleviate this pain?  Paraffin wax without relief     What aggravates it?  Movement    Joint swelling or redness?  no

## 2025-06-03 ENCOUNTER — HOSPITAL ENCOUNTER (OUTPATIENT)
Dept: WOMENS IMAGING | Age: 85
Discharge: HOME | End: 2025-06-03
Attending: FAMILY MEDICINE
Payer: MEDICARE

## 2025-06-03 ENCOUNTER — RESULTS FOLLOW-UP (OUTPATIENT)
Dept: FAMILY MEDICINE CLINIC | Age: 85
End: 2025-06-03

## 2025-06-03 DIAGNOSIS — M81.0 OSTEOPOROSIS, POST-MENOPAUSAL: Chronic | ICD-10-CM

## 2025-06-03 PROCEDURE — 77080 DXA BONE DENSITY AXIAL: CPT

## 2025-06-04 ENCOUNTER — TELEPHONE (OUTPATIENT)
Dept: FAMILY MEDICINE CLINIC | Age: 85
End: 2025-06-04

## 2025-06-04 ENCOUNTER — OFFICE VISIT (OUTPATIENT)
Age: 85
End: 2025-06-04
Payer: MEDICARE

## 2025-06-04 VITALS
WEIGHT: 165.5 LBS | SYSTOLIC BLOOD PRESSURE: 144 MMHG | DIASTOLIC BLOOD PRESSURE: 74 MMHG | HEIGHT: 62 IN | BODY MASS INDEX: 30.46 KG/M2 | OXYGEN SATURATION: 95 % | HEART RATE: 61 BPM

## 2025-06-04 DIAGNOSIS — M15.4 EROSIVE OSTEOARTHRITIS OF BOTH HANDS: ICD-10-CM

## 2025-06-04 DIAGNOSIS — M65.4 DE QUERVAIN'S TENOSYNOVITIS: Primary | ICD-10-CM

## 2025-06-04 PROCEDURE — 1123F ACP DISCUSS/DSCN MKR DOCD: CPT | Performed by: NURSE PRACTITIONER

## 2025-06-04 PROCEDURE — 99212 OFFICE O/P EST SF 10 MIN: CPT | Performed by: NURSE PRACTITIONER

## 2025-06-04 PROCEDURE — 1036F TOBACCO NON-USER: CPT | Performed by: NURSE PRACTITIONER

## 2025-06-04 PROCEDURE — G8399 PT W/DXA RESULTS DOCUMENT: HCPCS | Performed by: NURSE PRACTITIONER

## 2025-06-04 PROCEDURE — 3078F DIAST BP <80 MM HG: CPT | Performed by: NURSE PRACTITIONER

## 2025-06-04 PROCEDURE — 3077F SYST BP >= 140 MM HG: CPT | Performed by: NURSE PRACTITIONER

## 2025-06-04 PROCEDURE — 1090F PRES/ABSN URINE INCON ASSESS: CPT | Performed by: NURSE PRACTITIONER

## 2025-06-04 PROCEDURE — 1125F AMNT PAIN NOTED PAIN PRSNT: CPT | Performed by: NURSE PRACTITIONER

## 2025-06-04 PROCEDURE — 1160F RVW MEDS BY RX/DR IN RCRD: CPT | Performed by: NURSE PRACTITIONER

## 2025-06-04 PROCEDURE — 1159F MED LIST DOCD IN RCRD: CPT | Performed by: NURSE PRACTITIONER

## 2025-06-04 PROCEDURE — G8427 DOCREV CUR MEDS BY ELIG CLIN: HCPCS | Performed by: NURSE PRACTITIONER

## 2025-06-04 PROCEDURE — G8417 CALC BMI ABV UP PARAM F/U: HCPCS | Performed by: NURSE PRACTITIONER

## 2025-06-04 PROCEDURE — 99213 OFFICE O/P EST LOW 20 MIN: CPT | Performed by: NURSE PRACTITIONER

## 2025-06-04 RX ORDER — PREDNISONE 5 MG/1
TABLET ORAL
Qty: 40 TABLET | Refills: 0 | Status: SHIPPED | OUTPATIENT
Start: 2025-06-04 | End: 2025-06-20

## 2025-06-04 ASSESSMENT — ENCOUNTER SYMPTOMS
EYES NEGATIVE: 1
GASTROINTESTINAL NEGATIVE: 1
BACK PAIN: 1
RESPIRATORY NEGATIVE: 1

## 2025-06-04 NOTE — PROGRESS NOTES
[facility] RHEUMATOLOGY FOLLOW UP NOTE       Date Of Service: 6/4/2025  Provider: JOSE Gómez - CNP    Name: Nelda J Brunner   MRN: [id number]    Chief Complaint(s)     Chief Complaint   Patient presents with    Wrist Pain     Patient presents today with right wrist pain, that comes and goes with a burning sensation.         History of Present Illness (HPI)     Nelda J Brunner  is a(n)84 y.o. 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    HENT: Negative.     Eyes: Negative.    Respiratory: Negative.     Cardiovascular: Negative.    Gastrointestinal: Negative.    Endocrine: Negative.    Genitourinary: Negative.    Musculoskeletal:  Positive for arthralgias and back pain.   Skin: Negative.    Neurological: Negative.    Psychiatric/Behavioral: Negative.         PAST MEDICAL HISTORY      Past Medical History:   Diagnosis Date    Chronic low back pain     CKD (chronic kidney disease) stage 3, GFR 30-59 ml/min ([facility])     COVID     DDD (degenerative disc disease), lumbar     Dyslipidemia     History of skin cancer     BCC AND SCC    Hyperlipidemia     Hypertension     Macular degeneration, dry     Follow with ophthalmology     Osteoarthritis     Osteoporosis, post-menopausal     PONV (postoperative nausea and vomiting)        PAST SURGICAL HISTORY    .  Past Surgical History:   Procedure

## 2025-06-04 NOTE — TELEPHONE ENCOUNTER
Hakan Bernardo, APRN - CNP  [facility] Clinical Staff    Let Julianna know her DEXA scan has improved. No longer any osteoporosis, just osteopenia (thinning of the bones). Looks like she's due to repeat her Reclast this month. It has been ordered and faxed to outpatient nursing.

## 2025-06-05 ENCOUNTER — TELEPHONE (OUTPATIENT)
Dept: FAMILY MEDICINE CLINIC | Age: 85
End: 2025-06-05

## 2025-06-05 ENCOUNTER — LAB (OUTPATIENT)
Dept: LAB | Age: 85
End: 2025-06-05

## 2025-06-05 DIAGNOSIS — I10 ESSENTIAL HYPERTENSION: ICD-10-CM

## 2025-06-05 DIAGNOSIS — M81.0 OSTEOPOROSIS, POST-MENOPAUSAL: Chronic | ICD-10-CM

## 2025-06-05 DIAGNOSIS — E87.5 HYPERKALEMIA: ICD-10-CM

## 2025-06-05 DIAGNOSIS — N18.32 STAGE 3B CHRONIC KIDNEY DISEASE (HCC): Chronic | ICD-10-CM

## 2025-06-05 DIAGNOSIS — R80.9 MICROALBUMINURIA: ICD-10-CM

## 2025-06-05 LAB
ANION GAP SERPL CALC-SCNC: 13 MEQ/L (ref 8–16)
BUN SERPL-MCNC: 31 MG/DL (ref 8–23)
CALCIUM SERPL-MCNC: 9.4 MG/DL (ref 8.8–10.2)
CHLORIDE SERPL-SCNC: 104 MEQ/L (ref 98–111)
CO2 SERPL-SCNC: 23 MEQ/L (ref 22–29)
CREAT SERPL-MCNC: 1.2 MG/DL (ref 0.5–0.9)
GFR SERPL CREATININE-BSD FRML MDRD: 45 ML/MIN/1.73M2
GLUCOSE SERPL-MCNC: 120 MG/DL (ref 74–109)
POTASSIUM SERPL-SCNC: 4.5 MEQ/L (ref 3.5–5.2)
SODIUM SERPL-SCNC: 140 MEQ/L (ref 135–145)

## 2025-06-05 NOTE — TELEPHONE ENCOUNTER
Kenn Galvan,   [facility] Clinical Staff    BMP stable  Ok to proceed with Reclast  Let me know if questions, thanks!

## 2025-06-26 ENCOUNTER — HOSPITAL ENCOUNTER (OUTPATIENT)
Dept: NURSING | Age: 85
Discharge: HOME | End: 2025-06-26
Payer: MEDICARE

## 2025-06-26 VITALS
SYSTOLIC BLOOD PRESSURE: 136 MMHG | DIASTOLIC BLOOD PRESSURE: 72 MMHG | TEMPERATURE: 97.6 F | WEIGHT: 165 LBS | HEART RATE: 72 BPM | BODY MASS INDEX: 30.17 KG/M2 | RESPIRATION RATE: 18 BRPM | OXYGEN SATURATION: 98 %

## 2025-06-26 DIAGNOSIS — M81.0 SENILE OSTEOPOROSIS: Primary | ICD-10-CM

## 2025-06-26 PROCEDURE — 6360000002 HC RX W HCPCS: Performed by: FAMILY MEDICINE

## 2025-06-26 PROCEDURE — 96365 THER/PROPH/DIAG IV INF INIT: CPT

## 2025-06-26 RX ORDER — SODIUM CHLORIDE 9 MG/ML
INJECTION, SOLUTION INTRAVENOUS CONTINUOUS
Status: CANCELLED | OUTPATIENT
Start: 2025-06-26

## 2025-06-26 RX ORDER — SODIUM CHLORIDE 0.9 % (FLUSH) 0.9 %
5-40 SYRINGE (ML) INJECTION PRN
Status: CANCELLED | OUTPATIENT
Start: 2025-06-26

## 2025-06-26 RX ORDER — ZOLEDRONIC ACID 0.05 MG/ML
5 INJECTION, SOLUTION INTRAVENOUS ONCE
Status: COMPLETED | OUTPATIENT
Start: 2025-06-26 | End: 2025-06-26

## 2025-06-26 RX ORDER — HYDROCORTISONE SODIUM SUCCINATE 100 MG/2ML
100 INJECTION INTRAMUSCULAR; INTRAVENOUS
Status: CANCELLED | OUTPATIENT
Start: 2025-06-26

## 2025-06-26 RX ORDER — DIPHENHYDRAMINE HYDROCHLORIDE 50 MG/ML
50 INJECTION, SOLUTION INTRAMUSCULAR; INTRAVENOUS
Status: CANCELLED | OUTPATIENT
Start: 2025-06-26

## 2025-06-26 RX ORDER — ZOLEDRONIC ACID 0.05 MG/ML
5 INJECTION, SOLUTION INTRAVENOUS ONCE
Status: CANCELLED | OUTPATIENT
Start: 2025-06-26 | End: 2025-06-26

## 2025-06-26 RX ORDER — SODIUM CHLORIDE 9 MG/ML
5-250 INJECTION, SOLUTION INTRAVENOUS PRN
Status: CANCELLED | OUTPATIENT
Start: 2025-06-26

## 2025-06-26 RX ORDER — HEPARIN 100 UNIT/ML
500 SYRINGE INTRAVENOUS PRN
Status: CANCELLED | OUTPATIENT
Start: 2025-06-26

## 2025-06-26 RX ORDER — EPINEPHRINE 1 MG/ML
0.3 INJECTION, SOLUTION INTRAMUSCULAR; SUBCUTANEOUS PRN
Status: CANCELLED | OUTPATIENT
Start: 2025-06-26

## 2025-06-26 RX ADMIN — ZOLEDRONIC ACID 5 MG: 5 INJECTION, SOLUTION INTRAVENOUS at 13:17

## 2025-06-26 ASSESSMENT — PAIN DESCRIPTION - DESCRIPTORS: DESCRIPTORS: ACHING

## 2025-06-26 ASSESSMENT — PAIN - FUNCTIONAL ASSESSMENT: PAIN_FUNCTIONAL_ASSESSMENT: 0-10

## 2025-06-26 NOTE — PROGRESS NOTES
1300 Patient arrived to hospitals ambulatory for reclast infusion.  Oriented to room and call light  PT RIGHTS AND RESPONSIBILITIES OFFERED TO PT.    1317 Medication started and she denies complaints     1331 Medication completed and she denies complaints.  Iv removed.  Discharge instructions given and explained and she denies questions.  Discharged ambulatory       _M___ Safety:       (Environmental)  [place] to environment  Ensure ID band is correct and in place/ allergy band as needed  Assess for fall risk  Initiate fall precautions as applicable (fall band, side rails, etc.)  Call light within reach  Bed in low position/ wheels locked    _M___ Pain:       Assess pain level and characteristics  Administer analgesics as ordered  Assess effectiveness of pain management and report to MD as needed    _M___ Knowledge Deficit:  Assess baseline knowledge  Provide teaching at level of understanding  Provide teaching via preferred learning method  Evaluate teaching effectiveness    __M__ Hemodynamic/Respiratory Status:       (Pre and Post Procedure Monitoring)  Assess/Monitor vital signs and LOC  Assess Baseline SpO2 prior to any sedation  Obtain weight/height  Assess vital signs/ LOC until patient meets discharge criteria  Monitor procedure site and notify MD of any issues

## 2025-07-25 DIAGNOSIS — M19.072 OSTEOARTHRITIS OF BOTH FEET, UNSPECIFIED OSTEOARTHRITIS TYPE: ICD-10-CM

## 2025-07-25 DIAGNOSIS — M19.071 OSTEOARTHRITIS OF BOTH FEET, UNSPECIFIED OSTEOARTHRITIS TYPE: ICD-10-CM

## 2025-07-25 RX ORDER — HYDROXYCHLOROQUINE SULFATE 200 MG/1
300 TABLET, FILM COATED ORAL DAILY
Qty: 135 TABLET | Refills: 1 | OUTPATIENT
Start: 2025-07-25

## 2025-07-25 RX ORDER — HYDROXYCHLOROQUINE SULFATE 200 MG/1
300 TABLET, FILM COATED ORAL DAILY
Qty: 135 TABLET | Refills: 1 | Status: SHIPPED | OUTPATIENT
Start: 2025-07-25

## 2025-07-28 ENCOUNTER — OFFICE VISIT (OUTPATIENT)
Dept: PHYSICAL MEDICINE AND REHAB | Age: 85
End: 2025-07-28
Payer: MEDICARE

## 2025-07-28 VITALS
SYSTOLIC BLOOD PRESSURE: 134 MMHG | DIASTOLIC BLOOD PRESSURE: 82 MMHG | BODY MASS INDEX: 30.35 KG/M2 | HEIGHT: 62 IN | WEIGHT: 164.9 LBS

## 2025-07-28 DIAGNOSIS — Z65.8 PSYCHOSOCIAL STRESSORS: ICD-10-CM

## 2025-07-28 DIAGNOSIS — G89.4 CHRONIC PAIN SYNDROME: ICD-10-CM

## 2025-07-28 DIAGNOSIS — F43.21 SITUATIONAL DEPRESSION: ICD-10-CM

## 2025-07-28 DIAGNOSIS — Z98.890 HISTORY OF LUMBAR LAMINECTOMY: ICD-10-CM

## 2025-07-28 DIAGNOSIS — M54.50 CHRONIC BILATERAL LOW BACK PAIN WITHOUT SCIATICA: ICD-10-CM

## 2025-07-28 DIAGNOSIS — G89.29 CHRONIC BILATERAL LOW BACK PAIN WITHOUT SCIATICA: ICD-10-CM

## 2025-07-28 DIAGNOSIS — M47.816 LUMBAR FACET ARTHROPATHY: ICD-10-CM

## 2025-07-28 DIAGNOSIS — M47.816 LUMBAR SPONDYLOSIS: Primary | ICD-10-CM

## 2025-07-28 PROCEDURE — G8417 CALC BMI ABV UP PARAM F/U: HCPCS | Performed by: NURSE PRACTITIONER

## 2025-07-28 PROCEDURE — 1090F PRES/ABSN URINE INCON ASSESS: CPT | Performed by: NURSE PRACTITIONER

## 2025-07-28 PROCEDURE — 1159F MED LIST DOCD IN RCRD: CPT | Performed by: NURSE PRACTITIONER

## 2025-07-28 PROCEDURE — 1036F TOBACCO NON-USER: CPT | Performed by: NURSE PRACTITIONER

## 2025-07-28 PROCEDURE — G8399 PT W/DXA RESULTS DOCUMENT: HCPCS | Performed by: NURSE PRACTITIONER

## 2025-07-28 PROCEDURE — 3079F DIAST BP 80-89 MM HG: CPT | Performed by: NURSE PRACTITIONER

## 2025-07-28 PROCEDURE — 99214 OFFICE O/P EST MOD 30 MIN: CPT | Performed by: NURSE PRACTITIONER

## 2025-07-28 PROCEDURE — 1125F AMNT PAIN NOTED PAIN PRSNT: CPT | Performed by: NURSE PRACTITIONER

## 2025-07-28 PROCEDURE — G8427 DOCREV CUR MEDS BY ELIG CLIN: HCPCS | Performed by: NURSE PRACTITIONER

## 2025-07-28 PROCEDURE — 1123F ACP DISCUSS/DSCN MKR DOCD: CPT | Performed by: NURSE PRACTITIONER

## 2025-07-28 PROCEDURE — 3075F SYST BP GE 130 - 139MM HG: CPT | Performed by: NURSE PRACTITIONER

## 2025-07-28 ASSESSMENT — ENCOUNTER SYMPTOMS
RESPIRATORY NEGATIVE: 1
EYES NEGATIVE: 1
COLOR CHANGE: 0
BACK PAIN: 1
GASTROINTESTINAL NEGATIVE: 1

## 2025-07-28 NOTE — PROGRESS NOTES
[facility]  [address] [place] [address] [address]  [place] [id number]  Dept: [phone]  Dept Fax: [phone]  Loc: [phone]    Visit Date: 7/28/2025    Functionality Assessment/Goals Worksheet     On a scale of 0 (Does not Interfere) to 10 (Completely Interferes)     1.  Which number describes how during the past week pain has interfered with       the following:  A.  General Activity:  5  B.  Mood: 4  C.  Walking Ability:  5  D.  Normal Work (Includes both work outside the home and housework):  7  E.  Relations with Other People:   0  F.  Sleep:   3  G.  Enjoyment of Life:   0    2.  Patient Prefers to Take their Pain Medications:     []  On a regular basis   [x]  Only when necessary    []  Does not take pain medications    3.  What are the Patient's Goals/Expectations for Visiting Pain Management?     [x]  Learn about my pain    [x]  Receive Medication   [x]  Physical Therapy     []  Treat Depression   [x]  Receive Injections    []  Treat Sleep   []  Deal with Anxiety and Stress   []  Treat Opoid Dependence/Addiction   []  Other:      HPI:   Nelda J Brunner is a 84 y.o. female is here today for    Chief Complaint: Low back pain, right thumb pain     HPI   3 month follow up. Julianna continues to get intermittent low back pain with over activity. Denies any pain at this time but states if she is really active her back can really hurt at times. Pain is described as aching and dull pain and sometimes sharp pain. She feels that she continues to receive good relief from her bilateral L-facet RFA.      Resting helps reduce her pain when she gets it.     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 She continues to follow with Rheumatology. She had one injection in her thumb since last visit and states it did not help with Rheumatology      Continues arthritis tylenol for pain mainly, only uses

## 2025-07-31 ENCOUNTER — PATIENT MESSAGE (OUTPATIENT)
Dept: FAMILY MEDICINE CLINIC | Age: 85
End: 2025-07-31

## 2025-07-31 DIAGNOSIS — M19.072 OSTEOARTHRITIS OF BOTH FEET, UNSPECIFIED OSTEOARTHRITIS TYPE: ICD-10-CM

## 2025-07-31 DIAGNOSIS — M19.071 OSTEOARTHRITIS OF BOTH FEET, UNSPECIFIED OSTEOARTHRITIS TYPE: ICD-10-CM

## 2025-07-31 ASSESSMENT — LIFESTYLE VARIABLES
HAVE YOU EVER RECEIVED ALCOHOL OR OTHER DRUG ABUSE TREATMENT: NO
ALCOHOL_DAYS_PER_WEEK: 1
HISTORY_ALCOHOL_USE: NO
PAST THREE MONTHS WHAT IS THE LARGEST AMOUNT OF ALCOHOLIC DRINKS YOU HAVE CONSUMED IN ONE DAY: 2

## 2025-07-31 ASSESSMENT — PATIENT HEALTH QUESTIONNAIRE - PHQ9
3. TROUBLE FALLING OR STAYING ASLEEP: NOT AT ALL
SUM OF ALL RESPONSES TO PHQ QUESTIONS 1-9: 6
7. TROUBLE CONCENTRATING ON THINGS, SUCH AS READING THE NEWSPAPER OR WATCHING TELEVISION: SEVERAL DAYS
5. POOR APPETITE OR OVEREATING: SEVERAL DAYS
1. LITTLE INTEREST OR PLEASURE IN DOING THINGS: SEVERAL DAYS
6. FEELING BAD ABOUT YOURSELF - OR THAT YOU ARE A FAILURE OR HAVE LET YOURSELF OR YOUR FAMILY DOWN: SEVERAL DAYS
10. IF YOU CHECKED OFF ANY PROBLEMS, HOW DIFFICULT HAVE THESE PROBLEMS MADE IT FOR YOU TO DO YOUR WORK, TAKE CARE OF THINGS AT HOME, OR GET ALONG WITH OTHER PEOPLE: SOMEWHAT DIFFICULT
2. FEELING DOWN, DEPRESSED OR HOPELESS: SEVERAL DAYS
6. FEELING BAD ABOUT YOURSELF - OR THAT YOU ARE A FAILURE OR HAVE LET YOURSELF OR YOUR FAMILY DOWN: SEVERAL DAYS
SUM OF ALL RESPONSES TO PHQ QUESTIONS 1-9: 6
8. MOVING OR SPEAKING SO SLOWLY THAT OTHER PEOPLE COULD HAVE NOTICED. OR THE OPPOSITE, BEING SO FIGETY OR RESTLESS THAT YOU HAVE BEEN MOVING AROUND A LOT MORE THAN USUAL: NOT AT ALL
8. MOVING OR SPEAKING SO SLOWLY THAT OTHER PEOPLE COULD HAVE NOTICED. OR THE OPPOSITE - BEING SO FIDGETY OR RESTLESS THAT YOU HAVE BEEN MOVING AROUND A LOT MORE THAN USUAL: NOT AT ALL
SUM OF ALL RESPONSES TO PHQ QUESTIONS 1-9: 6
SUM OF ALL RESPONSES TO PHQ QUESTIONS 1-9: 6
4. FEELING TIRED OR HAVING LITTLE ENERGY: SEVERAL DAYS
10. IF YOU CHECKED OFF ANY PROBLEMS, HOW DIFFICULT HAVE THESE PROBLEMS MADE IT FOR YOU TO DO YOUR WORK, TAKE CARE OF THINGS AT HOME, OR GET ALONG WITH OTHER PEOPLE: SOMEWHAT DIFFICULT
2. FEELING DOWN, DEPRESSED OR HOPELESS: SEVERAL DAYS
7. TROUBLE CONCENTRATING ON THINGS, SUCH AS READING THE NEWSPAPER OR WATCHING TELEVISION: SEVERAL DAYS
9. THOUGHTS THAT YOU WOULD BE BETTER OFF DEAD, OR OF HURTING YOURSELF: NOT AT ALL
1. LITTLE INTEREST OR PLEASURE IN DOING THINGS: SEVERAL DAYS
5. POOR APPETITE OR OVEREATING: SEVERAL DAYS
SUM OF ALL RESPONSES TO PHQ QUESTIONS 1-9: 6
9. THOUGHTS THAT YOU WOULD BE BETTER OFF DEAD, OR OF HURTING YOURSELF: NOT AT ALL
3. TROUBLE FALLING OR STAYING ASLEEP: NOT AT ALL
4. FEELING TIRED OR HAVING LITTLE ENERGY: SEVERAL DAYS

## 2025-07-31 ASSESSMENT — ANXIETY QUESTIONNAIRES
7. FEELING AFRAID AS IF SOMETHING AWFUL MIGHT HAPPEN: NOT AT ALL
1. FEELING NERVOUS, ANXIOUS, OR ON EDGE: SEVERAL DAYS
4. TROUBLE RELAXING: SEVERAL DAYS
6. BECOMING EASILY ANNOYED OR IRRITABLE: NOT AT ALL
1. FEELING NERVOUS, ANXIOUS, OR ON EDGE: SEVERAL DAYS
5. BEING SO RESTLESS THAT IT IS HARD TO SIT STILL: SEVERAL DAYS
3. WORRYING TOO MUCH ABOUT DIFFERENT THINGS: SEVERAL DAYS
6. BECOMING EASILY ANNOYED OR IRRITABLE: NOT AT ALL
2. NOT BEING ABLE TO STOP OR CONTROL WORRYING: SEVERAL DAYS
3. WORRYING TOO MUCH ABOUT DIFFERENT THINGS: SEVERAL DAYS
5. BEING SO RESTLESS THAT IT IS HARD TO SIT STILL: SEVERAL DAYS
2. NOT BEING ABLE TO STOP OR CONTROL WORRYING: SEVERAL DAYS
4. TROUBLE RELAXING: SEVERAL DAYS
IF YOU CHECKED OFF ANY PROBLEMS ON THIS QUESTIONNAIRE, HOW DIFFICULT HAVE THESE PROBLEMS MADE IT FOR YOU TO DO YOUR WORK, TAKE CARE OF THINGS AT HOME, OR GET ALONG WITH OTHER PEOPLE: SOMEWHAT DIFFICULT
GAD7 TOTAL SCORE: 5
IF YOU CHECKED OFF ANY PROBLEMS ON THIS QUESTIONNAIRE, HOW DIFFICULT HAVE THESE PROBLEMS MADE IT FOR YOU TO DO YOUR WORK, TAKE CARE OF THINGS AT HOME, OR GET ALONG WITH OTHER PEOPLE: SOMEWHAT DIFFICULT
7. FEELING AFRAID AS IF SOMETHING AWFUL MIGHT HAPPEN: NOT AT ALL

## 2025-07-31 NOTE — TELEPHONE ENCOUNTER
Looked at patients record and it looks like medication was filled on 7/25/2025 by Lorena Munoz. Patient notified by telephone that prescriptions should be at pharmacy.

## 2025-08-05 ENCOUNTER — OFFICE VISIT (OUTPATIENT)
Age: 85
End: 2025-08-05
Payer: MEDICARE

## 2025-08-05 DIAGNOSIS — Z63.8 RELATIONSHIP PROBLEM WITH FAMILY MEMBER: Primary | ICD-10-CM

## 2025-08-05 PROCEDURE — 1036F TOBACCO NON-USER: CPT | Performed by: PSYCHOLOGIST

## 2025-08-05 PROCEDURE — 1123F ACP DISCUSS/DSCN MKR DOCD: CPT | Performed by: PSYCHOLOGIST

## 2025-08-05 PROCEDURE — 90791 PSYCH DIAGNOSTIC EVALUATION: CPT | Performed by: PSYCHOLOGIST

## 2025-08-05 SDOH — SOCIAL STABILITY - SOCIAL INSECURITY: OTHER SPECIFIED PROBLEMS RELATED TO PRIMARY SUPPORT GROUP: Z63.8

## 2025-08-06 ASSESSMENT — PATIENT HEALTH QUESTIONNAIRE - PHQ9
4. FEELING TIRED OR HAVING LITTLE ENERGY: SEVERAL DAYS
8. MOVING OR SPEAKING SO SLOWLY THAT OTHER PEOPLE COULD HAVE NOTICED. OR THE OPPOSITE, BEING SO FIGETY OR RESTLESS THAT YOU HAVE BEEN MOVING AROUND A LOT MORE THAN USUAL: NOT AT ALL
5. POOR APPETITE OR OVEREATING: SEVERAL DAYS
SUM OF ALL RESPONSES TO PHQ QUESTIONS 1-9: 6
1. LITTLE INTEREST OR PLEASURE IN DOING THINGS: SEVERAL DAYS
2. FEELING DOWN, DEPRESSED OR HOPELESS: SEVERAL DAYS
9. THOUGHTS THAT YOU WOULD BE BETTER OFF DEAD, OR OF HURTING YOURSELF: NOT AT ALL
10. IF YOU CHECKED OFF ANY PROBLEMS, HOW DIFFICULT HAVE THESE PROBLEMS MADE IT FOR YOU TO DO YOUR WORK, TAKE CARE OF THINGS AT HOME, OR GET ALONG WITH OTHER PEOPLE: NOT DIFFICULT AT ALL
3. TROUBLE FALLING OR STAYING ASLEEP: SEVERAL DAYS
SUM OF ALL RESPONSES TO PHQ QUESTIONS 1-9: 6
6. FEELING BAD ABOUT YOURSELF - OR THAT YOU ARE A FAILURE OR HAVE LET YOURSELF OR YOUR FAMILY DOWN: SEVERAL DAYS
SUM OF ALL RESPONSES TO PHQ QUESTIONS 1-9: 6
7. TROUBLE CONCENTRATING ON THINGS, SUCH AS READING THE NEWSPAPER OR WATCHING TELEVISION: NOT AT ALL
SUM OF ALL RESPONSES TO PHQ QUESTIONS 1-9: 6

## 2025-08-19 ENCOUNTER — OFFICE VISIT (OUTPATIENT)
Age: 85
End: 2025-08-19
Payer: MEDICARE

## 2025-08-19 DIAGNOSIS — Z63.8 RELATIONSHIP PROBLEM WITH FAMILY MEMBER: Primary | ICD-10-CM

## 2025-08-19 PROCEDURE — 1036F TOBACCO NON-USER: CPT | Performed by: PSYCHOLOGIST

## 2025-08-19 PROCEDURE — 1123F ACP DISCUSS/DSCN MKR DOCD: CPT | Performed by: PSYCHOLOGIST

## 2025-08-19 PROCEDURE — 90832 PSYTX W PT 30 MINUTES: CPT | Performed by: PSYCHOLOGIST

## 2025-08-19 SDOH — SOCIAL STABILITY - SOCIAL INSECURITY: OTHER SPECIFIED PROBLEMS RELATED TO PRIMARY SUPPORT GROUP: Z63.8

## 2025-08-19 ASSESSMENT — PATIENT HEALTH QUESTIONNAIRE - PHQ9
2. FEELING DOWN, DEPRESSED OR HOPELESS: SEVERAL DAYS
5. POOR APPETITE OR OVEREATING: NOT AT ALL
SUM OF ALL RESPONSES TO PHQ QUESTIONS 1-9: 4
6. FEELING BAD ABOUT YOURSELF - OR THAT YOU ARE A FAILURE OR HAVE LET YOURSELF OR YOUR FAMILY DOWN: SEVERAL DAYS
4. FEELING TIRED OR HAVING LITTLE ENERGY: SEVERAL DAYS
9. THOUGHTS THAT YOU WOULD BE BETTER OFF DEAD, OR OF HURTING YOURSELF: NOT AT ALL
SUM OF ALL RESPONSES TO PHQ QUESTIONS 1-9: 4
7. TROUBLE CONCENTRATING ON THINGS, SUCH AS READING THE NEWSPAPER OR WATCHING TELEVISION: NOT AT ALL
1. LITTLE INTEREST OR PLEASURE IN DOING THINGS: NOT AT ALL
3. TROUBLE FALLING OR STAYING ASLEEP: SEVERAL DAYS
10. IF YOU CHECKED OFF ANY PROBLEMS, HOW DIFFICULT HAVE THESE PROBLEMS MADE IT FOR YOU TO DO YOUR WORK, TAKE CARE OF THINGS AT HOME, OR GET ALONG WITH OTHER PEOPLE: SOMEWHAT DIFFICULT
SUM OF ALL RESPONSES TO PHQ QUESTIONS 1-9: 4
8. MOVING OR SPEAKING SO SLOWLY THAT OTHER PEOPLE COULD HAVE NOTICED. OR THE OPPOSITE, BEING SO FIGETY OR RESTLESS THAT YOU HAVE BEEN MOVING AROUND A LOT MORE THAN USUAL: NOT AT ALL
SUM OF ALL RESPONSES TO PHQ QUESTIONS 1-9: 4

## 2025-08-25 ENCOUNTER — PATIENT MESSAGE (OUTPATIENT)
Dept: FAMILY MEDICINE CLINIC | Age: 85
End: 2025-08-25

## 2025-08-28 ENCOUNTER — HOSPITAL ENCOUNTER (OUTPATIENT)
Dept: GENERAL RADIOLOGY | Age: 85
Discharge: HOME | End: 2025-08-28
Payer: MEDICARE

## 2025-08-28 ENCOUNTER — HOSPITAL ENCOUNTER (OUTPATIENT)
Age: 85
Discharge: HOME | End: 2025-08-28
Payer: MEDICARE

## 2025-08-28 ENCOUNTER — OFFICE VISIT (OUTPATIENT)
Dept: FAMILY MEDICINE CLINIC | Age: 85
End: 2025-08-28
Payer: MEDICARE

## 2025-08-28 ENCOUNTER — TELEPHONE (OUTPATIENT)
Dept: FAMILY MEDICINE CLINIC | Age: 85
End: 2025-08-28

## 2025-08-28 VITALS
RESPIRATION RATE: 16 BRPM | TEMPERATURE: 97.7 F | WEIGHT: 168.4 LBS | BODY MASS INDEX: 30.99 KG/M2 | OXYGEN SATURATION: 97 % | HEIGHT: 62 IN | SYSTOLIC BLOOD PRESSURE: 124 MMHG | DIASTOLIC BLOOD PRESSURE: 80 MMHG | HEART RATE: 71 BPM

## 2025-08-28 DIAGNOSIS — M25.551 RIGHT HIP PAIN: ICD-10-CM

## 2025-08-28 DIAGNOSIS — M25.551 RIGHT HIP PAIN: Primary | ICD-10-CM

## 2025-08-28 PROCEDURE — 1036F TOBACCO NON-USER: CPT | Performed by: FAMILY MEDICINE

## 2025-08-28 PROCEDURE — 3079F DIAST BP 80-89 MM HG: CPT | Performed by: FAMILY MEDICINE

## 2025-08-28 PROCEDURE — 99213 OFFICE O/P EST LOW 20 MIN: CPT | Performed by: FAMILY MEDICINE

## 2025-08-28 PROCEDURE — G8399 PT W/DXA RESULTS DOCUMENT: HCPCS | Performed by: FAMILY MEDICINE

## 2025-08-28 PROCEDURE — G8417 CALC BMI ABV UP PARAM F/U: HCPCS | Performed by: FAMILY MEDICINE

## 2025-08-28 PROCEDURE — 73502 X-RAY EXAM HIP UNI 2-3 VIEWS: CPT

## 2025-08-28 PROCEDURE — G8427 DOCREV CUR MEDS BY ELIG CLIN: HCPCS | Performed by: FAMILY MEDICINE

## 2025-08-28 PROCEDURE — 1123F ACP DISCUSS/DSCN MKR DOCD: CPT | Performed by: FAMILY MEDICINE

## 2025-08-28 PROCEDURE — 1160F RVW MEDS BY RX/DR IN RCRD: CPT | Performed by: FAMILY MEDICINE

## 2025-08-28 PROCEDURE — 1090F PRES/ABSN URINE INCON ASSESS: CPT | Performed by: FAMILY MEDICINE

## 2025-08-28 PROCEDURE — 1159F MED LIST DOCD IN RCRD: CPT | Performed by: FAMILY MEDICINE

## 2025-08-28 PROCEDURE — 3074F SYST BP LT 130 MM HG: CPT | Performed by: FAMILY MEDICINE

## 2025-08-28 RX ORDER — PREDNISONE 10 MG/1
TABLET ORAL
Qty: 30 TABLET | Refills: 0 | Status: SHIPPED | OUTPATIENT
Start: 2025-08-28 | End: 2025-09-13

## 2025-09-05 ENCOUNTER — TELEPHONE (OUTPATIENT)
Dept: FAMILY MEDICINE CLINIC | Age: 85
End: 2025-09-05

## 2025-09-05 DIAGNOSIS — R11.0 NAUSEA: Primary | ICD-10-CM

## 2025-09-05 RX ORDER — ONDANSETRON 4 MG/1
4 TABLET, FILM COATED ORAL 3 TIMES DAILY PRN
Qty: 30 TABLET | Refills: 0 | Status: ON HOLD | OUTPATIENT
Start: 2025-09-05 | End: 2025-09-15

## 2025-09-06 ENCOUNTER — HOSPITAL ENCOUNTER (OUTPATIENT)
Age: 85
Setting detail: OBSERVATION
End: 2025-09-06
Attending: STUDENT IN AN ORGANIZED HEALTH CARE EDUCATION/TRAINING PROGRAM
Payer: MEDICARE

## 2025-09-06 ENCOUNTER — APPOINTMENT (OUTPATIENT)
Dept: GENERAL RADIOLOGY | Age: 85
End: 2025-09-06
Payer: MEDICARE

## 2025-09-06 DIAGNOSIS — N17.9 AKI (ACUTE KIDNEY INJURY): Primary | ICD-10-CM

## 2025-09-06 PROBLEM — E87.1 HYPONATREMIA: Status: ACTIVE | Noted: 2025-09-06

## 2025-09-06 LAB
ALBUMIN SERPL BCG-MCNC: 3.4 G/DL (ref 3.4–4.9)
ALP SERPL-CCNC: 102 U/L (ref 38–126)
ALT SERPL W/O P-5'-P-CCNC: 21 U/L (ref 10–35)
ANION GAP SERPL CALC-SCNC: 13 MEQ/L (ref 8–16)
AST SERPL-CCNC: 26 U/L (ref 10–35)
BASOPHILS ABSOLUTE: 0 THOU/MM3 (ref 0–0.1)
BASOPHILS NFR BLD AUTO: 0.1 %
BILIRUB CONJ SERPL-MCNC: 0.3 MG/DL (ref 0–0.2)
BILIRUB SERPL-MCNC: 0.6 MG/DL (ref 0.3–1.2)
BUN SERPL-MCNC: 35 MG/DL (ref 8–23)
CALCIUM SERPL-MCNC: 8.2 MG/DL (ref 8.5–10.5)
CHLORIDE SERPL-SCNC: 98 MEQ/L (ref 98–111)
CO2 SERPL-SCNC: 22 MEQ/L (ref 22–29)
CREAT SERPL-MCNC: 1.9 MG/DL (ref 0.5–0.9)
DEPRECATED RDW RBC AUTO: 43.9 FL (ref 35–45)
EOSINOPHIL NFR BLD AUTO: 0.1 %
EOSINOPHILS ABSOLUTE: 0 THOU/MM3 (ref 0–0.4)
ERYTHROCYTE [DISTWIDTH] IN BLOOD BY AUTOMATED COUNT: 13.1 % (ref 11.5–14.5)
FERRITIN SERPL IA-MCNC: 295 NG/ML (ref 13–150)
FLUAV RNA RESP QL NAA+PROBE: NOT DETECTED
FLUBV RNA RESP QL NAA+PROBE: NOT DETECTED
FOLATE SERPL-MCNC: 10.2 NG/ML (ref 4.6–34.8)
GFR SERPL CREATININE-BSD FRML MDRD: 26 ML/MIN/1.73M2
GLUCOSE SERPL-MCNC: 158 MG/DL (ref 74–109)
HCT VFR BLD AUTO: 34.4 % (ref 37–47)
HGB BLD-MCNC: 11.1 GM/DL (ref 12–16)
IMM GRANULOCYTES # BLD AUTO: 0.06 THOU/MM3 (ref 0–0.07)
IMM GRANULOCYTES NFR BLD AUTO: 0.6 %
IRON SATN MFR SERPL: 5 % (ref 20–50)
IRON SERPL-MCNC: 10 UG/DL (ref 37–145)
LIPASE SERPL-CCNC: 27 U/L (ref 13–60)
LYMPHOCYTES ABSOLUTE: 0.4 THOU/MM3 (ref 1–4.8)
LYMPHOCYTES NFR BLD AUTO: 4.2 %
MAGNESIUM SERPL-MCNC: 2.2 MG/DL (ref 1.6–2.6)
MCH RBC QN AUTO: 29.8 PG (ref 26–33)
MCHC RBC AUTO-ENTMCNC: 32.3 GM/DL (ref 32.2–35.5)
MCV RBC AUTO: 92.2 FL (ref 81–99)
MONOCYTES ABSOLUTE: 0.9 THOU/MM3 (ref 0.4–1.3)
MONOCYTES NFR BLD AUTO: 9.5 %
NEUTROPHILS ABSOLUTE: 8.4 THOU/MM3 (ref 1.8–7.7)
NEUTROPHILS NFR BLD AUTO: 85.5 %
NRBC BLD AUTO-RTO: 0 /100 WBC
OSMOLALITY SERPL CALC.SUM OF ELEC: 277.7 MOSMOL/KG (ref 275–300)
PLATELET # BLD AUTO: 160 THOU/MM3 (ref 130–400)
PMV BLD AUTO: 10.2 FL (ref 9.4–12.4)
POTASSIUM SERPL-SCNC: 4.2 MEQ/L (ref 3.5–5.2)
PROT SERPL-MCNC: 6.4 G/DL (ref 6.4–8.3)
RBC # BLD AUTO: 3.73 MILL/MM3 (ref 4.2–5.4)
SARS-COV-2 RNA RESP QL NAA+PROBE: NOT DETECTED
SODIUM SERPL-SCNC: 133 MEQ/L (ref 135–145)
TIBC SERPL-MCNC: 186 UG/DL (ref 171–450)
VIT B12 SERPL-MCNC: 287 PG/ML (ref 232–1245)
WBC # BLD AUTO: 9.8 THOU/MM3 (ref 4.8–10.8)

## 2025-09-06 PROCEDURE — 82746 ASSAY OF FOLIC ACID SERUM: CPT

## 2025-09-06 PROCEDURE — 6370000000 HC RX 637 (ALT 250 FOR IP): Performed by: STUDENT IN AN ORGANIZED HEALTH CARE EDUCATION/TRAINING PROGRAM

## 2025-09-06 PROCEDURE — 83550 IRON BINDING TEST: CPT

## 2025-09-06 PROCEDURE — 82607 VITAMIN B-12: CPT

## 2025-09-06 PROCEDURE — 83540 ASSAY OF IRON: CPT

## 2025-09-06 PROCEDURE — 2500000003 HC RX 250 WO HCPCS

## 2025-09-06 PROCEDURE — 6360000002 HC RX W HCPCS: Performed by: STUDENT IN AN ORGANIZED HEALTH CARE EDUCATION/TRAINING PROGRAM

## 2025-09-06 PROCEDURE — 80053 COMPREHEN METABOLIC PANEL: CPT

## 2025-09-06 PROCEDURE — 6370000000 HC RX 637 (ALT 250 FOR IP)

## 2025-09-06 PROCEDURE — 83735 ASSAY OF MAGNESIUM: CPT

## 2025-09-06 PROCEDURE — 2580000003 HC RX 258

## 2025-09-06 PROCEDURE — 99223 1ST HOSP IP/OBS HIGH 75: CPT

## 2025-09-06 PROCEDURE — 6360000002 HC RX W HCPCS

## 2025-09-06 PROCEDURE — 83690 ASSAY OF LIPASE: CPT

## 2025-09-06 PROCEDURE — 82248 BILIRUBIN DIRECT: CPT

## 2025-09-06 PROCEDURE — 85025 COMPLETE CBC W/AUTO DIFF WBC: CPT

## 2025-09-06 PROCEDURE — G0378 HOSPITAL OBSERVATION PER HR: HCPCS

## 2025-09-06 PROCEDURE — 71046 X-RAY EXAM CHEST 2 VIEWS: CPT

## 2025-09-06 PROCEDURE — 87636 SARSCOV2 & INF A&B AMP PRB: CPT

## 2025-09-06 PROCEDURE — 82728 ASSAY OF FERRITIN: CPT

## 2025-09-06 PROCEDURE — 2580000003 HC RX 258: Performed by: STUDENT IN AN ORGANIZED HEALTH CARE EDUCATION/TRAINING PROGRAM

## 2025-09-06 RX ORDER — LISINOPRIL 10 MG/1
10 TABLET ORAL DAILY
Status: ACTIVE | OUTPATIENT
Start: 2025-09-06

## 2025-09-06 RX ORDER — HYDROCHLOROTHIAZIDE 12.5 MG/1
12.5 CAPSULE ORAL DAILY
Status: ACTIVE | OUTPATIENT
Start: 2025-09-06

## 2025-09-06 RX ORDER — PREDNISONE 10 MG/1
10 TABLET ORAL DAILY
Status: DISPENSED | OUTPATIENT
Start: 2025-09-08 | End: 2025-09-12

## 2025-09-06 RX ORDER — PREDNISONE 20 MG/1
20 TABLET ORAL DAILY
Status: COMPLETED | OUTPATIENT
Start: 2025-09-06 | End: 2025-09-07

## 2025-09-06 RX ORDER — 0.9 % SODIUM CHLORIDE 0.9 %
1000 INTRAVENOUS SOLUTION INTRAVENOUS ONCE
Status: COMPLETED | OUTPATIENT
Start: 2025-09-06 | End: 2025-09-06

## 2025-09-06 RX ORDER — SODIUM CHLORIDE 0.9 % (FLUSH) 0.9 %
10 SYRINGE (ML) INJECTION PRN
Status: ACTIVE | OUTPATIENT
Start: 2025-09-06

## 2025-09-06 RX ORDER — ONDANSETRON 2 MG/ML
4 INJECTION INTRAMUSCULAR; INTRAVENOUS ONCE
Status: COMPLETED | OUTPATIENT
Start: 2025-09-06 | End: 2025-09-06

## 2025-09-06 RX ORDER — SODIUM CHLORIDE 0.9 % (FLUSH) 0.9 %
10 SYRINGE (ML) INJECTION EVERY 12 HOURS SCHEDULED
Status: ACTIVE | OUTPATIENT
Start: 2025-09-06

## 2025-09-06 RX ORDER — LISINOPRIL AND HYDROCHLOROTHIAZIDE 10; 12.5 MG/1; MG/1
1 TABLET ORAL DAILY
Status: DISCONTINUED | OUTPATIENT
Start: 2025-09-06 | End: 2025-09-06 | Stop reason: ALTCHOICE

## 2025-09-06 RX ORDER — ACETAMINOPHEN 650 MG/1
650 SUPPOSITORY RECTAL EVERY 6 HOURS PRN
Status: ACTIVE | OUTPATIENT
Start: 2025-09-06

## 2025-09-06 RX ORDER — HYDROXYCHLOROQUINE SULFATE 200 MG/1
300 TABLET, FILM COATED ORAL DAILY
Status: DISPENSED | OUTPATIENT
Start: 2025-09-06

## 2025-09-06 RX ORDER — ONDANSETRON 2 MG/ML
4 INJECTION INTRAMUSCULAR; INTRAVENOUS EVERY 6 HOURS PRN
Status: DISPENSED | OUTPATIENT
Start: 2025-09-06

## 2025-09-06 RX ORDER — LISINOPRIL 2.5 MG/1
2.5 TABLET ORAL DAILY
Status: DISCONTINUED | OUTPATIENT
Start: 2025-09-06 | End: 2025-09-06

## 2025-09-06 RX ORDER — SODIUM CHLORIDE, SODIUM LACTATE, POTASSIUM CHLORIDE, CALCIUM CHLORIDE 600; 310; 30; 20 MG/100ML; MG/100ML; MG/100ML; MG/100ML
INJECTION, SOLUTION INTRAVENOUS CONTINUOUS
Status: DISCONTINUED | OUTPATIENT
Start: 2025-09-06 | End: 2025-09-07

## 2025-09-06 RX ORDER — ASCORBIC ACID 500 MG
500 TABLET ORAL DAILY
Status: DISCONTINUED | OUTPATIENT
Start: 2025-09-06 | End: 2025-09-06

## 2025-09-06 RX ORDER — ONDANSETRON 4 MG/1
4 TABLET, ORALLY DISINTEGRATING ORAL EVERY 8 HOURS PRN
Status: ACTIVE | OUTPATIENT
Start: 2025-09-06

## 2025-09-06 RX ORDER — ENOXAPARIN SODIUM 100 MG/ML
30 INJECTION SUBCUTANEOUS DAILY
Status: DISPENSED | OUTPATIENT
Start: 2025-09-06

## 2025-09-06 RX ORDER — LANOLIN ALCOHOL/MO/W.PET/CERES
1000 CREAM (GRAM) TOPICAL DAILY
Status: DISPENSED | OUTPATIENT
Start: 2025-09-06

## 2025-09-06 RX ORDER — HYDROCHLOROTHIAZIDE 12.5 MG/1
12.5 CAPSULE ORAL DAILY
Status: DISCONTINUED | OUTPATIENT
Start: 2025-09-06 | End: 2025-09-06

## 2025-09-06 RX ORDER — SODIUM CHLORIDE 9 MG/ML
INJECTION, SOLUTION INTRAVENOUS PRN
Status: ACTIVE | OUTPATIENT
Start: 2025-09-06

## 2025-09-06 RX ORDER — POLYETHYLENE GLYCOL 3350 17 G/17G
17 POWDER, FOR SOLUTION ORAL DAILY PRN
Status: ACTIVE | OUTPATIENT
Start: 2025-09-06

## 2025-09-06 RX ORDER — ACETAMINOPHEN 325 MG/1
650 TABLET ORAL EVERY 6 HOURS PRN
Status: DISPENSED | OUTPATIENT
Start: 2025-09-06

## 2025-09-06 RX ADMIN — SODIUM CHLORIDE 1000 ML: 0.9 INJECTION, SOLUTION INTRAVENOUS at 09:46

## 2025-09-06 RX ADMIN — ONDANSETRON 4 MG: 2 INJECTION, SOLUTION INTRAMUSCULAR; INTRAVENOUS at 09:44

## 2025-09-06 RX ADMIN — HYDROXYCHLOROQUINE SULFATE 300 MG: 200 TABLET ORAL at 17:16

## 2025-09-06 RX ADMIN — SODIUM CHLORIDE, PRESERVATIVE FREE 10 ML: 5 INJECTION INTRAVENOUS at 21:04

## 2025-09-06 RX ADMIN — ACETAMINOPHEN 650 MG: 325 TABLET ORAL at 15:16

## 2025-09-06 RX ADMIN — ONDANSETRON 4 MG: 2 INJECTION, SOLUTION INTRAMUSCULAR; INTRAVENOUS at 21:04

## 2025-09-06 RX ADMIN — ENOXAPARIN SODIUM 30 MG: 100 INJECTION SUBCUTANEOUS at 15:16

## 2025-09-06 RX ADMIN — Medication 1000 MCG: at 17:16

## 2025-09-06 RX ADMIN — PREDNISONE 20 MG: 20 TABLET ORAL at 17:15

## 2025-09-06 RX ADMIN — ONDANSETRON 4 MG: 2 INJECTION, SOLUTION INTRAMUSCULAR; INTRAVENOUS at 13:36

## 2025-09-06 RX ADMIN — LIDOCAINE HYDROCHLORIDE: 20 SOLUTION ORAL at 09:44

## 2025-09-06 RX ADMIN — SODIUM CHLORIDE, SODIUM LACTATE, POTASSIUM CHLORIDE, AND CALCIUM CHLORIDE: .6; .31; .03; .02 INJECTION, SOLUTION INTRAVENOUS at 13:02

## 2025-09-06 ASSESSMENT — PAIN - FUNCTIONAL ASSESSMENT
PAIN_FUNCTIONAL_ASSESSMENT: 0-10
PAIN_FUNCTIONAL_ASSESSMENT: ACTIVITIES ARE NOT PREVENTED
PAIN_FUNCTIONAL_ASSESSMENT: 0-10
PAIN_FUNCTIONAL_ASSESSMENT: ACTIVITIES ARE NOT PREVENTED

## 2025-09-06 ASSESSMENT — PAIN DESCRIPTION - ONSET: ONSET: ON-GOING

## 2025-09-06 ASSESSMENT — PAIN SCALES - GENERAL
PAINLEVEL_OUTOF10: 2
PAINLEVEL_OUTOF10: 6

## 2025-09-06 ASSESSMENT — PAIN DESCRIPTION - ORIENTATION: ORIENTATION: MID

## 2025-09-06 ASSESSMENT — PAIN DESCRIPTION - DESCRIPTORS
DESCRIPTORS: ACHING
DESCRIPTORS: ACHING

## 2025-09-06 ASSESSMENT — PAIN DESCRIPTION - FREQUENCY
FREQUENCY: INTERMITTENT
FREQUENCY: CONTINUOUS

## 2025-09-06 ASSESSMENT — PAIN DESCRIPTION - PAIN TYPE
TYPE: ACUTE PAIN
TYPE: ACUTE PAIN

## 2025-09-06 ASSESSMENT — PAIN DESCRIPTION - LOCATION
LOCATION: ABDOMEN
LOCATION: GENERALIZED

## 2025-09-07 ENCOUNTER — APPOINTMENT (OUTPATIENT)
Dept: CT IMAGING | Age: 85
End: 2025-09-07
Payer: MEDICARE

## 2025-09-07 VITALS
TEMPERATURE: 99 F | OXYGEN SATURATION: 96 % | WEIGHT: 165.12 LBS | RESPIRATION RATE: 16 BRPM | HEIGHT: 62 IN | BODY MASS INDEX: 30.39 KG/M2 | HEART RATE: 64 BPM | DIASTOLIC BLOOD PRESSURE: 92 MMHG | SYSTOLIC BLOOD PRESSURE: 120 MMHG

## 2025-09-07 LAB
ANION GAP SERPL CALC-SCNC: 10 MEQ/L (ref 8–16)
BACTERIA URNS QL MICRO: ABNORMAL /HPF
BILIRUB UR QL STRIP.AUTO: NEGATIVE
BUN SERPL-MCNC: 28 MG/DL (ref 8–23)
CA-I BLD ISE-SCNC: 1.06 MMOL/L (ref 1.12–1.32)
CALCIUM SERPL-MCNC: 7.8 MG/DL (ref 8.5–10.5)
CASTS #/AREA URNS LPF: ABNORMAL /LPF
CASTS 2: ABNORMAL /LPF
CHARACTER UR: ABNORMAL
CHLORIDE SERPL-SCNC: 99 MEQ/L (ref 98–111)
CO2 SERPL-SCNC: 23 MEQ/L (ref 22–29)
COLOR, UA: YELLOW
CREAT SERPL-MCNC: 1.6 MG/DL (ref 0.5–0.9)
CRYSTALS URNS MICRO: ABNORMAL
EKG ATRIAL RATE: 277 BPM
EKG ATRIAL RATE: 64 BPM
EKG P AXIS: 66 DEGREES
EKG P AXIS: 85 DEGREES
EKG P-R INTERVAL: 150 MS
EKG Q-T INTERVAL: 374 MS
EKG Q-T INTERVAL: 398 MS
EKG QRS DURATION: 76 MS
EKG QRS DURATION: 78 MS
EKG QTC CALCULATION (BAZETT): 410 MS
EKG QTC CALCULATION (BAZETT): 417 MS
EKG R AXIS: 12 DEGREES
EKG R AXIS: 5 DEGREES
EKG T AXIS: 49 DEGREES
EKG T AXIS: 54 DEGREES
EKG VENTRICULAR RATE: 64 BPM
EKG VENTRICULAR RATE: 75 BPM
EPITHELIAL CELLS, UA: ABNORMAL /HPF
GFR SERPL CREATININE-BSD FRML MDRD: 32 ML/MIN/1.73M2
GLUCOSE SERPL-MCNC: 125 MG/DL (ref 74–109)
GLUCOSE UR QL STRIP.AUTO: NEGATIVE MG/DL
HGB UR QL STRIP.AUTO: ABNORMAL
KETONES UR QL STRIP.AUTO: ABNORMAL
MISCELLANEOUS 2: ABNORMAL
NITRITE UR QL STRIP: POSITIVE
PH UR STRIP.AUTO: 6 [PH] (ref 5–9)
POTASSIUM SERPL-SCNC: 4.5 MEQ/L (ref 3.5–5.2)
PROT UR STRIP.AUTO-MCNC: 100 MG/DL
RBC URINE: ABNORMAL /HPF
RENAL EPI CELLS #/AREA URNS HPF: ABNORMAL /[HPF]
SODIUM SERPL-SCNC: 132 MEQ/L (ref 135–145)
SP GR UR REFRACT.AUTO: 1.02 (ref 1–1.03)
UROBILINOGEN, URINE: 1 EU/DL (ref 0–1)
WBC #/AREA URNS HPF: > 100 /HPF
WBC #/AREA URNS HPF: ABNORMAL /[HPF]
YEAST LIKE FUNGI URNS QL MICRO: ABNORMAL

## 2025-09-07 PROCEDURE — G0378 HOSPITAL OBSERVATION PER HR: HCPCS

## 2025-09-07 PROCEDURE — 82330 ASSAY OF CALCIUM: CPT

## 2025-09-07 PROCEDURE — 6360000002 HC RX W HCPCS

## 2025-09-07 PROCEDURE — 2580000003 HC RX 258

## 2025-09-07 PROCEDURE — 2500000003 HC RX 250 WO HCPCS: Performed by: NURSE PRACTITIONER

## 2025-09-07 PROCEDURE — 6370000000 HC RX 637 (ALT 250 FOR IP): Performed by: NURSE PRACTITIONER

## 2025-09-07 PROCEDURE — 6360000002 HC RX W HCPCS: Performed by: NURSE PRACTITIONER

## 2025-09-07 PROCEDURE — 81001 URINALYSIS AUTO W/SCOPE: CPT

## 2025-09-07 PROCEDURE — 2500000003 HC RX 250 WO HCPCS

## 2025-09-07 PROCEDURE — 80048 BASIC METABOLIC PNL TOTAL CA: CPT

## 2025-09-07 PROCEDURE — 6370000000 HC RX 637 (ALT 250 FOR IP)

## 2025-09-07 PROCEDURE — 74176 CT ABD & PELVIS W/O CONTRAST: CPT

## 2025-09-07 PROCEDURE — 87086 URINE CULTURE/COLONY COUNT: CPT

## 2025-09-07 PROCEDURE — 94761 N-INVAS EAR/PLS OXIMETRY MLT: CPT

## 2025-09-07 RX ORDER — FAMOTIDINE 20 MG/1
10 TABLET, FILM COATED ORAL 2 TIMES DAILY
Status: DISPENSED | OUTPATIENT
Start: 2025-09-07

## 2025-09-07 RX ORDER — PROMETHAZINE HYDROCHLORIDE 25 MG/1
12.5 TABLET ORAL EVERY 6 HOURS PRN
Status: ACTIVE | OUTPATIENT
Start: 2025-09-07

## 2025-09-07 RX ADMIN — ENOXAPARIN SODIUM 30 MG: 100 INJECTION SUBCUTANEOUS at 09:02

## 2025-09-07 RX ADMIN — FAMOTIDINE 10 MG: 20 TABLET, FILM COATED ORAL at 20:21

## 2025-09-07 RX ADMIN — SODIUM CHLORIDE, PRESERVATIVE FREE 10 ML: 5 INJECTION INTRAVENOUS at 20:20

## 2025-09-07 RX ADMIN — WATER 2000 MG: 1 INJECTION INTRAMUSCULAR; INTRAVENOUS; SUBCUTANEOUS at 20:20

## 2025-09-07 RX ADMIN — HYDROXYCHLOROQUINE SULFATE 300 MG: 200 TABLET ORAL at 09:01

## 2025-09-07 RX ADMIN — Medication 1000 MCG: at 09:02

## 2025-09-07 RX ADMIN — FAMOTIDINE 10 MG: 20 TABLET, FILM COATED ORAL at 11:20

## 2025-09-07 RX ADMIN — SODIUM CHLORIDE, SODIUM LACTATE, POTASSIUM CHLORIDE, AND CALCIUM CHLORIDE: .6; .31; .03; .02 INJECTION, SOLUTION INTRAVENOUS at 02:26

## 2025-09-07 RX ADMIN — ONDANSETRON 4 MG: 2 INJECTION, SOLUTION INTRAMUSCULAR; INTRAVENOUS at 04:59

## 2025-09-07 RX ADMIN — PREDNISONE 20 MG: 20 TABLET ORAL at 09:02

## 2025-09-07 RX ADMIN — SODIUM CHLORIDE, SODIUM LACTATE, POTASSIUM CHLORIDE, AND CALCIUM CHLORIDE: .6; .31; .03; .02 INJECTION, SOLUTION INTRAVENOUS at 15:32

## 2025-09-07 ASSESSMENT — PAIN SCALES - GENERAL: PAINLEVEL_OUTOF10: 0

## (undated) DEVICE — SYRINGE MED 10ML LUERLOCK TIP W/O SFTY DISP

## (undated) DEVICE — TOWEL,OR,DSP,ST,BLUE,STD,4/PK,20PK/CS: Brand: MEDLINE

## (undated) DEVICE — NEEDLE SYR 18GA L1.5IN RED PLAS HUB S STL BLNT FILL W/O

## (undated) DEVICE — MARKER,SKIN,WI/RULER AND LABELS: Brand: MEDLINE

## (undated) DEVICE — ENDO KIT: Brand: MEDLINE INDUSTRIES, INC.

## (undated) DEVICE — CATHETER ETER IV 22GA L1IN POLYUR STR RADPQ INTROCAN SFTY

## (undated) DEVICE — SYRINGE MED 5ML STD CLR PLAS LUERLOCK TIP N CTRL DISP

## (undated) DEVICE — SHEET,DRAPE,3/4,53X77,STERILE: Brand: MEDLINE

## (undated) DEVICE — GLOVE ORANGE PI 7   MSG9070

## (undated) DEVICE — CHLORAPREP 26ML CLEAR

## (undated) DEVICE — GAUZE SPONGES,USP TYPE VII GAUZE, 12 PLY: Brand: CURITY

## (undated) DEVICE — NEEDLE SPNL 22GA L3.5IN BLK HUB S STL REG WALL FIT STYL W/

## (undated) DEVICE — HYPODERMIC SAFETY NEEDLE: Brand: MAGELLAN

## (undated) DEVICE — GLOVE ORANGE PI 7 1/2   MSG9075

## (undated) DEVICE — GLOVE SURG SZ 65 THK91MIL LTX FREE SYN POLYISOPRENE

## (undated) DEVICE — 6 ML SYRINGE LUER-LOCK TIP: Brand: MONOJECT

## (undated) DEVICE — CONNECTOR TBNG AUX H2O JET DISP FOR OLY 160/180 SER

## (undated) DEVICE — LABEL MED DRUG CUST

## (undated) DEVICE — TUBING IV STOPCOCK 48 CM 3 W

## (undated) DEVICE — SHEET,DRAPE,53X77,STERILE: Brand: MEDLINE

## (undated) DEVICE — GAUZE,SPONGE,4"X4",12PLY,STERILE,LF,2'S: Brand: MEDLINE

## (undated) DEVICE — SOLUTION IV 1000ML 0.45% SOD CHL PH 5 INJ USP VIAFLX PLAS

## (undated) DEVICE — SYRINGE MED 3ML CLR PLAS STD N CTRL LUERLOCK TIP DISP

## (undated) DEVICE — SET ADMIN 25ML L117IN PMP MOD CK VLV RLER CLMP 2 SMRTSITE

## (undated) DEVICE — SET LNR RED GRN W/ BASE CLEANASCOPE

## (undated) DEVICE — IV START KIT: Brand: MEDLINE INDUSTRIES, INC.